# Patient Record
Sex: FEMALE | Race: BLACK OR AFRICAN AMERICAN | NOT HISPANIC OR LATINO | Employment: OTHER | URBAN - METROPOLITAN AREA
[De-identification: names, ages, dates, MRNs, and addresses within clinical notes are randomized per-mention and may not be internally consistent; named-entity substitution may affect disease eponyms.]

---

## 2017-01-13 ENCOUNTER — APPOINTMENT (OUTPATIENT)
Dept: ONCOLOGY | Facility: HOSPITAL | Age: 82
End: 2017-01-13

## 2017-01-13 ENCOUNTER — APPOINTMENT (OUTPATIENT)
Dept: LAB | Facility: HOSPITAL | Age: 82
End: 2017-01-13

## 2017-01-17 ENCOUNTER — LAB (OUTPATIENT)
Dept: LAB | Facility: HOSPITAL | Age: 82
End: 2017-01-17

## 2017-01-17 ENCOUNTER — INFUSION (OUTPATIENT)
Dept: ONCOLOGY | Facility: HOSPITAL | Age: 82
End: 2017-01-17

## 2017-01-17 VITALS — BODY MASS INDEX: 38.57 KG/M2 | WEIGHT: 199 LBS

## 2017-01-17 DIAGNOSIS — D50.9 IRON DEFICIENCY ANEMIA, UNSPECIFIED IRON DEFICIENCY ANEMIA TYPE: ICD-10-CM

## 2017-01-17 DIAGNOSIS — C50.211 MALIGNANT NEOPLASM OF UPPER-INNER QUADRANT OF RIGHT FEMALE BREAST (HCC): ICD-10-CM

## 2017-01-17 DIAGNOSIS — N18.30 ANEMIA OF CHRONIC RENAL FAILURE, STAGE 3 (MODERATE) (HCC): ICD-10-CM

## 2017-01-17 DIAGNOSIS — D63.1 ANEMIA OF CHRONIC RENAL FAILURE, STAGE 3 (MODERATE) (HCC): Primary | ICD-10-CM

## 2017-01-17 DIAGNOSIS — N18.30 ANEMIA OF CHRONIC RENAL FAILURE, STAGE 3 (MODERATE) (HCC): Primary | ICD-10-CM

## 2017-01-17 DIAGNOSIS — D63.1 ANEMIA OF CHRONIC RENAL FAILURE, STAGE 3 (MODERATE) (HCC): ICD-10-CM

## 2017-01-17 LAB
BASOPHILS # BLD AUTO: 0.01 10*3/MM3 (ref 0–0.1)
BASOPHILS NFR BLD AUTO: 0.2 % (ref 0–1.1)
DEPRECATED RDW RBC AUTO: 43.6 FL (ref 37–49)
EOSINOPHIL # BLD AUTO: 0 10*3/MM3 (ref 0–0.36)
EOSINOPHIL NFR BLD AUTO: 0 % (ref 1–5)
ERYTHROCYTE [DISTWIDTH] IN BLOOD BY AUTOMATED COUNT: 13.6 % (ref 11.7–14.5)
FERRITIN SERPL-MCNC: 592.1 NG/ML
HCT VFR BLD AUTO: 31.3 % (ref 34–45)
HGB BLD-MCNC: 9.6 G/DL (ref 11.5–14.9)
IMM GRANULOCYTES # BLD: 0.02 10*3/MM3 (ref 0–0.03)
IMM GRANULOCYTES NFR BLD: 0.3 % (ref 0–0.5)
IRON 24H UR-MRATE: 32 MCG/DL (ref 37–145)
IRON SATN MFR SERPL: 12 % (ref 14–48)
LYMPHOCYTES # BLD AUTO: 1.32 10*3/MM3 (ref 1–3.5)
LYMPHOCYTES NFR BLD AUTO: 20.8 % (ref 20–49)
MCH RBC QN AUTO: 26.9 PG (ref 27–33)
MCHC RBC AUTO-ENTMCNC: 30.7 G/DL (ref 32–35)
MCV RBC AUTO: 87.7 FL (ref 83–97)
MONOCYTES # BLD AUTO: 0.54 10*3/MM3 (ref 0.25–0.8)
MONOCYTES NFR BLD AUTO: 8.5 % (ref 4–12)
NEUTROPHILS # BLD AUTO: 4.45 10*3/MM3 (ref 1.5–7)
NEUTROPHILS NFR BLD AUTO: 70.2 % (ref 39–75)
NRBC BLD MANUAL-RTO: 0 /100 WBC (ref 0–0)
PLATELET # BLD AUTO: 215 10*3/MM3 (ref 150–375)
PMV BLD AUTO: 9.2 FL (ref 8.9–12.1)
RBC # BLD AUTO: 3.57 10*6/MM3 (ref 3.9–5)
TIBC SERPL-MCNC: 258 MCG/DL (ref 249–505)
TRANSFERRIN SERPL-MCNC: 184 MG/DL (ref 200–360)
WBC NRBC COR # BLD: 6.34 10*3/MM3 (ref 4–10)

## 2017-01-17 PROCEDURE — 82728 ASSAY OF FERRITIN: CPT | Performed by: INTERNAL MEDICINE

## 2017-01-17 PROCEDURE — 36415 COLL VENOUS BLD VENIPUNCTURE: CPT | Performed by: INTERNAL MEDICINE

## 2017-01-17 PROCEDURE — 83540 ASSAY OF IRON: CPT | Performed by: INTERNAL MEDICINE

## 2017-01-17 PROCEDURE — 84466 ASSAY OF TRANSFERRIN: CPT | Performed by: INTERNAL MEDICINE

## 2017-01-17 PROCEDURE — 85025 COMPLETE CBC W/AUTO DIFF WBC: CPT | Performed by: INTERNAL MEDICINE

## 2017-01-17 PROCEDURE — 25010000002 EPOETIN ALFA PER 1000 UNITS: Performed by: INTERNAL MEDICINE

## 2017-01-17 PROCEDURE — 96372 THER/PROPH/DIAG INJ SC/IM: CPT

## 2017-01-17 RX ADMIN — ERYTHROPOIETIN 14000 UNITS: 20000 INJECTION, SOLUTION INTRAVENOUS; SUBCUTANEOUS at 16:48

## 2017-02-03 ENCOUNTER — LAB (OUTPATIENT)
Dept: LAB | Facility: HOSPITAL | Age: 82
End: 2017-02-03

## 2017-02-03 ENCOUNTER — INFUSION (OUTPATIENT)
Dept: ONCOLOGY | Facility: HOSPITAL | Age: 82
End: 2017-02-03

## 2017-02-03 DIAGNOSIS — D63.1 ANEMIA OF CHRONIC RENAL FAILURE, STAGE 3 (MODERATE) (HCC): Primary | ICD-10-CM

## 2017-02-03 DIAGNOSIS — C50.211 MALIGNANT NEOPLASM OF UPPER-INNER QUADRANT OF RIGHT FEMALE BREAST (HCC): ICD-10-CM

## 2017-02-03 DIAGNOSIS — D50.9 IRON DEFICIENCY ANEMIA, UNSPECIFIED IRON DEFICIENCY ANEMIA TYPE: ICD-10-CM

## 2017-02-03 DIAGNOSIS — N18.30 ANEMIA OF CHRONIC RENAL FAILURE, STAGE 3 (MODERATE) (HCC): Primary | ICD-10-CM

## 2017-02-03 DIAGNOSIS — N18.30 ANEMIA OF CHRONIC RENAL FAILURE, STAGE 3 (MODERATE) (HCC): ICD-10-CM

## 2017-02-03 DIAGNOSIS — D63.1 ANEMIA OF CHRONIC RENAL FAILURE, STAGE 3 (MODERATE) (HCC): ICD-10-CM

## 2017-02-03 LAB
BASOPHILS # BLD AUTO: 0.01 10*3/MM3 (ref 0–0.1)
BASOPHILS NFR BLD AUTO: 0.2 % (ref 0–1.1)
DEPRECATED RDW RBC AUTO: 43.8 FL (ref 37–49)
EOSINOPHIL # BLD AUTO: 0 10*3/MM3 (ref 0–0.36)
EOSINOPHIL NFR BLD AUTO: 0 % (ref 1–5)
ERYTHROCYTE [DISTWIDTH] IN BLOOD BY AUTOMATED COUNT: 14.1 % (ref 11.7–14.5)
HCT VFR BLD AUTO: 30.7 % (ref 34–45)
HGB BLD-MCNC: 9.8 G/DL (ref 11.5–14.9)
IMM GRANULOCYTES # BLD: 0.04 10*3/MM3 (ref 0–0.03)
IMM GRANULOCYTES NFR BLD: 0.6 % (ref 0–0.5)
LYMPHOCYTES # BLD AUTO: 1.31 10*3/MM3 (ref 1–3.5)
LYMPHOCYTES NFR BLD AUTO: 21.1 % (ref 20–49)
MCH RBC QN AUTO: 27.1 PG (ref 27–33)
MCHC RBC AUTO-ENTMCNC: 31.9 G/DL (ref 32–35)
MCV RBC AUTO: 85 FL (ref 83–97)
MONOCYTES # BLD AUTO: 0.81 10*3/MM3 (ref 0.25–0.8)
MONOCYTES NFR BLD AUTO: 13 % (ref 4–12)
NEUTROPHILS # BLD AUTO: 4.04 10*3/MM3 (ref 1.5–7)
NEUTROPHILS NFR BLD AUTO: 65.1 % (ref 39–75)
NRBC BLD MANUAL-RTO: 0 /100 WBC (ref 0–0)
PLATELET # BLD AUTO: 201 10*3/MM3 (ref 150–375)
PMV BLD AUTO: 10.7 FL (ref 8.9–12.1)
RBC # BLD AUTO: 3.61 10*6/MM3 (ref 3.9–5)
WBC NRBC COR # BLD: 6.21 10*3/MM3 (ref 4–10)

## 2017-02-03 PROCEDURE — 25010000002 EPOETIN ALFA PER 1000 UNITS: Performed by: INTERNAL MEDICINE

## 2017-02-03 PROCEDURE — 96372 THER/PROPH/DIAG INJ SC/IM: CPT

## 2017-02-03 PROCEDURE — 85025 COMPLETE CBC W/AUTO DIFF WBC: CPT | Performed by: INTERNAL MEDICINE

## 2017-02-03 PROCEDURE — 36416 COLLJ CAPILLARY BLOOD SPEC: CPT | Performed by: INTERNAL MEDICINE

## 2017-02-03 RX ADMIN — ERYTHROPOIETIN 14000 UNITS: 20000 INJECTION, SOLUTION INTRAVENOUS; SUBCUTANEOUS at 16:12

## 2017-02-24 ENCOUNTER — LAB (OUTPATIENT)
Dept: LAB | Facility: HOSPITAL | Age: 82
End: 2017-02-24

## 2017-02-24 ENCOUNTER — INFUSION (OUTPATIENT)
Dept: ONCOLOGY | Facility: HOSPITAL | Age: 82
End: 2017-02-24

## 2017-02-24 VITALS — BODY MASS INDEX: 37.79 KG/M2 | WEIGHT: 195 LBS

## 2017-02-24 DIAGNOSIS — D50.9 IRON DEFICIENCY ANEMIA, UNSPECIFIED IRON DEFICIENCY ANEMIA TYPE: ICD-10-CM

## 2017-02-24 DIAGNOSIS — D63.1 ANEMIA ASSOCIATED WITH CHRONIC RENAL FAILURE, STAGE 3 (MODERATE): ICD-10-CM

## 2017-02-24 DIAGNOSIS — N18.3 ANEMIA ASSOCIATED WITH CHRONIC RENAL FAILURE, STAGE 3 (MODERATE): ICD-10-CM

## 2017-02-24 DIAGNOSIS — N18.3 ANEMIA ASSOCIATED WITH CHRONIC RENAL FAILURE, STAGE 3 (MODERATE): Primary | ICD-10-CM

## 2017-02-24 DIAGNOSIS — D63.1 ANEMIA OF CHRONIC RENAL FAILURE, STAGE 3 (MODERATE) (HCC): ICD-10-CM

## 2017-02-24 DIAGNOSIS — N18.30 ANEMIA OF CHRONIC RENAL FAILURE, STAGE 3 (MODERATE) (HCC): ICD-10-CM

## 2017-02-24 DIAGNOSIS — C50.211 MALIGNANT NEOPLASM OF UPPER-INNER QUADRANT OF RIGHT FEMALE BREAST (HCC): ICD-10-CM

## 2017-02-24 DIAGNOSIS — D63.1 ANEMIA ASSOCIATED WITH CHRONIC RENAL FAILURE, STAGE 3 (MODERATE): Primary | ICD-10-CM

## 2017-02-24 LAB
ALBUMIN SERPL-MCNC: 4 G/DL (ref 3.5–5.2)
ALBUMIN/GLOB SERPL: 1.2 G/DL (ref 1.1–2.4)
ALP SERPL-CCNC: 92 U/L (ref 38–116)
ALT SERPL W P-5'-P-CCNC: 17 U/L (ref 0–33)
ANION GAP SERPL CALCULATED.3IONS-SCNC: 10.9 MMOL/L
AST SERPL-CCNC: 20 U/L (ref 0–32)
BASOPHILS # BLD AUTO: 0.01 10*3/MM3 (ref 0–0.1)
BASOPHILS NFR BLD AUTO: 0.2 % (ref 0–1.1)
BILIRUB SERPL-MCNC: 0.2 MG/DL (ref 0.1–1.2)
BUN BLD-MCNC: 27 MG/DL (ref 6–20)
BUN/CREAT SERPL: 19.1 (ref 7.3–30)
CALCIUM SPEC-SCNC: 10.1 MG/DL (ref 8.5–10.2)
CHLORIDE SERPL-SCNC: 100 MMOL/L (ref 98–107)
CO2 SERPL-SCNC: 33.1 MMOL/L (ref 22–29)
CREAT BLD-MCNC: 1.41 MG/DL (ref 0.6–1.1)
DEPRECATED RDW RBC AUTO: 43.8 FL (ref 37–49)
EOSINOPHIL # BLD AUTO: 0 10*3/MM3 (ref 0–0.36)
EOSINOPHIL NFR BLD AUTO: 0 % (ref 1–5)
ERYTHROCYTE [DISTWIDTH] IN BLOOD BY AUTOMATED COUNT: 13.6 % (ref 11.7–14.5)
FERRITIN SERPL-MCNC: 599.5 NG/ML
GFR SERPL CREATININE-BSD FRML MDRD: 43 ML/MIN/1.73
GLOBULIN UR ELPH-MCNC: 3.3 GM/DL (ref 1.8–3.5)
GLUCOSE BLD-MCNC: 231 MG/DL (ref 74–124)
HCT VFR BLD AUTO: 30.3 % (ref 34–45)
HGB BLD-MCNC: 9.3 G/DL (ref 11.5–14.9)
HGB RETIC QN: 31 PG (ref 29.8–36.1)
IMM GRANULOCYTES # BLD: 0.02 10*3/MM3 (ref 0–0.03)
IMM GRANULOCYTES NFR BLD: 0.3 % (ref 0–0.5)
IMM RETICS NFR: 10.8 % (ref 3–15.8)
IRON 24H UR-MRATE: 48 MCG/DL (ref 37–145)
IRON SATN MFR SERPL: 20 % (ref 14–48)
LYMPHOCYTES # BLD AUTO: 1.39 10*3/MM3 (ref 1–3.5)
LYMPHOCYTES NFR BLD AUTO: 21.3 % (ref 20–49)
MCH RBC QN AUTO: 27.2 PG (ref 27–33)
MCHC RBC AUTO-ENTMCNC: 30.7 G/DL (ref 32–35)
MCV RBC AUTO: 88.6 FL (ref 83–97)
MONOCYTES # BLD AUTO: 0.54 10*3/MM3 (ref 0.25–0.8)
MONOCYTES NFR BLD AUTO: 8.3 % (ref 4–12)
NEUTROPHILS # BLD AUTO: 4.58 10*3/MM3 (ref 1.5–7)
NEUTROPHILS NFR BLD AUTO: 69.9 % (ref 39–75)
NRBC BLD MANUAL-RTO: 0 /100 WBC (ref 0–0)
PLATELET # BLD AUTO: 186 10*3/MM3 (ref 150–375)
PMV BLD AUTO: 9.1 FL (ref 8.9–12.1)
POTASSIUM BLD-SCNC: 4.4 MMOL/L (ref 3.5–4.7)
PROT SERPL-MCNC: 7.3 G/DL (ref 6.3–8)
RBC # BLD AUTO: 3.42 10*6/MM3 (ref 3.9–5)
RETICS/RBC NFR AUTO: 1.78 % (ref 0.6–2)
SODIUM BLD-SCNC: 144 MMOL/L (ref 134–145)
TIBC SERPL-MCNC: 245 MCG/DL (ref 249–505)
TRANSFERRIN SERPL-MCNC: 175 MG/DL (ref 200–360)
WBC NRBC COR # BLD: 6.54 10*3/MM3 (ref 4–10)

## 2017-02-24 PROCEDURE — 85025 COMPLETE CBC W/AUTO DIFF WBC: CPT | Performed by: INTERNAL MEDICINE

## 2017-02-24 PROCEDURE — 36415 COLL VENOUS BLD VENIPUNCTURE: CPT | Performed by: INTERNAL MEDICINE

## 2017-02-24 PROCEDURE — 82728 ASSAY OF FERRITIN: CPT | Performed by: INTERNAL MEDICINE

## 2017-02-24 PROCEDURE — 85046 RETICYTE/HGB CONCENTRATE: CPT | Performed by: INTERNAL MEDICINE

## 2017-02-24 PROCEDURE — 63510000001 EPOETIN ALFA PER 1000 UNITS: Performed by: INTERNAL MEDICINE

## 2017-02-24 PROCEDURE — 84466 ASSAY OF TRANSFERRIN: CPT | Performed by: INTERNAL MEDICINE

## 2017-02-24 PROCEDURE — 80053 COMPREHEN METABOLIC PANEL: CPT | Performed by: INTERNAL MEDICINE

## 2017-02-24 PROCEDURE — 96372 THER/PROPH/DIAG INJ SC/IM: CPT

## 2017-02-24 PROCEDURE — 83540 ASSAY OF IRON: CPT | Performed by: INTERNAL MEDICINE

## 2017-02-24 RX ADMIN — ERYTHROPOIETIN 14000 UNITS: 20000 INJECTION, SOLUTION INTRAVENOUS; SUBCUTANEOUS at 16:17

## 2017-03-17 ENCOUNTER — LAB (OUTPATIENT)
Dept: LAB | Facility: HOSPITAL | Age: 82
End: 2017-03-17

## 2017-03-17 ENCOUNTER — INFUSION (OUTPATIENT)
Dept: ONCOLOGY | Facility: HOSPITAL | Age: 82
End: 2017-03-17

## 2017-03-17 DIAGNOSIS — N18.30 ANEMIA OF CHRONIC RENAL FAILURE, STAGE 3 (MODERATE) (HCC): Primary | ICD-10-CM

## 2017-03-17 DIAGNOSIS — D63.1 ANEMIA OF CHRONIC RENAL FAILURE, STAGE 3 (MODERATE) (HCC): Primary | ICD-10-CM

## 2017-03-17 DIAGNOSIS — C50.211 MALIGNANT NEOPLASM OF UPPER-INNER QUADRANT OF RIGHT FEMALE BREAST (HCC): ICD-10-CM

## 2017-03-17 DIAGNOSIS — D50.9 IRON DEFICIENCY ANEMIA, UNSPECIFIED IRON DEFICIENCY ANEMIA TYPE: ICD-10-CM

## 2017-03-17 LAB
BASOPHILS # BLD AUTO: 0.02 10*3/MM3 (ref 0–0.1)
BASOPHILS NFR BLD AUTO: 0.3 % (ref 0–1.1)
DEPRECATED RDW RBC AUTO: 43.8 FL (ref 37–49)
EOSINOPHIL # BLD AUTO: 0 10*3/MM3 (ref 0–0.36)
EOSINOPHIL NFR BLD AUTO: 0 % (ref 1–5)
ERYTHROCYTE [DISTWIDTH] IN BLOOD BY AUTOMATED COUNT: 13.3 % (ref 11.7–14.5)
FERRITIN SERPL-MCNC: 545.9 NG/ML
HCT VFR BLD AUTO: 30.8 % (ref 34–45)
HGB BLD-MCNC: 8.9 G/DL (ref 11.5–14.9)
HGB RETIC QN: 31.2 PG (ref 29.8–36.1)
IMM GRANULOCYTES # BLD: 0.01 10*3/MM3 (ref 0–0.03)
IMM GRANULOCYTES NFR BLD: 0.2 % (ref 0–0.5)
IMM RETICS NFR: 10.8 % (ref 3–15.8)
IRON 24H UR-MRATE: 40 MCG/DL (ref 37–145)
IRON SATN MFR SERPL: 17 % (ref 14–48)
LYMPHOCYTES # BLD AUTO: 1.45 10*3/MM3 (ref 1–3.5)
LYMPHOCYTES NFR BLD AUTO: 23.5 % (ref 20–49)
MCH RBC QN AUTO: 26.3 PG (ref 27–33)
MCHC RBC AUTO-ENTMCNC: 28.9 G/DL (ref 32–35)
MCV RBC AUTO: 91.1 FL (ref 83–97)
MONOCYTES # BLD AUTO: 0.61 10*3/MM3 (ref 0.25–0.8)
MONOCYTES NFR BLD AUTO: 9.9 % (ref 4–12)
NEUTROPHILS # BLD AUTO: 4.08 10*3/MM3 (ref 1.5–7)
NEUTROPHILS NFR BLD AUTO: 66.1 % (ref 39–75)
NRBC BLD MANUAL-RTO: 0 /100 WBC (ref 0–0)
PLATELET # BLD AUTO: 239 10*3/MM3 (ref 150–375)
PMV BLD AUTO: 9.7 FL (ref 8.9–12.1)
RBC # BLD AUTO: 3.38 10*6/MM3 (ref 3.9–5)
RETICS/RBC NFR AUTO: 2.17 % (ref 0.6–2)
TIBC SERPL-MCNC: 238 MCG/DL (ref 249–505)
TRANSFERRIN SERPL-MCNC: 170 MG/DL (ref 200–360)
WBC NRBC COR # BLD: 6.17 10*3/MM3 (ref 4–10)

## 2017-03-17 PROCEDURE — 63510000001 EPOETIN ALFA PER 1000 UNITS: Performed by: INTERNAL MEDICINE

## 2017-03-17 PROCEDURE — 85025 COMPLETE CBC W/AUTO DIFF WBC: CPT | Performed by: INTERNAL MEDICINE

## 2017-03-17 PROCEDURE — 83540 ASSAY OF IRON: CPT | Performed by: INTERNAL MEDICINE

## 2017-03-17 PROCEDURE — 84466 ASSAY OF TRANSFERRIN: CPT | Performed by: INTERNAL MEDICINE

## 2017-03-17 PROCEDURE — 85046 RETICYTE/HGB CONCENTRATE: CPT | Performed by: INTERNAL MEDICINE

## 2017-03-17 PROCEDURE — 96372 THER/PROPH/DIAG INJ SC/IM: CPT

## 2017-03-17 PROCEDURE — 36415 COLL VENOUS BLD VENIPUNCTURE: CPT | Performed by: INTERNAL MEDICINE

## 2017-03-17 PROCEDURE — 82728 ASSAY OF FERRITIN: CPT | Performed by: INTERNAL MEDICINE

## 2017-03-17 RX ADMIN — ERYTHROPOIETIN 14000 UNITS: 20000 INJECTION, SOLUTION INTRAVENOUS; SUBCUTANEOUS at 16:17

## 2017-03-31 RX ORDER — ANASTROZOLE 1 MG/1
TABLET ORAL
Qty: 30 TABLET | Refills: 5 | Status: SHIPPED | OUTPATIENT
Start: 2017-03-31 | End: 2017-09-28 | Stop reason: SDUPTHER

## 2017-04-10 ENCOUNTER — OFFICE VISIT (OUTPATIENT)
Dept: ONCOLOGY | Facility: CLINIC | Age: 82
End: 2017-04-10

## 2017-04-10 ENCOUNTER — INFUSION (OUTPATIENT)
Dept: ONCOLOGY | Facility: HOSPITAL | Age: 82
End: 2017-04-10

## 2017-04-10 ENCOUNTER — LAB (OUTPATIENT)
Dept: LAB | Facility: HOSPITAL | Age: 82
End: 2017-04-10

## 2017-04-10 VITALS
DIASTOLIC BLOOD PRESSURE: 80 MMHG | SYSTOLIC BLOOD PRESSURE: 120 MMHG | HEIGHT: 60 IN | WEIGHT: 192.2 LBS | RESPIRATION RATE: 16 BRPM | HEART RATE: 82 BPM | BODY MASS INDEX: 37.73 KG/M2 | TEMPERATURE: 98.6 F

## 2017-04-10 DIAGNOSIS — N18.30 ANEMIA OF CHRONIC RENAL FAILURE, STAGE 3 (MODERATE) (HCC): ICD-10-CM

## 2017-04-10 DIAGNOSIS — D63.1 ANEMIA OF CHRONIC RENAL FAILURE, STAGE 3 (MODERATE) (HCC): ICD-10-CM

## 2017-04-10 DIAGNOSIS — C50.211 MALIGNANT NEOPLASM OF UPPER-INNER QUADRANT OF RIGHT FEMALE BREAST (HCC): Primary | ICD-10-CM

## 2017-04-10 DIAGNOSIS — D50.9 IRON DEFICIENCY ANEMIA, UNSPECIFIED IRON DEFICIENCY ANEMIA TYPE: ICD-10-CM

## 2017-04-10 DIAGNOSIS — N18.30 ANEMIA OF CHRONIC RENAL FAILURE, STAGE 3 (MODERATE) (HCC): Primary | ICD-10-CM

## 2017-04-10 DIAGNOSIS — D63.1 ANEMIA OF CHRONIC RENAL FAILURE, STAGE 3 (MODERATE) (HCC): Primary | ICD-10-CM

## 2017-04-10 LAB
BASOPHILS # BLD AUTO: 0.03 10*3/MM3 (ref 0–0.1)
BASOPHILS NFR BLD AUTO: 0.5 % (ref 0–1.1)
DEPRECATED RDW RBC AUTO: 41.2 FL (ref 37–49)
EOSINOPHIL # BLD AUTO: 0 10*3/MM3 (ref 0–0.36)
EOSINOPHIL NFR BLD AUTO: 0 % (ref 1–5)
ERYTHROCYTE [DISTWIDTH] IN BLOOD BY AUTOMATED COUNT: 13 % (ref 11.7–14.5)
HCT VFR BLD AUTO: 31.3 % (ref 34–45)
HGB BLD-MCNC: 9.8 G/DL (ref 11.5–14.9)
IMM GRANULOCYTES # BLD: 0.04 10*3/MM3 (ref 0–0.03)
IMM GRANULOCYTES NFR BLD: 0.7 % (ref 0–0.5)
LYMPHOCYTES # BLD AUTO: 1.37 10*3/MM3 (ref 1–3.5)
LYMPHOCYTES NFR BLD AUTO: 24.3 % (ref 20–49)
MCH RBC QN AUTO: 27.4 PG (ref 27–33)
MCHC RBC AUTO-ENTMCNC: 31.3 G/DL (ref 32–35)
MCV RBC AUTO: 87.4 FL (ref 83–97)
MONOCYTES # BLD AUTO: 0.53 10*3/MM3 (ref 0.25–0.8)
MONOCYTES NFR BLD AUTO: 9.4 % (ref 4–12)
NEUTROPHILS # BLD AUTO: 3.66 10*3/MM3 (ref 1.5–7)
NEUTROPHILS NFR BLD AUTO: 65.1 % (ref 39–75)
NRBC BLD MANUAL-RTO: 0 /100 WBC (ref 0–0)
PLATELET # BLD AUTO: 242 10*3/MM3 (ref 150–375)
PMV BLD AUTO: 9.7 FL (ref 8.9–12.1)
RBC # BLD AUTO: 3.58 10*6/MM3 (ref 3.9–5)
WBC NRBC COR # BLD: 5.63 10*3/MM3 (ref 4–10)

## 2017-04-10 PROCEDURE — 36415 COLL VENOUS BLD VENIPUNCTURE: CPT

## 2017-04-10 PROCEDURE — 85025 COMPLETE CBC W/AUTO DIFF WBC: CPT

## 2017-04-10 PROCEDURE — 99214 OFFICE O/P EST MOD 30 MIN: CPT | Performed by: INTERNAL MEDICINE

## 2017-04-10 PROCEDURE — 63510000001 EPOETIN ALFA PER 1000 UNITS: Performed by: INTERNAL MEDICINE

## 2017-04-10 PROCEDURE — 96372 THER/PROPH/DIAG INJ SC/IM: CPT

## 2017-04-10 RX ADMIN — ERYTHROPOIETIN 14000 UNITS: 20000 INJECTION, SOLUTION INTRAVENOUS; SUBCUTANEOUS at 16:12

## 2017-04-10 NOTE — PROGRESS NOTES
Subjective .     REASONS FOR FOLLOWUP:  Breast cancer, anemia    HISTORY OF PRESENT ILLNESS:  The patient is a 81 y.o. year old female  who is here for follow-up with the above-mentioned history.    Denies chest pain.  Denies shortness of air.  Denies dizziness.  Denies weakness.  Denies bleeding from any location.  Denies hot flashes.    Past Medical History:   Diagnosis Date   • Anemia     Iron deficiency anemia    • Anxiety    • Arthritis    • Breast cancer    • Chronic kidney disease     Anemia of chronic renal insufficency, stage 3   • Depression    • Diabetes mellitus    • Hypertension    • YARY (obstructive sleep apnea)    • Poor circulation    • Stroke        HEMATOLOGIC/ONCOLOGIC HISTORY:  (History from previous dates can be found in the separate document.)    MEDICATIONS    Current Outpatient Prescriptions:   •  amLODIPine (NORVASC) 10 MG tablet, Take 1 tablet by mouth daily., Disp: , Rfl:   •  anastrozole (ARIMIDEX) 1 MG tablet, take 1 tablet by mouth once daily, Disp: 30 tablet, Rfl: 5  •  aspirin 81 MG tablet, Take 81 mg by mouth., Disp: , Rfl:   •  busPIRone (BUSPAR) 15 MG tablet, Take 1 tablet by mouth every morning. And 2 tablets in the evening, Disp: , Rfl:   •  calcium carbonate-vitamin d (CALCIUM 600+D) 600-400 MG-UNIT per tablet, Take 1 tablet by mouth., Disp: , Rfl:   •  clopidogrel (PLAVIX) 75 MG tablet, take 1 tablet by mouth once daily, Disp: , Rfl:   •  cyanocobalamin (VITAMIN B-12) 1000 MCG tablet, Take 100 mcg by mouth., Disp: , Rfl:   •  doxepin (SINEquan) 50 MG capsule, take 1 tablet by mouth three times a day at bedtime, Disp: , Rfl: 0  •  epoetin joe (EPOGEN,PROCRIT) 45672 UNIT/ML injection, Epogen SOLN; Patient Sig: Epogen SOLN 5,000u if hgb is below 12   subq injectable; 0; 22-Jul-2015; Active, Disp: , Rfl:   •  FERROUS SULFATE PO, Take  by mouth., Disp: , Rfl:   •  furosemide (LASIX) 40 MG tablet, Take 1 tablet by mouth daily., Disp: , Rfl:   •  glucose blood test strip, TRUEtest  Test In Vitro Strip; Patient Sig: TRUEtest Test In Vitro Strip ; 200; 0; 27-May-2014; Active, Disp: , Rfl:   •  HYDROcodone-acetaminophen (NORCO) 5-325 MG per tablet, Take 1 tablet by mouth every 6 (six) hours as needed. For pain, Disp: , Rfl:   •  insulin aspart (NovoLOG) 100 UNIT/ML injection, Inject 8 Units under the skin Medrol Dose Pack scheduling ONLY., Disp: , Rfl:   •  Insulin Glargine 100 UNIT/ML solution pen-injector, Inject 20 Units under the skin daily., Disp: , Rfl:   •  ipratropium-albuterol (DUONEB) 0.5-2.5 mg/mL nebulizer, Inhale 3 mL 3 (Three) Times a Day., Disp: , Rfl:   •  lansoprazole (PREVACID) 30 MG capsule, Take 1 capsule by mouth daily., Disp: , Rfl:   •  LANTUS 100 UNIT/ML injection, , Disp: , Rfl: 0  •  levETIRAcetam (KEPPRA) 500 MG tablet, Take 1 tablet by mouth 2 (two) times a day., Disp: , Rfl:   •  losartan (COZAAR) 100 MG tablet, Take 1 tablet by mouth daily., Disp: , Rfl:   •  meclizine (ANTIVERT) 25 MG tablet, take 1 tablet by mouth three times a day if needed for dizziness, Disp: , Rfl: 0  •  metoclopramide (REGLAN) 10 MG tablet, Take 1 tablet by mouth daily. Before a meal, Disp: , Rfl:   •  Multiple Vitamins-Minerals (MULTIVITAL PO), Take 1 tablet by mouth daily., Disp: , Rfl:   •  pravastatin (PRAVACHOL) 40 MG tablet, Take 1 tablet by mouth nightly., Disp: , Rfl:   •  temazepam (RESTORIL) 30 MG capsule, Take 1 capsule by mouth nightly as needed. At bedtime, Disp: , Rfl:     ALLERGIES:     Allergies   Allergen Reactions   • Sulfa Antibiotics Other (See Comments)     GI upset   • Penicillins Itching and Rash       SOCIAL HISTORY:       Social History     Social History   • Marital status:      Spouse name: N/A   • Number of children: N/A   • Years of education: High School     Occupational History   • Nurse aid Retired     Social History Main Topics   • Smoking status: Never Smoker   • Smokeless tobacco: Never Used   • Alcohol use No   • Drug use: No   • Sexual activity: Not  "on file     Other Topics Concern   • Not on file     Social History Narrative         FAMILY HISTORY:  Family History   Problem Relation Age of Onset   • Cancer Father    • Cancer Brother    • Diabetes Brother    • Heart disease Brother    • Cancer Other        REVIEW OF SYSTEMS:  GENERAL: No change in appetite or weight;   No fevers, chills, sweats.    SKIN: No nonhealing lesions.   No rashes.  HEME/LYMPH: No easy bruising, bleeding.   No swollen nodes.   EYES: No vision changes or diplopia.   ENT: No tinnitus, hearing loss, gum bleeding, epistaxis, hoarseness or dysphagia.   RESPIRATORY: No cough, shortness of breath, hemoptysis or wheezing.   CVS: No chest pain, palpitations, orthopnea, dyspnea on exertion or PND.   GI: No melena or hematochezia.   No abdominal pain.  No nausea, vomiting, constipation, diarrhea  : No lower tract obstructive symptoms, dysuria or hematuria.   MUSCULOSKELETAL: No bone pain.  No joint stiffness.   NEUROLOGICAL: No global weakness, loss of consciousness or seizures.   PSYCHIATRIC: No increased nervousness, mood changes or depression.          Objective    Vitals:    04/10/17 1544   BP: 120/80   Pulse: 82   Resp: 16   Temp: 98.6 °F (37 °C)   Weight: 192 lb 3.2 oz (87.2 kg)   Height: 60.23\" (153 cm)   PainSc: 0-No pain     Current Status 4/10/2017   ECOG score 0      PHYSICAL EXAM:    GENERAL:  Well-developed, well-nourished in no acute distress.   SKIN:  Warm, dry without rashes, purpura or petechiae.  HEAD:  Normocephalic.  EYES:  Pupils equal, round and reactive to light.  EOMs intact.  Conjunctivae normal.  EARS:  Hearing intact.  NOSE:  Septum midline.  No excoriations or nasal discharge.  MOUTH:  Tongue is well-papillated; no stomatitis or ulcers.  Lips normal.  THROAT:  Oropharynx without lesions or exudates.  NECK:  Supple with good range of motion; no thyromegaly or masses, no JVD.  LYMPHATICS:  No cervical, supraclavicular, axillary or inguinal adenopathy.  CHEST:  Lungs " clear to percussion and auscultation. Good airflow.  CARDIAC:  Regular rate and rhythm without murmurs, rubs or gallops. Normal S1,S2.  ABDOMEN:  Soft, nontender with no organomegaly or masses.  EXTREMITIES:  No clubbing, cyanosis or edema.  NEUROLOGICAL:  Cranial Nerves II-XII grossly intact.  No focal neurological deficits.  PSYCHIATRIC:  Normal affect and mood.      RECENT LABS:        WBC   Date/Time Value Ref Range Status   04/10/2017 03:34 PM 5.63 4.00 - 10.00 10*3/mm3 Final     Hemoglobin   Date/Time Value Ref Range Status   04/10/2017 03:34 PM 9.8 (L) 11.5 - 14.9 g/dL Final     Platelets   Date/Time Value Ref Range Status   04/10/2017 03:34  150 - 375 10*3/mm3 Final       Assessment/Plan   ASSESSMENT:  Malignant neoplasm of upper-inner quadrant of right female breast  - CBC & Differential  - CBC & Differential  - CBC & Differential  - CBC & Differential  - Ferritin  - Iron Profile    Anemia of chronic renal failure, stage 3 (moderate)  - CBC & Differential  - CBC & Differential  - CBC & Differential  - CBC & Differential  - Ferritin  - Iron Profile    Iron deficiency anemia, unspecified iron deficiency anemia type  - CBC & Differential  - CBC & Differential  - CBC & Differential  - CBC & Differential  - Ferritin  - Iron Profile    1.  Breast cancer.  Right-sided invasive ductal carcinoma, papillary type, removed with lumpectomy on 08/26/2014 by Dr. Sung Griggs. Only 1.7 mm of remaining invasive carcinoma. Node negative. Completed radiation.   Arimidex 10/17/2014 until planned 10/17/2019. Tolerating Arimidex well.     2.  Bone health. Normal bone density on DEXA scan 9/23/16. Calcium carbonate 600 mg with vitamin D once daily, changed to calcium citrate 1000 mg with vitamin D when Arimidex was started (she is also on lansoprazole).     3.  Anemia of chronic renal insufficiency, stage 3.   Procrit p.r.n. for hemoglobin less than 10.   On the late March 2016 visit, changed from every 5 week CBC to every  2 week CBC due to a fall in hemoglobin.  She has not needed much Procrit on the every 3 week interval she is currently on.  However, family prefers to maintain 3 week interval checks.    4.  Iron deficiency anemia. History of Venofer and Feraheme use. Does not respond well to p.o. iron but is taking ferrous sulfate 1 tablet 3 times per day without any problems (she will continue this).   In May 2015, she received one dose of Feraheme due to an iron percent saturation of around 10% and MCV of 82.6 despite an elevated ferritin. I think her ferritin is chronically high because of inflammation).   Recent iron labs: Unremarkable     PLAN:  · CBC every 3 week.    · Procrit if hemoglobin less than 10.    · M.D. visit in 12 weeks with iron studies 3 weeks prior  · Next DEXA scan due sometime after 9/23/18  · She states Dr. Plascencia does her breast exams and orders her mammograms and she is up-to-date.    · Continue Arimidex calcium and vitamin D.

## 2017-05-01 ENCOUNTER — INFUSION (OUTPATIENT)
Dept: ONCOLOGY | Facility: HOSPITAL | Age: 82
End: 2017-05-01

## 2017-05-01 ENCOUNTER — LAB (OUTPATIENT)
Dept: LAB | Facility: HOSPITAL | Age: 82
End: 2017-05-01

## 2017-05-01 VITALS — WEIGHT: 192 LBS | BODY MASS INDEX: 37.21 KG/M2

## 2017-05-01 DIAGNOSIS — D63.1 ANEMIA OF CHRONIC RENAL FAILURE, STAGE 3 (MODERATE) (HCC): Primary | ICD-10-CM

## 2017-05-01 DIAGNOSIS — N18.30 ANEMIA OF CHRONIC RENAL FAILURE, STAGE 3 (MODERATE) (HCC): Primary | ICD-10-CM

## 2017-05-01 DIAGNOSIS — D63.1 ANEMIA OF CHRONIC RENAL FAILURE, STAGE 3 (MODERATE) (HCC): ICD-10-CM

## 2017-05-01 DIAGNOSIS — C50.211 MALIGNANT NEOPLASM OF UPPER-INNER QUADRANT OF RIGHT FEMALE BREAST (HCC): ICD-10-CM

## 2017-05-01 DIAGNOSIS — N18.30 ANEMIA OF CHRONIC RENAL FAILURE, STAGE 3 (MODERATE) (HCC): ICD-10-CM

## 2017-05-01 DIAGNOSIS — D50.9 IRON DEFICIENCY ANEMIA, UNSPECIFIED IRON DEFICIENCY ANEMIA TYPE: ICD-10-CM

## 2017-05-01 LAB
BASOPHILS # BLD AUTO: 0.02 10*3/MM3 (ref 0–0.1)
BASOPHILS NFR BLD AUTO: 0.3 % (ref 0–1.1)
DEPRECATED RDW RBC AUTO: 41.5 FL (ref 37–49)
EOSINOPHIL # BLD AUTO: 0 10*3/MM3 (ref 0–0.36)
EOSINOPHIL NFR BLD AUTO: 0 % (ref 1–5)
ERYTHROCYTE [DISTWIDTH] IN BLOOD BY AUTOMATED COUNT: 12.9 % (ref 11.7–14.5)
HCT VFR BLD AUTO: 30.7 % (ref 34–45)
HGB BLD-MCNC: 9.7 G/DL (ref 11.5–14.9)
IMM GRANULOCYTES # BLD: 0.03 10*3/MM3 (ref 0–0.03)
IMM GRANULOCYTES NFR BLD: 0.4 % (ref 0–0.5)
LYMPHOCYTES # BLD AUTO: 1.17 10*3/MM3 (ref 1–3.5)
LYMPHOCYTES NFR BLD AUTO: 14.9 % (ref 20–49)
MCH RBC QN AUTO: 28 PG (ref 27–33)
MCHC RBC AUTO-ENTMCNC: 31.6 G/DL (ref 32–35)
MCV RBC AUTO: 88.5 FL (ref 83–97)
MONOCYTES # BLD AUTO: 0.58 10*3/MM3 (ref 0.25–0.8)
MONOCYTES NFR BLD AUTO: 7.4 % (ref 4–12)
NEUTROPHILS # BLD AUTO: 6.04 10*3/MM3 (ref 1.5–7)
NEUTROPHILS NFR BLD AUTO: 77 % (ref 39–75)
NRBC BLD MANUAL-RTO: 0 /100 WBC (ref 0–0)
PLATELET # BLD AUTO: 215 10*3/MM3 (ref 150–375)
PMV BLD AUTO: 10.5 FL (ref 8.9–12.1)
RBC # BLD AUTO: 3.47 10*6/MM3 (ref 3.9–5)
WBC NRBC COR # BLD: 7.84 10*3/MM3 (ref 4–10)

## 2017-05-01 PROCEDURE — 63510000001 EPOETIN ALFA PER 1000 UNITS: Performed by: INTERNAL MEDICINE

## 2017-05-01 PROCEDURE — 85025 COMPLETE CBC W/AUTO DIFF WBC: CPT | Performed by: INTERNAL MEDICINE

## 2017-05-01 PROCEDURE — 96372 THER/PROPH/DIAG INJ SC/IM: CPT

## 2017-05-01 PROCEDURE — 36416 COLLJ CAPILLARY BLOOD SPEC: CPT | Performed by: INTERNAL MEDICINE

## 2017-05-01 RX ADMIN — ERYTHROPOIETIN 14000 UNITS: 20000 INJECTION, SOLUTION INTRAVENOUS; SUBCUTANEOUS at 15:45

## 2017-05-22 ENCOUNTER — APPOINTMENT (OUTPATIENT)
Dept: ONCOLOGY | Facility: HOSPITAL | Age: 82
End: 2017-05-22

## 2017-05-22 ENCOUNTER — APPOINTMENT (OUTPATIENT)
Dept: LAB | Facility: HOSPITAL | Age: 82
End: 2017-05-22

## 2017-05-30 ENCOUNTER — LAB (OUTPATIENT)
Dept: LAB | Facility: HOSPITAL | Age: 82
End: 2017-05-30

## 2017-05-30 ENCOUNTER — INFUSION (OUTPATIENT)
Dept: ONCOLOGY | Facility: HOSPITAL | Age: 82
End: 2017-05-30

## 2017-05-30 DIAGNOSIS — D63.1 ANEMIA OF CHRONIC RENAL FAILURE, STAGE 3 (MODERATE) (HCC): ICD-10-CM

## 2017-05-30 DIAGNOSIS — N18.30 ANEMIA OF CHRONIC RENAL FAILURE, STAGE 3 (MODERATE) (HCC): ICD-10-CM

## 2017-05-30 LAB
BASOPHILS # BLD AUTO: 0.03 10*3/MM3 (ref 0–0.1)
BASOPHILS NFR BLD AUTO: 0.3 % (ref 0–1.1)
DEPRECATED RDW RBC AUTO: 41.6 FL (ref 37–49)
EOSINOPHIL # BLD AUTO: 0 10*3/MM3 (ref 0–0.36)
EOSINOPHIL NFR BLD AUTO: 0 % (ref 1–5)
ERYTHROCYTE [DISTWIDTH] IN BLOOD BY AUTOMATED COUNT: 12.8 % (ref 11.7–14.5)
HCT VFR BLD AUTO: 32.1 % (ref 34–45)
HGB BLD-MCNC: 10 G/DL (ref 11.5–14.9)
IMM GRANULOCYTES # BLD: 0.06 10*3/MM3 (ref 0–0.03)
IMM GRANULOCYTES NFR BLD: 0.7 % (ref 0–0.5)
LYMPHOCYTES # BLD AUTO: 2.34 10*3/MM3 (ref 1–3.5)
LYMPHOCYTES NFR BLD AUTO: 26.7 % (ref 20–49)
MCH RBC QN AUTO: 27.6 PG (ref 27–33)
MCHC RBC AUTO-ENTMCNC: 31.2 G/DL (ref 32–35)
MCV RBC AUTO: 88.7 FL (ref 83–97)
MONOCYTES # BLD AUTO: 0.81 10*3/MM3 (ref 0.25–0.8)
MONOCYTES NFR BLD AUTO: 9.2 % (ref 4–12)
NEUTROPHILS # BLD AUTO: 5.53 10*3/MM3 (ref 1.5–7)
NEUTROPHILS NFR BLD AUTO: 63.1 % (ref 39–75)
NRBC BLD MANUAL-RTO: 0 /100 WBC (ref 0–0)
PLATELET # BLD AUTO: 250 10*3/MM3 (ref 150–375)
PMV BLD AUTO: 10.1 FL (ref 8.9–12.1)
RBC # BLD AUTO: 3.62 10*6/MM3 (ref 3.9–5)
WBC NRBC COR # BLD: 8.77 10*3/MM3 (ref 4–10)

## 2017-05-30 PROCEDURE — 36416 COLLJ CAPILLARY BLOOD SPEC: CPT

## 2017-05-30 PROCEDURE — 85025 COMPLETE CBC W/AUTO DIFF WBC: CPT

## 2017-06-23 ENCOUNTER — INFUSION (OUTPATIENT)
Dept: ONCOLOGY | Facility: HOSPITAL | Age: 82
End: 2017-06-23

## 2017-06-23 ENCOUNTER — LAB (OUTPATIENT)
Dept: LAB | Facility: HOSPITAL | Age: 82
End: 2017-06-23

## 2017-06-23 VITALS — WEIGHT: 196 LBS | BODY MASS INDEX: 37.99 KG/M2

## 2017-06-23 DIAGNOSIS — C50.211 MALIGNANT NEOPLASM OF UPPER-INNER QUADRANT OF RIGHT FEMALE BREAST (HCC): ICD-10-CM

## 2017-06-23 DIAGNOSIS — D50.9 IRON DEFICIENCY ANEMIA, UNSPECIFIED IRON DEFICIENCY ANEMIA TYPE: ICD-10-CM

## 2017-06-23 DIAGNOSIS — D63.1 ANEMIA OF CHRONIC RENAL FAILURE, STAGE 3 (MODERATE) (HCC): Primary | ICD-10-CM

## 2017-06-23 DIAGNOSIS — D63.1 ANEMIA OF CHRONIC RENAL FAILURE, STAGE 3 (MODERATE) (HCC): ICD-10-CM

## 2017-06-23 DIAGNOSIS — N18.30 ANEMIA OF CHRONIC RENAL FAILURE, STAGE 3 (MODERATE) (HCC): Primary | ICD-10-CM

## 2017-06-23 DIAGNOSIS — N18.30 ANEMIA OF CHRONIC RENAL FAILURE, STAGE 3 (MODERATE) (HCC): ICD-10-CM

## 2017-06-23 LAB
BASOPHILS # BLD AUTO: 0.02 10*3/MM3 (ref 0–0.1)
BASOPHILS NFR BLD AUTO: 0.3 % (ref 0–1.1)
DEPRECATED RDW RBC AUTO: 42.4 FL (ref 37–49)
EOSINOPHIL # BLD AUTO: 0 10*3/MM3 (ref 0–0.36)
EOSINOPHIL NFR BLD AUTO: 0 % (ref 1–5)
ERYTHROCYTE [DISTWIDTH] IN BLOOD BY AUTOMATED COUNT: 12.5 % (ref 11.7–14.5)
FERRITIN SERPL-MCNC: 507.4 NG/ML
HCT VFR BLD AUTO: 30.9 % (ref 34–45)
HGB BLD-MCNC: 9.4 G/DL (ref 11.5–14.9)
IMM GRANULOCYTES # BLD: 0.05 10*3/MM3 (ref 0–0.03)
IMM GRANULOCYTES NFR BLD: 0.7 % (ref 0–0.5)
IRON 24H UR-MRATE: 51 MCG/DL (ref 37–145)
IRON SATN MFR SERPL: 19 % (ref 14–48)
LYMPHOCYTES # BLD AUTO: 1.54 10*3/MM3 (ref 1–3.5)
LYMPHOCYTES NFR BLD AUTO: 22.1 % (ref 20–49)
MCH RBC QN AUTO: 28.1 PG (ref 27–33)
MCHC RBC AUTO-ENTMCNC: 30.4 G/DL (ref 32–35)
MCV RBC AUTO: 92.2 FL (ref 83–97)
MONOCYTES # BLD AUTO: 0.58 10*3/MM3 (ref 0.25–0.8)
MONOCYTES NFR BLD AUTO: 8.3 % (ref 4–12)
NEUTROPHILS # BLD AUTO: 4.79 10*3/MM3 (ref 1.5–7)
NEUTROPHILS NFR BLD AUTO: 68.6 % (ref 39–75)
NRBC BLD MANUAL-RTO: 0 /100 WBC (ref 0–0)
PLATELET # BLD AUTO: 219 10*3/MM3 (ref 150–375)
PMV BLD AUTO: 9.3 FL (ref 8.9–12.1)
RBC # BLD AUTO: 3.35 10*6/MM3 (ref 3.9–5)
TIBC SERPL-MCNC: 265 MCG/DL (ref 249–505)
TRANSFERRIN SERPL-MCNC: 189 MG/DL (ref 200–360)
WBC NRBC COR # BLD: 6.98 10*3/MM3 (ref 4–10)

## 2017-06-23 PROCEDURE — 96372 THER/PROPH/DIAG INJ SC/IM: CPT

## 2017-06-23 PROCEDURE — 36415 COLL VENOUS BLD VENIPUNCTURE: CPT | Performed by: INTERNAL MEDICINE

## 2017-06-23 PROCEDURE — 63510000001 EPOETIN ALFA PER 1000 UNITS: Performed by: INTERNAL MEDICINE

## 2017-06-23 PROCEDURE — 83540 ASSAY OF IRON: CPT | Performed by: INTERNAL MEDICINE

## 2017-06-23 PROCEDURE — 85025 COMPLETE CBC W/AUTO DIFF WBC: CPT | Performed by: INTERNAL MEDICINE

## 2017-06-23 PROCEDURE — 84466 ASSAY OF TRANSFERRIN: CPT | Performed by: INTERNAL MEDICINE

## 2017-06-23 PROCEDURE — 82728 ASSAY OF FERRITIN: CPT | Performed by: INTERNAL MEDICINE

## 2017-06-23 RX ADMIN — ERYTHROPOIETIN 14000 UNITS: 20000 INJECTION, SOLUTION INTRAVENOUS; SUBCUTANEOUS at 16:34

## 2017-07-06 ENCOUNTER — OFFICE VISIT (OUTPATIENT)
Dept: ONCOLOGY | Facility: CLINIC | Age: 82
End: 2017-07-06

## 2017-07-06 ENCOUNTER — LAB (OUTPATIENT)
Dept: LAB | Facility: HOSPITAL | Age: 82
End: 2017-07-06

## 2017-07-06 ENCOUNTER — INFUSION (OUTPATIENT)
Dept: ONCOLOGY | Facility: HOSPITAL | Age: 82
End: 2017-07-06

## 2017-07-06 VITALS
TEMPERATURE: 97.9 F | SYSTOLIC BLOOD PRESSURE: 136 MMHG | OXYGEN SATURATION: 94 % | BODY MASS INDEX: 38.13 KG/M2 | DIASTOLIC BLOOD PRESSURE: 64 MMHG | WEIGHT: 194.2 LBS | HEART RATE: 86 BPM | HEIGHT: 60 IN | RESPIRATION RATE: 14 BRPM

## 2017-07-06 DIAGNOSIS — C50.211 MALIGNANT NEOPLASM OF UPPER-INNER QUADRANT OF RIGHT FEMALE BREAST (HCC): Primary | ICD-10-CM

## 2017-07-06 DIAGNOSIS — D63.1 ANEMIA OF CHRONIC RENAL FAILURE, STAGE 3 (MODERATE) (HCC): ICD-10-CM

## 2017-07-06 DIAGNOSIS — D50.9 IRON DEFICIENCY ANEMIA, UNSPECIFIED IRON DEFICIENCY ANEMIA TYPE: ICD-10-CM

## 2017-07-06 DIAGNOSIS — D63.1 ANEMIA OF CHRONIC RENAL FAILURE, STAGE 3 (MODERATE) (HCC): Primary | ICD-10-CM

## 2017-07-06 DIAGNOSIS — N18.30 ANEMIA OF CHRONIC RENAL FAILURE, STAGE 3 (MODERATE) (HCC): Primary | ICD-10-CM

## 2017-07-06 DIAGNOSIS — N18.30 ANEMIA OF CHRONIC RENAL FAILURE, STAGE 3 (MODERATE) (HCC): ICD-10-CM

## 2017-07-06 LAB
BASOPHILS # BLD AUTO: 0.03 10*3/MM3 (ref 0–0.1)
BASOPHILS NFR BLD AUTO: 0.4 % (ref 0–1.1)
DEPRECATED RDW RBC AUTO: 41.3 FL (ref 37–49)
EOSINOPHIL # BLD AUTO: 0 10*3/MM3 (ref 0–0.36)
EOSINOPHIL NFR BLD AUTO: 0 % (ref 1–5)
ERYTHROCYTE [DISTWIDTH] IN BLOOD BY AUTOMATED COUNT: 12.9 % (ref 11.7–14.5)
HCT VFR BLD AUTO: 30.8 % (ref 34–45)
HGB BLD-MCNC: 9.9 G/DL (ref 11.5–14.9)
IMM GRANULOCYTES # BLD: 0.03 10*3/MM3 (ref 0–0.03)
IMM GRANULOCYTES NFR BLD: 0.4 % (ref 0–0.5)
LYMPHOCYTES # BLD AUTO: 1.31 10*3/MM3 (ref 1–3.5)
LYMPHOCYTES NFR BLD AUTO: 19.2 % (ref 20–49)
MCH RBC QN AUTO: 28.6 PG (ref 27–33)
MCHC RBC AUTO-ENTMCNC: 32.1 G/DL (ref 32–35)
MCV RBC AUTO: 89 FL (ref 83–97)
MONOCYTES # BLD AUTO: 0.62 10*3/MM3 (ref 0.25–0.8)
MONOCYTES NFR BLD AUTO: 9.1 % (ref 4–12)
NEUTROPHILS # BLD AUTO: 4.84 10*3/MM3 (ref 1.5–7)
NEUTROPHILS NFR BLD AUTO: 70.9 % (ref 39–75)
NRBC BLD MANUAL-RTO: 0 /100 WBC (ref 0–0)
PLATELET # BLD AUTO: 220 10*3/MM3 (ref 150–375)
PMV BLD AUTO: 9.8 FL (ref 8.9–12.1)
RBC # BLD AUTO: 3.46 10*6/MM3 (ref 3.9–5)
WBC NRBC COR # BLD: 6.83 10*3/MM3 (ref 4–10)

## 2017-07-06 PROCEDURE — 36416 COLLJ CAPILLARY BLOOD SPEC: CPT

## 2017-07-06 PROCEDURE — 63510000001 EPOETIN ALFA PER 1000 UNITS: Performed by: INTERNAL MEDICINE

## 2017-07-06 PROCEDURE — 99214 OFFICE O/P EST MOD 30 MIN: CPT | Performed by: INTERNAL MEDICINE

## 2017-07-06 PROCEDURE — 96372 THER/PROPH/DIAG INJ SC/IM: CPT

## 2017-07-06 PROCEDURE — 85025 COMPLETE CBC W/AUTO DIFF WBC: CPT

## 2017-07-06 RX ADMIN — ERYTHROPOIETIN 14000 UNITS: 20000 INJECTION, SOLUTION INTRAVENOUS; SUBCUTANEOUS at 16:16

## 2017-07-25 ENCOUNTER — TELEPHONE (OUTPATIENT)
Dept: NEUROSURGERY | Facility: CLINIC | Age: 82
End: 2017-07-25

## 2017-07-25 DIAGNOSIS — D32.0 BENIGN MENINGIOMA OF BRAIN (HCC): Primary | ICD-10-CM

## 2017-07-25 NOTE — TELEPHONE ENCOUNTER
----- Message from Martha Woodward sent at 7/25/2017  2:02 PM EDT -----  Regarding: Recall  This patient called and scheduled her two year recall for meningioma. She needs to have a repeat MRI Brain with 3D SPGR prior (she wants this done here at [facility]).    Her last appointment is converted so she will need new forms to be completed. Also, the MRI order needs to be entered.      She is scheduled to come in and see Patricia on 8/28/17 at 1:15.

## 2017-07-27 ENCOUNTER — APPOINTMENT (OUTPATIENT)
Dept: ONCOLOGY | Facility: HOSPITAL | Age: 82
End: 2017-07-27

## 2017-07-27 ENCOUNTER — APPOINTMENT (OUTPATIENT)
Dept: LAB | Facility: HOSPITAL | Age: 82
End: 2017-07-27

## 2017-08-16 ENCOUNTER — HOSPITAL ENCOUNTER (OUTPATIENT)
Dept: MRI IMAGING | Facility: HOSPITAL | Age: 82
Discharge: HOME | End: 2017-08-16
Admitting: NURSE PRACTITIONER

## 2017-08-16 DIAGNOSIS — D32.0 BENIGN MENINGIOMA OF BRAIN (HCC): ICD-10-CM

## 2017-08-16 PROCEDURE — 70553 MRI BRAIN STEM W/O & W/DYE: CPT

## 2017-08-16 PROCEDURE — 0 GADOBENATE DIMEGLUMINE 529 MG/ML SOLUTION: Performed by: NURSE PRACTITIONER

## 2017-08-16 PROCEDURE — A9577 INJ MULTIHANCE: HCPCS | Performed by: NURSE PRACTITIONER

## 2017-08-16 PROCEDURE — 82565 ASSAY OF CREATININE: CPT

## 2017-08-16 RX ADMIN — GADOBENATE DIMEGLUMINE 16 ML: 529 INJECTION, SOLUTION INTRAVENOUS at 18:22

## 2017-08-17 ENCOUNTER — LAB (OUTPATIENT)
Dept: LAB | Facility: HOSPITAL | Age: 82
End: 2017-08-17

## 2017-08-17 ENCOUNTER — INFUSION (OUTPATIENT)
Dept: ONCOLOGY | Facility: HOSPITAL | Age: 82
End: 2017-08-17

## 2017-08-17 VITALS — BODY MASS INDEX: 33.61 KG/M2 | WEIGHT: 195.8 LBS

## 2017-08-17 DIAGNOSIS — C50.211 MALIGNANT NEOPLASM OF UPPER-INNER QUADRANT OF RIGHT FEMALE BREAST (HCC): ICD-10-CM

## 2017-08-17 DIAGNOSIS — D63.1 ANEMIA OF CHRONIC RENAL FAILURE, STAGE 3 (MODERATE) (HCC): Primary | ICD-10-CM

## 2017-08-17 DIAGNOSIS — D63.1 ANEMIA OF CHRONIC RENAL FAILURE, STAGE 3 (MODERATE) (HCC): ICD-10-CM

## 2017-08-17 DIAGNOSIS — N18.30 ANEMIA OF CHRONIC RENAL FAILURE, STAGE 3 (MODERATE) (HCC): Primary | ICD-10-CM

## 2017-08-17 DIAGNOSIS — D50.9 IRON DEFICIENCY ANEMIA, UNSPECIFIED IRON DEFICIENCY ANEMIA TYPE: ICD-10-CM

## 2017-08-17 DIAGNOSIS — N18.30 ANEMIA OF CHRONIC RENAL FAILURE, STAGE 3 (MODERATE) (HCC): ICD-10-CM

## 2017-08-17 LAB
BASOPHILS # BLD AUTO: 0.02 10*3/MM3 (ref 0–0.1)
BASOPHILS NFR BLD AUTO: 0.2 % (ref 0–1.1)
CREAT BLDA-MCNC: 1.3 MG/DL (ref 0.6–1.3)
DEPRECATED RDW RBC AUTO: 40.2 FL (ref 37–49)
EOSINOPHIL # BLD AUTO: 0 10*3/MM3 (ref 0–0.36)
EOSINOPHIL NFR BLD AUTO: 0 % (ref 1–5)
ERYTHROCYTE [DISTWIDTH] IN BLOOD BY AUTOMATED COUNT: 12.7 % (ref 11.7–14.5)
HCT VFR BLD AUTO: 30.6 % (ref 34–45)
HGB BLD-MCNC: 9.7 G/DL (ref 11.5–14.9)
IMM GRANULOCYTES # BLD: 0.06 10*3/MM3 (ref 0–0.03)
IMM GRANULOCYTES NFR BLD: 0.7 % (ref 0–0.5)
LYMPHOCYTES # BLD AUTO: 1.32 10*3/MM3 (ref 1–3.5)
LYMPHOCYTES NFR BLD AUTO: 14.8 % (ref 20–49)
MCH RBC QN AUTO: 27.8 PG (ref 27–33)
MCHC RBC AUTO-ENTMCNC: 31.7 G/DL (ref 32–35)
MCV RBC AUTO: 87.7 FL (ref 83–97)
MONOCYTES # BLD AUTO: 0.72 10*3/MM3 (ref 0.25–0.8)
MONOCYTES NFR BLD AUTO: 8.1 % (ref 4–12)
NEUTROPHILS # BLD AUTO: 6.77 10*3/MM3 (ref 1.5–7)
NEUTROPHILS NFR BLD AUTO: 76.2 % (ref 39–75)
NRBC BLD MANUAL-RTO: 0 /100 WBC (ref 0–0)
PLATELET # BLD AUTO: 238 10*3/MM3 (ref 150–375)
PMV BLD AUTO: 9.6 FL (ref 8.9–12.1)
RBC # BLD AUTO: 3.49 10*6/MM3 (ref 3.9–5)
WBC NRBC COR # BLD: 8.89 10*3/MM3 (ref 4–10)

## 2017-08-17 PROCEDURE — 96372 THER/PROPH/DIAG INJ SC/IM: CPT

## 2017-08-17 PROCEDURE — 36416 COLLJ CAPILLARY BLOOD SPEC: CPT | Performed by: INTERNAL MEDICINE

## 2017-08-17 PROCEDURE — 85025 COMPLETE CBC W/AUTO DIFF WBC: CPT | Performed by: INTERNAL MEDICINE

## 2017-08-17 PROCEDURE — 63510000001 EPOETIN ALFA PER 1000 UNITS: Performed by: INTERNAL MEDICINE

## 2017-08-17 RX ADMIN — ERYTHROPOIETIN 14000 UNITS: 20000 INJECTION, SOLUTION INTRAVENOUS; SUBCUTANEOUS at 16:28

## 2017-08-17 NOTE — PROGRESS NOTES
Pt presents for procrit today. HGB 9.7. Barbie states pt is approved for procrit 15k units q3 weeks. Procrit given today.   Pt states she fell out of a chair at home today and hit her head on carpeted floor. Denies any dizziness, blurred vision, headaches, or other changes. Reviewed with Ashtyn BRIDGES. Per Ashtyn, instructed pt to go to ER or call 911 if she becomes dizzy, light headed, has headaches, blurred vision, nausea, vomiting, or other complaints. Pt and daughter v/u.

## 2017-08-28 ENCOUNTER — OFFICE VISIT (OUTPATIENT)
Dept: NEUROSURGERY | Facility: CLINIC | Age: 82
End: 2017-08-28

## 2017-08-28 VITALS
DIASTOLIC BLOOD PRESSURE: 70 MMHG | RESPIRATION RATE: 20 BRPM | WEIGHT: 198 LBS | HEART RATE: 86 BPM | HEIGHT: 64 IN | BODY MASS INDEX: 33.8 KG/M2 | SYSTOLIC BLOOD PRESSURE: 146 MMHG

## 2017-08-28 DIAGNOSIS — D32.0 CEREBRAL MENINGIOMA (HCC): Primary | ICD-10-CM

## 2017-08-28 DIAGNOSIS — C50.211 MALIGNANT NEOPLASM OF UPPER-INNER QUADRANT OF RIGHT FEMALE BREAST (HCC): ICD-10-CM

## 2017-08-28 PROCEDURE — 99214 OFFICE O/P EST MOD 30 MIN: CPT | Performed by: NURSE PRACTITIONER

## 2017-08-28 NOTE — PROGRESS NOTES
Subjective   Patient ID: Erika Irvin is a 81 y.o. female is here today for a two year follow-up of meningioma. Patient presents accompanied by her daughter.    History of Present Illness   Patient presents for follow-up of meningioma.  She is status post craniotomy and resection of large frontal meningioma in August 2013.  Pathology revealed WHO grade 1.  Since her last office visit she has had additional MRI.  She reports a new diagnosis of breast cancer.  This was treated with surgery, radiation.  She did not have to do chemotherapy.  Her daughter states was caught in the early stages.  She now continues on a Arimidex    She denies any headaches or vision changes.  She has had one fall that was out of a chair when she fell asleep.    The following portions of the patient's history were reviewed and updated as appropriate: allergies, current medications and problem list.    Review of Systems   Constitutional: Negative for chills and fever.   HENT: Negative for hearing loss, tinnitus and trouble swallowing.    Eyes: Negative for visual disturbance.   Respiratory: Positive for cough (managed with cough medication). Negative for shortness of breath and wheezing.    Cardiovascular: Negative for chest pain and palpitations.   Gastrointestinal: Negative for abdominal pain, nausea and vomiting.   Genitourinary: Negative for difficulty urinating and dysuria.   Musculoskeletal: Negative for gait problem.   Skin: Negative for rash.   Neurological: Negative for dizziness, facial asymmetry, weakness, light-headedness, numbness and headaches.   Psychiatric/Behavioral: Negative for confusion and decreased concentration.       Objective   Physical Exam   Constitutional: She is oriented to person, place, and time. She appears well-developed and well-nourished.   obese   HENT:   Head: Atraumatic.   Well healed left frontal incision   Eyes: EOM are normal. Pupils are equal, round, and reactive to light.   Pulmonary/Chest:  Effort normal.   Neurological: She is alert and oriented to person, place, and time. She has normal strength. She has a normal Finger-Nose-Finger Test.   Skin: Skin is warm and dry.   Psychiatric: She has a normal mood and affect. Her behavior is normal. Thought content normal.   Vitals reviewed.    Neurologic Exam     Mental Status   Oriented to person, place, and time.   Level of consciousness: alert  Normal comprehension.     Cranial Nerves     CN II   Visual fields full to confrontation.     CN III, IV, VI   Pupils are equal, round, and reactive to light.  Extraocular motions are normal.   CN III: no CN III palsy  CN VI: no CN VI palsy  Nystagmus: none   Diplopia: none    CN V   Facial sensation intact.     CN VII   Left facial weakness: central (mild)    CN VIII   CN VIII normal.     CN IX, X   CN IX normal.   CN X normal.     CN XI   CN XI normal.     CN XII   Tongue: not atrophic  Tongue deviation: left    Motor Exam   Right arm pronator drift: absent  Left arm pronator drift: absent    Strength   Strength 5/5 throughout.     Gait, Coordination, and Reflexes     Gait  Gait: wide-based (stable with cane)    Coordination   Finger to nose coordination: normal    Reflexes   Right Knowles: absent  Left Knowles: absent      Assessment/Plan   Independent Review of Radiographic Studies:    MRI of the brain with and without contrast [facility] dated August 16, 2017 was reviewed with Dr. William.  This reveals scoliosis in the bed of the previous tumor but no residual or recurrent tumor is seen.    Medical Decision Making:    Patient now 4 years status post craniotomy with resection of large WHO grade 1 meningioma.  Since her last visit she has been diagnosed with breast cancer.  He is currently in remission and she is on a Mitra attacks.  She is overall doing well.  She denies any headache or vision changes; however, her daughter states that she does need an eye exam.    Exam as noted above with no  neurologic changes.  Her MRI reveals no residual or recurrent tumor there is no de fahad masses.  We will plan to see her back in 3 years with repeat MRI.  I told her and her daughter to contact us if they have any concerns especially any changes in headaches, vision, speech, strength.  Any sudden changes should be evaluated in the emergency room.    Plan: Return to office 3 years with MRI brain.    Erika was seen today for meningioma.    Diagnoses and all orders for this visit:    Cerebral meningioma  Comments:  WHO grade I; crani 8/2013  Orders:  -     MRI Brain With & Without Contrast; Future    Malignant neoplasm of upper-inner quadrant of right female breast      Return in about 3 years (around 8/28/2020) for with imaging, Follow-up with NP.

## 2017-09-07 ENCOUNTER — LAB (OUTPATIENT)
Dept: LAB | Facility: HOSPITAL | Age: 82
End: 2017-09-07

## 2017-09-07 ENCOUNTER — INFUSION (OUTPATIENT)
Dept: ONCOLOGY | Facility: HOSPITAL | Age: 82
End: 2017-09-07

## 2017-09-07 DIAGNOSIS — D63.1 ANEMIA OF CHRONIC RENAL FAILURE, STAGE 3 (MODERATE) (HCC): Primary | ICD-10-CM

## 2017-09-07 DIAGNOSIS — D50.9 IRON DEFICIENCY ANEMIA, UNSPECIFIED IRON DEFICIENCY ANEMIA TYPE: ICD-10-CM

## 2017-09-07 DIAGNOSIS — N18.30 ANEMIA OF CHRONIC RENAL FAILURE, STAGE 3 (MODERATE) (HCC): Primary | ICD-10-CM

## 2017-09-07 DIAGNOSIS — N18.30 ANEMIA OF CHRONIC RENAL FAILURE, STAGE 3 (MODERATE) (HCC): ICD-10-CM

## 2017-09-07 DIAGNOSIS — C50.211 MALIGNANT NEOPLASM OF UPPER-INNER QUADRANT OF RIGHT FEMALE BREAST (HCC): ICD-10-CM

## 2017-09-07 DIAGNOSIS — D63.1 ANEMIA OF CHRONIC RENAL FAILURE, STAGE 3 (MODERATE) (HCC): ICD-10-CM

## 2017-09-07 LAB
BASOPHILS # BLD AUTO: 0.02 10*3/MM3 (ref 0–0.1)
BASOPHILS NFR BLD AUTO: 0.3 % (ref 0–1.1)
DEPRECATED RDW RBC AUTO: 42.2 FL (ref 37–49)
EOSINOPHIL # BLD AUTO: 0 10*3/MM3 (ref 0–0.36)
EOSINOPHIL NFR BLD AUTO: 0 % (ref 1–5)
ERYTHROCYTE [DISTWIDTH] IN BLOOD BY AUTOMATED COUNT: 12.8 % (ref 11.7–14.5)
FERRITIN SERPL-MCNC: 633.3 NG/ML
HCT VFR BLD AUTO: 31.5 % (ref 34–45)
HGB BLD-MCNC: 9.5 G/DL (ref 11.5–14.9)
IMM GRANULOCYTES # BLD: 0.03 10*3/MM3 (ref 0–0.03)
IMM GRANULOCYTES NFR BLD: 0.5 % (ref 0–0.5)
IRON 24H UR-MRATE: 51 MCG/DL (ref 37–145)
IRON SATN MFR SERPL: 19 % (ref 14–48)
LYMPHOCYTES # BLD AUTO: 1.6 10*3/MM3 (ref 1–3.5)
LYMPHOCYTES NFR BLD AUTO: 25 % (ref 20–49)
MCH RBC QN AUTO: 27.2 PG (ref 27–33)
MCHC RBC AUTO-ENTMCNC: 30.2 G/DL (ref 32–35)
MCV RBC AUTO: 90.3 FL (ref 83–97)
MONOCYTES # BLD AUTO: 0.5 10*3/MM3 (ref 0.25–0.8)
MONOCYTES NFR BLD AUTO: 7.8 % (ref 4–12)
NEUTROPHILS # BLD AUTO: 4.25 10*3/MM3 (ref 1.5–7)
NEUTROPHILS NFR BLD AUTO: 66.4 % (ref 39–75)
NRBC BLD MANUAL-RTO: 0 /100 WBC (ref 0–0)
PLATELET # BLD AUTO: 266 10*3/MM3 (ref 150–375)
PMV BLD AUTO: 9.2 FL (ref 8.9–12.1)
RBC # BLD AUTO: 3.49 10*6/MM3 (ref 3.9–5)
TIBC SERPL-MCNC: 266 MCG/DL (ref 249–505)
TRANSFERRIN SERPL-MCNC: 190 MG/DL (ref 200–360)
WBC NRBC COR # BLD: 6.4 10*3/MM3 (ref 4–10)

## 2017-09-07 PROCEDURE — 63510000001 EPOETIN ALFA PER 1000 UNITS: Performed by: INTERNAL MEDICINE

## 2017-09-07 PROCEDURE — 36415 COLL VENOUS BLD VENIPUNCTURE: CPT | Performed by: INTERNAL MEDICINE

## 2017-09-07 PROCEDURE — 96372 THER/PROPH/DIAG INJ SC/IM: CPT

## 2017-09-07 PROCEDURE — 83540 ASSAY OF IRON: CPT | Performed by: INTERNAL MEDICINE

## 2017-09-07 PROCEDURE — 85025 COMPLETE CBC W/AUTO DIFF WBC: CPT | Performed by: INTERNAL MEDICINE

## 2017-09-07 PROCEDURE — 82728 ASSAY OF FERRITIN: CPT | Performed by: INTERNAL MEDICINE

## 2017-09-07 PROCEDURE — 84466 ASSAY OF TRANSFERRIN: CPT | Performed by: INTERNAL MEDICINE

## 2017-09-07 RX ADMIN — ERYTHROPOIETIN 14000 UNITS: 20000 INJECTION, SOLUTION INTRAVENOUS; SUBCUTANEOUS at 16:35

## 2017-09-07 NOTE — PROGRESS NOTES
CBC reviewed with Cordelia ELDER.  Hgb 9.5 today and orders received to keep pt at 14,000 units because this dosing keeps pt in the 9.0 range for Hgb.    CBC reviewed with pt and family member and copy given to pt.

## 2017-09-28 ENCOUNTER — APPOINTMENT (OUTPATIENT)
Dept: ONCOLOGY | Facility: HOSPITAL | Age: 82
End: 2017-09-28

## 2017-09-28 ENCOUNTER — APPOINTMENT (OUTPATIENT)
Dept: LAB | Facility: HOSPITAL | Age: 82
End: 2017-09-28

## 2017-09-28 RX ORDER — ANASTROZOLE 1 MG/1
TABLET ORAL
Qty: 30 TABLET | Refills: 5 | Status: SHIPPED | OUTPATIENT
Start: 2017-09-28 | End: 2018-03-28 | Stop reason: SDUPTHER

## 2017-10-05 ENCOUNTER — INFUSION (OUTPATIENT)
Dept: ONCOLOGY | Facility: HOSPITAL | Age: 82
End: 2017-10-05

## 2017-10-05 ENCOUNTER — LAB (OUTPATIENT)
Dept: LAB | Facility: HOSPITAL | Age: 82
End: 2017-10-05

## 2017-10-05 DIAGNOSIS — D50.9 IRON DEFICIENCY ANEMIA, UNSPECIFIED IRON DEFICIENCY ANEMIA TYPE: ICD-10-CM

## 2017-10-05 DIAGNOSIS — C50.211 MALIGNANT NEOPLASM OF UPPER-INNER QUADRANT OF RIGHT FEMALE BREAST (HCC): ICD-10-CM

## 2017-10-05 DIAGNOSIS — D63.1 ANEMIA OF CHRONIC RENAL FAILURE, STAGE 3 (MODERATE) (HCC): Primary | ICD-10-CM

## 2017-10-05 DIAGNOSIS — N18.30 ANEMIA OF CHRONIC RENAL FAILURE, STAGE 3 (MODERATE) (HCC): Primary | ICD-10-CM

## 2017-10-05 DIAGNOSIS — N18.30 ANEMIA OF CHRONIC RENAL FAILURE, STAGE 3 (MODERATE) (HCC): ICD-10-CM

## 2017-10-05 DIAGNOSIS — D63.1 ANEMIA OF CHRONIC RENAL FAILURE, STAGE 3 (MODERATE) (HCC): ICD-10-CM

## 2017-10-05 LAB
BASOPHILS # BLD AUTO: 0.05 10*3/MM3 (ref 0–0.1)
BASOPHILS NFR BLD AUTO: 0.6 % (ref 0–1.1)
DEPRECATED RDW RBC AUTO: 40.8 FL (ref 37–49)
EOSINOPHIL # BLD AUTO: 0 10*3/MM3 (ref 0–0.36)
EOSINOPHIL NFR BLD AUTO: 0 % (ref 1–5)
ERYTHROCYTE [DISTWIDTH] IN BLOOD BY AUTOMATED COUNT: 12.9 % (ref 11.7–14.5)
HCT VFR BLD AUTO: 30.4 % (ref 34–45)
HGB BLD-MCNC: 9.6 G/DL (ref 11.5–14.9)
IMM GRANULOCYTES # BLD: 0.04 10*3/MM3 (ref 0–0.03)
IMM GRANULOCYTES NFR BLD: 0.5 % (ref 0–0.5)
LYMPHOCYTES # BLD AUTO: 2.23 10*3/MM3 (ref 1–3.5)
LYMPHOCYTES NFR BLD AUTO: 26.4 % (ref 20–49)
MCH RBC QN AUTO: 27.6 PG (ref 27–33)
MCHC RBC AUTO-ENTMCNC: 31.6 G/DL (ref 32–35)
MCV RBC AUTO: 87.4 FL (ref 83–97)
MONOCYTES # BLD AUTO: 0.73 10*3/MM3 (ref 0.25–0.8)
MONOCYTES NFR BLD AUTO: 8.6 % (ref 4–12)
NEUTROPHILS # BLD AUTO: 5.39 10*3/MM3 (ref 1.5–7)
NEUTROPHILS NFR BLD AUTO: 63.9 % (ref 39–75)
NRBC BLD MANUAL-RTO: 0 /100 WBC (ref 0–0)
PLATELET # BLD AUTO: 257 10*3/MM3 (ref 150–375)
PMV BLD AUTO: 10.5 FL (ref 8.9–12.1)
RBC # BLD AUTO: 3.48 10*6/MM3 (ref 3.9–5)
WBC NRBC COR # BLD: 8.44 10*3/MM3 (ref 4–10)

## 2017-10-05 PROCEDURE — 63510000001 EPOETIN ALFA PER 1000 UNITS: Performed by: NURSE PRACTITIONER

## 2017-10-05 PROCEDURE — 96372 THER/PROPH/DIAG INJ SC/IM: CPT | Performed by: NURSE PRACTITIONER

## 2017-10-05 PROCEDURE — 36416 COLLJ CAPILLARY BLOOD SPEC: CPT | Performed by: INTERNAL MEDICINE

## 2017-10-05 PROCEDURE — 85025 COMPLETE CBC W/AUTO DIFF WBC: CPT | Performed by: INTERNAL MEDICINE

## 2017-10-05 RX ADMIN — ERYTHROPOIETIN 14000 UNITS: 20000 INJECTION, SOLUTION INTRAVENOUS; SUBCUTANEOUS at 16:29

## 2017-10-20 ENCOUNTER — APPOINTMENT (OUTPATIENT)
Dept: ONCOLOGY | Facility: HOSPITAL | Age: 82
End: 2017-10-20

## 2017-10-20 ENCOUNTER — APPOINTMENT (OUTPATIENT)
Dept: ONCOLOGY | Facility: CLINIC | Age: 82
End: 2017-10-20

## 2017-10-20 ENCOUNTER — APPOINTMENT (OUTPATIENT)
Dept: LAB | Facility: HOSPITAL | Age: 82
End: 2017-10-20

## 2017-10-24 ENCOUNTER — LAB (OUTPATIENT)
Dept: LAB | Facility: HOSPITAL | Age: 82
End: 2017-10-24

## 2017-10-24 ENCOUNTER — APPOINTMENT (OUTPATIENT)
Dept: ONCOLOGY | Facility: HOSPITAL | Age: 82
End: 2017-10-24

## 2017-10-24 ENCOUNTER — OFFICE VISIT (OUTPATIENT)
Dept: ONCOLOGY | Facility: CLINIC | Age: 82
End: 2017-10-24

## 2017-10-24 VITALS
SYSTOLIC BLOOD PRESSURE: 138 MMHG | RESPIRATION RATE: 16 BRPM | TEMPERATURE: 98.6 F | OXYGEN SATURATION: 94 % | HEART RATE: 84 BPM | WEIGHT: 193.4 LBS | HEIGHT: 60 IN | DIASTOLIC BLOOD PRESSURE: 70 MMHG | BODY MASS INDEX: 37.97 KG/M2

## 2017-10-24 DIAGNOSIS — D50.9 IRON DEFICIENCY ANEMIA, UNSPECIFIED IRON DEFICIENCY ANEMIA TYPE: ICD-10-CM

## 2017-10-24 DIAGNOSIS — C50.211 MALIGNANT NEOPLASM OF UPPER-INNER QUADRANT OF RIGHT FEMALE BREAST (HCC): ICD-10-CM

## 2017-10-24 DIAGNOSIS — D63.1 ANEMIA OF CHRONIC RENAL FAILURE, STAGE 3 (MODERATE) (HCC): ICD-10-CM

## 2017-10-24 DIAGNOSIS — Z17.0 MALIGNANT NEOPLASM OF UPPER-INNER QUADRANT OF RIGHT BREAST IN FEMALE, ESTROGEN RECEPTOR POSITIVE (HCC): Primary | ICD-10-CM

## 2017-10-24 DIAGNOSIS — N18.30 ANEMIA OF CHRONIC RENAL FAILURE, STAGE 3 (MODERATE) (HCC): ICD-10-CM

## 2017-10-24 DIAGNOSIS — C50.211 MALIGNANT NEOPLASM OF UPPER-INNER QUADRANT OF RIGHT BREAST IN FEMALE, ESTROGEN RECEPTOR POSITIVE (HCC): Primary | ICD-10-CM

## 2017-10-24 LAB
BASOPHILS # BLD AUTO: 0.03 10*3/MM3 (ref 0–0.1)
BASOPHILS NFR BLD AUTO: 0.4 % (ref 0–1.1)
DEPRECATED RDW RBC AUTO: 41.2 FL (ref 37–49)
EOSINOPHIL # BLD AUTO: 0 10*3/MM3 (ref 0–0.36)
EOSINOPHIL NFR BLD AUTO: 0 % (ref 1–5)
ERYTHROCYTE [DISTWIDTH] IN BLOOD BY AUTOMATED COUNT: 13.1 % (ref 11.7–14.5)
HCT VFR BLD AUTO: 34.5 % (ref 34–45)
HGB BLD-MCNC: 10.8 G/DL (ref 11.5–14.9)
IMM GRANULOCYTES # BLD: 0.03 10*3/MM3 (ref 0–0.03)
IMM GRANULOCYTES NFR BLD: 0.4 % (ref 0–0.5)
LYMPHOCYTES # BLD AUTO: 1.79 10*3/MM3 (ref 1–3.5)
LYMPHOCYTES NFR BLD AUTO: 25.8 % (ref 20–49)
MCH RBC QN AUTO: 27.1 PG (ref 27–33)
MCHC RBC AUTO-ENTMCNC: 31.3 G/DL (ref 32–35)
MCV RBC AUTO: 86.7 FL (ref 83–97)
MONOCYTES # BLD AUTO: 0.66 10*3/MM3 (ref 0.25–0.8)
MONOCYTES NFR BLD AUTO: 9.5 % (ref 4–12)
NEUTROPHILS # BLD AUTO: 4.44 10*3/MM3 (ref 1.5–7)
NEUTROPHILS NFR BLD AUTO: 63.9 % (ref 39–75)
NRBC BLD MANUAL-RTO: 0 /100 WBC (ref 0–0)
PLATELET # BLD AUTO: 200 10*3/MM3 (ref 150–375)
PMV BLD AUTO: 10.1 FL (ref 8.9–12.1)
RBC # BLD AUTO: 3.98 10*6/MM3 (ref 3.9–5)
WBC NRBC COR # BLD: 6.95 10*3/MM3 (ref 4–10)

## 2017-10-24 PROCEDURE — 85025 COMPLETE CBC W/AUTO DIFF WBC: CPT | Performed by: INTERNAL MEDICINE

## 2017-10-24 PROCEDURE — 36416 COLLJ CAPILLARY BLOOD SPEC: CPT | Performed by: INTERNAL MEDICINE

## 2017-10-24 PROCEDURE — 99214 OFFICE O/P EST MOD 30 MIN: CPT | Performed by: INTERNAL MEDICINE

## 2017-10-24 NOTE — PROGRESS NOTES
Subjective .     REASONS FOR FOLLOWUP:  Breast cancer, anemia    HISTORY OF PRESENT ILLNESS:  The patient is a 82 y.o. year old female  who is here for follow-up with the above-mentioned history.      Denies neurological symptoms.  Denies nausea   Denies weight loss.  Denies pain.  No significant problems with hot flashes.    Denies chest pain.  Denies any change in baseline shortness of air.  Denies dizziness.  Denies weakness.  Denies bleeding from any location.      Past Medical History:   Diagnosis Date   • Anemia     Iron deficiency anemia    • Anxiety    • Arthritis    • Breast cancer 08/2014    right    • Breast cancer 12/2006    Left   • Chronic kidney disease     Anemia of chronic renal insufficency, stage 3   • COPD (chronic obstructive pulmonary disease)    • Depression    • Diabetes mellitus    • Hyperlipidemia    • Hypertension    • YARY (obstructive sleep apnea)    • Poor circulation    • Stroke     CVA in 1990.       HEMATOLOGIC/ONCOLOGIC HISTORY:  (History from previous dates can be found in the separate document.)    MEDICATIONS    Current Outpatient Prescriptions:   •  amLODIPine (NORVASC) 10 MG tablet, Take 1 tablet by mouth daily., Disp: , Rfl:   •  anastrozole (ARIMIDEX) 1 MG tablet, take 1 tablet by mouth once daily, Disp: 30 tablet, Rfl: 5  •  aspirin 81 MG tablet, Take 81 mg by mouth., Disp: , Rfl:   •  busPIRone (BUSPAR) 15 MG tablet, Take 1 tablet by mouth every morning. And 2 tablets in the evening, Disp: , Rfl:   •  calcium carbonate-vitamin d (CALCIUM 600+D) 600-400 MG-UNIT per tablet, Take 1 tablet by mouth., Disp: , Rfl:   •  clopidogrel (PLAVIX) 75 MG tablet, take 1 tablet by mouth once daily, Disp: , Rfl:   •  cyanocobalamin (VITAMIN B-12) 1000 MCG tablet, Take 100 mcg by mouth., Disp: , Rfl:   •  doxepin (SINEquan) 50 MG capsule, take 1 tablet by mouth three times a day at bedtime, Disp: , Rfl: 0  •  epoetin joe (EPOGEN,PROCRIT) 50505 UNIT/ML injection, Epogen SOLN; Patient Sig:  Epogen SOLN 5,000u if hgb is below 12   subq injectable; 0; 22-Jul-2015; Active, Disp: , Rfl:   •  FERROUS SULFATE PO, Take  by mouth., Disp: , Rfl:   •  furosemide (LASIX) 40 MG tablet, Take 1 tablet by mouth daily., Disp: , Rfl:   •  glucose blood test strip, TRUEtest Test In Vitro Strip; Patient Sig: TRUEtest Test In Vitro Strip ; 200; 0; 27-May-2014; Active, Disp: , Rfl:   •  HYDROcodone-acetaminophen (NORCO) 5-325 MG per tablet, Take 1 tablet by mouth every 6 (six) hours as needed. For pain, Disp: , Rfl:   •  insulin aspart (NovoLOG) 100 UNIT/ML injection, Inject 8 Units under the skin Medrol Dose Pack scheduling ONLY., Disp: , Rfl:   •  Insulin Glargine 100 UNIT/ML solution pen-injector, Inject 20 Units under the skin daily., Disp: , Rfl:   •  ipratropium-albuterol (DUONEB) 0.5-2.5 mg/mL nebulizer, Inhale 3 mL 3 (Three) Times a Day., Disp: , Rfl:   •  lansoprazole (PREVACID) 30 MG capsule, Take 1 capsule by mouth daily., Disp: , Rfl:   •  LANTUS 100 UNIT/ML injection, , Disp: , Rfl: 0  •  levETIRAcetam (KEPPRA) 500 MG tablet, Take 1 tablet by mouth 2 (two) times a day., Disp: , Rfl:   •  losartan (COZAAR) 100 MG tablet, Take 1 tablet by mouth daily., Disp: , Rfl:   •  meclizine (ANTIVERT) 25 MG tablet, take 1 tablet by mouth three times a day if needed for dizziness, Disp: , Rfl: 0  •  metoclopramide (REGLAN) 10 MG tablet, Take 1 tablet by mouth daily. Before a meal, Disp: , Rfl:   •  Multiple Vitamins-Minerals (MULTIVITAL PO), Take 1 tablet by mouth daily., Disp: , Rfl:   •  pravastatin (PRAVACHOL) 40 MG tablet, Take 1 tablet by mouth nightly., Disp: , Rfl:   •  temazepam (RESTORIL) 30 MG capsule, Take 1 capsule by mouth nightly as needed. At bedtime, Disp: , Rfl:     ALLERGIES:     Allergies   Allergen Reactions   • Sulfa Antibiotics Other (See Comments)     GI upset   • Penicillins Itching and Rash       SOCIAL HISTORY:       Social History     Social History   • Marital status:      Spouse name: N/A  "  • Number of children: N/A   • Years of education: High School     Occupational History   • Nurse aid Retired     Social History Main Topics   • Smoking status: Never Smoker   • Smokeless tobacco: Never Used   • Alcohol use No   • Drug use: No   • Sexual activity: Not on file     Other Topics Concern   • Not on file     Social History Narrative         FAMILY HISTORY:  Family History   Problem Relation Age of Onset   • Cancer Father    • Cancer Brother    • Diabetes Brother    • Heart disease Brother    • Cancer Other        REVIEW OF SYSTEMS:  GENERAL: No change in appetite or weight;   No fevers, chills, sweats.    SKIN: No nonhealing lesions.   No rashes.  HEME/LYMPH: No easy bruising, bleeding.   No swollen nodes.   EYES: No vision changes or diplopia.   ENT: No tinnitus, hearing loss, gum bleeding, epistaxis, hoarseness or dysphagia.   RESPIRATORY: No change in baseline shortness of air.  She is on oxygen at home.  CVS: No chest pain, palpitations, orthopnea, dyspnea on exertion or PND.   GI: No melena or hematochezia.   No abdominal pain.  No nausea, vomiting, constipation, diarrhea  : No lower tract obstructive symptoms, dysuria or hematuria.   MUSCULOSKELETAL: No bone pain.  No joint stiffness.   NEUROLOGICAL: No global weakness, loss of consciousness or seizures.   PSYCHIATRIC: No increased nervousness, mood changes or depression.          Objective    Vitals:    10/24/17 1603   BP: 138/70   Pulse: 84   Resp: 16   Temp: 98.6 °F (37 °C)   TempSrc: Oral   SpO2: 94%   Weight: 193 lb 6.4 oz (87.7 kg)   Height: 60.23\" (153 cm)   PainSc: 0-No pain     Current Status 10/24/2017   ECOG score 1      PHYSICAL EXAM:    GENERAL:  Well-developed, well-nourished in no acute distress.   SKIN:  Warm, dry without rashes, purpura or petechiae.  HEAD:  Normocephalic.  EYES:  Pupils equal, round and reactive to light.  EOMs intact.  Conjunctivae normal.  EARS:  Hearing intact.  NOSE:  Septum midline.  No excoriations or " nasal discharge.  MOUTH:  Tongue is well-papillated; no stomatitis or ulcers.  Lips normal.  THROAT:  Oropharynx without lesions or exudates.  NECK:  Supple with good range of motion; no thyromegaly or masses, no JVD.  LYMPHATICS:  No cervical, supraclavicular, axillary or inguinal adenopathy.  CHEST:  Lungs clear to percussion and auscultation. Good airflow.  CARDIAC:  Regular rate and rhythm without murmurs, rubs or gallops. Normal S1,S2.  ABDOMEN:  Soft, nontender with no organomegaly or masses.  EXTREMITIES:  No clubbing, cyanosis or edema.  NEUROLOGICAL:  Cranial Nerves II-XII grossly intact.  No focal neurological deficits.  PSYCHIATRIC:  Normal affect and mood.      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 female, estrogen receptor positive  - CBC & Differential  - CBC & Differential  - CBC & Differential  - CBC & Differential  - Comprehensive Metabolic Panel  - Ferritin  - Iron Profile    Anemia of chronic renal failure, stage 3 (moderate)  - CBC & Differential  - CBC & Differential  - CBC & Differential  - CBC & Differential  - Comprehensive Metabolic Panel  - Ferritin  - Iron Profile    Iron deficiency anemia, unspecified iron deficiency anemia type  - CBC & Differential  - CBC & Differential  - CBC & Differential  - CBC & Differential  - Comprehensive Metabolic Panel  - Ferritin  - Iron Profile    1.  Breast cancer.  Right-sided invasive ductal carcinoma, papillary type, removed with lumpectomy on 08/26/2014 by Dr. Sung Griggs. Only 1.7 mm of remaining invasive carcinoma. Node negative. Completed radiation.   Arimidex 10/17/2014 until  planned 10/17/2019. Tolerating Arimidex well.   Appears to remain in remission.    2.  Bone health. Normal bone density on DEXA scan 9/23/16. Calcium carbonate 600 mg with vitamin D once daily, changed to calcium citrate 1000 mg with vitamin D when Arimidex was started (she is also on lansoprazole).     3.  Anemia of chronic renal insufficiency, stage 3.   Procrit p.r.n. for hemoglobin less than 10.   On the late March 2016 visit, changed from every 5 week CBC to every 2 week CBC due to a fall in hemoglobin.  She has not needed much Procrit on the every 3 week interval she is currently on.  However, family prefers to maintain 3 week interval checks.    4.  Iron deficiency anemia. History of Venofer and Feraheme use. Does not respond well to p.o. iron but is taking ferrous sulfate 1 tablet 3 times per day without any problems (she will continue this).   In May 2015, she received one dose of Feraheme due to an iron percent saturation of around 10% and MCV of 82.6 despite an elevated ferritin. I think her ferritin is chronically high because of inflammation).   Recent iron labs: Unremarkable     PLAN:  · CBC every 3 week.    · Procrit if hemoglobin less than 10.    · M.D. visit in 12 weeks with iron studies 3 weeks prior  · Next DEXA scan due sometime after 9/23/18  · She states Dr. Plascencia does her breast exams and orders her mammograms and she is up-to-date.    · Continue Arimidex calcium and vitamin D.

## 2017-11-14 ENCOUNTER — INFUSION (OUTPATIENT)
Dept: ONCOLOGY | Facility: HOSPITAL | Age: 82
End: 2017-11-14

## 2017-11-14 ENCOUNTER — LAB (OUTPATIENT)
Dept: LAB | Facility: HOSPITAL | Age: 82
End: 2017-11-14

## 2017-11-14 DIAGNOSIS — D63.1 ANEMIA OF CHRONIC RENAL FAILURE, STAGE 3 (MODERATE) (HCC): ICD-10-CM

## 2017-11-14 DIAGNOSIS — N18.30 ANEMIA OF CHRONIC RENAL FAILURE, STAGE 3 (MODERATE) (HCC): ICD-10-CM

## 2017-11-14 DIAGNOSIS — Z17.0 MALIGNANT NEOPLASM OF UPPER-INNER QUADRANT OF RIGHT BREAST IN FEMALE, ESTROGEN RECEPTOR POSITIVE (HCC): ICD-10-CM

## 2017-11-14 DIAGNOSIS — D63.1 ANEMIA OF CHRONIC RENAL FAILURE, STAGE 3 (MODERATE) (HCC): Primary | ICD-10-CM

## 2017-11-14 DIAGNOSIS — D50.9 IRON DEFICIENCY ANEMIA, UNSPECIFIED IRON DEFICIENCY ANEMIA TYPE: ICD-10-CM

## 2017-11-14 DIAGNOSIS — N18.30 ANEMIA OF CHRONIC RENAL FAILURE, STAGE 3 (MODERATE) (HCC): Primary | ICD-10-CM

## 2017-11-14 DIAGNOSIS — C50.211 MALIGNANT NEOPLASM OF UPPER-INNER QUADRANT OF RIGHT BREAST IN FEMALE, ESTROGEN RECEPTOR POSITIVE (HCC): ICD-10-CM

## 2017-11-14 LAB
BASOPHILS # BLD AUTO: 0.02 10*3/MM3 (ref 0–0.1)
BASOPHILS NFR BLD AUTO: 0.2 % (ref 0–1.1)
DEPRECATED RDW RBC AUTO: 39.8 FL (ref 37–49)
EOSINOPHIL # BLD AUTO: 0 10*3/MM3 (ref 0–0.36)
EOSINOPHIL NFR BLD AUTO: 0 % (ref 1–5)
ERYTHROCYTE [DISTWIDTH] IN BLOOD BY AUTOMATED COUNT: 12.9 % (ref 11.7–14.5)
HCT VFR BLD AUTO: 31.9 % (ref 34–45)
HGB BLD-MCNC: 10.2 G/DL (ref 11.5–14.9)
IMM GRANULOCYTES # BLD: 0.04 10*3/MM3 (ref 0–0.03)
IMM GRANULOCYTES NFR BLD: 0.5 % (ref 0–0.5)
LYMPHOCYTES # BLD AUTO: 2.05 10*3/MM3 (ref 1–3.5)
LYMPHOCYTES NFR BLD AUTO: 24.1 % (ref 20–49)
MCH RBC QN AUTO: 27.4 PG (ref 27–33)
MCHC RBC AUTO-ENTMCNC: 32 G/DL (ref 32–35)
MCV RBC AUTO: 85.8 FL (ref 83–97)
MONOCYTES # BLD AUTO: 0.74 10*3/MM3 (ref 0.25–0.8)
MONOCYTES NFR BLD AUTO: 8.7 % (ref 4–12)
NEUTROPHILS # BLD AUTO: 5.67 10*3/MM3 (ref 1.5–7)
NEUTROPHILS NFR BLD AUTO: 66.5 % (ref 39–75)
NRBC BLD MANUAL-RTO: 0 /100 WBC (ref 0–0)
PLATELET # BLD AUTO: 245 10*3/MM3 (ref 150–375)
PMV BLD AUTO: 9.8 FL (ref 8.9–12.1)
RBC # BLD AUTO: 3.72 10*6/MM3 (ref 3.9–5)
WBC NRBC COR # BLD: 8.52 10*3/MM3 (ref 4–10)

## 2017-11-14 PROCEDURE — 85025 COMPLETE CBC W/AUTO DIFF WBC: CPT | Performed by: INTERNAL MEDICINE

## 2017-11-14 PROCEDURE — 36416 COLLJ CAPILLARY BLOOD SPEC: CPT | Performed by: INTERNAL MEDICINE

## 2017-12-05 ENCOUNTER — INFUSION (OUTPATIENT)
Dept: ONCOLOGY | Facility: HOSPITAL | Age: 82
End: 2017-12-05

## 2017-12-05 ENCOUNTER — LAB (OUTPATIENT)
Dept: LAB | Facility: HOSPITAL | Age: 82
End: 2017-12-05

## 2017-12-05 DIAGNOSIS — D63.1 ANEMIA OF CHRONIC RENAL FAILURE, STAGE 3 (MODERATE) (HCC): ICD-10-CM

## 2017-12-05 DIAGNOSIS — Z17.0 MALIGNANT NEOPLASM OF UPPER-INNER QUADRANT OF RIGHT BREAST IN FEMALE, ESTROGEN RECEPTOR POSITIVE (HCC): ICD-10-CM

## 2017-12-05 DIAGNOSIS — D63.1 ANEMIA OF CHRONIC RENAL FAILURE, STAGE 3 (MODERATE) (HCC): Primary | ICD-10-CM

## 2017-12-05 DIAGNOSIS — N18.30 ANEMIA OF CHRONIC RENAL FAILURE, STAGE 3 (MODERATE) (HCC): Primary | ICD-10-CM

## 2017-12-05 DIAGNOSIS — D50.9 IRON DEFICIENCY ANEMIA, UNSPECIFIED IRON DEFICIENCY ANEMIA TYPE: ICD-10-CM

## 2017-12-05 DIAGNOSIS — N18.30 ANEMIA OF CHRONIC RENAL FAILURE, STAGE 3 (MODERATE) (HCC): ICD-10-CM

## 2017-12-05 DIAGNOSIS — C50.211 MALIGNANT NEOPLASM OF UPPER-INNER QUADRANT OF RIGHT BREAST IN FEMALE, ESTROGEN RECEPTOR POSITIVE (HCC): ICD-10-CM

## 2017-12-05 LAB
BASOPHILS # BLD AUTO: 0.02 10*3/MM3 (ref 0–0.1)
BASOPHILS NFR BLD AUTO: 0.3 % (ref 0–1.1)
DEPRECATED RDW RBC AUTO: 42.4 FL (ref 37–49)
EOSINOPHIL # BLD AUTO: 0 10*3/MM3 (ref 0–0.36)
EOSINOPHIL NFR BLD AUTO: 0 % (ref 1–5)
ERYTHROCYTE [DISTWIDTH] IN BLOOD BY AUTOMATED COUNT: 13 % (ref 11.7–14.5)
FERRITIN SERPL-MCNC: 451.8 NG/ML
HCT VFR BLD AUTO: 30.6 % (ref 34–45)
HGB BLD-MCNC: 9.4 G/DL (ref 11.5–14.9)
IMM GRANULOCYTES # BLD: 0.02 10*3/MM3 (ref 0–0.03)
IMM GRANULOCYTES NFR BLD: 0.3 % (ref 0–0.5)
IRON 24H UR-MRATE: 35 MCG/DL (ref 37–145)
IRON SATN MFR SERPL: 14 % (ref 14–48)
LYMPHOCYTES # BLD AUTO: 1.48 10*3/MM3 (ref 1–3.5)
LYMPHOCYTES NFR BLD AUTO: 22.9 % (ref 20–49)
MCH RBC QN AUTO: 27.5 PG (ref 27–33)
MCHC RBC AUTO-ENTMCNC: 30.7 G/DL (ref 32–35)
MCV RBC AUTO: 89.5 FL (ref 83–97)
MONOCYTES # BLD AUTO: 0.6 10*3/MM3 (ref 0.25–0.8)
MONOCYTES NFR BLD AUTO: 9.3 % (ref 4–12)
NEUTROPHILS # BLD AUTO: 4.34 10*3/MM3 (ref 1.5–7)
NEUTROPHILS NFR BLD AUTO: 67.2 % (ref 39–75)
NRBC BLD MANUAL-RTO: 0 /100 WBC (ref 0–0)
PLATELET # BLD AUTO: 245 10*3/MM3 (ref 150–375)
PMV BLD AUTO: 9.9 FL (ref 8.9–12.1)
RBC # BLD AUTO: 3.42 10*6/MM3 (ref 3.9–5)
TIBC SERPL-MCNC: 248 MCG/DL (ref 249–505)
TRANSFERRIN SERPL-MCNC: 177 MG/DL (ref 200–360)
WBC NRBC COR # BLD: 6.46 10*3/MM3 (ref 4–10)

## 2017-12-05 PROCEDURE — 63510000001 EPOETIN ALFA PER 1000 UNITS: Performed by: INTERNAL MEDICINE

## 2017-12-05 PROCEDURE — 83540 ASSAY OF IRON: CPT | Performed by: INTERNAL MEDICINE

## 2017-12-05 PROCEDURE — 96372 THER/PROPH/DIAG INJ SC/IM: CPT

## 2017-12-05 PROCEDURE — 36415 COLL VENOUS BLD VENIPUNCTURE: CPT | Performed by: INTERNAL MEDICINE

## 2017-12-05 PROCEDURE — 82728 ASSAY OF FERRITIN: CPT | Performed by: INTERNAL MEDICINE

## 2017-12-05 PROCEDURE — 84466 ASSAY OF TRANSFERRIN: CPT | Performed by: INTERNAL MEDICINE

## 2017-12-05 PROCEDURE — 85025 COMPLETE CBC W/AUTO DIFF WBC: CPT | Performed by: INTERNAL MEDICINE

## 2017-12-05 RX ADMIN — ERYTHROPOIETIN 11000 UNITS: 20000 INJECTION, SOLUTION INTRAVENOUS; SUBCUTANEOUS at 15:51

## 2017-12-26 ENCOUNTER — APPOINTMENT (OUTPATIENT)
Dept: ONCOLOGY | Facility: HOSPITAL | Age: 82
End: 2017-12-26

## 2017-12-26 ENCOUNTER — APPOINTMENT (OUTPATIENT)
Dept: LAB | Facility: HOSPITAL | Age: 82
End: 2017-12-26

## 2017-12-27 ENCOUNTER — APPOINTMENT (OUTPATIENT)
Dept: ONCOLOGY | Facility: HOSPITAL | Age: 82
End: 2017-12-27

## 2017-12-27 ENCOUNTER — APPOINTMENT (OUTPATIENT)
Dept: LAB | Facility: HOSPITAL | Age: 82
End: 2017-12-27

## 2018-01-04 ENCOUNTER — INFUSION (OUTPATIENT)
Dept: ONCOLOGY | Facility: HOSPITAL | Age: 83
End: 2018-01-04

## 2018-01-04 ENCOUNTER — DOCUMENTATION (OUTPATIENT)
Dept: ONCOLOGY | Facility: CLINIC | Age: 83
End: 2018-01-04

## 2018-01-04 ENCOUNTER — LAB (OUTPATIENT)
Dept: LAB | Facility: HOSPITAL | Age: 83
End: 2018-01-04

## 2018-01-04 ENCOUNTER — APPOINTMENT (OUTPATIENT)
Dept: LAB | Facility: HOSPITAL | Age: 83
End: 2018-01-04

## 2018-01-04 ENCOUNTER — APPOINTMENT (OUTPATIENT)
Dept: ONCOLOGY | Facility: HOSPITAL | Age: 83
End: 2018-01-04

## 2018-01-04 DIAGNOSIS — D50.9 IRON DEFICIENCY ANEMIA, UNSPECIFIED IRON DEFICIENCY ANEMIA TYPE: ICD-10-CM

## 2018-01-04 DIAGNOSIS — N18.30 ANEMIA OF CHRONIC RENAL FAILURE, STAGE 3 (MODERATE) (HCC): ICD-10-CM

## 2018-01-04 DIAGNOSIS — C50.211 MALIGNANT NEOPLASM OF UPPER-INNER QUADRANT OF RIGHT BREAST IN FEMALE, ESTROGEN RECEPTOR POSITIVE (HCC): Primary | ICD-10-CM

## 2018-01-04 DIAGNOSIS — Z17.0 MALIGNANT NEOPLASM OF UPPER-INNER QUADRANT OF RIGHT BREAST IN FEMALE, ESTROGEN RECEPTOR POSITIVE (HCC): ICD-10-CM

## 2018-01-04 DIAGNOSIS — D63.1 ANEMIA OF CHRONIC RENAL FAILURE, STAGE 3 (MODERATE) (HCC): ICD-10-CM

## 2018-01-04 DIAGNOSIS — D63.1 ANEMIA OF CHRONIC RENAL FAILURE, STAGE 3 (MODERATE) (HCC): Primary | ICD-10-CM

## 2018-01-04 DIAGNOSIS — N18.30 ANEMIA OF CHRONIC RENAL FAILURE, STAGE 3 (MODERATE) (HCC): Primary | ICD-10-CM

## 2018-01-04 DIAGNOSIS — C50.211 MALIGNANT NEOPLASM OF UPPER-INNER QUADRANT OF RIGHT BREAST IN FEMALE, ESTROGEN RECEPTOR POSITIVE (HCC): ICD-10-CM

## 2018-01-04 DIAGNOSIS — Z17.0 MALIGNANT NEOPLASM OF UPPER-INNER QUADRANT OF RIGHT BREAST IN FEMALE, ESTROGEN RECEPTOR POSITIVE (HCC): Primary | ICD-10-CM

## 2018-01-04 LAB
ALBUMIN SERPL-MCNC: 4.2 G/DL (ref 3.5–5.2)
ALBUMIN/GLOB SERPL: 1.2 G/DL (ref 1.1–2.4)
ALP SERPL-CCNC: 140 U/L (ref 38–116)
ALT SERPL W P-5'-P-CCNC: 164 U/L (ref 0–33)
ANION GAP SERPL CALCULATED.3IONS-SCNC: 8.3 MMOL/L
AST SERPL-CCNC: 252 U/L (ref 0–32)
BASOPHILS # BLD AUTO: 0.02 10*3/MM3 (ref 0–0.1)
BASOPHILS NFR BLD AUTO: 0.3 % (ref 0–1.1)
BILIRUB SERPL-MCNC: 0.3 MG/DL (ref 0.1–1.2)
BUN BLD-MCNC: 24 MG/DL (ref 6–20)
BUN/CREAT SERPL: 18.5 (ref 7.3–30)
CALCIUM SPEC-SCNC: 10.3 MG/DL (ref 8.5–10.2)
CHLORIDE SERPL-SCNC: 97 MMOL/L (ref 98–107)
CO2 SERPL-SCNC: 35.7 MMOL/L (ref 22–29)
CREAT BLD-MCNC: 1.3 MG/DL (ref 0.6–1.1)
DEPRECATED RDW RBC AUTO: 42.4 FL (ref 37–49)
EOSINOPHIL # BLD AUTO: 0 10*3/MM3 (ref 0–0.36)
EOSINOPHIL NFR BLD AUTO: 0 % (ref 1–5)
ERYTHROCYTE [DISTWIDTH] IN BLOOD BY AUTOMATED COUNT: 12.9 % (ref 11.7–14.5)
GFR SERPL CREATININE-BSD FRML MDRD: 48 ML/MIN/1.73
GLOBULIN UR ELPH-MCNC: 3.5 GM/DL (ref 1.8–3.5)
GLUCOSE BLD-MCNC: 191 MG/DL (ref 74–124)
HCT VFR BLD AUTO: 30.5 % (ref 34–45)
HGB BLD-MCNC: 9.2 G/DL (ref 11.5–14.9)
IMM GRANULOCYTES # BLD: 0.01 10*3/MM3 (ref 0–0.03)
IMM GRANULOCYTES NFR BLD: 0.2 % (ref 0–0.5)
LYMPHOCYTES # BLD AUTO: 0.87 10*3/MM3 (ref 1–3.5)
LYMPHOCYTES NFR BLD AUTO: 14.4 % (ref 20–49)
MCH RBC QN AUTO: 27.4 PG (ref 27–33)
MCHC RBC AUTO-ENTMCNC: 30.2 G/DL (ref 32–35)
MCV RBC AUTO: 90.8 FL (ref 83–97)
MONOCYTES # BLD AUTO: 0.55 10*3/MM3 (ref 0.25–0.8)
MONOCYTES NFR BLD AUTO: 9.1 % (ref 4–12)
NEUTROPHILS # BLD AUTO: 4.61 10*3/MM3 (ref 1.5–7)
NEUTROPHILS NFR BLD AUTO: 76 % (ref 39–75)
NRBC BLD MANUAL-RTO: 0 /100 WBC (ref 0–0)
PLATELET # BLD AUTO: 238 10*3/MM3 (ref 150–375)
PMV BLD AUTO: 9.2 FL (ref 8.9–12.1)
POTASSIUM BLD-SCNC: 4.1 MMOL/L (ref 3.5–4.7)
PROT SERPL-MCNC: 7.7 G/DL (ref 6.3–8)
RBC # BLD AUTO: 3.36 10*6/MM3 (ref 3.9–5)
SODIUM BLD-SCNC: 141 MMOL/L (ref 134–145)
WBC NRBC COR # BLD: 6.06 10*3/MM3 (ref 4–10)

## 2018-01-04 PROCEDURE — 63510000001 EPOETIN ALFA PER 1000 UNITS: Performed by: INTERNAL MEDICINE

## 2018-01-04 PROCEDURE — 96372 THER/PROPH/DIAG INJ SC/IM: CPT

## 2018-01-04 PROCEDURE — 36415 COLL VENOUS BLD VENIPUNCTURE: CPT | Performed by: INTERNAL MEDICINE

## 2018-01-04 PROCEDURE — 80053 COMPREHEN METABOLIC PANEL: CPT | Performed by: INTERNAL MEDICINE

## 2018-01-04 PROCEDURE — 85025 COMPLETE CBC W/AUTO DIFF WBC: CPT | Performed by: INTERNAL MEDICINE

## 2018-01-04 RX ADMIN — ERYTHROPOIETIN 11000 UNITS: 20000 INJECTION, SOLUTION INTRAVENOUS; SUBCUTANEOUS at 14:52

## 2018-01-04 NOTE — PROGRESS NOTES
Noted increase in AST and ALT on lab work from today.  With a history of breast cancer, I called and left a voicemail telling her I think she should have a CT of the abdomen to week or so before her upcoming appointment with me which is in about 2 weeks.    I told her I doubt this is from breast cancer since she had such a low risk breast cancer.  However, I think we should look for cancer in the liver.  If none is found, would defer to her PCP evaluation for liver dysfunction.    I last the triage nurses to set up a CT scan.  (I have ordered this without IV contrast due to her creatinine of 1.3 at the age of 82 years old)

## 2018-01-05 ENCOUNTER — TELEPHONE (OUTPATIENT)
Dept: ONCOLOGY | Facility: HOSPITAL | Age: 83
End: 2018-01-05

## 2018-01-05 ENCOUNTER — APPOINTMENT (OUTPATIENT)
Dept: ONCOLOGY | Facility: HOSPITAL | Age: 83
End: 2018-01-05

## 2018-01-05 ENCOUNTER — APPOINTMENT (OUTPATIENT)
Dept: LAB | Facility: HOSPITAL | Age: 83
End: 2018-01-05

## 2018-01-05 NOTE — TELEPHONE ENCOUNTER
----- Message from Hosea Trinh II, MD sent at 1/4/2018  5:33 PM EST -----   Please arrange for a CT abdomen without contrast at least a few days before her upcoming office visit with me.    (See my note from today for additional details)      Attempted to call pt. No answer, left VM on home number with details explaining why pt needs CT scan. Message sent to scheduling to call pt and schedule appt

## 2018-01-11 ENCOUNTER — HOSPITAL ENCOUNTER (OUTPATIENT)
Dept: CT IMAGING | Facility: HOSPITAL | Age: 83
Discharge: HOME | End: 2018-01-11
Attending: INTERNAL MEDICINE | Admitting: INTERNAL MEDICINE

## 2018-01-11 DIAGNOSIS — Z17.0 MALIGNANT NEOPLASM OF UPPER-INNER QUADRANT OF RIGHT BREAST IN FEMALE, ESTROGEN RECEPTOR POSITIVE (HCC): ICD-10-CM

## 2018-01-11 DIAGNOSIS — C50.211 MALIGNANT NEOPLASM OF UPPER-INNER QUADRANT OF RIGHT BREAST IN FEMALE, ESTROGEN RECEPTOR POSITIVE (HCC): ICD-10-CM

## 2018-01-11 PROCEDURE — 74150 CT ABDOMEN W/O CONTRAST: CPT

## 2018-01-16 ENCOUNTER — APPOINTMENT (OUTPATIENT)
Dept: LAB | Facility: HOSPITAL | Age: 83
End: 2018-01-16

## 2018-01-16 ENCOUNTER — APPOINTMENT (OUTPATIENT)
Dept: ONCOLOGY | Facility: CLINIC | Age: 83
End: 2018-01-16

## 2018-01-22 ENCOUNTER — OFFICE VISIT (OUTPATIENT)
Dept: ONCOLOGY | Facility: CLINIC | Age: 83
End: 2018-01-22

## 2018-01-22 ENCOUNTER — LAB (OUTPATIENT)
Dept: LAB | Facility: HOSPITAL | Age: 83
End: 2018-01-22

## 2018-01-22 ENCOUNTER — INFUSION (OUTPATIENT)
Dept: ONCOLOGY | Facility: HOSPITAL | Age: 83
End: 2018-01-22

## 2018-01-22 ENCOUNTER — TELEPHONE (OUTPATIENT)
Dept: ONCOLOGY | Facility: HOSPITAL | Age: 83
End: 2018-01-22

## 2018-01-22 VITALS
WEIGHT: 192.4 LBS | BODY MASS INDEX: 37.77 KG/M2 | RESPIRATION RATE: 16 BRPM | SYSTOLIC BLOOD PRESSURE: 122 MMHG | DIASTOLIC BLOOD PRESSURE: 60 MMHG | TEMPERATURE: 98.3 F | HEART RATE: 96 BPM | HEIGHT: 60 IN

## 2018-01-22 DIAGNOSIS — D63.1 ANEMIA OF CHRONIC RENAL FAILURE, STAGE 3 (MODERATE) (HCC): ICD-10-CM

## 2018-01-22 DIAGNOSIS — C50.211 MALIGNANT NEOPLASM OF UPPER-INNER QUADRANT OF RIGHT BREAST IN FEMALE, ESTROGEN RECEPTOR POSITIVE (HCC): ICD-10-CM

## 2018-01-22 DIAGNOSIS — D50.9 IRON DEFICIENCY ANEMIA, UNSPECIFIED IRON DEFICIENCY ANEMIA TYPE: ICD-10-CM

## 2018-01-22 DIAGNOSIS — N18.30 ANEMIA OF CHRONIC RENAL FAILURE, STAGE 3 (MODERATE) (HCC): ICD-10-CM

## 2018-01-22 DIAGNOSIS — C50.211 MALIGNANT NEOPLASM OF UPPER-INNER QUADRANT OF RIGHT BREAST IN FEMALE, ESTROGEN RECEPTOR POSITIVE (HCC): Primary | ICD-10-CM

## 2018-01-22 DIAGNOSIS — Z17.0 MALIGNANT NEOPLASM OF UPPER-INNER QUADRANT OF RIGHT BREAST IN FEMALE, ESTROGEN RECEPTOR POSITIVE (HCC): Primary | ICD-10-CM

## 2018-01-22 DIAGNOSIS — D63.1 ANEMIA OF CHRONIC RENAL FAILURE, STAGE 3 (MODERATE) (HCC): Primary | ICD-10-CM

## 2018-01-22 DIAGNOSIS — Z17.0 MALIGNANT NEOPLASM OF UPPER-INNER QUADRANT OF RIGHT BREAST IN FEMALE, ESTROGEN RECEPTOR POSITIVE (HCC): ICD-10-CM

## 2018-01-22 DIAGNOSIS — N18.30 ANEMIA OF CHRONIC RENAL FAILURE, STAGE 3 (MODERATE) (HCC): Primary | ICD-10-CM

## 2018-01-22 LAB
ALBUMIN SERPL-MCNC: 4.2 G/DL (ref 3.5–5.2)
ALBUMIN/GLOB SERPL: 1.1 G/DL (ref 1.1–2.4)
ALP SERPL-CCNC: 152 U/L (ref 38–116)
ALT SERPL W P-5'-P-CCNC: 81 U/L (ref 0–33)
ANION GAP SERPL CALCULATED.3IONS-SCNC: 9.7 MMOL/L
AST SERPL-CCNC: 131 U/L (ref 0–32)
BASOPHILS # BLD AUTO: 0.02 10*3/MM3 (ref 0–0.1)
BASOPHILS NFR BLD AUTO: 0.3 % (ref 0–1.1)
BILIRUB SERPL-MCNC: 0.4 MG/DL (ref 0.1–1.2)
BUN BLD-MCNC: 22 MG/DL (ref 6–20)
BUN/CREAT SERPL: 15.2 (ref 7.3–30)
CALCIUM SPEC-SCNC: 10.5 MG/DL (ref 8.5–10.2)
CHLORIDE SERPL-SCNC: 100 MMOL/L (ref 98–107)
CO2 SERPL-SCNC: 35.3 MMOL/L (ref 22–29)
CREAT BLD-MCNC: 1.45 MG/DL (ref 0.6–1.1)
DEPRECATED RDW RBC AUTO: 41 FL (ref 37–49)
EOSINOPHIL # BLD AUTO: 0 10*3/MM3 (ref 0–0.36)
EOSINOPHIL NFR BLD AUTO: 0 % (ref 1–5)
ERYTHROCYTE [DISTWIDTH] IN BLOOD BY AUTOMATED COUNT: 12.8 % (ref 11.7–14.5)
GFR SERPL CREATININE-BSD FRML MDRD: 42 ML/MIN/1.73
GLOBULIN UR ELPH-MCNC: 3.7 GM/DL (ref 1.8–3.5)
GLUCOSE BLD-MCNC: 96 MG/DL (ref 74–124)
HCT VFR BLD AUTO: 31.4 % (ref 34–45)
HGB BLD-MCNC: 9.7 G/DL (ref 11.5–14.9)
IMM GRANULOCYTES # BLD: 0.05 10*3/MM3 (ref 0–0.03)
IMM GRANULOCYTES NFR BLD: 0.7 % (ref 0–0.5)
LYMPHOCYTES # BLD AUTO: 1.91 10*3/MM3 (ref 1–3.5)
LYMPHOCYTES NFR BLD AUTO: 26.6 % (ref 20–49)
MCH RBC QN AUTO: 27.4 PG (ref 27–33)
MCHC RBC AUTO-ENTMCNC: 30.9 G/DL (ref 32–35)
MCV RBC AUTO: 88.7 FL (ref 83–97)
MONOCYTES # BLD AUTO: 0.9 10*3/MM3 (ref 0.25–0.8)
MONOCYTES NFR BLD AUTO: 12.5 % (ref 4–12)
NEUTROPHILS # BLD AUTO: 4.31 10*3/MM3 (ref 1.5–7)
NEUTROPHILS NFR BLD AUTO: 59.9 % (ref 39–75)
NRBC BLD MANUAL-RTO: 0 /100 WBC (ref 0–0)
PLATELET # BLD AUTO: 226 10*3/MM3 (ref 150–375)
PMV BLD AUTO: 10.1 FL (ref 8.9–12.1)
POTASSIUM BLD-SCNC: 3.9 MMOL/L (ref 3.5–4.7)
PROT SERPL-MCNC: 7.9 G/DL (ref 6.3–8)
RBC # BLD AUTO: 3.54 10*6/MM3 (ref 3.9–5)
SODIUM BLD-SCNC: 145 MMOL/L (ref 134–145)
WBC NRBC COR # BLD: 7.19 10*3/MM3 (ref 4–10)

## 2018-01-22 PROCEDURE — 36416 COLLJ CAPILLARY BLOOD SPEC: CPT | Performed by: INTERNAL MEDICINE

## 2018-01-22 PROCEDURE — 85025 COMPLETE CBC W/AUTO DIFF WBC: CPT | Performed by: INTERNAL MEDICINE

## 2018-01-22 PROCEDURE — 96372 THER/PROPH/DIAG INJ SC/IM: CPT

## 2018-01-22 PROCEDURE — 99215 OFFICE O/P EST HI 40 MIN: CPT | Performed by: INTERNAL MEDICINE

## 2018-01-22 PROCEDURE — 63510000001 EPOETIN ALFA PER 1000 UNITS: Performed by: INTERNAL MEDICINE

## 2018-01-22 PROCEDURE — 36415 COLL VENOUS BLD VENIPUNCTURE: CPT | Performed by: INTERNAL MEDICINE

## 2018-01-22 PROCEDURE — 80053 COMPREHEN METABOLIC PANEL: CPT | Performed by: INTERNAL MEDICINE

## 2018-01-22 RX ADMIN — ERYTHROPOIETIN 11000 UNITS: 20000 INJECTION, SOLUTION INTRAVENOUS; SUBCUTANEOUS at 09:54

## 2018-01-22 NOTE — TELEPHONE ENCOUNTER
----- Message from Hosea Trinh II, MD sent at 1/22/2018 10:35 AM EST -----   Please call patient's daughter and tell her liver labs are much better, but are still significantly elevated.  Please make sure both sets of CMP labs are sent to Dr. Plascencia and that the patient follows up with Dr. Plascencia.        Attempted to call pt's daughter, no answer. Unable to leave message. Called pt and informed her. She v/u. Routed CMP to Dr. Lopez's office.

## 2018-01-22 NOTE — PROGRESS NOTES
Subjective .     REASONS FOR FOLLOWUP:  Breast cancer, anemia    HISTORY OF PRESENT ILLNESS:  The patient is a 82 y.o. year old female  who is here for follow-up with the above-mentioned history.    Denies hot flashes.  Denies bleeding, chest pain, dizziness.  No change in baseline shortness of air.    Past Medical History:   Diagnosis Date   • Anemia     Iron deficiency anemia    • Anxiety    • Arthritis    • Breast cancer 08/2014    right    • Breast cancer 12/2006    Left   • Chronic kidney disease     Anemia of chronic renal insufficency, stage 3   • COPD (chronic obstructive pulmonary disease)    • Depression    • Diabetes mellitus    • Hyperlipidemia    • Hypertension    • YARY (obstructive sleep apnea)    • Poor circulation    • Stroke     CVA in 1990.       HEMATOLOGIC/ONCOLOGIC HISTORY:  (History from previous dates can be found in the separate document.)    MEDICATIONS    Current Outpatient Prescriptions:   •  amLODIPine (NORVASC) 10 MG tablet, Take 1 tablet by mouth daily., Disp: , Rfl:   •  anastrozole (ARIMIDEX) 1 MG tablet, take 1 tablet by mouth once daily, Disp: 30 tablet, Rfl: 5  •  aspirin 81 MG tablet, Take 81 mg by mouth., Disp: , Rfl:   •  busPIRone (BUSPAR) 15 MG tablet, Take 1 tablet by mouth every morning. And 2 tablets in the evening, Disp: , Rfl:   •  calcium carbonate-vitamin d (CALCIUM 600+D) 600-400 MG-UNIT per tablet, Take 1 tablet by mouth., Disp: , Rfl:   •  clopidogrel (PLAVIX) 75 MG tablet, take 1 tablet by mouth once daily, Disp: , Rfl:   •  cyanocobalamin (VITAMIN B-12) 1000 MCG tablet, Take 100 mcg by mouth., Disp: , Rfl:   •  doxepin (SINEquan) 50 MG capsule, take 1 tablet by mouth three times a day at bedtime, Disp: , Rfl: 0  •  epoetin joe (EPOGEN,PROCRIT) 74401 UNIT/ML injection, Epogen SOLN; Patient Sig: Epogen SOLN 5,000u if hgb is below 12   subq injectable; 0; 22-Jul-2015; Active, Disp: , Rfl:   •  FERROUS SULFATE PO, Take  by mouth., Disp: , Rfl:   •  furosemide  (LASIX) 40 MG tablet, Take 1 tablet by mouth daily., Disp: , Rfl:   •  glucose blood test strip, TRUEtest Test In Vitro Strip; Patient Sig: TRUEtest Test In Vitro Strip ; 200; 0; 27-May-2014; Active, Disp: , Rfl:   •  HYDROcodone-acetaminophen (NORCO) 5-325 MG per tablet, Take 1 tablet by mouth every 6 (six) hours as needed. For pain, Disp: , Rfl:   •  insulin aspart (NovoLOG) 100 UNIT/ML injection, Inject 8 Units under the skin Medrol Dose Pack scheduling ONLY., Disp: , Rfl:   •  Insulin Glargine 100 UNIT/ML solution pen-injector, Inject 20 Units under the skin daily., Disp: , Rfl:   •  ipratropium-albuterol (DUONEB) 0.5-2.5 mg/mL nebulizer, Inhale 3 mL 3 (Three) Times a Day., Disp: , Rfl:   •  lansoprazole (PREVACID) 30 MG capsule, Take 1 capsule by mouth daily., Disp: , Rfl:   •  LANTUS 100 UNIT/ML injection, , Disp: , Rfl: 0  •  levETIRAcetam (KEPPRA) 500 MG tablet, Take 1 tablet by mouth 2 (two) times a day., Disp: , Rfl:   •  losartan (COZAAR) 100 MG tablet, Take 1 tablet by mouth daily., Disp: , Rfl:   •  meclizine (ANTIVERT) 25 MG tablet, take 1 tablet by mouth three times a day if needed for dizziness, Disp: , Rfl: 0  •  metoclopramide (REGLAN) 10 MG tablet, Take 1 tablet by mouth daily. Before a meal, Disp: , Rfl:   •  Multiple Vitamins-Minerals (MULTIVITAL PO), Take 1 tablet by mouth daily., Disp: , Rfl:   •  pravastatin (PRAVACHOL) 40 MG tablet, Take 1 tablet by mouth nightly., Disp: , Rfl:   •  temazepam (RESTORIL) 30 MG capsule, Take 1 capsule by mouth nightly as needed. At bedtime, Disp: , Rfl:   No current facility-administered medications for this visit.     Facility-Administered Medications Ordered in Other Visits:   •  epoetin joe (EPOGEN,PROCRIT) injection 11,000 Units, 11,000 Units, Subcutaneous, Once, Hosea Trinh II, MD    ALLERGIES:     Allergies   Allergen Reactions   • Sulfa Antibiotics Other (See Comments)     GI upset   • Penicillins Itching and Rash       SOCIAL HISTORY:       Social  "History     Social History   • Marital status:      Spouse name: N/A   • Number of children: N/A   • Years of education: High School     Occupational History   • Nurse aid Retired     Social History Main Topics   • Smoking status: Never Smoker   • Smokeless tobacco: Never Used   • Alcohol use No   • Drug use: No   • Sexual activity: Not on file     Other Topics Concern   • Not on file     Social History Narrative         FAMILY HISTORY:  Family History   Problem Relation Age of Onset   • Cancer Father    • Cancer Brother    • Diabetes Brother    • Heart disease Brother    • Cancer Other        REVIEW OF SYSTEMS:  GENERAL: No change in appetite or weight;   No fevers, chills, sweats.    SKIN: No nonhealing lesions.   No rashes.  HEME/LYMPH: No easy bruising, bleeding.   No swollen nodes.   EYES: No vision changes or diplopia.   ENT: No tinnitus, hearing loss, gum bleeding, epistaxis, hoarseness or dysphagia.   RESPIRATORY: No change in baseline shortness of air.  She is on oxygen at home.  CVS: No chest pain, palpitations, orthopnea, dyspnea on exertion or PND.   GI: No melena or hematochezia.   No abdominal pain.  No nausea, vomiting, constipation, diarrhea  : No lower tract obstructive symptoms, dysuria or hematuria.   MUSCULOSKELETAL: No bone pain.  No joint stiffness.   NEUROLOGICAL: No global weakness, loss of consciousness or seizures.   PSYCHIATRIC: No increased nervousness, mood changes or depression.          Objective    Vitals:    01/22/18 0912   BP: 122/60   Pulse: 96   Resp: 16   Temp: 98.3 °F (36.8 °C)   Weight: 87.3 kg (192 lb 6.4 oz)   Height: 153 cm (60.24\")   PainSc: 0-No pain     Current Status 1/22/2018   ECOG score 0      PHYSICAL EXAM:    GENERAL:  Well-developed, well-nourished in no acute distress.   SKIN:  Warm, dry without rashes, purpura or petechiae.  HEAD:  Normocephalic.  EYES:  Pupils equal, round and reactive to light.  EOMs intact.  Conjunctivae normal.  EARS:  Hearing " intact.  NOSE:  Septum midline.  No excoriations or nasal discharge.  MOUTH:  Tongue is well-papillated; no stomatitis or ulcers.  Lips normal.  THROAT:  Oropharynx without lesions or exudates.  NECK:  Supple with good range of motion; no thyromegaly or masses, no JVD.  LYMPHATICS:  No cervical, supraclavicular, axillary or inguinal adenopathy.  CHEST:  Lungs clear to percussion and auscultation. Good airflow.  CARDIAC:  Regular rate and rhythm without murmurs, rubs or gallops. Normal S1,S2.  ABDOMEN:  Soft, nontender with no organomegaly or masses.  EXTREMITIES:  No clubbing, cyanosis or edema.  NEUROLOGICAL:  Cranial Nerves II-XII grossly intact.  No focal neurological deficits.  PSYCHIATRIC:  Normal affect and mood.      RECENT LABS:        WBC   Date/Time Value Ref Range Status   01/22/2018 09:04 AM 7.19 4.00 - 10.00 10*3/mm3 Final     Hemoglobin   Date/Time Value Ref Range Status   01/22/2018 09:04 AM 9.7 (L) 11.5 - 14.9 g/dL Final     Platelets   Date/Time Value Ref Range Status   01/22/2018 09:04  150 - 375 10*3/mm3 Final       Assessment/Plan   ASSESSMENT:  Malignant neoplasm of upper-inner quadrant of right breast in female, estrogen receptor positive  - CBC & Differential  - CBC & Differential  - CBC & Differential  - CBC & Differential  - Comprehensive Metabolic Panel  - Ferritin  - Iron Profile  - Retic With IRF & RET-He    Anemia of chronic renal failure, stage 3 (moderate)  - CBC & Differential  - CBC & Differential  - CBC & Differential  - CBC & Differential  - Comprehensive Metabolic Panel  - Ferritin  - Iron Profile  - Retic With IRF & RET-He    Iron deficiency anemia, unspecified iron deficiency anemia type  - CBC & Differential  - CBC & Differential  - CBC & Differential  - CBC & Differential  - Comprehensive Metabolic Panel  - Ferritin  - Iron Profile  - Retic With IRF & RET-He    *Breast cancer.  Right-sided invasive ductal carcinoma, papillary type, removed with lumpectomy on 08/26/2014  by Dr. Sung Griggs. Only 1.7 mm of remaining invasive carcinoma. Node negative. Completed radiation.   Arimidex 10/17/2014 until planned 10/17/2019. Tolerating Arimidex well.   Appears to remain in remission.    *Bone health. Normal bone density on DEXA scan 9/23/16. Calcium carbonate 600 mg with vitamin D once daily, changed to calcium citrate 1000 mg with vitamin D when Arimidex was started (she is also on lansoprazole).     *Anemia of chronic renal insufficiency, stage 3.   Procrit p.r.n. for hemoglobin less than 10.   On the late March 2016 visit, changed from every 5 week CBC to every 2 week CBC due to a fall in hemoglobin.  She has not needed much Procrit on the every 3 week interval she is currently on.  However, family prefers to maintain 3 week interval checks.    *Iron deficiency anemia. History of Venofer and Feraheme use. Does not respond well to p.o. iron but is taking ferrous sulfate 1 tablet 3 times per day without any problems (she will continue this).   In May 2015, she received one dose of Feraheme due to an iron percent saturation of around 10% and MCV of 82.6 despite an elevated ferritin. I think her ferritin is chronically high because of inflammation).   Recent iron labs: Unremarkable     *Elevated transaminases 1/4/18.  This prompted CT abdomen 1/11/18 (without contrast due to chronic renal insufficiency):  No evidence of malignancy.  Transaminases are much better today, but still significantly elevated.  She was advised to follow up with her PCP.    PLAN:  · CBC every 3 week.    · Procrit if hemoglobin less than 10.    · M.D. visit in 12 weeks with iron studies 3 weeks prior  · Next DEXA scan due sometime after 9/23/18  · She states Dr. Plascencia does her breast exams and orders her mammograms and she is up-to-date.    · Continue Arimidex calcium and vitamin D.  · Defer further management of elevated transaminases to PCP, Dr. Plascencia.    Daughter assisted with history.  CT images  personally reviewed by me.

## 2018-02-12 ENCOUNTER — INFUSION (OUTPATIENT)
Dept: ONCOLOGY | Facility: HOSPITAL | Age: 83
End: 2018-02-12

## 2018-02-12 ENCOUNTER — LAB (OUTPATIENT)
Dept: LAB | Facility: HOSPITAL | Age: 83
End: 2018-02-12

## 2018-02-12 DIAGNOSIS — D50.9 IRON DEFICIENCY ANEMIA, UNSPECIFIED IRON DEFICIENCY ANEMIA TYPE: ICD-10-CM

## 2018-02-12 DIAGNOSIS — D63.1 ANEMIA OF CHRONIC RENAL FAILURE, STAGE 3 (MODERATE) (HCC): ICD-10-CM

## 2018-02-12 DIAGNOSIS — C50.211 MALIGNANT NEOPLASM OF UPPER-INNER QUADRANT OF RIGHT BREAST IN FEMALE, ESTROGEN RECEPTOR POSITIVE (HCC): ICD-10-CM

## 2018-02-12 DIAGNOSIS — N18.30 ANEMIA OF CHRONIC RENAL FAILURE, STAGE 3 (MODERATE) (HCC): ICD-10-CM

## 2018-02-12 DIAGNOSIS — Z17.0 MALIGNANT NEOPLASM OF UPPER-INNER QUADRANT OF RIGHT BREAST IN FEMALE, ESTROGEN RECEPTOR POSITIVE (HCC): ICD-10-CM

## 2018-02-12 LAB
BASOPHILS # BLD AUTO: 0.04 10*3/MM3 (ref 0–0.1)
BASOPHILS NFR BLD AUTO: 0.5 % (ref 0–1.1)
DEPRECATED RDW RBC AUTO: 39.9 FL (ref 37–49)
EOSINOPHIL # BLD AUTO: 0 10*3/MM3 (ref 0–0.36)
EOSINOPHIL NFR BLD AUTO: 0 % (ref 1–5)
ERYTHROCYTE [DISTWIDTH] IN BLOOD BY AUTOMATED COUNT: 12.6 % (ref 11.7–14.5)
HCT VFR BLD AUTO: 31.8 % (ref 34–45)
HGB BLD-MCNC: 10.2 G/DL (ref 11.5–14.9)
IMM GRANULOCYTES # BLD: 0.08 10*3/MM3 (ref 0–0.03)
IMM GRANULOCYTES NFR BLD: 1.1 % (ref 0–0.5)
LYMPHOCYTES # BLD AUTO: 1.65 10*3/MM3 (ref 1–3.5)
LYMPHOCYTES NFR BLD AUTO: 22.4 % (ref 20–49)
MCH RBC QN AUTO: 27.6 PG (ref 27–33)
MCHC RBC AUTO-ENTMCNC: 32.1 G/DL (ref 32–35)
MCV RBC AUTO: 85.9 FL (ref 83–97)
MONOCYTES # BLD AUTO: 0.59 10*3/MM3 (ref 0.25–0.8)
MONOCYTES NFR BLD AUTO: 8 % (ref 4–12)
NEUTROPHILS # BLD AUTO: 5.02 10*3/MM3 (ref 1.5–7)
NEUTROPHILS NFR BLD AUTO: 68 % (ref 39–75)
NRBC BLD MANUAL-RTO: 0 /100 WBC (ref 0–0)
PLATELET # BLD AUTO: 189 10*3/MM3 (ref 150–375)
PMV BLD AUTO: 10.6 FL (ref 8.9–12.1)
RBC # BLD AUTO: 3.7 10*6/MM3 (ref 3.9–5)
WBC NRBC COR # BLD: 7.38 10*3/MM3 (ref 4–10)

## 2018-02-12 PROCEDURE — 85025 COMPLETE CBC W/AUTO DIFF WBC: CPT | Performed by: INTERNAL MEDICINE

## 2018-02-12 PROCEDURE — 36415 COLL VENOUS BLD VENIPUNCTURE: CPT | Performed by: INTERNAL MEDICINE

## 2018-03-05 ENCOUNTER — APPOINTMENT (OUTPATIENT)
Dept: ONCOLOGY | Facility: HOSPITAL | Age: 83
End: 2018-03-05

## 2018-03-05 ENCOUNTER — APPOINTMENT (OUTPATIENT)
Dept: LAB | Facility: HOSPITAL | Age: 83
End: 2018-03-05

## 2018-03-05 ENCOUNTER — INFUSION (OUTPATIENT)
Dept: ONCOLOGY | Facility: HOSPITAL | Age: 83
End: 2018-03-05

## 2018-03-05 ENCOUNTER — LAB (OUTPATIENT)
Dept: LAB | Facility: HOSPITAL | Age: 83
End: 2018-03-05

## 2018-03-05 DIAGNOSIS — D63.1 ANEMIA OF CHRONIC RENAL FAILURE, STAGE 3 (MODERATE) (HCC): ICD-10-CM

## 2018-03-05 DIAGNOSIS — N18.30 ANEMIA OF CHRONIC RENAL FAILURE, STAGE 3 (MODERATE) (HCC): Primary | ICD-10-CM

## 2018-03-05 DIAGNOSIS — D50.9 IRON DEFICIENCY ANEMIA, UNSPECIFIED IRON DEFICIENCY ANEMIA TYPE: ICD-10-CM

## 2018-03-05 DIAGNOSIS — N18.30 ANEMIA OF CHRONIC RENAL FAILURE, STAGE 3 (MODERATE) (HCC): ICD-10-CM

## 2018-03-05 DIAGNOSIS — Z17.0 MALIGNANT NEOPLASM OF UPPER-INNER QUADRANT OF RIGHT BREAST IN FEMALE, ESTROGEN RECEPTOR POSITIVE (HCC): ICD-10-CM

## 2018-03-05 DIAGNOSIS — C50.211 MALIGNANT NEOPLASM OF UPPER-INNER QUADRANT OF RIGHT BREAST IN FEMALE, ESTROGEN RECEPTOR POSITIVE (HCC): ICD-10-CM

## 2018-03-05 DIAGNOSIS — D63.1 ANEMIA OF CHRONIC RENAL FAILURE, STAGE 3 (MODERATE) (HCC): Primary | ICD-10-CM

## 2018-03-05 LAB
BASOPHILS # BLD AUTO: 0.03 10*3/MM3 (ref 0–0.1)
BASOPHILS NFR BLD AUTO: 0.5 % (ref 0–1.1)
DEPRECATED RDW RBC AUTO: 42.3 FL (ref 37–49)
EOSINOPHIL # BLD AUTO: 0 10*3/MM3 (ref 0–0.36)
EOSINOPHIL NFR BLD AUTO: 0 % (ref 1–5)
ERYTHROCYTE [DISTWIDTH] IN BLOOD BY AUTOMATED COUNT: 13.1 % (ref 11.7–14.5)
FERRITIN SERPL-MCNC: 487.8 NG/ML
HCT VFR BLD AUTO: 31.2 % (ref 34–45)
HGB BLD-MCNC: 9.6 G/DL (ref 11.5–14.9)
HGB RETIC QN: 31.1 PG (ref 29.8–36.1)
IMM GRANULOCYTES # BLD: 0.03 10*3/MM3 (ref 0–0.03)
IMM GRANULOCYTES NFR BLD: 0.5 % (ref 0–0.5)
IMM RETICS NFR: 18.5 % (ref 3–15.8)
IRON 24H UR-MRATE: 40 MCG/DL (ref 37–145)
IRON SATN MFR SERPL: 15 % (ref 14–48)
LYMPHOCYTES # BLD AUTO: 1.5 10*3/MM3 (ref 1–3.5)
LYMPHOCYTES NFR BLD AUTO: 23.7 % (ref 20–49)
MCH RBC QN AUTO: 27.7 PG (ref 27–33)
MCHC RBC AUTO-ENTMCNC: 30.8 G/DL (ref 32–35)
MCV RBC AUTO: 89.9 FL (ref 83–97)
MONOCYTES # BLD AUTO: 0.67 10*3/MM3 (ref 0.25–0.8)
MONOCYTES NFR BLD AUTO: 10.6 % (ref 4–12)
NEUTROPHILS # BLD AUTO: 4.1 10*3/MM3 (ref 1.5–7)
NEUTROPHILS NFR BLD AUTO: 64.7 % (ref 39–75)
NRBC BLD MANUAL-RTO: 0 /100 WBC (ref 0–0)
PLATELET # BLD AUTO: 243 10*3/MM3 (ref 150–375)
PMV BLD AUTO: 9.3 FL (ref 8.9–12.1)
RBC # BLD AUTO: 3.47 10*6/MM3 (ref 3.9–5)
RETICS/RBC NFR AUTO: 3.18 % (ref 0.6–2)
TIBC SERPL-MCNC: 265 MCG/DL (ref 249–505)
TRANSFERRIN SERPL-MCNC: 189 MG/DL (ref 200–360)
WBC NRBC COR # BLD: 6.33 10*3/MM3 (ref 4–10)

## 2018-03-05 PROCEDURE — 85025 COMPLETE CBC W/AUTO DIFF WBC: CPT | Performed by: INTERNAL MEDICINE

## 2018-03-05 PROCEDURE — 83540 ASSAY OF IRON: CPT | Performed by: INTERNAL MEDICINE

## 2018-03-05 PROCEDURE — 84466 ASSAY OF TRANSFERRIN: CPT | Performed by: INTERNAL MEDICINE

## 2018-03-05 PROCEDURE — 36415 COLL VENOUS BLD VENIPUNCTURE: CPT | Performed by: INTERNAL MEDICINE

## 2018-03-05 PROCEDURE — 96372 THER/PROPH/DIAG INJ SC/IM: CPT

## 2018-03-05 PROCEDURE — 82728 ASSAY OF FERRITIN: CPT | Performed by: INTERNAL MEDICINE

## 2018-03-05 PROCEDURE — 63510000001 EPOETIN ALFA PER 1000 UNITS: Performed by: INTERNAL MEDICINE

## 2018-03-05 PROCEDURE — 85046 RETICYTE/HGB CONCENTRATE: CPT | Performed by: INTERNAL MEDICINE

## 2018-03-05 RX ADMIN — ERYTHROPOIETIN 8000 UNITS: 20000 INJECTION, SOLUTION INTRAVENOUS; SUBCUTANEOUS at 15:55

## 2018-03-05 NOTE — PROGRESS NOTES
Pt here for CBC and Procrit. Hgb 9.6 today. Dose scheduled for 1/22 was 11,000 units and dose for 2/12 was held per protocol. Pt restarted today at 25% reduced dose which is rounded to 8,000 of Procrit. Copy of labs given to pt and pt v/u.

## 2018-03-06 ENCOUNTER — APPOINTMENT (OUTPATIENT)
Dept: LAB | Facility: HOSPITAL | Age: 83
End: 2018-03-06

## 2018-03-06 ENCOUNTER — APPOINTMENT (OUTPATIENT)
Dept: ONCOLOGY | Facility: HOSPITAL | Age: 83
End: 2018-03-06

## 2018-03-24 ENCOUNTER — APPOINTMENT (OUTPATIENT)
Dept: GENERAL RADIOLOGY | Facility: HOSPITAL | Age: 83
End: 2018-03-24

## 2018-03-24 ENCOUNTER — HOSPITAL ENCOUNTER (EMERGENCY)
Facility: HOSPITAL | Age: 83
Discharge: HOME | End: 2018-03-24
Attending: EMERGENCY MEDICINE | Admitting: NURSE PRACTITIONER

## 2018-03-24 ENCOUNTER — APPOINTMENT (OUTPATIENT)
Dept: CT IMAGING | Facility: HOSPITAL | Age: 83
End: 2018-03-24

## 2018-03-24 VITALS
RESPIRATION RATE: 16 BRPM | HEART RATE: 74 BPM | DIASTOLIC BLOOD PRESSURE: 51 MMHG | BODY MASS INDEX: 29.88 KG/M2 | HEIGHT: 64 IN | SYSTOLIC BLOOD PRESSURE: 115 MMHG | TEMPERATURE: 97.3 F | WEIGHT: 175 LBS | OXYGEN SATURATION: 100 %

## 2018-03-24 DIAGNOSIS — S00.93XA CONTUSION OF HEAD, UNSPECIFIED PART OF HEAD, INITIAL ENCOUNTER: Primary | ICD-10-CM

## 2018-03-24 DIAGNOSIS — S16.1XXA STRAIN OF NECK MUSCLE, INITIAL ENCOUNTER: ICD-10-CM
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
## 2018-03-24 DIAGNOSIS — D64.9 CHRONIC ANEMIA: ICD-10-CM

## 2018-03-24 DIAGNOSIS — S70.02XA CONTUSION OF LEFT HIP, INITIAL ENCOUNTER: ICD-10-CM

## 2018-03-24 DIAGNOSIS — R42 DIZZINESS: ICD-10-CM

## 2018-03-24 LAB
ALBUMIN SERPL-MCNC: 3.8 G/DL (ref 3.5–5.2)
ALBUMIN/GLOB SERPL: 1.2 G/DL
ALP SERPL-CCNC: 150 U/L (ref 39–117)
ALT SERPL W P-5'-P-CCNC: 67 U/L (ref 1–33)
ANION GAP SERPL CALCULATED.3IONS-SCNC: 9 MMOL/L
AST SERPL-CCNC: 48 U/L (ref 1–32)
BASOPHILS # BLD AUTO: 0.01 10*3/MM3 (ref 0–0.2)
BASOPHILS NFR BLD AUTO: 0.2 % (ref 0–1.5)
BILIRUB SERPL-MCNC: 0.3 MG/DL (ref 0.1–1.2)
BILIRUB UR QL STRIP: NEGATIVE
BUN BLD-MCNC: 22 MG/DL (ref 8–23)
BUN/CREAT SERPL: 15.4 (ref 7–25)
CALCIUM SPEC-SCNC: 9.5 MG/DL (ref 8.6–10.5)
CHLORIDE SERPL-SCNC: 102 MMOL/L (ref 98–107)
CLARITY UR: CLEAR
CO2 SERPL-SCNC: 32 MMOL/L (ref 22–29)
COLOR UR: YELLOW
CREAT BLD-MCNC: 1.43 MG/DL (ref 0.57–1)
DEPRECATED RDW RBC AUTO: 45.3 FL (ref 37–54)
EOSINOPHIL # BLD AUTO: 0 10*3/MM3 (ref 0–0.7)
EOSINOPHIL NFR BLD AUTO: 0 % (ref 0.3–6.2)
ERYTHROCYTE [DISTWIDTH] IN BLOOD BY AUTOMATED COUNT: 13.7 % (ref 11.7–13)
GFR SERPL CREATININE-BSD FRML MDRD: 43 ML/MIN/1.73
GLOBULIN UR ELPH-MCNC: 3.3 GM/DL
GLUCOSE BLD-MCNC: 137 MG/DL (ref 65–99)
GLUCOSE UR STRIP-MCNC: NEGATIVE MG/DL
HCT VFR BLD AUTO: 28.1 % (ref 35.6–45.5)
HGB BLD-MCNC: 8.6 G/DL (ref 11.9–15.5)
HGB UR QL STRIP.AUTO: NEGATIVE
HOLD SPECIMEN: NORMAL
IMM GRANULOCYTES # BLD: 0.03 10*3/MM3 (ref 0–0.03)
IMM GRANULOCYTES NFR BLD: 0.5 % (ref 0–0.5)
KETONES UR QL STRIP: NEGATIVE
LEUKOCYTE ESTERASE UR QL STRIP.AUTO: NEGATIVE
LYMPHOCYTES # BLD AUTO: 0.68 10*3/MM3 (ref 0.9–4.8)
LYMPHOCYTES NFR BLD AUTO: 10.8 % (ref 19.6–45.3)
MAGNESIUM SERPL-MCNC: 2.3 MG/DL (ref 1.6–2.4)
MCH RBC QN AUTO: 27.8 PG (ref 26.9–32)
MCHC RBC AUTO-ENTMCNC: 30.6 G/DL (ref 32.4–36.3)
MCV RBC AUTO: 90.9 FL (ref 80.5–98.2)
MONOCYTES # BLD AUTO: 0.6 10*3/MM3 (ref 0.2–1.2)
MONOCYTES NFR BLD AUTO: 9.5 % (ref 5–12)
NEUTROPHILS # BLD AUTO: 4.98 10*3/MM3 (ref 1.9–8.1)
NEUTROPHILS NFR BLD AUTO: 79 % (ref 42.7–76)
NITRITE UR QL STRIP: NEGATIVE
PH UR STRIP.AUTO: 5.5 [PH] (ref 5–8)
PLATELET # BLD AUTO: 243 10*3/MM3 (ref 140–500)
PMV BLD AUTO: 9.8 FL (ref 6–12)
POTASSIUM BLD-SCNC: 4.1 MMOL/L (ref 3.5–5.2)
PROT SERPL-MCNC: 7.1 G/DL (ref 6–8.5)
PROT UR QL STRIP: NEGATIVE
RBC # BLD AUTO: 3.09 10*6/MM3 (ref 3.9–5.2)
SODIUM BLD-SCNC: 143 MMOL/L (ref 136–145)
SP GR UR STRIP: 1.01 (ref 1–1.03)
TROPONIN T SERPL-MCNC: <0.01 NG/ML (ref 0–0.03)
UROBILINOGEN UR QL STRIP: NORMAL
WBC NRBC COR # BLD: 6.3 10*3/MM3 (ref 4.5–10.7)
WHOLE BLOOD HOLD SPECIMEN: NORMAL

## 2018-03-24 PROCEDURE — 83735 ASSAY OF MAGNESIUM: CPT | Performed by: NURSE PRACTITIONER

## 2018-03-24 PROCEDURE — 85025 COMPLETE CBC W/AUTO DIFF WBC: CPT | Performed by: NURSE PRACTITIONER

## 2018-03-24 PROCEDURE — 84484 ASSAY OF TROPONIN QUANT: CPT | Performed by: NURSE PRACTITIONER

## 2018-03-24 PROCEDURE — 81003 URINALYSIS AUTO W/O SCOPE: CPT | Performed by: NURSE PRACTITIONER

## 2018-03-24 PROCEDURE — 80053 COMPREHEN METABOLIC PANEL: CPT | Performed by: NURSE PRACTITIONER

## 2018-03-24 PROCEDURE — 96360 HYDRATION IV INFUSION INIT: CPT

## 2018-03-24 PROCEDURE — 99285 EMERGENCY DEPT VISIT HI MDM: CPT

## 2018-03-24 PROCEDURE — 70450 CT HEAD/BRAIN W/O DYE: CPT

## 2018-03-24 PROCEDURE — 72125 CT NECK SPINE W/O DYE: CPT

## 2018-03-24 PROCEDURE — 71046 X-RAY EXAM CHEST 2 VIEWS: CPT

## 2018-03-24 PROCEDURE — 93010 ELECTROCARDIOGRAM REPORT: CPT | Performed by: INTERNAL MEDICINE

## 2018-03-24 PROCEDURE — 93005 ELECTROCARDIOGRAM TRACING: CPT | Performed by: NURSE PRACTITIONER

## 2018-03-24 PROCEDURE — 73502 X-RAY EXAM HIP UNI 2-3 VIEWS: CPT

## 2018-03-24 RX ORDER — SODIUM CHLORIDE 0.9 % (FLUSH) 0.9 %
10 SYRINGE (ML) INJECTION AS NEEDED
Status: DISCONTINUED | OUTPATIENT
Start: 2018-03-24 | End: 2018-03-24 | Stop reason: HOSPADM

## 2018-03-24 RX ADMIN — SODIUM CHLORIDE 500 ML: 9 INJECTION, SOLUTION INTRAVENOUS at 10:47

## 2018-03-24 NOTE — ED PROVIDER NOTES
MD ATTESTATION NOTE    The OBIE and I have discussed this patient's history, physical exam, and treatment plan. I have reviewed the documentation and personally had a face to face interaction with the patient. I affirm the documentation and agree with the treatment and plan.  The attached note describes my personal findings.    Pt presents to the ED with daughter in law bedside c/o neck and left hip pain s/p fall from standing. She advised she was sitting at the table, stood up, got dizzy/weak, and then fell. She reports striking her head on the floor but denies LOC. Pt typically uses a walker and cane at home to ambulate. She denies dizziness now, CP, or SOA. On exam, pt is awake and alert in NAD, RRR, CTAB 98% on 2 L via NC, abdomen soft nontender, tenderness w/ raising of LLE in the left hip to the buttock/lateral aspect. No gross deformity noted      EKG           EKG time: 1035  Rhythm/Rate: NSR 79  P waves and CO: nml  QRS, axis: LAD   ST and T waves: non specific changes, nml QT     Interpreted Contemporaneously by me, independently viewed  unchanged compared to prior 8/2014  I reviewed lab work and CT reports.  I also reviewed x-rays which were unremarkable.  Her clinical exam is not suggestive of a fracture as well.  The patient was able to ambulate well in emergency department at baseline with walker.  I believe she very likely had some orthostasis as the cause of the lightheadedness.  She did not have any symptoms suggestive of posterior circulation issue and she had no dizziness, poor control or focal weakness.  She had no change in her speech or visual changes well.  Did not have any chest pain or shortness of breath.  She also did not have any syncope  Attestation:  I supervised care provided by the midlevel provider.    We have discussed this patient's history, physical exam, and treatment plan.   I have reviewed the note and personally saw and examined the patient and agree with the plan of  care.    Documentation assistance provided by kandace Sheppard for Dr. Michael. Information recorded by the scribe was done at my direction and has been verified and validated by me.       Ivet Sheppard  03/24/18 1147       Mendez Michael MD  03/24/18 [id number]

## 2018-03-24 NOTE — ED NOTES
Pt ambulated in [place] with steady gait using ER walker. Pt independent after helped to sit up in bed. Denies pain with ambulation. Lauren ELDER aware.      Adrienne Wellington RN  03/24/18 2506

## 2018-03-24 NOTE — ED NOTES
Pt to ED via EMS s/ fall at home. Pt is a/o x 3 here, states she was sitting at table, got up and got dizzy, fell to ground hit back of head, neck pain, L hip pain. Pt denies LOC, no visual external rotation of LLE, can lift leg off bed, painful with flexion at hip joint. Was not able to stand after fall. Edema present to L ankle but daughter reports normal for her. Pt has + distal pedal pulses. Wears 2L NC AAT at home. Pt not on blood thinners, hx of stroke, trouble with swallowing normally. Pt uses walker at home. Pt arrives in c-collar     Adrienne Wellington RN  03/24/18 1031       Adrienne Wellington RN  03/24/18 1220

## 2018-03-24 NOTE — ED PROVIDER NOTES
EMERGENCY DEPARTMENT ENCOUNTER    CHIEF COMPLAINT  Chief Complaint: pain post fall  History given by: patient, family  History limited by: N/A  Room Number: 44/44  PMD: Kate Lopez MD      HPI:  Pt is a 82 y.o. female who reports that she lives alone and presents s/p fall that occurred this morning. She states that when she stood up quickly from her chair, she became lightheaded and then fell. She notes that she did not have any other prodrome before the fall (denies syncope, chest pain, trouble breathing, abd pain, or any other sx). She reports that she sustained blow to head but did not lose consciousness. Currently, she has neck pain and left hip pain, both of which are exacerbated by movement. She denies headache, back pain, abd pain, chest pain, acute change in chronic dyspnea, nausea, vomiting, vision changes, fevers, chills, and sustaining any other injury during fall. Per family, pt has been at her baseline mental status since the fall. Pt notes that she is on 81mg ASA and no other blood thinner. EMS placed pt's neck in c-collar. Past Medical History of HTN, COPD, arthritis, CKD, diabetes, and hyperlipidemia.     Duration: fall occurred this morning  Timing: constant  Location: neck, left hip  Radiation: none  Quality: pain  Intensity/Severity: moderate  Progression: unchanged  Associated Symptoms: lightheadedness (before fall), neck pain, left hip pain  Aggravating Factors: movement  Alleviating Factors: remaining still, rest  Previous Episodes: none mentioned  Treatment before arrival: EMS placed pt's neck in c-collar.     PAST MEDICAL HISTORY  Active Ambulatory Problems     Diagnosis Date Noted   • Anemia of chronic renal failure, stage 3 (moderate) 02/05/2016   • Malignant neoplasm of upper-inner quadrant of right female breast 02/09/2016   • Ductal carcinoma in situ (DCIS) of left breast 02/09/2016   • Iron deficiency anemia 02/09/2016   • At risk for bone density loss 02/09/2016   •  Cerebral meningioma 08/28/2017     Resolved Ambulatory Problems     Diagnosis Date Noted   • No Resolved Ambulatory Problems     Past Medical History:   Diagnosis Date   • Anemia    • Anxiety    • Arthritis    • Breast cancer 08/2014   • Breast cancer 12/2006   • Chronic kidney disease    • COPD (chronic obstructive pulmonary disease)    • Depression    • Diabetes mellitus    • Hyperlipidemia    • Hypertension    • YARY (obstructive sleep apnea)    • Poor circulation    • Stroke        PAST SURGICAL HISTORY  Past Surgical History:   Procedure Laterality Date   • BREAST LUMPECTOMY  08/2014    Right-sided invasive ductal carcinoma,papillary type   • BREAST LUMPECTOMY Left 2006   • BREAST SURGERY Right 09/2014    enlargement of lumpectomy site   • CHOLECYSTECTOMY     • CRANIOTOMY Left 08/2013    with removal meningioma   • HYSTERECTOMY     • TUBAL ABDOMINAL LIGATION         FAMILY HISTORY  Family History   Problem Relation Age of Onset   • Cancer Father    • Cancer Brother    • Diabetes Brother    • Heart disease Brother    • Cancer Other        SOCIAL HISTORY  Social History     Social History   • Marital status:      Spouse name: N/A   • Number of children: N/A   • Years of education: High School     Occupational History   • Nurse aid Retired     Social History Main Topics   • Smoking status: Never Smoker   • Smokeless tobacco: Never Used   • Alcohol use No   • Drug use: No   • Sexual activity: Not on file     Other Topics Concern   • Not on file     Social History Narrative   • No narrative on file         ALLERGIES  Sulfa antibiotics and Penicillins    REVIEW OF SYSTEMS  Review of Systems   Constitutional: Negative for chills and fever.   HENT: Negative for sore throat.    Eyes: Negative for visual disturbance.   Respiratory: Negative for cough. Shortness of breath: chronic, no acute change.    Cardiovascular: Negative for chest pain.   Gastrointestinal: Negative for abdominal pain, diarrhea, nausea and  vomiting.   Genitourinary: Negative for flank pain.   Musculoskeletal: Positive for neck pain. Negative for back pain.        Left hip pain   Skin: Negative for rash.   Neurological: Positive for dizziness (lightheadedness (before fall)) and light-headedness (before fall). Negative for numbness and headaches.   Psychiatric/Behavioral: The patient is not nervous/anxious.        PHYSICAL EXAM  ED Triage Vitals   Temp Heart Rate Resp BP SpO2   03/24/18 0943 03/24/18 0938 03/24/18 0938 03/24/18 0938 03/24/18 0938   98.5 °F (36.9 °C) 84 16 122/56 100 % WNL       Physical Exam   Constitutional: She is oriented to person, place, and time and well-developed, well-nourished, and in no distress.   HENT:   Head: Normocephalic and atraumatic.   Mouth/Throat: Mucous membranes are normal.   No palpable scalp hematoma   Eyes: EOM are normal. Pupils are equal, round, and reactive to light. No scleral icterus.   Neck:   c-spine tenderness, c-collar in place   Cardiovascular: Normal rate, regular rhythm and normal heart sounds.    Pulses:       Dorsalis pedis pulses are 2+ on the right side, and 2+ on the left side.        Posterior tibial pulses are 2+ on the right side, and 2+ on the left side.   Pulmonary/Chest: Effort normal and breath sounds normal. No respiratory distress. She exhibits no tenderness.   Abdominal: Soft. There is no tenderness. There is no rebound and no guarding.   Genitourinary: Rectal exam shows guaiac negative stool (heme negative brown stool in rectal vault).   Genitourinary Comments: RN at pt's bedside during rectal exam   Musculoskeletal:   Left hip tenderness, NV intact distally to BLE, FROM to BUE   Neurological: She is alert and oriented to person, place, and time. She has normal sensation.   Skin: Skin is warm and dry.   Psychiatric: Mood and affect normal.   Nursing note and vitals reviewed.      LAB RESULTS  Recent Results (from the past 24 hour(s))   Urinalysis With / Microscopic If Indicated -  Urine, Catheter    Collection Time: 03/24/18 10:34 AM   Result Value Ref Range    Color, UA Yellow Yellow, Straw    Appearance, UA Clear Clear    pH, UA 5.5 5.0 - 8.0    Specific Gravity, UA 1.012 1.005 - 1.030    Glucose, UA Negative Negative    Ketones, UA Negative Negative    Bilirubin, UA Negative Negative    Blood, UA Negative Negative    Protein, UA Negative Negative    Leuk Esterase, UA Negative Negative    Nitrite, UA Negative Negative    Urobilinogen, UA 0.2 E.U./dL 0.2 - 1.0 E.U./dL   Comprehensive Metabolic Panel    Collection Time: 03/24/18 10:45 AM   Result Value Ref Range    Glucose 137 (H) 65 - 99 mg/dL    BUN 22 8 - 23 mg/dL    Creatinine 1.43 (H) 0.57 - 1.00 mg/dL    Sodium 143 136 - 145 mmol/L    Potassium 4.1 3.5 - 5.2 mmol/L    Chloride 102 98 - 107 mmol/L    CO2 32.0 (H) 22.0 - 29.0 mmol/L    Calcium 9.5 8.6 - 10.5 mg/dL    Total Protein 7.1 6.0 - 8.5 g/dL    Albumin 3.80 3.50 - 5.20 g/dL    ALT (SGPT) 67 (H) 1 - 33 U/L    AST (SGOT) 48 (H) 1 - 32 U/L    Alkaline Phosphatase 150 (H) 39 - 117 U/L    Total Bilirubin 0.3 0.1 - 1.2 mg/dL    eGFR  African Amer 43 (L) >60 mL/min/1.73    Globulin 3.3 gm/dL    A/G Ratio 1.2 g/dL    BUN/Creatinine Ratio 15.4 7.0 - 25.0    Anion Gap 9.0 mmol/L   Troponin    Collection Time: 03/24/18 10:45 AM   Result Value Ref Range    Troponin T <0.010 0.000 - 0.030 ng/mL   Magnesium    Collection Time: 03/24/18 10:45 AM   Result Value Ref Range    Magnesium 2.3 1.6 - 2.4 mg/dL   CBC Auto Differential    Collection Time: 03/24/18 10:45 AM   Result Value Ref Range    WBC 6.30 4.50 - 10.70 10*3/mm3    RBC 3.09 (L) 3.90 - 5.20 10*6/mm3    Hemoglobin 8.6 (L) 11.9 - 15.5 g/dL    Hematocrit 28.1 (L) 35.6 - 45.5 %    MCV 90.9 80.5 - 98.2 fL    MCH 27.8 26.9 - 32.0 pg    MCHC 30.6 (L) 32.4 - 36.3 g/dL    RDW 13.7 (H) 11.7 - 13.0 %    RDW-SD 45.3 37.0 - 54.0 fl    MPV 9.8 6.0 - 12.0 fL    Platelets 243 140 - 500 10*3/mm3    Neutrophil % 79.0 (H) 42.7 - 76.0 %    Lymphocyte % 10.8  (L) 19.6 - 45.3 %    Monocyte % 9.5 5.0 - 12.0 %    Eosinophil % 0.0 (L) 0.3 - 6.2 %    Basophil % 0.2 0.0 - 1.5 %    Immature Grans % 0.5 0.0 - 0.5 %    Neutrophils, Absolute 4.98 1.90 - 8.10 10*3/mm3    Lymphocytes, Absolute 0.68 (L) 0.90 - 4.80 10*3/mm3    Monocytes, Absolute 0.60 0.20 - 1.20 10*3/mm3    Eosinophils, Absolute 0.00 0.00 - 0.70 10*3/mm3    Basophils, Absolute 0.01 0.00 - 0.20 10*3/mm3    Immature Grans, Absolute 0.03 0.00 - 0.03 10*3/mm3   Light Blue Top    Collection Time: 03/24/18 10:46 AM   Result Value Ref Range    Extra Tube hold for add-on    Gold Top - SST    Collection Time: 03/24/18 10:46 AM   Result Value Ref Range    Extra Tube Hold for add-ons.        I ordered the above labs and reviewed the results      RADIOLOGY    CT Head Without Contrast (Final result) Result time: 03/24/18 11:22:24   Final result by Dangelo Mckeon MD (03/24/18 11:22:24)   Impression:   1. No evidence of acute intracranial process.           NONCONTRAST CT CERVICAL SPINE     HISTORY: See above.     PROTOCOL: Imaging was performed with standard technique.     Radiation dose reduction techniques were utilized including automated  exposure control and exposure modulation based on body size.     COMPARISON: None available     FINDINGS:  1. There is no vertebral body compression deformity, traumatic  malalignment nor other evidence of acute process.  2. Chronic compression deformities of C3-C4 C5-C6.  3. Large anterior osteophyte formation most pronounced at C5-C6 also  C3-4-5.  4. No acute traumatic abnormality, no soft tissue swelling of the neck.  5. Incidental note is made of large volume of cerumen within the ear  canal bilaterally     This report was finalized on 3/24/2018 11:22 AM by Dr. Dangelo Mckeon MD.      Narrative:   EMERGENCY NONCONTRAST HEAD CT SCAN.     HISTORY: Female who is 82 years-old, with a history of prior resection  for left frontal meningioma, craniotomy with hyperostosis frontalis  interna  presenting now for evaluation of dizziness, neck pain and  headache following trauma.     COMPARISON: MRI of 08/07/2013 and postsurgical head CT scan following  removal of left frontal meningioma.     Radiation dose reduction techniques were utilized, including automated  exposure control and exposure modulation based on body size.     FINDINGS:   1. The basal cisterns and sulci over the convexities are normal for age,  mildly prominent but stable.   2. The ventricles are prominent, stable without displacement.   3. There is periventricular lucency consistent with chronic ischemic  change.    4. Encephalomalacia left frontal region consistent with postop surgical  bed following removal of meningioma.  5. There is no significant interval change nor other evidence of an  acute intracranial process.     XR Hip With or Without Pelvis 2 - 3 View Left (Final result) Result time: 03/24/18 14:04:52   Final result by Dangelo Mckeon MD (03/24/18 14:04:52)   Narrative:   EMERGENCY LEFT HIP AND PELVIS 3 VIEWS     HISTORY: Patient fell and complains of left leg pain     COMPARISON: None available     FINDINGS:  1. Large-volume of thick material without obstruction free air nor  dilatation of loops question constipation.  2. The osseous structures the pelvis and left hip are normal.           TWO-VIEW CHEST     HISTORY: Morbidly obese 82-year-old female with dizziness fell today  complains of left hip and leg pain.     COMPARISON: 8/20/2014     FINDINGS:  1. Limited imaging due to body habitus, portable technique and low lung  volumes.  2. Cardiac enlargement.  3. Tracheal deviation to the right question left neck mass.  4. The lungs are grossly clear of acute process.     This report was finalized on 3/24/2018 2:04 PM by Dr. Dangelo Mckeon MD.         I ordered the above noted radiological studies and reviewed the images on the PACS system.        EKG    EKG was interpreted by Dr. Michael. See Dr Michael's note for EKG  interpretation.       MEDICAL RECORD REVIEW    2 months ago, BUN was 22, creatinine was 1.45, and hgb was 9.6.         PROGRESS AND CONSULTS  1030- Ordered CT c-spine, CT head, left hip and pelvis xray, CXR, UA, troponin, magnesium level, blood work, and EKG for further evaluation. Ordered IV fluids for hydration.   1036- Reviewed pt's history and workup with Dr. Michael.  At bedside evaluation, they agree with the plan of care.  1200- CT c-spine shows no acute fracture. C-collar has been removed.   1400- Rechecked pt. She is resting comfortably and is in no acute distress. Discussed with pt and family about all pertinent results including hemoglobin of 8.6 that has decreased from 2 weeks ago, elevated creatinine that is unchanged compared to previous, negative troponin, stable EKG findings, CT head findings (no acute process), CT c-spine findings (no acute fracture), left hip and pelvis xray findings (no acute fracture), and CXR findings (no obvious acute process). Informed pt and family of plan to perform rectal exam to evaluate for any rectal bleeding that could potentially be contributing to pt's decreased hemoglobin. Also informed them that we will ambulate pt in ED. They verbalize understanding and agreement with plan.   1403- Performed rectal exam with RN at pt's bedside-> heme negative brown stool in rectal vault. Informed pt and family about heme negative stool on rectal exam.  1428- Pt ambulated using walker without difficulty in ED. Pt now states that she has no dizziness, no lightheadedness, and no pain when ambulating.   1432- Reviewed implications of results, diagnosis, meds, responsibility to follow up, warning signs and symptoms of possible worsening, potential complications and reasons to return to ER with patient and family.  Discussed all results and noted any abnormalities with patient and family.  Discussed with pt and family about the importance of and the absolute need to have pt recheck  "abnormalities (including decreased hemoglobin of 8.6) and condition closely with PMD in 3 to 5 days. Advised pt to rest, to well hydrate, to change positions slowly, to use walker when ambulating, and to have family or friend stay with her today. Provided head injury precautions. Pt states that she has walker at home and has available family member who can stay with her today.   Discussed plan for discharge, as there is no emergent indication for admission.  Pt and family are agreeable and understands need for follow up and repeat testing.  Pt and family are aware that discharge does not mean that nothing is wrong but it indicates no emergency is present.  Pt is discharged with instructions to follow up with primary care doctor to have their blood pressure rechecked.       DIAGNOSIS  Final diagnoses:   Contusion of head, unspecified part of head, initial encounter   Strain of neck muscle, initial encounter   Contusion of left hip, initial encounter   Dizziness (resolved)   Acute on chronic anemia   Fall, initial encounter       FOLLOW UP   Kate Lopez MD  [address]  [place] [address]  [phone]    Call in 2 days          COURSE & MEDICAL DECISION MAKING  Pertinent Labs and Imaging studies that were ordered and reviewed are noted above.  Results were reviewed/discussed with the patient and family and they were also made aware of online assess.   Pt and family also made aware that some labs, such as cultures, will not be resulted during ER visit and follow up with PMD is necessary.     MEDICATIONS GIVEN IN ER  Medications   sodium chloride 0.9 % flush 10 mL (not administered)   sodium chloride 0.9 % bolus 500 mL (0 mL Intravenous Stopped 3/24/18 1141)       /51 (BP Location: Right arm, Patient Position: Lying)   Pulse 74   Temp 97.3 °F (36.3 °C) (Temporal Artery )   Resp 16   Ht 162.6 cm (64\")   Wt 79.4 kg (175 lb)   SpO2 100%   BMI 30.04 kg/m²       I personally " reviewed the past medical history, past surgical history, social history, family history, current medications and allergies as they appear in this chart.  The scribe's note accurately reflects the work and decisions made by me.     Documentation assistance provided by kandace Chapman for YULIANA Cee on 3/24/2018 at 2:35 PM. Information recorded by the scribe was done at my direction and has been verified and validated by me.       Dean Chapman  03/24/18 1511       JERROD Jerez  03/24/18 1824

## 2018-03-24 NOTE — DISCHARGE INSTRUCTIONS
Continue current home medications  Rest, increase fluids  Change positions slowly  Use walker when ambulating  Follow up with pmd in 3-5 days for recheck and to have your hemoglobin rechecked  Have someone stay with you tonight  Return to er for headache, vomiting, chest pain, shortness of air, dizziness, weakness, increased pain or any new or worsening symptoms

## 2018-03-24 NOTE — ED TRIAGE NOTES
Stood up became dizzy, fell, found by EMS on left side, c/o head, neck and generalized back pain, denies LOC

## 2018-03-26 ENCOUNTER — INFUSION (OUTPATIENT)
Dept: ONCOLOGY | Facility: HOSPITAL | Age: 83
End: 2018-03-26

## 2018-03-26 ENCOUNTER — LAB (OUTPATIENT)
Dept: LAB | Facility: HOSPITAL | Age: 83
End: 2018-03-26

## 2018-03-26 DIAGNOSIS — N18.30 ANEMIA OF CHRONIC RENAL FAILURE, STAGE 3 (MODERATE) (HCC): ICD-10-CM

## 2018-03-26 DIAGNOSIS — N18.30 ANEMIA OF CHRONIC RENAL FAILURE, STAGE 3 (MODERATE) (HCC): Primary | ICD-10-CM

## 2018-03-26 DIAGNOSIS — C50.211 MALIGNANT NEOPLASM OF UPPER-INNER QUADRANT OF RIGHT BREAST IN FEMALE, ESTROGEN RECEPTOR POSITIVE (HCC): ICD-10-CM

## 2018-03-26 DIAGNOSIS — D63.1 ANEMIA OF CHRONIC RENAL FAILURE, STAGE 3 (MODERATE) (HCC): ICD-10-CM

## 2018-03-26 DIAGNOSIS — D50.9 IRON DEFICIENCY ANEMIA, UNSPECIFIED IRON DEFICIENCY ANEMIA TYPE: ICD-10-CM

## 2018-03-26 DIAGNOSIS — Z17.0 MALIGNANT NEOPLASM OF UPPER-INNER QUADRANT OF RIGHT BREAST IN FEMALE, ESTROGEN RECEPTOR POSITIVE (HCC): ICD-10-CM

## 2018-03-26 DIAGNOSIS — D63.1 ANEMIA OF CHRONIC RENAL FAILURE, STAGE 3 (MODERATE) (HCC): Primary | ICD-10-CM

## 2018-03-26 LAB
BASOPHILS # BLD AUTO: 0.02 10*3/MM3 (ref 0–0.1)
BASOPHILS NFR BLD AUTO: 0.3 % (ref 0–1.1)
DEPRECATED RDW RBC AUTO: 41.6 FL (ref 37–49)
EOSINOPHIL # BLD AUTO: 0 10*3/MM3 (ref 0–0.36)
EOSINOPHIL NFR BLD AUTO: 0 % (ref 1–5)
ERYTHROCYTE [DISTWIDTH] IN BLOOD BY AUTOMATED COUNT: 13.2 % (ref 11.7–14.5)
HCT VFR BLD AUTO: 28.4 % (ref 34–45)
HGB BLD-MCNC: 9 G/DL (ref 11.5–14.9)
IMM GRANULOCYTES # BLD: 0.02 10*3/MM3 (ref 0–0.03)
IMM GRANULOCYTES NFR BLD: 0.3 % (ref 0–0.5)
LYMPHOCYTES # BLD AUTO: 1.17 10*3/MM3 (ref 1–3.5)
LYMPHOCYTES NFR BLD AUTO: 19.3 % (ref 20–49)
MCH RBC QN AUTO: 27.6 PG (ref 27–33)
MCHC RBC AUTO-ENTMCNC: 31.7 G/DL (ref 32–35)
MCV RBC AUTO: 87.1 FL (ref 83–97)
MONOCYTES # BLD AUTO: 0.58 10*3/MM3 (ref 0.25–0.8)
MONOCYTES NFR BLD AUTO: 9.6 % (ref 4–12)
NEUTROPHILS # BLD AUTO: 4.26 10*3/MM3 (ref 1.5–7)
NEUTROPHILS NFR BLD AUTO: 70.5 % (ref 39–75)
NRBC BLD MANUAL-RTO: 0 /100 WBC (ref 0–0)
PLATELET # BLD AUTO: 242 10*3/MM3 (ref 150–375)
PMV BLD AUTO: 10.1 FL (ref 8.9–12.1)
RBC # BLD AUTO: 3.26 10*6/MM3 (ref 3.9–5)
WBC NRBC COR # BLD: 6.05 10*3/MM3 (ref 4–10)

## 2018-03-26 PROCEDURE — 36415 COLL VENOUS BLD VENIPUNCTURE: CPT | Performed by: INTERNAL MEDICINE

## 2018-03-26 PROCEDURE — 96372 THER/PROPH/DIAG INJ SC/IM: CPT | Performed by: INTERNAL MEDICINE

## 2018-03-26 PROCEDURE — 63510000001 EPOETIN ALFA PER 1000 UNITS: Performed by: INTERNAL MEDICINE

## 2018-03-26 PROCEDURE — 85025 COMPLETE CBC W/AUTO DIFF WBC: CPT | Performed by: INTERNAL MEDICINE

## 2018-03-26 RX ADMIN — ERYTHROPOIETIN 8000 UNITS: 20000 INJECTION, SOLUTION INTRAVENOUS; SUBCUTANEOUS at 15:38

## 2018-03-28 RX ORDER — ANASTROZOLE 1 MG/1
TABLET ORAL
Qty: 30 TABLET | Refills: 5 | Status: SHIPPED | OUTPATIENT
Start: 2018-03-28 | End: 2018-09-04 | Stop reason: SDUPTHER

## 2018-05-03 ENCOUNTER — APPOINTMENT (OUTPATIENT)
Dept: LAB | Facility: HOSPITAL | Age: 83
End: 2018-05-03

## 2018-05-03 ENCOUNTER — APPOINTMENT (OUTPATIENT)
Dept: ONCOLOGY | Facility: HOSPITAL | Age: 83
End: 2018-05-03

## 2018-05-03 ENCOUNTER — APPOINTMENT (OUTPATIENT)
Dept: ONCOLOGY | Facility: CLINIC | Age: 83
End: 2018-05-03

## 2018-05-04 ENCOUNTER — LAB (OUTPATIENT)
Dept: LAB | Facility: HOSPITAL | Age: 83
End: 2018-05-04

## 2018-05-04 ENCOUNTER — INFUSION (OUTPATIENT)
Dept: ONCOLOGY | Facility: HOSPITAL | Age: 83
End: 2018-05-04

## 2018-05-04 ENCOUNTER — DOCUMENTATION (OUTPATIENT)
Dept: ONCOLOGY | Facility: CLINIC | Age: 83
End: 2018-05-04

## 2018-05-04 DIAGNOSIS — D50.9 IRON DEFICIENCY ANEMIA, UNSPECIFIED IRON DEFICIENCY ANEMIA TYPE: ICD-10-CM

## 2018-05-04 DIAGNOSIS — C50.211 MALIGNANT NEOPLASM OF UPPER-INNER QUADRANT OF RIGHT BREAST IN FEMALE, ESTROGEN RECEPTOR POSITIVE (HCC): ICD-10-CM

## 2018-05-04 DIAGNOSIS — Z17.0 MALIGNANT NEOPLASM OF UPPER-INNER QUADRANT OF RIGHT BREAST IN FEMALE, ESTROGEN RECEPTOR POSITIVE (HCC): ICD-10-CM

## 2018-05-04 DIAGNOSIS — D63.1 ANEMIA OF CHRONIC RENAL FAILURE, STAGE 3 (MODERATE) (HCC): Primary | ICD-10-CM

## 2018-05-04 DIAGNOSIS — N18.30 ANEMIA OF CHRONIC RENAL FAILURE, STAGE 3 (MODERATE) (HCC): Primary | ICD-10-CM

## 2018-05-04 DIAGNOSIS — D63.1 ANEMIA OF CHRONIC RENAL FAILURE, STAGE 3 (MODERATE) (HCC): ICD-10-CM

## 2018-05-04 DIAGNOSIS — N18.30 ANEMIA OF CHRONIC RENAL FAILURE, STAGE 3 (MODERATE) (HCC): ICD-10-CM

## 2018-05-04 LAB
BASOPHILS # BLD AUTO: 0.03 10*3/MM3 (ref 0–0.1)
BASOPHILS NFR BLD AUTO: 0.4 % (ref 0–1.1)
DEPRECATED RDW RBC AUTO: 40 FL (ref 37–49)
EOSINOPHIL # BLD AUTO: 0 10*3/MM3 (ref 0–0.36)
EOSINOPHIL NFR BLD AUTO: 0 % (ref 1–5)
ERYTHROCYTE [DISTWIDTH] IN BLOOD BY AUTOMATED COUNT: 12.6 % (ref 11.7–14.5)
HCT VFR BLD AUTO: 29.9 % (ref 34–45)
HGB BLD-MCNC: 9.3 G/DL (ref 11.5–14.9)
IMM GRANULOCYTES # BLD: 0.03 10*3/MM3 (ref 0–0.03)
IMM GRANULOCYTES NFR BLD: 0.4 % (ref 0–0.5)
LYMPHOCYTES # BLD AUTO: 1.52 10*3/MM3 (ref 1–3.5)
LYMPHOCYTES NFR BLD AUTO: 19.6 % (ref 20–49)
MCH RBC QN AUTO: 27 PG (ref 27–33)
MCHC RBC AUTO-ENTMCNC: 31.1 G/DL (ref 32–35)
MCV RBC AUTO: 86.7 FL (ref 83–97)
MONOCYTES # BLD AUTO: 0.73 10*3/MM3 (ref 0.25–0.8)
MONOCYTES NFR BLD AUTO: 9.4 % (ref 4–12)
NEUTROPHILS # BLD AUTO: 5.46 10*3/MM3 (ref 1.5–7)
NEUTROPHILS NFR BLD AUTO: 70.2 % (ref 39–75)
NRBC BLD MANUAL-RTO: 0 /100 WBC (ref 0–0)
PLATELET # BLD AUTO: 212 10*3/MM3 (ref 150–375)
PMV BLD AUTO: 10.9 FL (ref 8.9–12.1)
RBC # BLD AUTO: 3.45 10*6/MM3 (ref 3.9–5)
WBC NRBC COR # BLD: 7.77 10*3/MM3 (ref 4–10)

## 2018-05-04 PROCEDURE — 96372 THER/PROPH/DIAG INJ SC/IM: CPT | Performed by: INTERNAL MEDICINE

## 2018-05-04 PROCEDURE — 36415 COLL VENOUS BLD VENIPUNCTURE: CPT | Performed by: INTERNAL MEDICINE

## 2018-05-04 PROCEDURE — 85025 COMPLETE CBC W/AUTO DIFF WBC: CPT | Performed by: INTERNAL MEDICINE

## 2018-05-04 PROCEDURE — 63510000001 EPOETIN ALFA PER 1000 UNITS: Performed by: INTERNAL MEDICINE

## 2018-05-04 RX ADMIN — ERYTHROPOIETIN 8000 UNITS: 20000 INJECTION, SOLUTION INTRAVENOUS; SUBCUTANEOUS at 13:01

## 2018-05-04 NOTE — PROGRESS NOTES
[employer] financial paperwork given to Ms. Irvin and her daughter.  I explained documentation needed. They should bring completed application to us, call with questions.  They have contact information.

## 2018-05-22 DIAGNOSIS — D63.1 ANEMIA OF CHRONIC RENAL FAILURE, STAGE 3 (MODERATE) (HCC): Primary | ICD-10-CM

## 2018-05-22 DIAGNOSIS — N18.30 ANEMIA OF CHRONIC RENAL FAILURE, STAGE 3 (MODERATE) (HCC): Primary | ICD-10-CM

## 2018-05-24 ENCOUNTER — LAB (OUTPATIENT)
Dept: LAB | Facility: HOSPITAL | Age: 83
End: 2018-05-24

## 2018-05-24 ENCOUNTER — OFFICE VISIT (OUTPATIENT)
Dept: ONCOLOGY | Facility: CLINIC | Age: 83
End: 2018-05-24

## 2018-05-24 ENCOUNTER — INFUSION (OUTPATIENT)
Dept: ONCOLOGY | Facility: HOSPITAL | Age: 83
End: 2018-05-24

## 2018-05-24 VITALS
RESPIRATION RATE: 16 BRPM | TEMPERATURE: 97.6 F | HEIGHT: 64 IN | DIASTOLIC BLOOD PRESSURE: 56 MMHG | OXYGEN SATURATION: 93 % | WEIGHT: 187 LBS | HEART RATE: 81 BPM | SYSTOLIC BLOOD PRESSURE: 114 MMHG | BODY MASS INDEX: 31.92 KG/M2

## 2018-05-24 DIAGNOSIS — C50.211 MALIGNANT NEOPLASM OF UPPER-INNER QUADRANT OF RIGHT BREAST IN FEMALE, ESTROGEN RECEPTOR POSITIVE (HCC): Primary | ICD-10-CM

## 2018-05-24 DIAGNOSIS — N18.30 ANEMIA OF CHRONIC RENAL FAILURE, STAGE 3 (MODERATE) (HCC): ICD-10-CM

## 2018-05-24 DIAGNOSIS — Z17.0 MALIGNANT NEOPLASM OF UPPER-INNER QUADRANT OF RIGHT BREAST IN FEMALE, ESTROGEN RECEPTOR POSITIVE (HCC): Primary | ICD-10-CM

## 2018-05-24 DIAGNOSIS — D63.1 ANEMIA OF CHRONIC RENAL FAILURE, STAGE 3 (MODERATE) (HCC): ICD-10-CM

## 2018-05-24 DIAGNOSIS — D63.1 ANEMIA OF CHRONIC RENAL FAILURE, STAGE 3 (MODERATE) (HCC): Primary | ICD-10-CM

## 2018-05-24 DIAGNOSIS — D50.9 IRON DEFICIENCY ANEMIA, UNSPECIFIED IRON DEFICIENCY ANEMIA TYPE: ICD-10-CM

## 2018-05-24 DIAGNOSIS — N18.30 ANEMIA OF CHRONIC RENAL FAILURE, STAGE 3 (MODERATE) (HCC): Primary | ICD-10-CM

## 2018-05-24 LAB
BASOPHILS # BLD AUTO: 0.02 10*3/MM3 (ref 0–0.1)
BASOPHILS NFR BLD AUTO: 0.3 % (ref 0–1.1)
DEPRECATED RDW RBC AUTO: 41.9 FL (ref 37–49)
EOSINOPHIL # BLD AUTO: 0 10*3/MM3 (ref 0–0.36)
EOSINOPHIL NFR BLD AUTO: 0 % (ref 1–5)
ERYTHROCYTE [DISTWIDTH] IN BLOOD BY AUTOMATED COUNT: 12.6 % (ref 11.7–14.5)
FERRITIN SERPL-MCNC: 448.6 NG/ML
HCT VFR BLD AUTO: 30.1 % (ref 34–45)
HGB BLD-MCNC: 9 G/DL (ref 11.5–14.9)
HGB RETIC QN: 30.1 PG (ref 29.8–36.1)
IMM GRANULOCYTES # BLD: 0.02 10*3/MM3 (ref 0–0.03)
IMM GRANULOCYTES NFR BLD: 0.3 % (ref 0–0.5)
IMM RETICS NFR: 20.7 % (ref 3–15.8)
IRON 24H UR-MRATE: 49 MCG/DL (ref 37–145)
IRON SATN MFR SERPL: 19 % (ref 14–48)
LYMPHOCYTES # BLD AUTO: 1.33 10*3/MM3 (ref 1–3.5)
LYMPHOCYTES NFR BLD AUTO: 19.7 % (ref 20–49)
MCH RBC QN AUTO: 27.3 PG (ref 27–33)
MCHC RBC AUTO-ENTMCNC: 29.9 G/DL (ref 32–35)
MCV RBC AUTO: 91.2 FL (ref 83–97)
MONOCYTES # BLD AUTO: 0.69 10*3/MM3 (ref 0.25–0.8)
MONOCYTES NFR BLD AUTO: 10.2 % (ref 4–12)
NEUTROPHILS # BLD AUTO: 4.7 10*3/MM3 (ref 1.5–7)
NEUTROPHILS NFR BLD AUTO: 69.5 % (ref 39–75)
NRBC BLD MANUAL-RTO: 0 /100 WBC (ref 0–0)
PLATELET # BLD AUTO: 270 10*3/MM3 (ref 150–375)
PMV BLD AUTO: 9.7 FL (ref 8.9–12.1)
RBC # BLD AUTO: 3.3 10*6/MM3 (ref 3.9–5)
RETICS/RBC NFR AUTO: 2.12 % (ref 0.6–2)
TIBC SERPL-MCNC: 258 MCG/DL (ref 249–505)
TRANSFERRIN SERPL-MCNC: 184 MG/DL (ref 200–360)
WBC NRBC COR # BLD: 6.76 10*3/MM3 (ref 4–10)

## 2018-05-24 PROCEDURE — 83540 ASSAY OF IRON: CPT | Performed by: INTERNAL MEDICINE

## 2018-05-24 PROCEDURE — 84466 ASSAY OF TRANSFERRIN: CPT | Performed by: INTERNAL MEDICINE

## 2018-05-24 PROCEDURE — 96372 THER/PROPH/DIAG INJ SC/IM: CPT

## 2018-05-24 PROCEDURE — 85046 RETICYTE/HGB CONCENTRATE: CPT | Performed by: INTERNAL MEDICINE

## 2018-05-24 PROCEDURE — 36415 COLL VENOUS BLD VENIPUNCTURE: CPT | Performed by: INTERNAL MEDICINE

## 2018-05-24 PROCEDURE — 85025 COMPLETE CBC W/AUTO DIFF WBC: CPT | Performed by: INTERNAL MEDICINE

## 2018-05-24 PROCEDURE — 63510000001 EPOETIN ALFA PER 1000 UNITS: Performed by: INTERNAL MEDICINE

## 2018-05-24 PROCEDURE — 99214 OFFICE O/P EST MOD 30 MIN: CPT | Performed by: INTERNAL MEDICINE

## 2018-05-24 PROCEDURE — 82728 ASSAY OF FERRITIN: CPT | Performed by: INTERNAL MEDICINE

## 2018-05-24 RX ORDER — FUROSEMIDE 40 MG/1
TABLET ORAL
COMMUNITY
Start: 2018-01-28 | End: 2018-05-24 | Stop reason: SDUPTHER

## 2018-05-24 RX ADMIN — ERYTHROPOIETIN 8000 UNITS: 20000 INJECTION, SOLUTION INTRAVENOUS; SUBCUTANEOUS at 12:15

## 2018-05-24 NOTE — PROGRESS NOTES
Subjective .     REASONS FOR FOLLOWUP:  Breast cancer, anemia    HISTORY OF PRESENT ILLNESS:  The patient is a 82 y.o. year old female  who is here for follow-up with the above-mentioned history.    Denies nausea, neurological symptoms, pain, hot flashes.    Denies chest pain, dizziness, bleeding.    No change in baseline shortness of air, she is on home oxygen.    Past Medical History:   Diagnosis Date   • Anemia     Iron deficiency anemia    • Anxiety    • Arthritis    • Breast cancer 08/2014    right    • Breast cancer 12/2006    Left   • Chronic kidney disease     Anemia of chronic renal insufficency, stage 3   • COPD (chronic obstructive pulmonary disease)    • Depression    • Diabetes mellitus    • Hyperlipidemia    • Hypertension    • YARY (obstructive sleep apnea)    • Poor circulation    • Stroke     CVA in 1990.       HEMATOLOGIC/ONCOLOGIC HISTORY:  (History from previous dates can be found in the separate document.)    MEDICATIONS    Current Outpatient Prescriptions:   •  amLODIPine (NORVASC) 10 MG tablet, Take 1 tablet by mouth daily., Disp: , Rfl:   •  anastrozole (ARIMIDEX) 1 MG tablet, take 1 tablet by mouth once daily, Disp: 30 tablet, Rfl: 5  •  aspirin 81 MG tablet, Take 81 mg by mouth., Disp: , Rfl:   •  busPIRone (BUSPAR) 15 MG tablet, Take 1 tablet by mouth every morning. And 2 tablets in the evening, Disp: , Rfl:   •  calcium carbonate-vitamin d (CALCIUM 600+D) 600-400 MG-UNIT per tablet, Take 1 tablet by mouth., Disp: , Rfl:   •  cyanocobalamin (VITAMIN B-12) 1000 MCG tablet, Take 100 mcg by mouth., Disp: , Rfl:   •  doxepin (SINEquan) 50 MG capsule, 3 tablets at bedtime, Disp: , Rfl: 0  •  epoetin joe (EPOGEN,PROCRIT) 46589 UNIT/ML injection, Epogen SOLN; Patient Sig: Epogen SOLN 5,000u if hgb is below 12   subq injectable; 0; 22-Jul-2015; Active, Disp: , Rfl:   •  FERROUS SULFATE PO, Take  by mouth., Disp: , Rfl:   •  furosemide (LASIX) 40 MG tablet, Take 1 tablet by mouth daily., Disp: ,  Rfl:   •  glucose blood test strip, TRUEtest Test In Vitro Strip; Patient Sig: TRUEtest Test In Vitro Strip ; 200; 0; 27-May-2014; Active, Disp: , Rfl:   •  HYDROcodone-acetaminophen (NORCO) 5-325 MG per tablet, Take 1 tablet by mouth every 6 (six) hours as needed. For pain, Disp: , Rfl:   •  insulin aspart (NovoLOG) 100 UNIT/ML injection, Inject 8 Units under the skin Medrol Dose Pack scheduling ONLY., Disp: , Rfl:   •  Insulin Glargine 100 UNIT/ML solution pen-injector, Inject 20 Units under the skin daily., Disp: , Rfl:   •  ipratropium-albuterol (DUONEB) 0.5-2.5 mg/mL nebulizer, Inhale 3 mL 3 (Three) Times a Day., Disp: , Rfl:   •  lansoprazole (PREVACID) 30 MG capsule, Take 1 capsule by mouth daily., Disp: , Rfl:   •  LANTUS 100 UNIT/ML injection, , Disp: , Rfl: 0  •  levETIRAcetam (KEPPRA) 500 MG tablet, Take 1 tablet by mouth 2 (two) times a day., Disp: , Rfl:   •  losartan (COZAAR) 100 MG tablet, Take 1 tablet by mouth daily., Disp: , Rfl:   •  metoclopramide (REGLAN) 10 MG tablet, Take 1 tablet by mouth daily. Before a meal, Disp: , Rfl:   •  Multiple Vitamins-Minerals (MULTIVITAL PO), Take 1 tablet by mouth daily., Disp: , Rfl:   •  pravastatin (PRAVACHOL) 40 MG tablet, Take 1 tablet by mouth nightly., Disp: , Rfl:   •  temazepam (RESTORIL) 30 MG capsule, Take 1 capsule by mouth nightly as needed. At bedtime, Disp: , Rfl:     ALLERGIES:     Allergies   Allergen Reactions   • Sulfa Antibiotics Other (See Comments)     GI upset   • Penicillins Itching and Rash       SOCIAL HISTORY:       Social History     Social History   • Marital status:      Spouse name: N/A   • Number of children: N/A   • Years of education: High School     Occupational History   • Nurse aid Retired     Social History Main Topics   • Smoking status: Never Smoker   • Smokeless tobacco: Never Used   • Alcohol use No   • Drug use: No   • Sexual activity: Not on file     Other Topics Concern   • Not on file     Social History  "Narrative   • No narrative on file         FAMILY HISTORY:  Family History   Problem Relation Age of Onset   • Cancer Father    • Cancer Brother    • Diabetes Brother    • Heart disease Brother    • Cancer Other        REVIEW OF SYSTEMS:  Review of Systems   Constitutional: Negative for activity change.   HENT: Negative for nosebleeds and trouble swallowing.    Respiratory: Positive for shortness of breath. Negative for wheezing.    Cardiovascular: Negative for chest pain and palpitations.   Gastrointestinal: Negative for constipation, diarrhea and nausea.   Genitourinary: Negative for dysuria and hematuria.   Musculoskeletal: Negative for arthralgias and myalgias.   Skin: Negative for rash and wound.   Neurological: Negative for seizures and syncope.   Hematological: Negative for adenopathy. Does not bruise/bleed easily.   Psychiatric/Behavioral: Negative for confusion.             Objective    Vitals:    05/24/18 1144   BP: 114/56   Pulse: 81   Resp: 16   Temp: 97.6 °F (36.4 °C)   TempSrc: Oral   SpO2: 93%   Weight: 84.8 kg (187 lb)   Height: 162.6 cm (64.02\")   PainSc: 6  Comment: Headaches x2 days   PainLoc: Head     Current Status 5/24/2018   ECOG score 1      PHYSICAL EXAM:    CONSTITUTIONAL:  Vital signs reviewed.  No distress, looks comfortable.  EYES:  Conjunctiva and lids unremarkable.  PERRLA  EARS,NOSE,MOUTH,THROAT:  Ears and nose appear unremarkable.  Lips, teeth, gums appear unremarkable.  RESPIRATORY:  Normal respiratory effort.  Lungs clear to auscultation bilaterally.  CARDIOVASCULAR:  Normal S1, S2.  No murmurs rubs or gallops.  No significant lower extremity edema.  GASTROINTESTINAL: Abdomen appears unremarkable.  Nontender.  No hepatomegaly.  No splenomegaly.  LYMPHATIC:  No cervical, supraclavicular, axillary lymphadenopathy.  SKIN:  Warm.  No rashes.  PSYCHIATRIC:  Normal judgment and insight.  Normal mood and affect.     RECENT LABS:        WBC   Date/Time Value Ref Range Status   05/24/2018 " 11:36 AM 6.76 4.00 - 10.00 10*3/mm3 Final     Hemoglobin   Date/Time Value Ref Range Status   05/24/2018 11:36 AM 9.0 (L) 11.5 - 14.9 g/dL Final     Platelets   Date/Time Value Ref Range Status   05/24/2018 11:36  150 - 375 10*3/mm3 Final       Assessment/Plan   ASSESSMENT:  There are no diagnoses linked to this encounter.    *Breast cancer.  Right-sided invasive ductal carcinoma, papillary type, removed with lumpectomy on 08/26/2014 by Dr. Sung Griggs. Only 1.7 mm of remaining invasive carcinoma. Node negative. Completed radiation.   Arimidex 10/17/2014 until planned 10/17/2019. Tolerating Arimidex well.   Remains in remission.    *Bone health. Normal bone density on DEXA scan 9/23/16. Calcium carbonate 600 mg with vitamin D once daily, changed to calcium citrate 1000 mg with vitamin D when Arimidex was started (she is also on lansoprazole).     *Anemia of chronic renal insufficiency, stage 3.   Procrit p.r.n. for hemoglobin less than 10.   On the late March 2016 visit, changed from every 5 week CBC to every 2 week CBC due to a fall in hemoglobin.  She has not needed much Procrit on the every 3 week interval she is currently on.  However, family prefers to maintain 3 week interval checks.    *Iron deficiency anemia. History of Venofer and Feraheme use. Does not respond well to p.o. iron but is taking ferrous sulfate 1 tablet 3 times per day without any problems (she will continue this).   In May 2015, she received one dose of Feraheme due to an iron percent saturation of around 10% and MCV of 82.6 despite an elevated ferritin. I think her ferritin is chronically high because of inflammation).   She has not had recent iron labs.  Therefore, I have ordered some today.  I've also ordered some for the next M.D. visit in 3 months.    *Elevated transaminases 1/4/18.  This prompted CT abdomen 1/11/18 (without contrast due to chronic renal insufficiency):  No evidence of malignancy.  Transaminases are much better  today, but still significantly elevated.  She was advised to follow up with her PCP.    PLAN:  · CBC every 3 week.    · Procrit if hemoglobin less than 10.    · M.D. visit in 12 weeks with iron studies 3 weeks prior  · Next DEXA scan due sometime after 9/23/18 (M.D. visit after next)  · She states Dr. Plascencia does her breast exams and orders her mammograms and she is up-to-date.  ·   last mammogram BI-RADS 2 (3/5/18, Mike)  · Continue Arimidex calcium and vitamin D.  · Defer further management of elevated transaminases to PCP, Dr. Plascencia.    Daughter assisted with history.

## 2018-06-18 ENCOUNTER — APPOINTMENT (OUTPATIENT)
Dept: LAB | Facility: HOSPITAL | Age: 83
End: 2018-06-18

## 2018-06-18 ENCOUNTER — APPOINTMENT (OUTPATIENT)
Dept: ONCOLOGY | Facility: HOSPITAL | Age: 83
End: 2018-06-18

## 2018-06-21 ENCOUNTER — INFUSION (OUTPATIENT)
Dept: ONCOLOGY | Facility: HOSPITAL | Age: 83
End: 2018-06-21

## 2018-06-21 ENCOUNTER — LAB (OUTPATIENT)
Dept: LAB | Facility: HOSPITAL | Age: 83
End: 2018-06-21

## 2018-06-21 DIAGNOSIS — D63.1 ANEMIA OF CHRONIC RENAL FAILURE, STAGE 3 (MODERATE) (HCC): ICD-10-CM

## 2018-06-21 DIAGNOSIS — D63.1 ANEMIA OF CHRONIC RENAL FAILURE, STAGE 3 (MODERATE) (HCC): Primary | ICD-10-CM

## 2018-06-21 DIAGNOSIS — Z17.0 MALIGNANT NEOPLASM OF UPPER-INNER QUADRANT OF RIGHT BREAST IN FEMALE, ESTROGEN RECEPTOR POSITIVE (HCC): ICD-10-CM

## 2018-06-21 DIAGNOSIS — N18.30 ANEMIA OF CHRONIC RENAL FAILURE, STAGE 3 (MODERATE) (HCC): ICD-10-CM

## 2018-06-21 DIAGNOSIS — D50.9 IRON DEFICIENCY ANEMIA, UNSPECIFIED IRON DEFICIENCY ANEMIA TYPE: ICD-10-CM

## 2018-06-21 DIAGNOSIS — N18.30 ANEMIA OF CHRONIC RENAL FAILURE, STAGE 3 (MODERATE) (HCC): Primary | ICD-10-CM

## 2018-06-21 DIAGNOSIS — C50.211 MALIGNANT NEOPLASM OF UPPER-INNER QUADRANT OF RIGHT BREAST IN FEMALE, ESTROGEN RECEPTOR POSITIVE (HCC): ICD-10-CM

## 2018-06-21 LAB
BASOPHILS # BLD AUTO: 0.02 10*3/MM3 (ref 0–0.1)
BASOPHILS NFR BLD AUTO: 0.3 % (ref 0–1.1)
DEPRECATED RDW RBC AUTO: 41.7 FL (ref 37–49)
EOSINOPHIL # BLD AUTO: 0 10*3/MM3 (ref 0–0.36)
EOSINOPHIL NFR BLD AUTO: 0 % (ref 1–5)
ERYTHROCYTE [DISTWIDTH] IN BLOOD BY AUTOMATED COUNT: 12.7 % (ref 11.7–14.5)
FERRITIN SERPL-MCNC: 429.8 NG/ML
HCT VFR BLD AUTO: 30.3 % (ref 34–45)
HGB BLD-MCNC: 9.3 G/DL (ref 11.5–14.9)
IMM GRANULOCYTES # BLD: 0.04 10*3/MM3 (ref 0–0.03)
IMM GRANULOCYTES NFR BLD: 0.7 % (ref 0–0.5)
IRON 24H UR-MRATE: 35 MCG/DL (ref 37–145)
IRON SATN MFR SERPL: 14 % (ref 14–48)
LYMPHOCYTES # BLD AUTO: 1.82 10*3/MM3 (ref 1–3.5)
LYMPHOCYTES NFR BLD AUTO: 30.6 % (ref 20–49)
MCH RBC QN AUTO: 27.5 PG (ref 27–33)
MCHC RBC AUTO-ENTMCNC: 30.7 G/DL (ref 32–35)
MCV RBC AUTO: 89.6 FL (ref 83–97)
MONOCYTES # BLD AUTO: 0.6 10*3/MM3 (ref 0.25–0.8)
MONOCYTES NFR BLD AUTO: 10.1 % (ref 4–12)
NEUTROPHILS # BLD AUTO: 3.47 10*3/MM3 (ref 1.5–7)
NEUTROPHILS NFR BLD AUTO: 58.3 % (ref 39–75)
NRBC BLD MANUAL-RTO: 0 /100 WBC (ref 0–0)
PLATELET # BLD AUTO: 265 10*3/MM3 (ref 150–375)
PMV BLD AUTO: 8.5 FL (ref 8.9–12.1)
RBC # BLD AUTO: 3.38 10*6/MM3 (ref 3.9–5)
TIBC SERPL-MCNC: 251 MCG/DL (ref 249–505)
TRANSFERRIN SERPL-MCNC: 179 MG/DL (ref 200–360)
WBC NRBC COR # BLD: 5.95 10*3/MM3 (ref 4–10)

## 2018-06-21 PROCEDURE — 63510000001 EPOETIN ALFA PER 1000 UNITS: Performed by: INTERNAL MEDICINE

## 2018-06-21 PROCEDURE — 36415 COLL VENOUS BLD VENIPUNCTURE: CPT | Performed by: INTERNAL MEDICINE

## 2018-06-21 PROCEDURE — 85025 COMPLETE CBC W/AUTO DIFF WBC: CPT

## 2018-06-21 PROCEDURE — 96372 THER/PROPH/DIAG INJ SC/IM: CPT

## 2018-06-21 PROCEDURE — 83540 ASSAY OF IRON: CPT | Performed by: INTERNAL MEDICINE

## 2018-06-21 PROCEDURE — 84466 ASSAY OF TRANSFERRIN: CPT | Performed by: INTERNAL MEDICINE

## 2018-06-21 PROCEDURE — 82728 ASSAY OF FERRITIN: CPT | Performed by: INTERNAL MEDICINE

## 2018-06-21 RX ADMIN — ERYTHROPOIETIN 10000 UNITS: 10000 INJECTION, SOLUTION INTRAVENOUS; SUBCUTANEOUS at 15:26

## 2018-07-09 ENCOUNTER — APPOINTMENT (OUTPATIENT)
Dept: LAB | Facility: HOSPITAL | Age: 83
End: 2018-07-09

## 2018-07-09 ENCOUNTER — APPOINTMENT (OUTPATIENT)
Dept: ONCOLOGY | Facility: HOSPITAL | Age: 83
End: 2018-07-09

## 2018-07-16 ENCOUNTER — INFUSION (OUTPATIENT)
Dept: ONCOLOGY | Facility: HOSPITAL | Age: 83
End: 2018-07-16

## 2018-07-16 ENCOUNTER — LAB (OUTPATIENT)
Dept: LAB | Facility: HOSPITAL | Age: 83
End: 2018-07-16

## 2018-07-16 DIAGNOSIS — D63.1 ANEMIA OF CHRONIC RENAL FAILURE, STAGE 3 (MODERATE) (HCC): ICD-10-CM

## 2018-07-16 DIAGNOSIS — Z17.0 MALIGNANT NEOPLASM OF UPPER-INNER QUADRANT OF RIGHT BREAST IN FEMALE, ESTROGEN RECEPTOR POSITIVE (HCC): ICD-10-CM

## 2018-07-16 DIAGNOSIS — N18.30 ANEMIA OF CHRONIC RENAL FAILURE, STAGE 3 (MODERATE) (HCC): ICD-10-CM

## 2018-07-16 DIAGNOSIS — D50.9 IRON DEFICIENCY ANEMIA, UNSPECIFIED IRON DEFICIENCY ANEMIA TYPE: ICD-10-CM

## 2018-07-16 DIAGNOSIS — C50.211 MALIGNANT NEOPLASM OF UPPER-INNER QUADRANT OF RIGHT BREAST IN FEMALE, ESTROGEN RECEPTOR POSITIVE (HCC): ICD-10-CM

## 2018-07-16 LAB
BASOPHILS # BLD AUTO: 0.02 10*3/MM3 (ref 0–0.1)
BASOPHILS NFR BLD AUTO: 0.3 % (ref 0–1.1)
DEPRECATED RDW RBC AUTO: 39.8 FL (ref 37–49)
EOSINOPHIL # BLD AUTO: 0 10*3/MM3 (ref 0–0.36)
EOSINOPHIL NFR BLD AUTO: 0 % (ref 1–5)
ERYTHROCYTE [DISTWIDTH] IN BLOOD BY AUTOMATED COUNT: 12.9 % (ref 11.7–14.5)
HCT VFR BLD AUTO: 32 % (ref 34–45)
HGB BLD-MCNC: 10.2 G/DL (ref 11.5–14.9)
IMM GRANULOCYTES # BLD: 0.04 10*3/MM3 (ref 0–0.03)
IMM GRANULOCYTES NFR BLD: 0.6 % (ref 0–0.5)
LYMPHOCYTES # BLD AUTO: 1.93 10*3/MM3 (ref 1–3.5)
LYMPHOCYTES NFR BLD AUTO: 29 % (ref 20–49)
MCH RBC QN AUTO: 27.3 PG (ref 27–33)
MCHC RBC AUTO-ENTMCNC: 31.9 G/DL (ref 32–35)
MCV RBC AUTO: 85.8 FL (ref 83–97)
MONOCYTES # BLD AUTO: 0.62 10*3/MM3 (ref 0.25–0.8)
MONOCYTES NFR BLD AUTO: 9.3 % (ref 4–12)
NEUTROPHILS # BLD AUTO: 4.05 10*3/MM3 (ref 1.5–7)
NEUTROPHILS NFR BLD AUTO: 60.8 % (ref 39–75)
NRBC BLD MANUAL-RTO: 0 /100 WBC (ref 0–0)
PLATELET # BLD AUTO: 266 10*3/MM3 (ref 150–375)
PMV BLD AUTO: 10.4 FL (ref 8.9–12.1)
RBC # BLD AUTO: 3.73 10*6/MM3 (ref 3.9–5)
WBC NRBC COR # BLD: 6.66 10*3/MM3 (ref 4–10)

## 2018-07-16 PROCEDURE — 36415 COLL VENOUS BLD VENIPUNCTURE: CPT | Performed by: INTERNAL MEDICINE

## 2018-07-16 PROCEDURE — 85025 COMPLETE CBC W/AUTO DIFF WBC: CPT | Performed by: INTERNAL MEDICINE

## 2018-07-16 NOTE — PROGRESS NOTES
Hgb today is 10.2.  Per Dr Trinh's note, procrit for hgb less than 10.0.  Patient without questions and concerns at this time.   Patient to appt desk to make new appts.   Given copy of labs.

## 2018-07-30 ENCOUNTER — APPOINTMENT (OUTPATIENT)
Dept: ONCOLOGY | Facility: HOSPITAL | Age: 83
End: 2018-07-30

## 2018-07-30 ENCOUNTER — APPOINTMENT (OUTPATIENT)
Dept: LAB | Facility: HOSPITAL | Age: 83
End: 2018-07-30

## 2018-08-01 ENCOUNTER — LAB (OUTPATIENT)
Dept: LAB | Facility: HOSPITAL | Age: 83
End: 2018-08-01

## 2018-08-01 ENCOUNTER — INFUSION (OUTPATIENT)
Dept: ONCOLOGY | Facility: HOSPITAL | Age: 83
End: 2018-08-01

## 2018-08-01 DIAGNOSIS — D63.1 ANEMIA OF CHRONIC RENAL FAILURE, STAGE 3 (MODERATE) (HCC): ICD-10-CM

## 2018-08-01 DIAGNOSIS — N18.30 ANEMIA OF CHRONIC RENAL FAILURE, STAGE 3 (MODERATE) (HCC): Primary | ICD-10-CM

## 2018-08-01 DIAGNOSIS — D63.1 ANEMIA OF CHRONIC RENAL FAILURE, STAGE 3 (MODERATE) (HCC): Primary | ICD-10-CM

## 2018-08-01 DIAGNOSIS — N18.30 ANEMIA OF CHRONIC RENAL FAILURE, STAGE 3 (MODERATE) (HCC): ICD-10-CM

## 2018-08-01 DIAGNOSIS — D50.9 IRON DEFICIENCY ANEMIA, UNSPECIFIED IRON DEFICIENCY ANEMIA TYPE: ICD-10-CM

## 2018-08-01 DIAGNOSIS — C50.211 MALIGNANT NEOPLASM OF UPPER-INNER QUADRANT OF RIGHT BREAST IN FEMALE, ESTROGEN RECEPTOR POSITIVE (HCC): ICD-10-CM

## 2018-08-01 DIAGNOSIS — Z17.0 MALIGNANT NEOPLASM OF UPPER-INNER QUADRANT OF RIGHT BREAST IN FEMALE, ESTROGEN RECEPTOR POSITIVE (HCC): ICD-10-CM

## 2018-08-01 LAB
BASOPHILS # BLD AUTO: 0.02 10*3/MM3 (ref 0–0.1)
BASOPHILS NFR BLD AUTO: 0.4 % (ref 0–1.1)
DEPRECATED RDW RBC AUTO: 42.3 FL (ref 37–49)
EOSINOPHIL # BLD AUTO: 0 10*3/MM3 (ref 0–0.36)
EOSINOPHIL NFR BLD AUTO: 0 % (ref 1–5)
ERYTHROCYTE [DISTWIDTH] IN BLOOD BY AUTOMATED COUNT: 13 % (ref 11.7–14.5)
FERRITIN SERPL-MCNC: 690.6 NG/ML
HCT VFR BLD AUTO: 31.3 % (ref 34–45)
HGB BLD-MCNC: 9.8 G/DL (ref 11.5–14.9)
HGB RETIC QN: 30.6 PG (ref 29.8–36.1)
IMM GRANULOCYTES # BLD: 0.01 10*3/MM3 (ref 0–0.03)
IMM GRANULOCYTES NFR BLD: 0.2 % (ref 0–0.5)
IMM RETICS NFR: 19.3 % (ref 3–15.8)
IRON 24H UR-MRATE: 55 MCG/DL (ref 37–145)
IRON SATN MFR SERPL: 21 % (ref 14–48)
LYMPHOCYTES # BLD AUTO: 1.48 10*3/MM3 (ref 1–3.5)
LYMPHOCYTES NFR BLD AUTO: 26.8 % (ref 20–49)
MCH RBC QN AUTO: 27.6 PG (ref 27–33)
MCHC RBC AUTO-ENTMCNC: 31.3 G/DL (ref 32–35)
MCV RBC AUTO: 88.2 FL (ref 83–97)
MONOCYTES # BLD AUTO: 0.63 10*3/MM3 (ref 0.25–0.8)
MONOCYTES NFR BLD AUTO: 11.4 % (ref 4–12)
NEUTROPHILS # BLD AUTO: 3.38 10*3/MM3 (ref 1.5–7)
NEUTROPHILS NFR BLD AUTO: 61.2 % (ref 39–75)
NRBC BLD MANUAL-RTO: 0 /100 WBC (ref 0–0)
PLATELET # BLD AUTO: 262 10*3/MM3 (ref 150–375)
PMV BLD AUTO: 10.2 FL (ref 8.9–12.1)
RBC # BLD AUTO: 3.55 10*6/MM3 (ref 3.9–5)
RETICS/RBC NFR AUTO: 2.44 % (ref 0.6–2)
TIBC SERPL-MCNC: 259 MCG/DL (ref 249–505)
TRANSFERRIN SERPL-MCNC: 185 MG/DL (ref 200–360)
WBC NRBC COR # BLD: 5.52 10*3/MM3 (ref 4–10)

## 2018-08-01 PROCEDURE — 84466 ASSAY OF TRANSFERRIN: CPT

## 2018-08-01 PROCEDURE — 83540 ASSAY OF IRON: CPT

## 2018-08-01 PROCEDURE — 85046 RETICYTE/HGB CONCENTRATE: CPT | Performed by: INTERNAL MEDICINE

## 2018-08-01 PROCEDURE — 63510000001 EPOETIN ALFA PER 1000 UNITS: Performed by: INTERNAL MEDICINE

## 2018-08-01 PROCEDURE — 82728 ASSAY OF FERRITIN: CPT

## 2018-08-01 PROCEDURE — 36415 COLL VENOUS BLD VENIPUNCTURE: CPT | Performed by: INTERNAL MEDICINE

## 2018-08-01 PROCEDURE — 85025 COMPLETE CBC W/AUTO DIFF WBC: CPT | Performed by: INTERNAL MEDICINE

## 2018-08-01 PROCEDURE — 96372 THER/PROPH/DIAG INJ SC/IM: CPT | Performed by: INTERNAL MEDICINE

## 2018-08-01 RX ADMIN — ERYTHROPOIETIN 10000 UNITS: 10000 INJECTION, SOLUTION INTRAVENOUS; SUBCUTANEOUS at 12:48

## 2018-08-01 NOTE — PROGRESS NOTES
Reviewed CBC results from today and iron studies from June, with Bia Patel and Barbie Mendes,  And decision was made to keep procrit at 79587 unit dose.   Iron studies pending today.

## 2018-08-20 ENCOUNTER — LAB (OUTPATIENT)
Dept: LAB | Facility: HOSPITAL | Age: 83
End: 2018-08-20

## 2018-08-20 ENCOUNTER — APPOINTMENT (OUTPATIENT)
Dept: ONCOLOGY | Facility: HOSPITAL | Age: 83
End: 2018-08-20

## 2018-08-20 ENCOUNTER — OFFICE VISIT (OUTPATIENT)
Dept: ONCOLOGY | Facility: CLINIC | Age: 83
End: 2018-08-20

## 2018-08-20 VITALS
HEIGHT: 64 IN | SYSTOLIC BLOOD PRESSURE: 116 MMHG | OXYGEN SATURATION: 93 % | HEART RATE: 84 BPM | DIASTOLIC BLOOD PRESSURE: 64 MMHG | TEMPERATURE: 98.5 F | WEIGHT: 185.2 LBS | RESPIRATION RATE: 16 BRPM | BODY MASS INDEX: 31.62 KG/M2

## 2018-08-20 DIAGNOSIS — Z17.0 MALIGNANT NEOPLASM OF UPPER-INNER QUADRANT OF RIGHT BREAST IN FEMALE, ESTROGEN RECEPTOR POSITIVE (HCC): Primary | ICD-10-CM

## 2018-08-20 DIAGNOSIS — C50.211 MALIGNANT NEOPLASM OF UPPER-INNER QUADRANT OF RIGHT BREAST IN FEMALE, ESTROGEN RECEPTOR POSITIVE (HCC): ICD-10-CM

## 2018-08-20 DIAGNOSIS — D63.1 ANEMIA OF CHRONIC RENAL FAILURE, STAGE 3 (MODERATE) (HCC): ICD-10-CM

## 2018-08-20 DIAGNOSIS — Z17.0 MALIGNANT NEOPLASM OF UPPER-INNER QUADRANT OF RIGHT BREAST IN FEMALE, ESTROGEN RECEPTOR POSITIVE (HCC): ICD-10-CM

## 2018-08-20 DIAGNOSIS — Z91.89 AT RISK FOR BONE DENSITY LOSS: ICD-10-CM

## 2018-08-20 DIAGNOSIS — N18.30 ANEMIA OF CHRONIC RENAL FAILURE, STAGE 3 (MODERATE) (HCC): ICD-10-CM

## 2018-08-20 DIAGNOSIS — D50.9 IRON DEFICIENCY ANEMIA, UNSPECIFIED IRON DEFICIENCY ANEMIA TYPE: ICD-10-CM

## 2018-08-20 DIAGNOSIS — C50.211 MALIGNANT NEOPLASM OF UPPER-INNER QUADRANT OF RIGHT BREAST IN FEMALE, ESTROGEN RECEPTOR POSITIVE (HCC): Primary | ICD-10-CM

## 2018-08-20 DIAGNOSIS — Z79.811 AROMATASE INHIBITOR USE: ICD-10-CM

## 2018-08-20 LAB
BASOPHILS # BLD AUTO: 0.01 10*3/MM3 (ref 0–0.1)
BASOPHILS NFR BLD AUTO: 0.2 % (ref 0–1.1)
DEPRECATED RDW RBC AUTO: 40.9 FL (ref 37–49)
EOSINOPHIL # BLD AUTO: 0 10*3/MM3 (ref 0–0.36)
EOSINOPHIL NFR BLD AUTO: 0 % (ref 1–5)
ERYTHROCYTE [DISTWIDTH] IN BLOOD BY AUTOMATED COUNT: 13 % (ref 11.7–14.5)
HCT VFR BLD AUTO: 32.9 % (ref 34–45)
HGB BLD-MCNC: 10.5 G/DL (ref 11.5–14.9)
IMM GRANULOCYTES # BLD: 0.03 10*3/MM3 (ref 0–0.03)
IMM GRANULOCYTES NFR BLD: 0.5 % (ref 0–0.5)
LYMPHOCYTES # BLD AUTO: 1.48 10*3/MM3 (ref 1–3.5)
LYMPHOCYTES NFR BLD AUTO: 26.6 % (ref 20–49)
MCH RBC QN AUTO: 27.6 PG (ref 27–33)
MCHC RBC AUTO-ENTMCNC: 31.9 G/DL (ref 32–35)
MCV RBC AUTO: 86.4 FL (ref 83–97)
MONOCYTES # BLD AUTO: 0.62 10*3/MM3 (ref 0.25–0.8)
MONOCYTES NFR BLD AUTO: 11.2 % (ref 4–12)
NEUTROPHILS # BLD AUTO: 3.42 10*3/MM3 (ref 1.5–7)
NEUTROPHILS NFR BLD AUTO: 61.5 % (ref 39–75)
NRBC BLD MANUAL-RTO: 0 /100 WBC (ref 0–0)
PLATELET # BLD AUTO: 241 10*3/MM3 (ref 150–375)
PMV BLD AUTO: 9.8 FL (ref 8.9–12.1)
RBC # BLD AUTO: 3.81 10*6/MM3 (ref 3.9–5)
WBC NRBC COR # BLD: 5.56 10*3/MM3 (ref 4–10)

## 2018-08-20 PROCEDURE — 85025 COMPLETE CBC W/AUTO DIFF WBC: CPT | Performed by: INTERNAL MEDICINE

## 2018-08-20 PROCEDURE — 99214 OFFICE O/P EST MOD 30 MIN: CPT | Performed by: INTERNAL MEDICINE

## 2018-08-20 PROCEDURE — 36415 COLL VENOUS BLD VENIPUNCTURE: CPT | Performed by: INTERNAL MEDICINE

## 2018-08-20 NOTE — PROGRESS NOTES
Subjective .     REASONS FOR FOLLOWUP:  Breast cancer, anemia    HISTORY OF PRESENT ILLNESS:  The patient is a 82 y.o. year old female  who is here for follow-up with the above-mentioned history.    Denies bleeding from any location, chest pain.    No change in baseline shortness of air.    Denies hot flashes, nausea.    Right shoulder pain with lifting the arm ×1 week.    Past Medical History:   Diagnosis Date   • Anemia     Iron deficiency anemia    • Anxiety    • Arthritis    • Breast cancer (CMS/HCC) 08/2014    right    • Breast cancer (CMS/HCC) 12/2006    Left   • Chronic kidney disease     Anemia of chronic renal insufficency, stage 3   • COPD (chronic obstructive pulmonary disease) (CMS/HCC)    • Depression    • Diabetes mellitus (CMS/HCC)    • Hyperlipidemia    • Hypertension    • YARY (obstructive sleep apnea)    • Poor circulation    • Stroke (CMS/[facility])     CVA in 1990.       HEMATOLOGIC/ONCOLOGIC HISTORY:  (History from previous dates can be found in the separate document.)    MEDICATIONS    Current Outpatient Prescriptions:   •  amLODIPine (NORVASC) 10 MG tablet, Take 1 tablet by mouth daily., Disp: , Rfl:   •  anastrozole (ARIMIDEX) 1 MG tablet, take 1 tablet by mouth once daily, Disp: 30 tablet, Rfl: 5  •  aspirin 81 MG tablet, Take 81 mg by mouth., Disp: , Rfl:   •  busPIRone (BUSPAR) 15 MG tablet, Take 1 tablet by mouth every morning. And 2 tablets in the evening, Disp: , Rfl:   •  calcium carbonate-vitamin d (CALCIUM 600+D) 600-400 MG-UNIT per tablet, Take 1 tablet by mouth., Disp: , Rfl:   •  cyanocobalamin (VITAMIN B-12) 1000 MCG tablet, Take 100 mcg by mouth., Disp: , Rfl:   •  doxepin (SINEquan) 50 MG capsule, 3 tablets at bedtime, Disp: , Rfl: 0  •  epoetin joe (EPOGEN,PROCRIT) 25452 UNIT/ML injection, Epogen SOLN; Patient Sig: Epogen SOLN 5,000u if hgb is below 12   subq injectable; 0; 22-Jul-2015; Active, Disp: , Rfl:   •  FERROUS SULFATE PO, Take  by mouth., Disp: , Rfl:   •  furosemide  (LASIX) 40 MG tablet, Take 1 tablet by mouth daily., Disp: , Rfl:   •  glucose blood test strip, TRUEtest Test In Vitro Strip; Patient Sig: TRUEtest Test In Vitro Strip ; 200; 0; 27-May-2014; Active, Disp: , Rfl:   •  HYDROcodone-acetaminophen (NORCO) 5-325 MG per tablet, Take 1 tablet by mouth every 6 (six) hours as needed. For pain, Disp: , Rfl:   •  insulin aspart (NovoLOG) 100 UNIT/ML injection, Inject 8 Units under the skin Medrol Dose Pack scheduling ONLY., Disp: , Rfl:   •  Insulin Glargine 100 UNIT/ML solution pen-injector, Inject 20 Units under the skin daily., Disp: , Rfl:   •  ipratropium-albuterol (DUONEB) 0.5-2.5 mg/mL nebulizer, Inhale 3 mL 3 (Three) Times a Day., Disp: , Rfl:   •  LANTUS 100 UNIT/ML injection, , Disp: , Rfl: 0  •  levETIRAcetam (KEPPRA) 500 MG tablet, Take 1 tablet by mouth 2 (two) times a day., Disp: , Rfl:   •  losartan (COZAAR) 100 MG tablet, Take 1 tablet by mouth daily., Disp: , Rfl:   •  metoclopramide (REGLAN) 10 MG tablet, Take 1 tablet by mouth daily. Before a meal, Disp: , Rfl:   •  Multiple Vitamins-Minerals (MULTIVITAL PO), Take 1 tablet by mouth daily., Disp: , Rfl:   •  pravastatin (PRAVACHOL) 40 MG tablet, Take 1 tablet by mouth nightly., Disp: , Rfl:   •  temazepam (RESTORIL) 30 MG capsule, Take 1 capsule by mouth nightly as needed. At bedtime, Disp: , Rfl:   •  lansoprazole (PREVACID) 30 MG capsule, Take 1 capsule by mouth daily., Disp: , Rfl:     ALLERGIES:     Allergies   Allergen Reactions   • Sulfa Antibiotics Other (See Comments)     GI upset   • Penicillins Itching and Rash       SOCIAL HISTORY:       Social History     Social History   • Marital status:      Spouse name: N/A   • Number of children: N/A   • Years of education: High School     Occupational History   • Nurse aid Retired     Social History Main Topics   • Smoking status: Never Smoker   • Smokeless tobacco: Never Used   • Alcohol use No   • Drug use: No   • Sexual activity: Not on file  "    Other Topics Concern   • Not on file     Social History Narrative   • No narrative on file         FAMILY HISTORY:  Family History   Problem Relation Age of Onset   • Cancer Father    • Cancer Brother    • Diabetes Brother    • Heart disease Brother    • Cancer Other        REVIEW OF SYSTEMS:  Review of Systems   Constitutional: Negative for activity change.   HENT: Negative for nosebleeds and trouble swallowing.    Respiratory: Positive for shortness of breath. Negative for wheezing.    Cardiovascular: Negative for chest pain and palpitations.   Gastrointestinal: Negative for constipation, diarrhea and nausea.   Genitourinary: Negative for dysuria and hematuria.   Musculoskeletal: Negative for arthralgias and myalgias.   Skin: Negative for rash and wound.   Neurological: Negative for seizures and syncope.   Hematological: Negative for adenopathy. Does not bruise/bleed easily.   Psychiatric/Behavioral: Negative for confusion.             Objective    Vitals:    08/20/18 1523   BP: 116/64   Pulse: 84   Resp: 16   Temp: 98.5 °F (36.9 °C)   TempSrc: Oral   SpO2: 93%   Weight: 84 kg (185 lb 3.2 oz)   Height: 162.6 cm (64.02\")   PainSc: 5  Comment: RT hand pain     Current Status 5/24/2018   ECOG score 1      PHYSICAL EXAM:    CONSTITUTIONAL:  Vital signs reviewed.  No distress, looks comfortable.  EYES:  Conjunctiva and lids unremarkable.  PERRLA  EARS,NOSE,MOUTH,THROAT:  Ears and nose appear unremarkable.  Lips, teeth, gums appear unremarkable.  RESPIRATORY:  Normal respiratory effort.  Lungs clear to auscultation bilaterally.  CARDIOVASCULAR:  Normal S1, S2.  No murmurs rubs or gallops.  No significant lower extremity edema.  GASTROINTESTINAL: Abdomen appears unremarkable.  Nontender.  No hepatomegaly.  No splenomegaly.  LYMPHATIC:  No cervical, supraclavicular, axillary lymphadenopathy.  SKIN:  Warm.  No rashes.  PSYCHIATRIC:  Normal judgment and insight.  Normal mood and affect.      RECENT LABS:        WBC "   Date/Time Value Ref Range Status   08/20/2018 03:02 PM 5.56 4.00 - 10.00 10*3/mm3 Final     Hemoglobin   Date/Time Value Ref Range Status   08/20/2018 03:02 PM 10.5 (L) 11.5 - 14.9 g/dL Final     Platelets   Date/Time Value Ref Range Status   08/20/2018 03:02  150 - 375 10*3/mm3 Final       Assessment/Plan   ASSESSMENT:  Malignant neoplasm of upper-inner quadrant of right breast in female, estrogen receptor positive (CMS/HCC)  - CBC & Differential  - Comprehensive Metabolic Panel  - Ferritin  - Iron Profile  - CBC & Differential  - CBC & Differential    Iron deficiency anemia, unspecified iron deficiency anemia type  - CBC & Differential  - Comprehensive Metabolic Panel  - Ferritin  - Iron Profile  - CBC & Differential  - CBC & Differential      *Breast cancer.  Right-sided invasive ductal carcinoma, papillary type, removed with lumpectomy on 08/26/2014 by Dr. Sung Griggs. Only 1.7 mm of remaining invasive carcinoma. Node negative. Completed radiation.   Arimidex 10/17/2014 until planned 10/17/2019. Tolerating Arimidex well.   Appears to remain in remission.    *Bone health. Normal bone density on DEXA scan 9/23/16. Calcium carbonate 600 mg with vitamin D once daily, changed to calcium citrate 1000 mg with vitamin D when Arimidex was started (she is also on lansoprazole).     *Anemia of chronic renal insufficiency, stage 3.   Procrit p.r.n. for hemoglobin less than 10.   On the late March 2016 visit, changed from every 5 week CBC to every 2 week CBC due to a fall in hemoglobin.  She has not needed much Procrit on the every 3 week interval she is currently on.    She would like to decrease from every 3 week checks monthly checks.  I think that is reasonable.    *Iron deficiency anemia. History of Venofer and Feraheme use. Does not respond well to p.o. iron but is taking ferrous sulfate 1 tablet 3 times per day without any problems (she will continue this).   In May 2015, she received one dose of Feraheme  due to an iron percent saturation of around 10% and MCV of 82.6 despite an elevated ferritin. I think her ferritin is chronically high because of inflammation).     *Elevated transaminases 1/4/18.  This prompted CT abdomen 1/11/18 (without contrast due to chronic renal insufficiency):  No evidence of malignancy.  Transaminases are much better today, but still significantly elevated.  She was advised to follow up with her PCP.    PLAN:  · CBC every month  · Procrit if hemoglobin less than 10.    · Iron labs and CMP 2 months  · M.D. 3 months.  One week prior: DEXA scan  · She states Dr. Plascencia does her breast exams and orders her mammograms and she is up-to-date.  ·   last mammogram BI-RADS 2 (3/5/18, Mike)  · Continue Arimidex calcium and vitamin D.  · Defer further management of right shoulder pain with lifting arm to PCP, Dr. Plascencia.  This has been present for 1 week.    Daughter assisted with history.

## 2018-09-05 RX ORDER — ANASTROZOLE 1 MG/1
TABLET ORAL
Qty: 90 TABLET | Refills: 1 | Status: SHIPPED | OUTPATIENT
Start: 2018-09-05 | End: 2019-02-14 | Stop reason: SDUPTHER

## 2018-09-17 ENCOUNTER — INFUSION (OUTPATIENT)
Dept: ONCOLOGY | Facility: HOSPITAL | Age: 83
End: 2018-09-17

## 2018-09-17 ENCOUNTER — LAB (OUTPATIENT)
Dept: LAB | Facility: HOSPITAL | Age: 83
End: 2018-09-17

## 2018-09-17 DIAGNOSIS — D63.1 ANEMIA OF CHRONIC RENAL FAILURE, STAGE 3 (MODERATE) (HCC): ICD-10-CM

## 2018-09-17 DIAGNOSIS — Z17.0 MALIGNANT NEOPLASM OF UPPER-INNER QUADRANT OF RIGHT BREAST IN FEMALE, ESTROGEN RECEPTOR POSITIVE (HCC): ICD-10-CM

## 2018-09-17 DIAGNOSIS — Z79.811 AROMATASE INHIBITOR USE: ICD-10-CM

## 2018-09-17 DIAGNOSIS — D50.9 IRON DEFICIENCY ANEMIA, UNSPECIFIED IRON DEFICIENCY ANEMIA TYPE: ICD-10-CM

## 2018-09-17 DIAGNOSIS — N18.30 ANEMIA OF CHRONIC RENAL FAILURE, STAGE 3 (MODERATE) (HCC): ICD-10-CM

## 2018-09-17 DIAGNOSIS — Z91.89 AT RISK FOR BONE DENSITY LOSS: ICD-10-CM

## 2018-09-17 DIAGNOSIS — D63.1 ANEMIA OF CHRONIC RENAL FAILURE, STAGE 3 (MODERATE) (HCC): Primary | ICD-10-CM

## 2018-09-17 DIAGNOSIS — C50.211 MALIGNANT NEOPLASM OF UPPER-INNER QUADRANT OF RIGHT BREAST IN FEMALE, ESTROGEN RECEPTOR POSITIVE (HCC): ICD-10-CM

## 2018-09-17 DIAGNOSIS — N18.30 ANEMIA OF CHRONIC RENAL FAILURE, STAGE 3 (MODERATE) (HCC): Primary | ICD-10-CM

## 2018-09-17 LAB
BASOPHILS # BLD AUTO: 0.02 10*3/MM3 (ref 0–0.1)
BASOPHILS NFR BLD AUTO: 0.3 % (ref 0–1.1)
DEPRECATED RDW RBC AUTO: 43.8 FL (ref 37–49)
EOSINOPHIL # BLD AUTO: 0 10*3/MM3 (ref 0–0.36)
EOSINOPHIL NFR BLD AUTO: 0 % (ref 1–5)
ERYTHROCYTE [DISTWIDTH] IN BLOOD BY AUTOMATED COUNT: 13 % (ref 11.7–14.5)
HCT VFR BLD AUTO: 31.2 % (ref 34–45)
HGB BLD-MCNC: 9.2 G/DL (ref 11.5–14.9)
IMM GRANULOCYTES # BLD: 0.03 10*3/MM3 (ref 0–0.03)
IMM GRANULOCYTES NFR BLD: 0.4 % (ref 0–0.5)
LYMPHOCYTES # BLD AUTO: 1.37 10*3/MM3 (ref 1–3.5)
LYMPHOCYTES NFR BLD AUTO: 19.5 % (ref 20–49)
MCH RBC QN AUTO: 27.3 PG (ref 27–33)
MCHC RBC AUTO-ENTMCNC: 29.5 G/DL (ref 32–35)
MCV RBC AUTO: 92.6 FL (ref 83–97)
MONOCYTES # BLD AUTO: 0.73 10*3/MM3 (ref 0.25–0.8)
MONOCYTES NFR BLD AUTO: 10.4 % (ref 4–12)
NEUTROPHILS # BLD AUTO: 4.87 10*3/MM3 (ref 1.5–7)
NEUTROPHILS NFR BLD AUTO: 69.4 % (ref 39–75)
NRBC BLD MANUAL-RTO: 0 /100 WBC (ref 0–0)
PLATELET # BLD AUTO: 228 10*3/MM3 (ref 150–375)
PMV BLD AUTO: 9.4 FL (ref 8.9–12.1)
RBC # BLD AUTO: 3.37 10*6/MM3 (ref 3.9–5)
WBC NRBC COR # BLD: 7.02 10*3/MM3 (ref 4–10)

## 2018-09-17 PROCEDURE — 63510000001 EPOETIN ALFA PER 1000 UNITS: Performed by: INTERNAL MEDICINE

## 2018-09-17 PROCEDURE — 96372 THER/PROPH/DIAG INJ SC/IM: CPT | Performed by: INTERNAL MEDICINE

## 2018-09-17 PROCEDURE — 36415 COLL VENOUS BLD VENIPUNCTURE: CPT | Performed by: INTERNAL MEDICINE

## 2018-09-17 PROCEDURE — 85025 COMPLETE CBC W/AUTO DIFF WBC: CPT | Performed by: INTERNAL MEDICINE

## 2018-09-17 RX ADMIN — ERYTHROPOIETIN 7000 UNITS: 20000 INJECTION, SOLUTION INTRAVENOUS; SUBCUTANEOUS at 14:26

## 2018-10-09 ENCOUNTER — TELEPHONE (OUTPATIENT)
Dept: ONCOLOGY | Facility: HOSPITAL | Age: 83
End: 2018-10-09

## 2018-10-09 NOTE — TELEPHONE ENCOUNTER
----- Message from Ashtyn Linder sent at 10/9/2018  4:31 PM EDT -----  Contact: [phone]  Dorcas daughter   Calling to see if mother should continue calcium she has been taking?

## 2018-10-09 NOTE — TELEPHONE ENCOUNTER
Pt's daughter thought that pt was supposed to stop taking Calcium. Per Dr Trinh's last visit note, pt is still supposed to be on this.

## 2018-10-15 ENCOUNTER — APPOINTMENT (OUTPATIENT)
Dept: ONCOLOGY | Facility: HOSPITAL | Age: 83
End: 2018-10-15

## 2018-10-15 ENCOUNTER — APPOINTMENT (OUTPATIENT)
Dept: LAB | Facility: HOSPITAL | Age: 83
End: 2018-10-15

## 2018-10-19 ENCOUNTER — INFUSION (OUTPATIENT)
Dept: ONCOLOGY | Facility: HOSPITAL | Age: 83
End: 2018-10-19

## 2018-10-19 ENCOUNTER — LAB (OUTPATIENT)
Dept: LAB | Facility: HOSPITAL | Age: 83
End: 2018-10-19

## 2018-10-19 DIAGNOSIS — D63.1 ANEMIA OF CHRONIC RENAL FAILURE, STAGE 3 (MODERATE) (HCC): ICD-10-CM

## 2018-10-19 DIAGNOSIS — D63.1 ANEMIA OF CHRONIC RENAL FAILURE, STAGE 3 (MODERATE) (HCC): Primary | ICD-10-CM

## 2018-10-19 DIAGNOSIS — Z17.0 MALIGNANT NEOPLASM OF UPPER-INNER QUADRANT OF RIGHT BREAST IN FEMALE, ESTROGEN RECEPTOR POSITIVE (HCC): ICD-10-CM

## 2018-10-19 DIAGNOSIS — Z91.89 AT RISK FOR BONE DENSITY LOSS: ICD-10-CM

## 2018-10-19 DIAGNOSIS — D50.9 IRON DEFICIENCY ANEMIA, UNSPECIFIED IRON DEFICIENCY ANEMIA TYPE: ICD-10-CM

## 2018-10-19 DIAGNOSIS — N18.30 ANEMIA OF CHRONIC RENAL FAILURE, STAGE 3 (MODERATE) (HCC): ICD-10-CM

## 2018-10-19 DIAGNOSIS — N18.30 ANEMIA OF CHRONIC RENAL FAILURE, STAGE 3 (MODERATE) (HCC): Primary | ICD-10-CM

## 2018-10-19 DIAGNOSIS — C50.211 MALIGNANT NEOPLASM OF UPPER-INNER QUADRANT OF RIGHT BREAST IN FEMALE, ESTROGEN RECEPTOR POSITIVE (HCC): ICD-10-CM

## 2018-10-19 DIAGNOSIS — Z79.811 AROMATASE INHIBITOR USE: ICD-10-CM

## 2018-10-19 LAB
ABO GROUP BLD: NORMAL
ALBUMIN SERPL-MCNC: 3.6 G/DL (ref 3.5–5.2)
ALBUMIN/GLOB SERPL: 1.1 G/DL (ref 1.1–2.4)
ALP SERPL-CCNC: 165 U/L (ref 38–116)
ALT SERPL W P-5'-P-CCNC: 40 U/L (ref 0–33)
ANION GAP SERPL CALCULATED.3IONS-SCNC: 7.8 MMOL/L
AST SERPL-CCNC: 33 U/L (ref 0–32)
BASOPHILS # BLD AUTO: 0 10*3/MM3 (ref 0–0.1)
BASOPHILS NFR BLD AUTO: 0 % (ref 0–1.1)
BILIRUB SERPL-MCNC: 0.5 MG/DL (ref 0.1–1.2)
BLD GP AB SCN SERPL QL: NEGATIVE
BUN BLD-MCNC: 29 MG/DL (ref 6–20)
BUN/CREAT SERPL: 21.6 (ref 7.3–30)
CALCIUM SPEC-SCNC: 9.7 MG/DL (ref 8.5–10.2)
CHLORIDE SERPL-SCNC: 95 MMOL/L (ref 98–107)
CO2 SERPL-SCNC: 39.2 MMOL/L (ref 22–29)
CREAT BLD-MCNC: 1.34 MG/DL (ref 0.6–1.1)
DEPRECATED RDW RBC AUTO: 43.1 FL (ref 37–49)
EOSINOPHIL # BLD AUTO: 0 10*3/MM3 (ref 0–0.36)
EOSINOPHIL NFR BLD AUTO: 0 % (ref 1–5)
ERYTHROCYTE [DISTWIDTH] IN BLOOD BY AUTOMATED COUNT: 12.9 % (ref 11.7–14.5)
FERRITIN SERPL-MCNC: 654.7 NG/ML
GFR SERPL CREATININE-BSD FRML MDRD: 46 ML/MIN/1.73
GLOBULIN UR ELPH-MCNC: 3.4 GM/DL (ref 1.8–3.5)
GLUCOSE BLD-MCNC: 189 MG/DL (ref 74–124)
HCT VFR BLD AUTO: 25.5 % (ref 34–45)
HGB BLD-MCNC: 7.5 G/DL (ref 11.5–14.9)
IMM GRANULOCYTES # BLD: 0.05 10*3/MM3 (ref 0–0.03)
IMM GRANULOCYTES NFR BLD: 0.6 % (ref 0–0.5)
IRON 24H UR-MRATE: 64 MCG/DL (ref 37–145)
IRON SATN MFR SERPL: 26 % (ref 14–48)
LYMPHOCYTES # BLD AUTO: 1.43 10*3/MM3 (ref 1–3.5)
LYMPHOCYTES NFR BLD AUTO: 17.8 % (ref 20–49)
MCH RBC QN AUTO: 27 PG (ref 27–33)
MCHC RBC AUTO-ENTMCNC: 29.4 G/DL (ref 32–35)
MCV RBC AUTO: 91.7 FL (ref 83–97)
MONOCYTES # BLD AUTO: 0.75 10*3/MM3 (ref 0.25–0.8)
MONOCYTES NFR BLD AUTO: 9.3 % (ref 4–12)
NEUTROPHILS # BLD AUTO: 5.81 10*3/MM3 (ref 1.5–7)
NEUTROPHILS NFR BLD AUTO: 72.3 % (ref 39–75)
NRBC BLD MANUAL-RTO: 0 /100 WBC (ref 0–0)
PLATELET # BLD AUTO: 250 10*3/MM3 (ref 150–375)
PMV BLD AUTO: 9.6 FL (ref 8.9–12.1)
POTASSIUM BLD-SCNC: 4.3 MMOL/L (ref 3.5–4.7)
PROT SERPL-MCNC: 7 G/DL (ref 6.3–8)
RBC # BLD AUTO: 2.78 10*6/MM3 (ref 3.9–5)
RH BLD: POSITIVE
SODIUM BLD-SCNC: 142 MMOL/L (ref 134–145)
T&S EXPIRATION DATE: NORMAL
TIBC SERPL-MCNC: 246 MCG/DL (ref 249–505)
TRANSFERRIN SERPL-MCNC: 176 MG/DL (ref 200–360)
WBC NRBC COR # BLD: 8.04 10*3/MM3 (ref 4–10)

## 2018-10-19 PROCEDURE — 86900 BLOOD TYPING SEROLOGIC ABO: CPT

## 2018-10-19 PROCEDURE — 36415 COLL VENOUS BLD VENIPUNCTURE: CPT | Performed by: INTERNAL MEDICINE

## 2018-10-19 PROCEDURE — 80053 COMPREHEN METABOLIC PANEL: CPT | Performed by: INTERNAL MEDICINE

## 2018-10-19 PROCEDURE — 83540 ASSAY OF IRON: CPT | Performed by: INTERNAL MEDICINE

## 2018-10-19 PROCEDURE — 86850 RBC ANTIBODY SCREEN: CPT

## 2018-10-19 PROCEDURE — 96372 THER/PROPH/DIAG INJ SC/IM: CPT | Performed by: NURSE PRACTITIONER

## 2018-10-19 PROCEDURE — 82728 ASSAY OF FERRITIN: CPT | Performed by: INTERNAL MEDICINE

## 2018-10-19 PROCEDURE — 84466 ASSAY OF TRANSFERRIN: CPT | Performed by: INTERNAL MEDICINE

## 2018-10-19 PROCEDURE — 85025 COMPLETE CBC W/AUTO DIFF WBC: CPT | Performed by: INTERNAL MEDICINE

## 2018-10-19 PROCEDURE — 86920 COMPATIBILITY TEST SPIN: CPT

## 2018-10-19 PROCEDURE — 86901 BLOOD TYPING SEROLOGIC RH(D): CPT

## 2018-10-19 PROCEDURE — 63510000001 EPOETIN ALFA PER 1000 UNITS: Performed by: NURSE PRACTITIONER

## 2018-10-19 RX ORDER — DIPHENHYDRAMINE HCL 25 MG
25 CAPSULE ORAL ONCE
Status: CANCELLED | OUTPATIENT
Start: 2018-10-19 | End: 2018-10-19

## 2018-10-19 RX ORDER — SODIUM CHLORIDE 9 MG/ML
250 INJECTION, SOLUTION INTRAVENOUS AS NEEDED
Status: CANCELLED | OUTPATIENT
Start: 2018-10-19

## 2018-10-19 RX ORDER — ACETAMINOPHEN 325 MG/1
650 TABLET ORAL ONCE
Status: CANCELLED | OUTPATIENT
Start: 2018-10-19 | End: 2018-10-19

## 2018-10-19 RX ADMIN — ERYTHROPOIETIN 10000 UNITS: 10000 INJECTION, SOLUTION INTRAVENOUS; SUBCUTANEOUS at 12:34

## 2018-10-19 NOTE — PROGRESS NOTES
Hgb today is 7.5.  Patient denies any bleeding, blood in stools.  States that she is more tired.  Reviewed with Adrienne colunga NP and jay to order transfusion and increase procrit dose to 63140 units today.   Orders placed in epic for transfusion.  Message sent to scheduling desk for appt to transfuse [id number].  Patient sent to scheduling desk for appt and to lab for type and cross.

## 2018-10-20 ENCOUNTER — INFUSION (OUTPATIENT)
Dept: ONCOLOGY | Facility: HOSPITAL | Age: 83
End: 2018-10-20

## 2018-10-20 VITALS
RESPIRATION RATE: 18 BRPM | HEART RATE: 80 BPM | TEMPERATURE: 97.6 F | DIASTOLIC BLOOD PRESSURE: 58 MMHG | SYSTOLIC BLOOD PRESSURE: 134 MMHG | OXYGEN SATURATION: 99 %

## 2018-10-20 DIAGNOSIS — N18.30 ANEMIA OF CHRONIC RENAL FAILURE, STAGE 3 (MODERATE) (HCC): ICD-10-CM

## 2018-10-20 DIAGNOSIS — D63.1 ANEMIA OF CHRONIC RENAL FAILURE, STAGE 3 (MODERATE) (HCC): ICD-10-CM

## 2018-10-20 PROCEDURE — 36430 TRANSFUSION BLD/BLD COMPNT: CPT

## 2018-10-20 PROCEDURE — 63710000001 DIPHENHYDRAMINE PER 50 MG: Performed by: NURSE PRACTITIONER

## 2018-10-20 PROCEDURE — 86900 BLOOD TYPING SEROLOGIC ABO: CPT

## 2018-10-20 PROCEDURE — P9016 RBC LEUKOCYTES REDUCED: HCPCS

## 2018-10-20 RX ORDER — ACETAMINOPHEN 325 MG/1
650 TABLET ORAL ONCE
Status: COMPLETED | OUTPATIENT
Start: 2018-10-20 | End: 2018-10-20

## 2018-10-20 RX ORDER — SODIUM CHLORIDE 9 MG/ML
250 INJECTION, SOLUTION INTRAVENOUS AS NEEDED
Status: DISCONTINUED | OUTPATIENT
Start: 2018-10-20 | End: 2018-10-20 | Stop reason: HOSPADM

## 2018-10-20 RX ORDER — DIPHENHYDRAMINE HCL 25 MG
25 CAPSULE ORAL ONCE
Status: COMPLETED | OUTPATIENT
Start: 2018-10-20 | End: 2018-10-20

## 2018-10-20 RX ADMIN — ACETAMINOPHEN 650 MG: 325 TABLET, FILM COATED ORAL at 10:44

## 2018-10-20 RX ADMIN — DIPHENHYDRAMINE HYDROCHLORIDE 25 MG: 25 CAPSULE ORAL at 10:44

## 2018-10-21 LAB
ABO + RH BLD: NORMAL
ABO + RH BLD: NORMAL
BH BB BLOOD EXPIRATION DATE: NORMAL
BH BB BLOOD EXPIRATION DATE: NORMAL
BH BB BLOOD TYPE BARCODE: 5100
BH BB BLOOD TYPE BARCODE: 5100
BH BB DISPENSE STATUS: NORMAL
BH BB DISPENSE STATUS: NORMAL
BH BB PRODUCT CODE: NORMAL
BH BB PRODUCT CODE: NORMAL
BH BB UNIT NUMBER: NORMAL
BH BB UNIT NUMBER: NORMAL
CROSSMATCH INTERPRETATION: NORMAL
CROSSMATCH INTERPRETATION: NORMAL
UNIT  ABO: NORMAL
UNIT  ABO: NORMAL
UNIT  RH: NORMAL
UNIT  RH: NORMAL

## 2018-11-05 ENCOUNTER — HOSPITAL ENCOUNTER (OUTPATIENT)
Dept: BONE DENSITY | Facility: HOSPITAL | Age: 83
Discharge: HOME | End: 2018-11-05
Attending: INTERNAL MEDICINE | Admitting: INTERNAL MEDICINE

## 2018-11-05 DIAGNOSIS — D50.9 IRON DEFICIENCY ANEMIA, UNSPECIFIED IRON DEFICIENCY ANEMIA TYPE: ICD-10-CM

## 2018-11-05 DIAGNOSIS — D63.1 ANEMIA OF CHRONIC RENAL FAILURE, STAGE 3 (MODERATE) (HCC): ICD-10-CM

## 2018-11-05 DIAGNOSIS — Z17.0 MALIGNANT NEOPLASM OF UPPER-INNER QUADRANT OF RIGHT BREAST IN FEMALE, ESTROGEN RECEPTOR POSITIVE (HCC): ICD-10-CM

## 2018-11-05 DIAGNOSIS — C50.211 MALIGNANT NEOPLASM OF UPPER-INNER QUADRANT OF RIGHT BREAST IN FEMALE, ESTROGEN RECEPTOR POSITIVE (HCC): ICD-10-CM

## 2018-11-05 DIAGNOSIS — N18.30 ANEMIA OF CHRONIC RENAL FAILURE, STAGE 3 (MODERATE) (HCC): ICD-10-CM

## 2018-11-05 DIAGNOSIS — Z79.811 AROMATASE INHIBITOR USE: ICD-10-CM

## 2018-11-05 DIAGNOSIS — Z91.89 AT RISK FOR BONE DENSITY LOSS: ICD-10-CM

## 2018-11-05 PROCEDURE — 77080 DXA BONE DENSITY AXIAL: CPT

## 2018-11-12 ENCOUNTER — OFFICE VISIT (OUTPATIENT)
Dept: ONCOLOGY | Facility: CLINIC | Age: 83
End: 2018-11-12

## 2018-11-12 ENCOUNTER — LAB (OUTPATIENT)
Dept: LAB | Facility: HOSPITAL | Age: 83
End: 2018-11-12

## 2018-11-12 ENCOUNTER — APPOINTMENT (OUTPATIENT)
Dept: ONCOLOGY | Facility: HOSPITAL | Age: 83
End: 2018-11-12

## 2018-11-12 VITALS
DIASTOLIC BLOOD PRESSURE: 62 MMHG | HEIGHT: 64 IN | WEIGHT: 183 LBS | RESPIRATION RATE: 16 BRPM | SYSTOLIC BLOOD PRESSURE: 126 MMHG | OXYGEN SATURATION: 90 % | HEART RATE: 93 BPM | TEMPERATURE: 99.5 F | BODY MASS INDEX: 31.24 KG/M2

## 2018-11-12 DIAGNOSIS — C50.211 MALIGNANT NEOPLASM OF UPPER-INNER QUADRANT OF RIGHT BREAST IN FEMALE, ESTROGEN RECEPTOR POSITIVE (HCC): Primary | ICD-10-CM

## 2018-11-12 DIAGNOSIS — C50.211 MALIGNANT NEOPLASM OF UPPER-INNER QUADRANT OF RIGHT BREAST IN FEMALE, ESTROGEN RECEPTOR POSITIVE (HCC): ICD-10-CM

## 2018-11-12 DIAGNOSIS — Z79.811 AROMATASE INHIBITOR USE: ICD-10-CM

## 2018-11-12 DIAGNOSIS — D50.9 IRON DEFICIENCY ANEMIA, UNSPECIFIED IRON DEFICIENCY ANEMIA TYPE: ICD-10-CM

## 2018-11-12 DIAGNOSIS — Z17.0 MALIGNANT NEOPLASM OF UPPER-INNER QUADRANT OF RIGHT BREAST IN FEMALE, ESTROGEN RECEPTOR POSITIVE (HCC): Primary | ICD-10-CM

## 2018-11-12 DIAGNOSIS — Z17.0 MALIGNANT NEOPLASM OF UPPER-INNER QUADRANT OF RIGHT BREAST IN FEMALE, ESTROGEN RECEPTOR POSITIVE (HCC): ICD-10-CM

## 2018-11-12 DIAGNOSIS — Z91.89 AT RISK FOR BONE DENSITY LOSS: ICD-10-CM

## 2018-11-12 DIAGNOSIS — N18.30 ANEMIA OF CHRONIC RENAL FAILURE, STAGE 3 (MODERATE) (HCC): ICD-10-CM

## 2018-11-12 DIAGNOSIS — D63.1 ANEMIA OF CHRONIC RENAL FAILURE, STAGE 3 (MODERATE) (HCC): ICD-10-CM

## 2018-11-12 LAB
BASOPHILS # BLD AUTO: 0.02 10*3/MM3 (ref 0–0.1)
BASOPHILS NFR BLD AUTO: 0.2 % (ref 0–1.1)
DEPRECATED RDW RBC AUTO: 40.6 FL (ref 37–49)
EOSINOPHIL # BLD AUTO: 0 10*3/MM3 (ref 0–0.36)
EOSINOPHIL NFR BLD AUTO: 0 % (ref 1–5)
ERYTHROCYTE [DISTWIDTH] IN BLOOD BY AUTOMATED COUNT: 12.7 % (ref 11.7–14.5)
HCT VFR BLD AUTO: 32.7 % (ref 34–45)
HGB BLD-MCNC: 10.4 G/DL (ref 11.5–14.9)
IMM GRANULOCYTES # BLD: 0.05 10*3/MM3 (ref 0–0.03)
IMM GRANULOCYTES NFR BLD: 0.6 % (ref 0–0.5)
LYMPHOCYTES # BLD AUTO: 2.28 10*3/MM3 (ref 1–3.5)
LYMPHOCYTES NFR BLD AUTO: 25.7 % (ref 20–49)
MCH RBC QN AUTO: 28 PG (ref 27–33)
MCHC RBC AUTO-ENTMCNC: 31.8 G/DL (ref 32–35)
MCV RBC AUTO: 88.1 FL (ref 83–97)
MONOCYTES # BLD AUTO: 0.77 10*3/MM3 (ref 0.25–0.8)
MONOCYTES NFR BLD AUTO: 8.7 % (ref 4–12)
NEUTROPHILS # BLD AUTO: 5.76 10*3/MM3 (ref 1.5–7)
NEUTROPHILS NFR BLD AUTO: 64.8 % (ref 39–75)
NRBC BLD MANUAL-RTO: 0 /100 WBC (ref 0–0)
PLATELET # BLD AUTO: 228 10*3/MM3 (ref 150–375)
PMV BLD AUTO: 10.1 FL (ref 8.9–12.1)
RBC # BLD AUTO: 3.71 10*6/MM3 (ref 3.9–5)
WBC NRBC COR # BLD: 8.88 10*3/MM3 (ref 4–10)

## 2018-11-12 PROCEDURE — 85025 COMPLETE CBC W/AUTO DIFF WBC: CPT | Performed by: INTERNAL MEDICINE

## 2018-11-12 PROCEDURE — 99214 OFFICE O/P EST MOD 30 MIN: CPT | Performed by: INTERNAL MEDICINE

## 2018-11-12 PROCEDURE — 36416 COLLJ CAPILLARY BLOOD SPEC: CPT | Performed by: INTERNAL MEDICINE

## 2018-11-12 NOTE — PROGRESS NOTES
Subjective .     REASONS FOR FOLLOWUP:  Breast cancer, anemia    HISTORY OF PRESENT ILLNESS:  The patient is a 83 y.o. year old female  who is here for follow-up with the above-mentioned history.    Right shoulder pain cleared up spontaneously.  Currently denies pain.  No change in baseline shortness of breath.  Has oxygen at home.  Denies bleeding.  Denies chest pain.  Denies dizziness.  Denies nausea.    Taking Arimidex without problems.    Past Medical History:   Diagnosis Date   • Anemia     Iron deficiency anemia    • Anxiety    • Arthritis    • Breast cancer (CMS/HCC) 08/2014    right    • Breast cancer (CMS/HCC) 12/2006    Left   • Chronic kidney disease     Anemia of chronic renal insufficency, stage 3   • COPD (chronic obstructive pulmonary disease) (CMS/HCC)    • Depression    • Diabetes mellitus (CMS/HCC)    • Hyperlipidemia    • Hypertension    • YARY (obstructive sleep apnea)    • Poor circulation    • Stroke (CMS/HCC)     CVA in 1990.       HEMATOLOGIC/ONCOLOGIC HISTORY:  (History from previous dates can be found in the separate document.)    MEDICATIONS    Current Outpatient Medications:   •  amLODIPine (NORVASC) 10 MG tablet, Take 1 tablet by mouth daily., Disp: , Rfl:   •  anastrozole (ARIMIDEX) 1 MG tablet, TAKE 1 TABLET BY MOUTH ONCE DAILY, Disp: 90 tablet, Rfl: 1  •  aspirin 81 MG tablet, Take 81 mg by mouth., Disp: , Rfl:   •  busPIRone (BUSPAR) 15 MG tablet, Take 1 tablet by mouth every morning. And 2 tablets in the evening, Disp: , Rfl:   •  calcium carbonate-vitamin d (CALCIUM 600+D) 600-400 MG-UNIT per tablet, Take 1 tablet by mouth., Disp: , Rfl:   •  cyanocobalamin (VITAMIN B-12) 1000 MCG tablet, Take 100 mcg by mouth., Disp: , Rfl:   •  doxepin (SINEquan) 50 MG capsule, 3 tablets at bedtime, Disp: , Rfl: 0  •  epoetin joe (EPOGEN,PROCRIT) 18121 UNIT/ML injection, Epogen SOLN; Patient Sig: Epogen SOLN 5,000u if hgb is below 12   subq injectable; 0; 22-Jul-2015; Active, Disp: , Rfl:   •   FERROUS SULFATE PO, Take  by mouth., Disp: , Rfl:   •  furosemide (LASIX) 40 MG tablet, Take 1 tablet by mouth daily., Disp: , Rfl:   •  glucose blood test strip, TRUEtest Test In Vitro Strip; Patient Sig: TRUEtest Test In Vitro Strip ; 200; 0; 27-May-2014; Active, Disp: , Rfl:   •  HYDROcodone-acetaminophen (NORCO) 5-325 MG per tablet, Take 1 tablet by mouth every 6 (six) hours as needed. For pain, Disp: , Rfl:   •  insulin aspart (NovoLOG) 100 UNIT/ML injection, Inject 8 Units under the skin Medrol Dose Pack scheduling ONLY., Disp: , Rfl:   •  Insulin Glargine 100 UNIT/ML solution pen-injector, Inject 20 Units under the skin daily., Disp: , Rfl:   •  ipratropium-albuterol (DUONEB) 0.5-2.5 mg/mL nebulizer, Inhale 3 mL 3 (Three) Times a Day., Disp: , Rfl:   •  lansoprazole (PREVACID) 30 MG capsule, Take 1 capsule by mouth daily., Disp: , Rfl:   •  LANTUS 100 UNIT/ML injection, , Disp: , Rfl: 0  •  levETIRAcetam (KEPPRA) 500 MG tablet, Take 1 tablet by mouth 2 (two) times a day., Disp: , Rfl:   •  losartan (COZAAR) 100 MG tablet, Take 1 tablet by mouth daily., Disp: , Rfl:   •  metoclopramide (REGLAN) 10 MG tablet, Take 1 tablet by mouth daily. Before a meal, Disp: , Rfl:   •  Multiple Vitamins-Minerals (MULTIVITAL PO), Take 1 tablet by mouth daily., Disp: , Rfl:   •  pravastatin (PRAVACHOL) 40 MG tablet, Take 1 tablet by mouth nightly., Disp: , Rfl:   •  temazepam (RESTORIL) 30 MG capsule, Take 1 capsule by mouth nightly as needed. At bedtime, Disp: , Rfl:     ALLERGIES:     Allergies   Allergen Reactions   • Sulfa Antibiotics Other (See Comments)     GI upset   • Penicillins Itching and Rash       SOCIAL HISTORY:       Social History     Socioeconomic History   • Marital status:      Spouse name: Not on file   • Number of children: Not on file   • Years of education: High School   • Highest education level: Not on file   Social Needs   • Financial resource strain: Not on file   • Food insecurity - worry: Not  "on file   • Food insecurity - inability: Not on file   • Transportation needs - medical: Not on file   • Transportation needs - non-medical: Not on file   Occupational History   • Occupation: Nurse aid     Employer: RETIRED   Tobacco Use   • Smoking status: Never Smoker   • Smokeless tobacco: Never Used   Substance and Sexual Activity   • Alcohol use: No   • Drug use: No   • Sexual activity: Not on file   Other Topics Concern   • Not on file   Social History Narrative   • Not on file         FAMILY HISTORY:  Family History   Problem Relation Age of Onset   • Cancer Father    • Cancer Brother    • Diabetes Brother    • Heart disease Brother    • Cancer Other        REVIEW OF SYSTEMS:  Review of Systems   Constitutional: Negative for activity change.   HENT: Negative for nosebleeds and trouble swallowing.    Respiratory: Positive for shortness of breath. Negative for wheezing.    Cardiovascular: Negative for chest pain and palpitations.   Gastrointestinal: Negative for constipation, diarrhea and nausea.   Genitourinary: Negative for dysuria and hematuria.   Musculoskeletal: Negative for arthralgias and myalgias.   Skin: Negative for rash and wound.   Neurological: Negative for seizures and syncope.   Hematological: Negative for adenopathy. Does not bruise/bleed easily.   Psychiatric/Behavioral: Negative for confusion.             Objective    Vitals:    11/12/18 1519   BP: 126/62   Pulse: 93   Resp: 16   Temp: 99.5 °F (37.5 °C)   TempSrc: Oral   SpO2: 90%   Weight: 83 kg (183 lb)   Height: 162.6 cm (64.02\")   PainSc: 0-No pain     Current Status 11/12/2018   ECOG score 1      PHYSICAL EXAM:    CONSTITUTIONAL:  Vital signs reviewed.  No distress, looks comfortable.  EYES:  Conjunctiva and lids unremarkable.  PERRLA  EARS,NOSE,MOUTH,THROAT:  Ears and nose appear unremarkable.  Lips, teeth, gums appear unremarkable.  RESPIRATORY:  Normal respiratory effort.  Lungs clear to auscultation bilaterally.  CARDIOVASCULAR:  " Normal S1, S2.  No murmurs rubs or gallops.  No significant lower extremity edema.  GASTROINTESTINAL: Abdomen appears unremarkable.  Nontender.  No hepatomegaly.  No splenomegaly.  LYMPHATIC:  No cervical, supraclavicular, axillary lymphadenopathy.  SKIN:  Warm.  No rashes.  PSYCHIATRIC:  Normal judgment and insight.  Normal mood and affect.    RECENT LABS:        WBC   Date/Time Value Ref Range Status   11/12/2018 02:58 PM 8.88 4.00 - 10.00 10*3/mm3 Final     Hemoglobin   Date/Time Value Ref Range Status   11/12/2018 02:58 PM 10.4 (L) 11.5 - 14.9 g/dL Final     Platelets   Date/Time Value Ref Range Status   11/12/2018 02:58  150 - 375 10*3/mm3 Final       Assessment/Plan   ASSESSMENT:  Malignant neoplasm of upper-inner quadrant of right breast in female, estrogen receptor positive (CMS/HCC)  - CBC & Differential  - CBC & Differential  - CBC & Differential  - Ferritin  - Iron Profile  - Retic With IRF & RET-He    Iron deficiency anemia, unspecified iron deficiency anemia type  - CBC & Differential  - CBC & Differential  - CBC & Differential  - Ferritin  - Iron Profile  - Retic With IRF & RET-He      *Breast cancer.  Right-sided invasive ductal carcinoma, papillary type, removed with lumpectomy on 08/26/2014 by Dr. Sung Griggs. Only 1.7 mm of remaining invasive carcinoma. Node negative. Completed radiation.   Arimidex 10/17/2014 until planned 10/17/2019. Tolerating Arimidex well.   Remission continues.    *Bone health. Normal bone density on DEXA scan 11/5/18. Calcium carbonate 600 mg with vitamin D once daily, changed to calcium citrate 1000 mg with vitamin D when Arimidex was started (she is also on lansoprazole).     *Anemia of chronic renal insufficiency, stage 3.   Procrit p.r.n. for hemoglobin less than 10.   On the late March 2016 visit, changed from every 5 week CBC to every 2 week CBC due to a fall in hemoglobin.  She has not needed much Procrit on the every 3 week interval she is currently on.     Required a transfusion last month and Hb dropped to 7.5.  However, since Hb is >10 today, no Procrit today and I think it is reasonable to maintain the monthly CBC check.  Continue monthly CBC.    *Iron deficiency anemia. History of Venofer and Feraheme use. Does not respond well to p.o. iron but is taking ferrous sulfate 1 tablet 3 times per day without any problems (she will continue this).   In May 2015, she received one dose of Feraheme due to an iron percent saturation of around 10% and MCV of 82.6 despite an elevated ferritin. I think her ferritin is chronically high because of inflammation).     *Elevated transaminases 1/4/18.  This prompted CT abdomen 1/11/18 (without contrast due to chronic renal insufficiency):  No evidence of malignancy.  Transaminases are much better today, but still significantly elevated.  She was advised to follow up with her PCP.    PLAN:  · CBC every month  · Procrit if hemoglobin less than 10.    · Iron labs and CMP 2 months  · M.D. 3 months.   · She states Dr. Plascencia does her breast exams and orders her mammograms and she is up-to-date.  ·   last mammogram BI-RADS 2 (3/5/18, [facility])  · Continue Arimidex calcium and vitamin D.      Daughter assisted with history.

## 2018-12-19 ENCOUNTER — INFUSION (OUTPATIENT)
Dept: ONCOLOGY | Facility: HOSPITAL | Age: 83
End: 2018-12-19

## 2018-12-19 ENCOUNTER — LAB (OUTPATIENT)
Dept: LAB | Facility: HOSPITAL | Age: 83
End: 2018-12-19

## 2018-12-19 ENCOUNTER — APPOINTMENT (OUTPATIENT)
Dept: ONCOLOGY | Facility: HOSPITAL | Age: 83
End: 2018-12-19

## 2018-12-19 DIAGNOSIS — N18.30 ANEMIA OF CHRONIC RENAL FAILURE, STAGE 3 (MODERATE) (HCC): Primary | ICD-10-CM

## 2018-12-19 DIAGNOSIS — C50.211 MALIGNANT NEOPLASM OF UPPER-INNER QUADRANT OF RIGHT BREAST IN FEMALE, ESTROGEN RECEPTOR POSITIVE (HCC): ICD-10-CM

## 2018-12-19 DIAGNOSIS — Z17.0 MALIGNANT NEOPLASM OF UPPER-INNER QUADRANT OF RIGHT BREAST IN FEMALE, ESTROGEN RECEPTOR POSITIVE (HCC): ICD-10-CM

## 2018-12-19 DIAGNOSIS — D63.1 ANEMIA OF CHRONIC RENAL FAILURE, STAGE 3 (MODERATE) (HCC): Primary | ICD-10-CM

## 2018-12-19 DIAGNOSIS — D50.9 IRON DEFICIENCY ANEMIA, UNSPECIFIED IRON DEFICIENCY ANEMIA TYPE: ICD-10-CM

## 2018-12-19 LAB
BASOPHILS # BLD AUTO: 0.01 10*3/MM3 (ref 0–0.1)
BASOPHILS NFR BLD AUTO: 0.2 % (ref 0–1.1)
DEPRECATED RDW RBC AUTO: 39.1 FL (ref 37–49)
EOSINOPHIL # BLD AUTO: 0 10*3/MM3 (ref 0–0.36)
EOSINOPHIL NFR BLD AUTO: 0 % (ref 1–5)
ERYTHROCYTE [DISTWIDTH] IN BLOOD BY AUTOMATED COUNT: 12.4 % (ref 11.7–14.5)
HCT VFR BLD AUTO: 30 % (ref 34–45)
HGB BLD-MCNC: 9.5 G/DL (ref 11.5–14.9)
IMM GRANULOCYTES # BLD: 0.04 10*3/MM3 (ref 0–0.03)
IMM GRANULOCYTES NFR BLD: 0.7 % (ref 0–0.5)
LYMPHOCYTES # BLD AUTO: 1.13 10*3/MM3 (ref 1–3.5)
LYMPHOCYTES NFR BLD AUTO: 18.6 % (ref 20–49)
MCH RBC QN AUTO: 27.4 PG (ref 27–33)
MCHC RBC AUTO-ENTMCNC: 31.7 G/DL (ref 32–35)
MCV RBC AUTO: 86.5 FL (ref 83–97)
MONOCYTES # BLD AUTO: 0.44 10*3/MM3 (ref 0.25–0.8)
MONOCYTES NFR BLD AUTO: 7.2 % (ref 4–12)
NEUTROPHILS # BLD AUTO: 4.46 10*3/MM3 (ref 1.5–7)
NEUTROPHILS NFR BLD AUTO: 73.3 % (ref 39–75)
NRBC BLD MANUAL-RTO: 0 /100 WBC (ref 0–0)
PLATELET # BLD AUTO: 199 10*3/MM3 (ref 150–375)
PMV BLD AUTO: 9.9 FL (ref 8.9–12.1)
RBC # BLD AUTO: 3.47 10*6/MM3 (ref 3.9–5)
WBC NRBC COR # BLD: 6.08 10*3/MM3 (ref 4–10)

## 2018-12-19 PROCEDURE — 85025 COMPLETE CBC W/AUTO DIFF WBC: CPT | Performed by: INTERNAL MEDICINE

## 2018-12-19 PROCEDURE — 36415 COLL VENOUS BLD VENIPUNCTURE: CPT | Performed by: INTERNAL MEDICINE

## 2018-12-19 PROCEDURE — 96372 THER/PROPH/DIAG INJ SC/IM: CPT | Performed by: INTERNAL MEDICINE

## 2018-12-19 PROCEDURE — 63510000001 EPOETIN ALFA PER 1000 UNITS: Performed by: INTERNAL MEDICINE

## 2018-12-19 RX ADMIN — ERYTHROPOIETIN 7000 UNITS: 20000 INJECTION, SOLUTION INTRAVENOUS; SUBCUTANEOUS at 15:01

## 2019-01-07 ENCOUNTER — APPOINTMENT (OUTPATIENT)
Dept: ONCOLOGY | Facility: HOSPITAL | Age: 84
End: 2019-01-07

## 2019-01-07 ENCOUNTER — APPOINTMENT (OUTPATIENT)
Dept: LAB | Facility: HOSPITAL | Age: 84
End: 2019-01-07

## 2019-01-09 ENCOUNTER — APPOINTMENT (OUTPATIENT)
Dept: ONCOLOGY | Facility: HOSPITAL | Age: 84
End: 2019-01-09

## 2019-01-09 ENCOUNTER — APPOINTMENT (OUTPATIENT)
Dept: LAB | Facility: HOSPITAL | Age: 84
End: 2019-01-09

## 2019-01-16 ENCOUNTER — LAB (OUTPATIENT)
Dept: LAB | Facility: HOSPITAL | Age: 84
End: 2019-01-16

## 2019-01-16 ENCOUNTER — INFUSION (OUTPATIENT)
Dept: ONCOLOGY | Facility: HOSPITAL | Age: 84
End: 2019-01-16

## 2019-01-16 DIAGNOSIS — D50.9 IRON DEFICIENCY ANEMIA, UNSPECIFIED IRON DEFICIENCY ANEMIA TYPE: ICD-10-CM

## 2019-01-16 DIAGNOSIS — C50.211 MALIGNANT NEOPLASM OF UPPER-INNER QUADRANT OF RIGHT BREAST IN FEMALE, ESTROGEN RECEPTOR POSITIVE (HCC): ICD-10-CM

## 2019-01-16 DIAGNOSIS — Z17.0 MALIGNANT NEOPLASM OF UPPER-INNER QUADRANT OF RIGHT BREAST IN FEMALE, ESTROGEN RECEPTOR POSITIVE (HCC): ICD-10-CM

## 2019-01-16 DIAGNOSIS — N18.30 ANEMIA OF CHRONIC RENAL FAILURE, STAGE 3 (MODERATE) (HCC): Primary | ICD-10-CM

## 2019-01-16 DIAGNOSIS — D63.1 ANEMIA OF CHRONIC RENAL FAILURE, STAGE 3 (MODERATE) (HCC): Primary | ICD-10-CM

## 2019-01-16 LAB
BASOPHILS # BLD AUTO: 0.01 10*3/MM3 (ref 0–0.1)
BASOPHILS NFR BLD AUTO: 0.2 % (ref 0–1.1)
DEPRECATED RDW RBC AUTO: 39.9 FL (ref 37–49)
EOSINOPHIL # BLD AUTO: 0 10*3/MM3 (ref 0–0.36)
EOSINOPHIL NFR BLD AUTO: 0 % (ref 1–5)
ERYTHROCYTE [DISTWIDTH] IN BLOOD BY AUTOMATED COUNT: 12.2 % (ref 11.7–14.5)
FERRITIN SERPL-MCNC: 633.7 NG/ML
HCT VFR BLD AUTO: 29.5 % (ref 34–45)
HGB BLD-MCNC: 9 G/DL (ref 11.5–14.9)
HGB RETIC QN AUTO: 31 PG (ref 29.8–36.1)
IMM GRANULOCYTES # BLD AUTO: 0.04 10*3/MM3 (ref 0–0.03)
IMM GRANULOCYTES NFR BLD AUTO: 0.7 % (ref 0–0.5)
IMM RETICS NFR: 15 % (ref 3–15.8)
IRON 24H UR-MRATE: 67 MCG/DL (ref 37–145)
IRON SATN MFR SERPL: 29 % (ref 14–48)
LYMPHOCYTES # BLD AUTO: 1.29 10*3/MM3 (ref 1–3.5)
LYMPHOCYTES NFR BLD AUTO: 22.6 % (ref 20–49)
MCH RBC QN AUTO: 27.4 PG (ref 27–33)
MCHC RBC AUTO-ENTMCNC: 30.5 G/DL (ref 32–35)
MCV RBC AUTO: 89.9 FL (ref 83–97)
MONOCYTES # BLD AUTO: 0.54 10*3/MM3 (ref 0.25–0.8)
MONOCYTES NFR BLD AUTO: 9.5 % (ref 4–12)
NEUTROPHILS # BLD AUTO: 3.83 10*3/MM3 (ref 1.5–7)
NEUTROPHILS NFR BLD AUTO: 67 % (ref 39–75)
NRBC BLD AUTO-RTO: 0 /100 WBC (ref 0–0)
PLATELET # BLD AUTO: 205 10*3/MM3 (ref 150–375)
PMV BLD AUTO: 9.6 FL (ref 8.9–12.1)
RBC # BLD AUTO: 3.28 10*6/MM3 (ref 3.9–5)
RETICS/RBC NFR AUTO: 1.88 % (ref 0.6–2)
TIBC SERPL-MCNC: 232 MCG/DL (ref 249–505)
TRANSFERRIN SERPL-MCNC: 166 MG/DL (ref 200–360)
WBC NRBC COR # BLD: 5.71 10*3/MM3 (ref 4–10)

## 2019-01-16 PROCEDURE — 83540 ASSAY OF IRON: CPT | Performed by: INTERNAL MEDICINE

## 2019-01-16 PROCEDURE — 96372 THER/PROPH/DIAG INJ SC/IM: CPT | Performed by: INTERNAL MEDICINE

## 2019-01-16 PROCEDURE — 63510000001 EPOETIN ALFA PER 1000 UNITS: Performed by: INTERNAL MEDICINE

## 2019-01-16 PROCEDURE — 84466 ASSAY OF TRANSFERRIN: CPT | Performed by: INTERNAL MEDICINE

## 2019-01-16 PROCEDURE — 36415 COLL VENOUS BLD VENIPUNCTURE: CPT | Performed by: INTERNAL MEDICINE

## 2019-01-16 PROCEDURE — 82728 ASSAY OF FERRITIN: CPT | Performed by: INTERNAL MEDICINE

## 2019-01-16 PROCEDURE — 85046 RETICYTE/HGB CONCENTRATE: CPT | Performed by: INTERNAL MEDICINE

## 2019-01-16 PROCEDURE — 85025 COMPLETE CBC W/AUTO DIFF WBC: CPT | Performed by: INTERNAL MEDICINE

## 2019-01-16 RX ADMIN — ERYTHROPOIETIN 7000 UNITS: 20000 INJECTION, SOLUTION INTRAVENOUS; SUBCUTANEOUS at 15:11

## 2019-02-04 ENCOUNTER — INFUSION (OUTPATIENT)
Dept: ONCOLOGY | Facility: HOSPITAL | Age: 84
End: 2019-02-04

## 2019-02-04 ENCOUNTER — OFFICE VISIT (OUTPATIENT)
Dept: ONCOLOGY | Facility: CLINIC | Age: 84
End: 2019-02-04

## 2019-02-04 ENCOUNTER — LAB (OUTPATIENT)
Dept: LAB | Facility: HOSPITAL | Age: 84
End: 2019-02-04

## 2019-02-04 VITALS
DIASTOLIC BLOOD PRESSURE: 62 MMHG | HEIGHT: 64 IN | WEIGHT: 183 LBS | RESPIRATION RATE: 20 BRPM | SYSTOLIC BLOOD PRESSURE: 136 MMHG | BODY MASS INDEX: 31.24 KG/M2 | HEART RATE: 105 BPM | OXYGEN SATURATION: 93 % | TEMPERATURE: 98.8 F

## 2019-02-04 DIAGNOSIS — Z17.0 MALIGNANT NEOPLASM OF UPPER-INNER QUADRANT OF RIGHT BREAST IN FEMALE, ESTROGEN RECEPTOR POSITIVE (HCC): Primary | ICD-10-CM

## 2019-02-04 DIAGNOSIS — C50.211 MALIGNANT NEOPLASM OF UPPER-INNER QUADRANT OF RIGHT BREAST IN FEMALE, ESTROGEN RECEPTOR POSITIVE (HCC): Primary | ICD-10-CM

## 2019-02-04 DIAGNOSIS — D50.9 IRON DEFICIENCY ANEMIA, UNSPECIFIED IRON DEFICIENCY ANEMIA TYPE: ICD-10-CM

## 2019-02-04 DIAGNOSIS — D63.1 ANEMIA OF CHRONIC RENAL FAILURE, STAGE 3 (MODERATE) (HCC): Primary | ICD-10-CM

## 2019-02-04 DIAGNOSIS — Z17.0 MALIGNANT NEOPLASM OF UPPER-INNER QUADRANT OF RIGHT BREAST IN FEMALE, ESTROGEN RECEPTOR POSITIVE (HCC): ICD-10-CM

## 2019-02-04 DIAGNOSIS — N18.30 ANEMIA OF CHRONIC RENAL FAILURE, STAGE 3 (MODERATE) (HCC): ICD-10-CM

## 2019-02-04 DIAGNOSIS — C50.211 MALIGNANT NEOPLASM OF UPPER-INNER QUADRANT OF RIGHT BREAST IN FEMALE, ESTROGEN RECEPTOR POSITIVE (HCC): ICD-10-CM

## 2019-02-04 DIAGNOSIS — D63.1 ANEMIA OF CHRONIC RENAL FAILURE, STAGE 3 (MODERATE) (HCC): ICD-10-CM

## 2019-02-04 DIAGNOSIS — N18.30 ANEMIA OF CHRONIC RENAL FAILURE, STAGE 3 (MODERATE) (HCC): Primary | ICD-10-CM

## 2019-02-04 LAB
BASOPHILS # BLD AUTO: 0.01 10*3/MM3 (ref 0–0.1)
BASOPHILS NFR BLD AUTO: 0.2 % (ref 0–1.1)
DEPRECATED RDW RBC AUTO: 39.9 FL (ref 37–49)
EOSINOPHIL # BLD AUTO: 0 10*3/MM3 (ref 0–0.36)
EOSINOPHIL NFR BLD AUTO: 0 % (ref 1–5)
ERYTHROCYTE [DISTWIDTH] IN BLOOD BY AUTOMATED COUNT: 12.5 % (ref 11.7–14.5)
HCT VFR BLD AUTO: 30.4 % (ref 34–45)
HGB BLD-MCNC: 9.6 G/DL (ref 11.5–14.9)
IMM GRANULOCYTES # BLD AUTO: 0.06 10*3/MM3 (ref 0–0.03)
IMM GRANULOCYTES NFR BLD AUTO: 0.9 % (ref 0–0.5)
LYMPHOCYTES # BLD AUTO: 1.22 10*3/MM3 (ref 1–3.5)
LYMPHOCYTES NFR BLD AUTO: 18.9 % (ref 20–49)
MCH RBC QN AUTO: 27.8 PG (ref 27–33)
MCHC RBC AUTO-ENTMCNC: 31.6 G/DL (ref 32–35)
MCV RBC AUTO: 88.1 FL (ref 83–97)
MONOCYTES # BLD AUTO: 0.58 10*3/MM3 (ref 0.25–0.8)
MONOCYTES NFR BLD AUTO: 9 % (ref 4–12)
NEUTROPHILS # BLD AUTO: 4.58 10*3/MM3 (ref 1.5–7)
NEUTROPHILS NFR BLD AUTO: 71 % (ref 39–75)
NRBC BLD AUTO-RTO: 0 /100 WBC (ref 0–0)
PLATELET # BLD AUTO: 227 10*3/MM3 (ref 150–375)
PMV BLD AUTO: 10.2 FL (ref 8.9–12.1)
RBC # BLD AUTO: 3.45 10*6/MM3 (ref 3.9–5)
WBC NRBC COR # BLD: 6.45 10*3/MM3 (ref 4–10)

## 2019-02-04 PROCEDURE — 85025 COMPLETE CBC W/AUTO DIFF WBC: CPT | Performed by: INTERNAL MEDICINE

## 2019-02-04 PROCEDURE — 99214 OFFICE O/P EST MOD 30 MIN: CPT | Performed by: INTERNAL MEDICINE

## 2019-02-04 PROCEDURE — 36415 COLL VENOUS BLD VENIPUNCTURE: CPT | Performed by: INTERNAL MEDICINE

## 2019-02-04 PROCEDURE — 63510000001 EPOETIN ALFA PER 1000 UNITS: Performed by: INTERNAL MEDICINE

## 2019-02-04 PROCEDURE — 96372 THER/PROPH/DIAG INJ SC/IM: CPT

## 2019-02-04 RX ADMIN — ERYTHROPOIETIN 7000 UNITS: 20000 INJECTION, SOLUTION INTRAVENOUS; SUBCUTANEOUS at 16:11

## 2019-02-04 NOTE — PROGRESS NOTES
Subjective .     REASONS FOR FOLLOWUP:  Breast cancer, anemia    HISTORY OF PRESENT ILLNESS:  The patient is a 83 y.o. year old female  who is here for follow-up with the above-mentioned history.    Has some abdominal discomfort when she is constipated.  Having some constipation today with abdominal discomfort today.  This clears when she has a bowel movement.  Denies nausea.  Denies pain otherwise.  Denies shortness of breath or bleeding.    Past Medical History:   Diagnosis Date   • Anemia     Iron deficiency anemia    • Anxiety    • Arthritis    • Breast cancer (CMS/HCC) 08/2014    right    • Breast cancer (CMS/HCC) 12/2006    Left   • Chronic kidney disease     Anemia of chronic renal insufficency, stage 3   • COPD (chronic obstructive pulmonary disease) (CMS/HCC)    • Depression    • Diabetes mellitus (CMS/HCC)    • Hyperlipidemia    • Hypertension    • YARY (obstructive sleep apnea)    • Poor circulation    • Stroke (CMS/HCC)     CVA in 1990.       HEMATOLOGIC/ONCOLOGIC HISTORY:  (History from previous dates can be found in the separate document.)    MEDICATIONS    Current Outpatient Medications:   •  O2 (OXYGEN), Inhale 2 L/min Daily., Disp: , Rfl:   •  amLODIPine (NORVASC) 10 MG tablet, Take 1 tablet by mouth daily., Disp: , Rfl:   •  anastrozole (ARIMIDEX) 1 MG tablet, TAKE 1 TABLET BY MOUTH ONCE DAILY, Disp: 90 tablet, Rfl: 1  •  aspirin 81 MG tablet, Take 81 mg by mouth., Disp: , Rfl:   •  busPIRone (BUSPAR) 15 MG tablet, Take 1 tablet by mouth every morning. And 2 tablets in the evening, Disp: , Rfl:   •  calcium carbonate-vitamin d (CALCIUM 600+D) 600-400 MG-UNIT per tablet, Take 1 tablet by mouth., Disp: , Rfl:   •  cyanocobalamin (VITAMIN B-12) 1000 MCG tablet, Take 100 mcg by mouth., Disp: , Rfl:   •  doxepin (SINEquan) 50 MG capsule, 3 tablets at bedtime, Disp: , Rfl: 0  •  epoetin joe (EPOGEN,PROCRIT) 42205 UNIT/ML injection, Epogen SOLN; Patient Sig: Epogen SOLN 5,000u if hgb is below 12   subq  injectable; 0; 22-Jul-2015; Active, Disp: , Rfl:   •  FERROUS SULFATE PO, Take  by mouth., Disp: , Rfl:   •  furosemide (LASIX) 40 MG tablet, Take 1 tablet by mouth daily., Disp: , Rfl:   •  glucose blood test strip, TRUEtest Test In Vitro Strip; Patient Sig: TRUEtest Test In Vitro Strip ; 200; 0; 27-May-2014; Active, Disp: , Rfl:   •  HYDROcodone-acetaminophen (NORCO) 5-325 MG per tablet, Take 1 tablet by mouth every 6 (six) hours as needed. For pain, Disp: , Rfl:   •  insulin aspart (NovoLOG) 100 UNIT/ML injection, Inject 8 Units under the skin Medrol Dose Pack scheduling ONLY., Disp: , Rfl:   •  Insulin Glargine 100 UNIT/ML solution pen-injector, Inject 20 Units under the skin daily., Disp: , Rfl:   •  ipratropium-albuterol (DUONEB) 0.5-2.5 mg/mL nebulizer, Inhale 3 mL 3 (Three) Times a Day., Disp: , Rfl:   •  lansoprazole (PREVACID) 30 MG capsule, Take 1 capsule by mouth daily., Disp: , Rfl:   •  LANTUS 100 UNIT/ML injection, , Disp: , Rfl: 0  •  levETIRAcetam (KEPPRA) 500 MG tablet, Take 1 tablet by mouth 2 (two) times a day., Disp: , Rfl:   •  losartan (COZAAR) 100 MG tablet, Take 1 tablet by mouth daily., Disp: , Rfl:   •  metoclopramide (REGLAN) 10 MG tablet, Take 1 tablet by mouth daily. Before a meal, Disp: , Rfl:   •  Multiple Vitamins-Minerals (MULTIVITAL PO), Take 1 tablet by mouth daily., Disp: , Rfl:   •  pravastatin (PRAVACHOL) 40 MG tablet, Take 1 tablet by mouth nightly., Disp: , Rfl:   •  temazepam (RESTORIL) 30 MG capsule, Take 1 capsule by mouth nightly as needed. At bedtime, Disp: , Rfl:   No current facility-administered medications for this visit.     ALLERGIES:     Allergies   Allergen Reactions   • Sulfa Antibiotics Other (See Comments)     GI upset   • Penicillins Itching and Rash       SOCIAL HISTORY:       Social History     Socioeconomic History   • Marital status:      Spouse name: Not on file   • Number of children: Not on file   • Years of education: High School   • Highest  "education level: Not on file   Social Needs   • Financial resource strain: Not on file   • Food insecurity - worry: Not on file   • Food insecurity - inability: Not on file   • Transportation needs - medical: Not on file   • Transportation needs - non-medical: Not on file   Occupational History   • Occupation: Nurse aid     Employer: RETIRED   Tobacco Use   • Smoking status: Never Smoker   • Smokeless tobacco: Never Used   Substance and Sexual Activity   • Alcohol use: No   • Drug use: No   • Sexual activity: Not on file   Other Topics Concern   • Not on file   Social History Narrative   • Not on file         FAMILY HISTORY:  Family History   Problem Relation Age of Onset   • Cancer Father    • Cancer Brother    • Diabetes Brother    • Heart disease Brother    • Cancer Other        REVIEW OF SYSTEMS:  Review of Systems   Constitutional: Negative for activity change.   HENT: Negative for nosebleeds and trouble swallowing.    Respiratory: Negative for shortness of breath and wheezing.    Cardiovascular: Negative for chest pain and palpitations.   Gastrointestinal: Positive for abdominal pain. Negative for constipation, diarrhea and nausea.   Genitourinary: Negative for dysuria and hematuria.   Musculoskeletal: Negative for arthralgias and myalgias.   Skin: Negative for rash and wound.   Neurological: Negative for seizures and syncope.   Hematological: Negative for adenopathy. Does not bruise/bleed easily.   Psychiatric/Behavioral: Negative for confusion.             Objective    Vitals:    02/04/19 1611   BP: 136/62   Pulse: 105   Resp: 20   Temp: 98.8 °F (37.1 °C)   TempSrc: Oral   SpO2: 93%   Weight: 83 kg (183 lb)   Height: 162.6 cm (64.02\")   PainSc:   6   PainLoc: Abdomen  Comment: PAIN IN STOMACH FROM BREAKFAST     Current Status 2/4/2019   ECOG score 1      PHYSICAL EXAM:    CONSTITUTIONAL:  Vital signs reviewed.  No distress, looks comfortable.  EYES:  Conjunctiva and lids unremarkable.  " PERRLA  EARS,NOSE,MOUTH,THROAT:  Ears and nose appear unremarkable.  Lips, teeth, gums appear unremarkable.  RESPIRATORY:  Normal respiratory effort.  Lungs clear to auscultation bilaterally.  CARDIOVASCULAR:  Normal S1, S2.  No murmurs rubs or gallops.  No significant lower extremity edema.  GASTROINTESTINAL: Abdomen appears unremarkable.  Nontender.  No hepatomegaly.  No splenomegaly.  LYMPHATIC:  No cervical, supraclavicular, axillary lymphadenopathy.  SKIN:  Warm.  No rashes.  PSYCHIATRIC:  Normal judgment and insight.  Normal mood and affect.      RECENT LABS:        WBC   Date/Time Value Ref Range Status   02/04/2019 03:46 PM 6.45 4.00 - 10.00 10*3/mm3 Final     Hemoglobin   Date/Time Value Ref Range Status   02/04/2019 03:46 PM 9.6 (L) 11.5 - 14.9 g/dL Final     Platelets   Date/Time Value Ref Range Status   02/04/2019 03:46  150 - 375 10*3/mm3 Final       Assessment/Plan   ASSESSMENT:  There are no diagnoses linked to this encounter.    *Breast cancer.  Right-sided invasive ductal carcinoma, papillary type, removed with lumpectomy on 08/26/2014 by Dr. Sung Griggs. Only 1.7 mm of remaining invasive carcinoma. Node negative. Completed radiation.   Arimidex 10/17/2014 until planned 10/17/2019. Tolerating Arimidex well.   Remains in remission.    *Bone health. Normal bone density on DEXA scan 11/5/18. Calcium carbonate 600 mg with vitamin D once daily, changed to calcium citrate 1000 mg with vitamin D when Arimidex was started (she is also on lansoprazole).     *Anemia of chronic renal insufficiency, stage 3.   Procrit p.r.n. for hemoglobin less than 10.   On the late March 2016 visit, changed from every 5 week CBC to every 2 week CBC due to a fall in hemoglobin.  She has not needed much Procrit on the every 3 week interval she is currently on.    Hb 9.6 today.  Procrit today.    *Iron deficiency anemia. History of Venofer and Feraheme use. Does not respond well to p.o. iron but is taking ferrous sulfate  1 tablet 3 times per day without any problems (she will continue this).   In May 2015, she received one dose of Feraheme due to an iron percent saturation of around 10% and MCV of 82.6 despite an elevated ferritin. I think her ferritin is chronically high because of inflammation).   Recent iron labs normal.    *Elevated transaminases 1/4/18.  This prompted CT abdomen 1/11/18 (without contrast due to chronic renal insufficiency):  No evidence of malignancy.  Transaminases are much better today, but still significantly elevated.  She was advised to follow up with her PCP.    PLAN:  · CBC every month  · Procrit if hemoglobin less than 10.    · Iron labs and CMP 2 months  · M.D. 3 months.   · She states Dr. Plascencia does her breast exams and orders her mammograms and she is up-to-date.  ·   last mammogram BI-RADS 2 (3/5/18, [facility])  · Continue Arimidex calcium and vitamin D.      Daughter assisted with history.

## 2019-02-14 RX ORDER — ANASTROZOLE 1 MG/1
TABLET ORAL
Qty: 90 TABLET | Refills: 0 | Status: SHIPPED | OUTPATIENT
Start: 2019-02-14 | End: 2019-05-03 | Stop reason: SDUPTHER

## 2019-03-04 ENCOUNTER — APPOINTMENT (OUTPATIENT)
Dept: ONCOLOGY | Facility: HOSPITAL | Age: 84
End: 2019-03-04

## 2019-03-12 ENCOUNTER — TELEPHONE (OUTPATIENT)
Dept: ONCOLOGY | Facility: HOSPITAL | Age: 84
End: 2019-03-12

## 2019-03-12 ENCOUNTER — INFUSION (OUTPATIENT)
Dept: ONCOLOGY | Facility: HOSPITAL | Age: 84
End: 2019-03-12

## 2019-03-12 ENCOUNTER — LAB (OUTPATIENT)
Dept: LAB | Facility: HOSPITAL | Age: 84
End: 2019-03-12

## 2019-03-12 ENCOUNTER — DOCUMENTATION (OUTPATIENT)
Dept: ONCOLOGY | Facility: CLINIC | Age: 84
End: 2019-03-12

## 2019-03-12 DIAGNOSIS — D63.1 ANEMIA OF CHRONIC RENAL FAILURE, STAGE 3 (MODERATE) (HCC): Primary | ICD-10-CM

## 2019-03-12 DIAGNOSIS — N18.30 ANEMIA OF CHRONIC RENAL FAILURE, STAGE 3 (MODERATE) (HCC): Primary | ICD-10-CM

## 2019-03-12 DIAGNOSIS — D63.1 ANEMIA OF CHRONIC RENAL FAILURE, STAGE 3 (MODERATE) (HCC): ICD-10-CM

## 2019-03-12 DIAGNOSIS — N18.30 ANEMIA OF CHRONIC RENAL FAILURE, STAGE 3 (MODERATE) (HCC): ICD-10-CM

## 2019-03-12 DIAGNOSIS — Z17.0 MALIGNANT NEOPLASM OF UPPER-INNER QUADRANT OF RIGHT BREAST IN FEMALE, ESTROGEN RECEPTOR POSITIVE (HCC): ICD-10-CM

## 2019-03-12 DIAGNOSIS — C50.211 MALIGNANT NEOPLASM OF UPPER-INNER QUADRANT OF RIGHT BREAST IN FEMALE, ESTROGEN RECEPTOR POSITIVE (HCC): ICD-10-CM

## 2019-03-12 LAB
ALBUMIN SERPL-MCNC: 4.1 G/DL (ref 3.5–5.2)
ALBUMIN/GLOB SERPL: 1 G/DL (ref 1.1–2.4)
ALP SERPL-CCNC: 206 U/L (ref 38–116)
ALT SERPL W P-5'-P-CCNC: 304 U/L (ref 0–33)
ANION GAP SERPL CALCULATED.3IONS-SCNC: 10.5 MMOL/L
AST SERPL-CCNC: 82 U/L (ref 0–32)
BASOPHILS # BLD AUTO: 0.01 10*3/MM3 (ref 0–0.2)
BASOPHILS NFR BLD AUTO: 0.2 % (ref 0–1.5)
BILIRUB SERPL-MCNC: 0.4 MG/DL (ref 0.1–1.2)
BUN BLD-MCNC: 16 MG/DL (ref 6–20)
BUN/CREAT SERPL: 13.7 (ref 7.3–30)
CALCIUM SPEC-SCNC: 10.7 MG/DL (ref 8.5–10.2)
CHLORIDE SERPL-SCNC: 99 MMOL/L (ref 98–107)
CO2 SERPL-SCNC: 33.5 MMOL/L (ref 22–29)
CREAT BLD-MCNC: 1.17 MG/DL (ref 0.6–1.1)
DEPRECATED RDW RBC AUTO: 39.7 FL (ref 37–54)
EOSINOPHIL # BLD AUTO: 0 10*3/MM3 (ref 0–0.4)
EOSINOPHIL NFR BLD AUTO: 0 % (ref 0.3–6.2)
ERYTHROCYTE [DISTWIDTH] IN BLOOD BY AUTOMATED COUNT: 12.3 % (ref 12.3–15.4)
FERRITIN SERPL-MCNC: 997.8 NG/ML (ref 13–150)
GFR SERPL CREATININE-BSD FRML MDRD: 54 ML/MIN/1.73
GLOBULIN UR ELPH-MCNC: 4 GM/DL (ref 1.8–3.5)
GLUCOSE BLD-MCNC: 175 MG/DL (ref 74–124)
HCT VFR BLD AUTO: 31 % (ref 34–46.6)
HGB BLD-MCNC: 9.9 G/DL (ref 12–15.9)
HGB RETIC QN AUTO: 30.7 PG (ref 29.8–36.1)
IMM GRANULOCYTES # BLD AUTO: 0.03 10*3/MM3 (ref 0–0.05)
IMM GRANULOCYTES NFR BLD AUTO: 0.5 % (ref 0–0.5)
IMM RETICS NFR: 13.3 % (ref 3–15.8)
IRON 24H UR-MRATE: 57 MCG/DL (ref 37–145)
IRON SATN MFR SERPL: 23 % (ref 14–48)
LYMPHOCYTES # BLD AUTO: 0.97 10*3/MM3 (ref 0.7–3.1)
LYMPHOCYTES NFR BLD AUTO: 15 % (ref 19.6–45.3)
MCH RBC QN AUTO: 28.4 PG (ref 26.6–33)
MCHC RBC AUTO-ENTMCNC: 31.9 G/DL (ref 31.5–35.7)
MCV RBC AUTO: 89.1 FL (ref 79–97)
MONOCYTES # BLD AUTO: 0.5 10*3/MM3 (ref 0.1–0.9)
MONOCYTES NFR BLD AUTO: 7.7 % (ref 5–12)
NEUTROPHILS # BLD AUTO: 4.97 10*3/MM3 (ref 1.4–7)
NEUTROPHILS NFR BLD AUTO: 76.6 % (ref 42.7–76)
NRBC BLD AUTO-RTO: 0 /100 WBC (ref 0–0)
PLATELET # BLD AUTO: 206 10*3/MM3 (ref 140–450)
PMV BLD AUTO: 9.8 FL (ref 6–12)
POTASSIUM BLD-SCNC: 3.8 MMOL/L (ref 3.5–4.7)
PROT SERPL-MCNC: 8.1 G/DL (ref 6.3–8)
RBC # BLD AUTO: 3.48 10*6/MM3 (ref 3.77–5.28)
RETICS/RBC NFR AUTO: 2.45 % (ref 0.5–1.5)
SODIUM BLD-SCNC: 143 MMOL/L (ref 134–145)
TIBC SERPL-MCNC: 249 MCG/DL (ref 249–505)
TRANSFERRIN SERPL-MCNC: 178 MG/DL (ref 200–360)
WBC NRBC COR # BLD: 6.48 10*3/MM3 (ref 3.4–10.8)

## 2019-03-12 PROCEDURE — 85046 RETICYTE/HGB CONCENTRATE: CPT | Performed by: INTERNAL MEDICINE

## 2019-03-12 PROCEDURE — 84466 ASSAY OF TRANSFERRIN: CPT | Performed by: INTERNAL MEDICINE

## 2019-03-12 PROCEDURE — 63510000001 EPOETIN ALFA PER 1000 UNITS: Performed by: INTERNAL MEDICINE

## 2019-03-12 PROCEDURE — 83540 ASSAY OF IRON: CPT | Performed by: INTERNAL MEDICINE

## 2019-03-12 PROCEDURE — 96372 THER/PROPH/DIAG INJ SC/IM: CPT | Performed by: INTERNAL MEDICINE

## 2019-03-12 PROCEDURE — 36415 COLL VENOUS BLD VENIPUNCTURE: CPT | Performed by: INTERNAL MEDICINE

## 2019-03-12 PROCEDURE — 85025 COMPLETE CBC W/AUTO DIFF WBC: CPT | Performed by: INTERNAL MEDICINE

## 2019-03-12 PROCEDURE — 82728 ASSAY OF FERRITIN: CPT | Performed by: INTERNAL MEDICINE

## 2019-03-12 PROCEDURE — 80053 COMPREHEN METABOLIC PANEL: CPT | Performed by: INTERNAL MEDICINE

## 2019-03-12 RX ADMIN — ERYTHROPOIETIN 7000 UNITS: 20000 INJECTION, SOLUTION INTRAVENOUS; SUBCUTANEOUS at 11:27

## 2019-03-12 NOTE — TELEPHONE ENCOUNTER
Unable to reach patient.  Called daughter Ms. Mcknight and spoke to her.  Pt currently living with daughter.  Informed daughter that liver labs and calcium were a little elevated today and that we faxed the results to Dr. Lopez.  [place] in lab faxed today's lab results to Dr. Lopez.  Daughter instructed to have her mother stop any calcium supplements.  Daughter v/u.  Stat CMP order placed for next lab draw as ordered per Dr. Trinh.

## 2019-03-12 NOTE — TELEPHONE ENCOUNTER
----- Message from Hosea Trinh II, MD sent at 3/12/2019  4:16 PM EDT -----   Please call patient and let her know that her liver labs are little high again and her calcium is slightly elevated again.  This has happened in the past.  I doubt it is related to the breast cancer considering she has this many years out from her breast cancer diagnosis.    I asked the lab to fax these labs to her PCP.  Please confirm this was done.    Stop calcium    Repeat a stat CMP when she comes in for her next lab check in our office

## 2019-03-12 NOTE — PROGRESS NOTES
Copy of the note I sent to the triage nurses today:      Please call patient and let her know that her liver labs are little high again and her calcium is slightly elevated again.  This has happened in the past.  I doubt it is related to the breast cancer considering she has this many years out from her breast cancer diagnosis.    I asked the lab to fax these labs to her PCP.  Please confirm this was done.    Stop calcium    Repeat a stat CMP when she comes in for her next lab check in our office

## 2019-04-01 ENCOUNTER — INFUSION (OUTPATIENT)
Dept: ONCOLOGY | Facility: HOSPITAL | Age: 84
End: 2019-04-01

## 2019-04-01 ENCOUNTER — LAB (OUTPATIENT)
Dept: LAB | Facility: HOSPITAL | Age: 84
End: 2019-04-01

## 2019-04-01 DIAGNOSIS — D63.1 ANEMIA OF CHRONIC RENAL FAILURE, STAGE 3 (MODERATE) (HCC): Primary | ICD-10-CM

## 2019-04-01 DIAGNOSIS — C50.211 MALIGNANT NEOPLASM OF UPPER-INNER QUADRANT OF RIGHT BREAST IN FEMALE, ESTROGEN RECEPTOR POSITIVE (HCC): ICD-10-CM

## 2019-04-01 DIAGNOSIS — D63.1 ANEMIA OF CHRONIC RENAL FAILURE, STAGE 3 (MODERATE) (HCC): ICD-10-CM

## 2019-04-01 DIAGNOSIS — N18.30 ANEMIA OF CHRONIC RENAL FAILURE, STAGE 3 (MODERATE) (HCC): Primary | ICD-10-CM

## 2019-04-01 DIAGNOSIS — N18.30 ANEMIA OF CHRONIC RENAL FAILURE, STAGE 3 (MODERATE) (HCC): ICD-10-CM

## 2019-04-01 DIAGNOSIS — Z17.0 MALIGNANT NEOPLASM OF UPPER-INNER QUADRANT OF RIGHT BREAST IN FEMALE, ESTROGEN RECEPTOR POSITIVE (HCC): ICD-10-CM

## 2019-04-01 LAB
BASOPHILS # BLD AUTO: 0.01 10*3/MM3 (ref 0–0.2)
BASOPHILS NFR BLD AUTO: 0.2 % (ref 0–1.5)
DEPRECATED RDW RBC AUTO: 39.8 FL (ref 37–54)
EOSINOPHIL # BLD AUTO: 0 10*3/MM3 (ref 0–0.4)
EOSINOPHIL NFR BLD AUTO: 0 % (ref 0.3–6.2)
ERYTHROCYTE [DISTWIDTH] IN BLOOD BY AUTOMATED COUNT: 12.3 % (ref 12.3–15.4)
HCT VFR BLD AUTO: 30.9 % (ref 34–46.6)
HGB BLD-MCNC: 9.7 G/DL (ref 12–15.9)
IMM GRANULOCYTES # BLD AUTO: 0.07 10*3/MM3 (ref 0–0.05)
IMM GRANULOCYTES NFR BLD AUTO: 1.1 % (ref 0–0.5)
LYMPHOCYTES # BLD AUTO: 1.48 10*3/MM3 (ref 0.7–3.1)
LYMPHOCYTES NFR BLD AUTO: 22.7 % (ref 19.6–45.3)
MCH RBC QN AUTO: 28.1 PG (ref 26.6–33)
MCHC RBC AUTO-ENTMCNC: 31.4 G/DL (ref 31.5–35.7)
MCV RBC AUTO: 89.6 FL (ref 79–97)
MONOCYTES # BLD AUTO: 0.6 10*3/MM3 (ref 0.1–0.9)
MONOCYTES NFR BLD AUTO: 9.2 % (ref 5–12)
NEUTROPHILS # BLD AUTO: 4.35 10*3/MM3 (ref 1.4–7)
NEUTROPHILS NFR BLD AUTO: 66.8 % (ref 42.7–76)
NRBC BLD AUTO-RTO: 0 /100 WBC (ref 0–0)
PLATELET # BLD AUTO: 237 10*3/MM3 (ref 140–450)
PMV BLD AUTO: 9.9 FL (ref 6–12)
RBC # BLD AUTO: 3.45 10*6/MM3 (ref 3.77–5.28)
WBC NRBC COR # BLD: 6.51 10*3/MM3 (ref 3.4–10.8)

## 2019-04-01 PROCEDURE — 96372 THER/PROPH/DIAG INJ SC/IM: CPT | Performed by: INTERNAL MEDICINE

## 2019-04-01 PROCEDURE — 63510000001 EPOETIN ALFA PER 1000 UNITS: Performed by: INTERNAL MEDICINE

## 2019-04-01 PROCEDURE — 36415 COLL VENOUS BLD VENIPUNCTURE: CPT | Performed by: INTERNAL MEDICINE

## 2019-04-01 PROCEDURE — 85025 COMPLETE CBC W/AUTO DIFF WBC: CPT | Performed by: INTERNAL MEDICINE

## 2019-04-01 RX ADMIN — ERYTHROPOIETIN 10000 UNITS: 10000 INJECTION, SOLUTION INTRAVENOUS; SUBCUTANEOUS at 16:30

## 2019-04-26 NOTE — PROGRESS NOTES
Subjective .     REASONS FOR FOLLOWUP:  Breast cancer, anemia    HISTORY OF PRESENT ILLNESS:  The patient is a 83 y.o. year old female  who is here for follow-up with the above-mentioned history.    No new problems.  Denies pain, shortness of breath, chest pain, nausea, bleeding.    Past Medical History:   Diagnosis Date   • Anemia     Iron deficiency anemia    • Anxiety    • Arthritis    • Breast cancer (CMS/HCC) 08/2014    right    • Breast cancer (CMS/HCC) 12/2006    Left   • Chronic kidney disease     Anemia of chronic renal insufficency, stage 3   • COPD (chronic obstructive pulmonary disease) (CMS/HCC)    • Depression    • Diabetes mellitus (CMS/HCC)    • Hyperlipidemia    • Hypertension    • YARY (obstructive sleep apnea)    • Poor circulation    • Stroke (CMS/HCC)     CVA in 1990.       HEMATOLOGIC/ONCOLOGIC HISTORY:  (History from previous dates can be found in the separate document.)    MEDICATIONS    Current Outpatient Medications:   •  amLODIPine (NORVASC) 10 MG tablet, Take 1 tablet by mouth daily., Disp: , Rfl:   •  anastrozole (ARIMIDEX) 1 MG tablet, TAKE 1 TABLET BY MOUTH ONCE DAILY, Disp: 90 tablet, Rfl: 0  •  aspirin 81 MG tablet, Take 81 mg by mouth., Disp: , Rfl:   •  busPIRone (BUSPAR) 15 MG tablet, Take 1 tablet by mouth every morning. And 2 tablets in the evening, Disp: , Rfl:   •  calcium carbonate-vitamin d (CALCIUM 600+D) 600-400 MG-UNIT per tablet, Take 1 tablet by mouth., Disp: , Rfl:   •  cyanocobalamin (VITAMIN B-12) 1000 MCG tablet, Take 100 mcg by mouth., Disp: , Rfl:   •  doxepin (SINEquan) 50 MG capsule, 3 tablets at bedtime, Disp: , Rfl: 0  •  epoetin joe (EPOGEN,PROCRIT) 93984 UNIT/ML injection, Epogen SOLN; Patient Sig: Epogen SOLN 5,000u if hgb is below 12   subq injectable; 0; 22-Jul-2015; Active, Disp: , Rfl:   •  FERROUS SULFATE PO, Take  by mouth., Disp: , Rfl:   •  furosemide (LASIX) 40 MG tablet, Take 1 tablet by mouth daily., Disp: , Rfl:   •  glucose blood test strip,  TRUEtest Test In Vitro Strip; Patient Sig: TRUEtest Test In Vitro Strip ; 200; 0; 27-May-2014; Active, Disp: , Rfl:   •  HYDROcodone-acetaminophen (NORCO) 5-325 MG per tablet, Take 1 tablet by mouth every 6 (six) hours as needed. For pain, Disp: , Rfl:   •  insulin aspart (NovoLOG) 100 UNIT/ML injection, Inject 8 Units under the skin Medrol Dose Pack scheduling ONLY., Disp: , Rfl:   •  Insulin Glargine 100 UNIT/ML solution pen-injector, Inject 20 Units under the skin daily., Disp: , Rfl:   •  ipratropium-albuterol (DUONEB) 0.5-2.5 mg/mL nebulizer, Inhale 3 mL 3 (Three) Times a Day., Disp: , Rfl:   •  lansoprazole (PREVACID) 30 MG capsule, Take 1 capsule by mouth daily., Disp: , Rfl:   •  LANTUS 100 UNIT/ML injection, , Disp: , Rfl: 0  •  levETIRAcetam (KEPPRA) 500 MG tablet, Take 1 tablet by mouth 2 (two) times a day., Disp: , Rfl:   •  losartan (COZAAR) 100 MG tablet, Take 1 tablet by mouth daily., Disp: , Rfl:   •  metoclopramide (REGLAN) 10 MG tablet, Take 1 tablet by mouth daily. Before a meal, Disp: , Rfl:   •  Multiple Vitamins-Minerals (MULTIVITAL PO), Take 1 tablet by mouth daily., Disp: , Rfl:   •  O2 (OXYGEN), Inhale 2 L/min Daily., Disp: , Rfl:   •  pravastatin (PRAVACHOL) 40 MG tablet, Take 1 tablet by mouth nightly., Disp: , Rfl:   •  temazepam (RESTORIL) 30 MG capsule, Take 1 capsule by mouth nightly as needed. At bedtime, Disp: , Rfl:     ALLERGIES:     Allergies   Allergen Reactions   • Sulfa Antibiotics Other (See Comments)     GI upset   • Penicillins Itching and Rash       SOCIAL HISTORY:       Social History     Socioeconomic History   • Marital status:      Spouse name: Not on file   • Number of children: Not on file   • Years of education: High School   • Highest education level: Not on file   Occupational History   • Occupation: Nurse aid     Employer: RETIRED   Tobacco Use   • Smoking status: Never Smoker   • Smokeless tobacco: Never Used   Substance and Sexual Activity   • Alcohol  "use: No   • Drug use: No         FAMILY HISTORY:  Family History   Problem Relation Age of Onset   • Cancer Father    • Cancer Brother    • Diabetes Brother    • Heart disease Brother    • Cancer Other        REVIEW OF SYSTEMS:  Review of Systems   Constitutional: Negative for activity change.   HENT: Negative for nosebleeds and trouble swallowing.    Respiratory: Negative for shortness of breath and wheezing.    Cardiovascular: Negative for chest pain and palpitations.   Gastrointestinal: Negative for constipation, diarrhea and nausea.   Genitourinary: Negative for dysuria and hematuria.   Musculoskeletal: Negative for arthralgias and myalgias.   Skin: Negative for rash and wound.   Neurological: Negative for seizures and syncope.   Hematological: Negative for adenopathy. Does not bruise/bleed easily.   Psychiatric/Behavioral: Negative for confusion.             Objective    Vitals:    04/29/19 1322   BP: 148/70   Pulse: 92   Resp: 16   Temp: 98.6 °F (37 °C)   SpO2: (!) 88%   Weight: 84.1 kg (185 lb 6.4 oz)   Height: 162.6 cm (64.02\")   PainSc: 0-No pain     Current Status 4/29/2019   ECOG score 0      PHYSICAL EXAM:    CONSTITUTIONAL:  Vital signs reviewed.  No distress, looks comfortable.  Overweight  EYES:  Conjunctiva and lids unremarkable.  PERRLA  EARS,NOSE,MOUTH,THROAT:  Ears and nose appear unremarkable.  Lips, teeth, gums appear unremarkable.  RESPIRATORY:  Normal respiratory effort.  Lungs clear to auscultation bilaterally.  CARDIOVASCULAR:  Normal S1, S2.  No murmurs rubs or gallops.  No significant lower extremity edema.  GASTROINTESTINAL: Abdomen appears unremarkable.  Nontender.  No hepatomegaly.  No splenomegaly.  LYMPHATIC:  No cervical, supraclavicular, axillary lymphadenopathy.  SKIN:  Warm.  No rashes.  PSYCHIATRIC:  Normal judgment and insight.  Normal mood and affect.    RECENT LABS:        WBC   Date/Time Value Ref Range Status   04/29/2019 01:13 PM 7.78 3.40 - 10.80 10*3/mm3 Final "     Hemoglobin   Date/Time Value Ref Range Status   04/29/2019 01:13 PM 10.1 (L) 12.0 - 15.9 g/dL Final     Platelets   Date/Time Value Ref Range Status   04/29/2019 01:13  140 - 450 10*3/mm3 Final       Assessment/Plan   ASSESSMENT:  Anemia of chronic renal failure, stage 3 (moderate) (CMS/HCC)  - CBC & Differential  - CBC & Differential  - CBC & Differential  - Ferritin  - Iron Profile      *Breast cancer.  Right-sided invasive ductal carcinoma, papillary type, removed with lumpectomy on 08/26/2014 by Dr. Sung Griggs. Only 1.7 mm of remaining invasive carcinoma. Node negative. Completed radiation.   Arimidex 10/17/2014 until planned 10/17/2019. Tolerating Arimidex well.   No clinical signs of recurrence.    *Bone health. Normal bone density on DEXA scan 11/5/18. Calcium carbonate 600 mg with vitamin D once daily, changed to calcium citrate 1000 mg with vitamin D when Arimidex was started (she is also on lansoprazole).   · Calcium stopped 2/28/19 due to hypercalcemia    *Anemia of chronic renal insufficiency, stage 3.   Procrit p.r.n. for hemoglobin less than 10.   On the late March 2016 visit, changed from every 5 week CBC to every 2 week CBC due to a fall in hemoglobin.  She has not needed much Procrit on the every 3 week interval she is currently on.    Hb 10.1 today.  No Procrit needed today.    *Iron deficiency anemia. History of Venofer and Feraheme use. Does not respond well to p.o. iron but is taking ferrous sulfate 1 tablet 3 times per day without any problems (she will continue this).   In May 2015, she received one dose of Feraheme due to an iron percent saturation of around 10% and MCV of 82.6 despite an elevated ferritin. I think her ferritin is chronically high because of inflammation).   Recent iron labs not low.    *Elevated transaminases 1/4/18.  This prompted CT abdomen 1/11/18 (without contrast due to chronic renal insufficiency):  No evidence of malignancy.  Transaminases are much better  today, but still significantly elevated.  She was advised to follow up with her PCP.  ALT and AST elevated March 2019.  Patient's daughter reports her PCP repeated these and repeat labs were fine.    *Hypercalcemia.  Calcium was stopped 2/28/2019.    PLAN:  · CBC every month  · Procrit if hemoglobin less than 10.    · Iron labs and 2 months  · (Defer CMP lab monitoring to PCP)  · M.D. 3 months.   · She states Dr. Plascencia does her breast exams and orders her mammograms and she is up-to-date.  ·   last mammogram BI-RADS 2 (3/27/2019, [facility])  · Continue Arimidex (5 years completes 10/17/2019)    Daughter assisted with history.      Daughter assisted with history.

## 2019-04-29 ENCOUNTER — LAB (OUTPATIENT)
Dept: LAB | Facility: HOSPITAL | Age: 84
End: 2019-04-29

## 2019-04-29 ENCOUNTER — APPOINTMENT (OUTPATIENT)
Dept: ONCOLOGY | Facility: HOSPITAL | Age: 84
End: 2019-04-29

## 2019-04-29 ENCOUNTER — OFFICE VISIT (OUTPATIENT)
Dept: ONCOLOGY | Facility: CLINIC | Age: 84
End: 2019-04-29

## 2019-04-29 VITALS
SYSTOLIC BLOOD PRESSURE: 148 MMHG | TEMPERATURE: 98.6 F | WEIGHT: 185.4 LBS | RESPIRATION RATE: 16 BRPM | OXYGEN SATURATION: 88 % | HEIGHT: 64 IN | BODY MASS INDEX: 31.65 KG/M2 | DIASTOLIC BLOOD PRESSURE: 70 MMHG | HEART RATE: 92 BPM

## 2019-04-29 DIAGNOSIS — D63.1 ANEMIA OF CHRONIC RENAL FAILURE, STAGE 3 (MODERATE) (HCC): Primary | ICD-10-CM

## 2019-04-29 DIAGNOSIS — N18.30 ANEMIA OF CHRONIC RENAL FAILURE, STAGE 3 (MODERATE) (HCC): Primary | ICD-10-CM

## 2019-04-29 LAB
BASOPHILS # BLD AUTO: 0.01 10*3/MM3 (ref 0–0.2)
BASOPHILS NFR BLD AUTO: 0.1 % (ref 0–1.5)
DEPRECATED RDW RBC AUTO: 40.8 FL (ref 37–54)
EOSINOPHIL # BLD AUTO: 0 10*3/MM3 (ref 0–0.4)
EOSINOPHIL NFR BLD AUTO: 0 % (ref 0.3–6.2)
ERYTHROCYTE [DISTWIDTH] IN BLOOD BY AUTOMATED COUNT: 12.5 % (ref 12.3–15.4)
HCT VFR BLD AUTO: 31.4 % (ref 34–46.6)
HGB BLD-MCNC: 10.1 G/DL (ref 12–15.9)
IMM GRANULOCYTES # BLD AUTO: 0.07 10*3/MM3 (ref 0–0.05)
IMM GRANULOCYTES NFR BLD AUTO: 0.9 % (ref 0–0.5)
LYMPHOCYTES # BLD AUTO: 1.45 10*3/MM3 (ref 0.7–3.1)
LYMPHOCYTES NFR BLD AUTO: 18.6 % (ref 19.6–45.3)
MCH RBC QN AUTO: 28.6 PG (ref 26.6–33)
MCHC RBC AUTO-ENTMCNC: 32.2 G/DL (ref 31.5–35.7)
MCV RBC AUTO: 89 FL (ref 79–97)
MONOCYTES # BLD AUTO: 0.76 10*3/MM3 (ref 0.1–0.9)
MONOCYTES NFR BLD AUTO: 9.8 % (ref 5–12)
NEUTROPHILS # BLD AUTO: 5.49 10*3/MM3 (ref 1.7–7)
NEUTROPHILS NFR BLD AUTO: 70.6 % (ref 42.7–76)
NRBC BLD AUTO-RTO: 0 /100 WBC (ref 0–0.2)
PLATELET # BLD AUTO: 215 10*3/MM3 (ref 140–450)
PMV BLD AUTO: 10.5 FL (ref 6–12)
RBC # BLD AUTO: 3.53 10*6/MM3 (ref 3.77–5.28)
WBC NRBC COR # BLD: 7.78 10*3/MM3 (ref 3.4–10.8)

## 2019-04-29 PROCEDURE — 99215 OFFICE O/P EST HI 40 MIN: CPT | Performed by: INTERNAL MEDICINE

## 2019-04-29 PROCEDURE — 85025 COMPLETE CBC W/AUTO DIFF WBC: CPT | Performed by: INTERNAL MEDICINE

## 2019-04-29 PROCEDURE — 36415 COLL VENOUS BLD VENIPUNCTURE: CPT | Performed by: INTERNAL MEDICINE

## 2019-05-03 RX ORDER — ANASTROZOLE 1 MG/1
TABLET ORAL
Qty: 90 TABLET | Refills: 1 | Status: SHIPPED | OUTPATIENT
Start: 2019-05-03 | End: 2020-06-12

## 2019-05-24 RX ORDER — ANASTROZOLE 1 MG/1
TABLET ORAL
Qty: 90 TABLET | Refills: 0 | OUTPATIENT
Start: 2019-05-24

## 2019-05-28 ENCOUNTER — LAB (OUTPATIENT)
Dept: LAB | Facility: HOSPITAL | Age: 84
End: 2019-05-28

## 2019-05-28 ENCOUNTER — INFUSION (OUTPATIENT)
Dept: ONCOLOGY | Facility: HOSPITAL | Age: 84
End: 2019-05-28

## 2019-05-28 DIAGNOSIS — N18.30 ANEMIA OF CHRONIC RENAL FAILURE, STAGE 3 (MODERATE) (HCC): ICD-10-CM

## 2019-05-28 DIAGNOSIS — D63.1 ANEMIA OF CHRONIC RENAL FAILURE, STAGE 3 (MODERATE) (HCC): ICD-10-CM

## 2019-05-28 LAB
BASOPHILS # BLD AUTO: 0.02 10*3/MM3 (ref 0–0.2)
BASOPHILS NFR BLD AUTO: 0.3 % (ref 0–1.5)
DEPRECATED RDW RBC AUTO: 39.8 FL (ref 37–54)
EOSINOPHIL # BLD AUTO: 0.01 10*3/MM3 (ref 0–0.4)
EOSINOPHIL NFR BLD AUTO: 0.1 % (ref 0.3–6.2)
ERYTHROCYTE [DISTWIDTH] IN BLOOD BY AUTOMATED COUNT: 12.4 % (ref 12.3–15.4)
HCT VFR BLD AUTO: 31.5 % (ref 34–46.6)
HGB BLD-MCNC: 10 G/DL (ref 12–15.9)
IMM GRANULOCYTES # BLD AUTO: 0.05 10*3/MM3 (ref 0–0.05)
IMM GRANULOCYTES NFR BLD AUTO: 0.7 % (ref 0–0.5)
LYMPHOCYTES # BLD AUTO: 1.93 10*3/MM3 (ref 0.7–3.1)
LYMPHOCYTES NFR BLD AUTO: 25.3 % (ref 19.6–45.3)
MCH RBC QN AUTO: 28.2 PG (ref 26.6–33)
MCHC RBC AUTO-ENTMCNC: 31.7 G/DL (ref 31.5–35.7)
MCV RBC AUTO: 88.7 FL (ref 79–97)
MONOCYTES # BLD AUTO: 0.68 10*3/MM3 (ref 0.1–0.9)
MONOCYTES NFR BLD AUTO: 8.9 % (ref 5–12)
NEUTROPHILS # BLD AUTO: 4.95 10*3/MM3 (ref 1.7–7)
NEUTROPHILS NFR BLD AUTO: 64.7 % (ref 42.7–76)
NRBC BLD AUTO-RTO: 0.4 /100 WBC (ref 0–0.2)
PLATELET # BLD AUTO: 227 10*3/MM3 (ref 140–450)
PMV BLD AUTO: 10 FL (ref 6–12)
RBC # BLD AUTO: 3.55 10*6/MM3 (ref 3.77–5.28)
WBC NRBC COR # BLD: 7.64 10*3/MM3 (ref 3.4–10.8)

## 2019-05-28 PROCEDURE — 36415 COLL VENOUS BLD VENIPUNCTURE: CPT | Performed by: INTERNAL MEDICINE

## 2019-05-28 PROCEDURE — 85025 COMPLETE CBC W/AUTO DIFF WBC: CPT | Performed by: INTERNAL MEDICINE

## 2019-06-25 ENCOUNTER — INFUSION (OUTPATIENT)
Dept: ONCOLOGY | Facility: HOSPITAL | Age: 84
End: 2019-06-25

## 2019-06-25 ENCOUNTER — LAB (OUTPATIENT)
Dept: LAB | Facility: HOSPITAL | Age: 84
End: 2019-06-25

## 2019-06-25 DIAGNOSIS — N18.30 ANEMIA OF CHRONIC RENAL FAILURE, STAGE 3 (MODERATE) (HCC): ICD-10-CM

## 2019-06-25 DIAGNOSIS — D63.1 ANEMIA OF CHRONIC RENAL FAILURE, STAGE 3 (MODERATE) (HCC): ICD-10-CM

## 2019-06-25 DIAGNOSIS — D63.1 ANEMIA OF CHRONIC RENAL FAILURE, STAGE 3 (MODERATE) (HCC): Primary | ICD-10-CM

## 2019-06-25 DIAGNOSIS — N18.30 ANEMIA OF CHRONIC RENAL FAILURE, STAGE 3 (MODERATE) (HCC): Primary | ICD-10-CM

## 2019-06-25 LAB
BASOPHILS # BLD AUTO: 0 10*3/MM3 (ref 0–0.2)
BASOPHILS NFR BLD AUTO: 0 % (ref 0–1.5)
DEPRECATED RDW RBC AUTO: 38.6 FL (ref 37–54)
EOSINOPHIL # BLD AUTO: 0 10*3/MM3 (ref 0–0.4)
EOSINOPHIL NFR BLD AUTO: 0 % (ref 0.3–6.2)
ERYTHROCYTE [DISTWIDTH] IN BLOOD BY AUTOMATED COUNT: 12 % (ref 12.3–15.4)
FERRITIN SERPL-MCNC: 592.1 NG/ML (ref 13–150)
HCT VFR BLD AUTO: 30.9 % (ref 34–46.6)
HGB BLD-MCNC: 9.5 G/DL (ref 12–15.9)
IMM GRANULOCYTES # BLD AUTO: 0.01 10*3/MM3 (ref 0–0.05)
IMM GRANULOCYTES NFR BLD AUTO: 0.2 % (ref 0–0.5)
IRON 24H UR-MRATE: 43 MCG/DL (ref 37–145)
IRON SATN MFR SERPL: 19 % (ref 14–48)
LYMPHOCYTES # BLD AUTO: 0.8 10*3/MM3 (ref 0.7–3.1)
LYMPHOCYTES NFR BLD AUTO: 13.4 % (ref 19.6–45.3)
MCH RBC QN AUTO: 27.5 PG (ref 26.6–33)
MCHC RBC AUTO-ENTMCNC: 30.7 G/DL (ref 31.5–35.7)
MCV RBC AUTO: 89.3 FL (ref 79–97)
MONOCYTES # BLD AUTO: 0.64 10*3/MM3 (ref 0.1–0.9)
MONOCYTES NFR BLD AUTO: 10.7 % (ref 5–12)
NEUTROPHILS # BLD AUTO: 4.52 10*3/MM3 (ref 1.7–7)
NEUTROPHILS NFR BLD AUTO: 75.7 % (ref 42.7–76)
NRBC BLD AUTO-RTO: 0 /100 WBC (ref 0–0.2)
PLATELET # BLD AUTO: 209 10*3/MM3 (ref 140–450)
PMV BLD AUTO: 9.8 FL (ref 6–12)
RBC # BLD AUTO: 3.46 10*6/MM3 (ref 3.77–5.28)
TIBC SERPL-MCNC: 227 MCG/DL (ref 249–505)
TRANSFERRIN SERPL-MCNC: 162 MG/DL (ref 200–360)
WBC NRBC COR # BLD: 5.97 10*3/MM3 (ref 3.4–10.8)

## 2019-06-25 PROCEDURE — 82728 ASSAY OF FERRITIN: CPT | Performed by: INTERNAL MEDICINE

## 2019-06-25 PROCEDURE — 36415 COLL VENOUS BLD VENIPUNCTURE: CPT | Performed by: INTERNAL MEDICINE

## 2019-06-25 PROCEDURE — 85025 COMPLETE CBC W/AUTO DIFF WBC: CPT | Performed by: INTERNAL MEDICINE

## 2019-06-25 PROCEDURE — 83540 ASSAY OF IRON: CPT | Performed by: INTERNAL MEDICINE

## 2019-06-25 PROCEDURE — 84466 ASSAY OF TRANSFERRIN: CPT | Performed by: INTERNAL MEDICINE

## 2019-06-25 PROCEDURE — 25010000002 EPOETIN ALFA PER 1000 UNITS: Performed by: INTERNAL MEDICINE

## 2019-06-25 PROCEDURE — 96372 THER/PROPH/DIAG INJ SC/IM: CPT | Performed by: INTERNAL MEDICINE

## 2019-06-25 RX ADMIN — ERYTHROPOIETIN 7000 UNITS: 20000 INJECTION, SOLUTION INTRAVENOUS; SUBCUTANEOUS at 16:39

## 2019-07-03 ENCOUNTER — LAB (OUTPATIENT)
Dept: LAB | Facility: HOSPITAL | Age: 84
End: 2019-07-03

## 2019-07-03 ENCOUNTER — APPOINTMENT (OUTPATIENT)
Dept: ONCOLOGY | Facility: HOSPITAL | Age: 84
End: 2019-07-03

## 2019-07-03 ENCOUNTER — OFFICE VISIT (OUTPATIENT)
Dept: ONCOLOGY | Facility: CLINIC | Age: 84
End: 2019-07-03

## 2019-07-03 VITALS
SYSTOLIC BLOOD PRESSURE: 125 MMHG | RESPIRATION RATE: 18 BRPM | BODY MASS INDEX: 31.26 KG/M2 | DIASTOLIC BLOOD PRESSURE: 64 MMHG | TEMPERATURE: 98.5 F | HEIGHT: 64 IN | WEIGHT: 183.1 LBS | HEART RATE: 93 BPM | OXYGEN SATURATION: 93 %

## 2019-07-03 DIAGNOSIS — C50.211 MALIGNANT NEOPLASM OF UPPER-INNER QUADRANT OF RIGHT BREAST IN FEMALE, ESTROGEN RECEPTOR POSITIVE (HCC): Primary | ICD-10-CM

## 2019-07-03 DIAGNOSIS — N18.30 ANEMIA OF CHRONIC RENAL FAILURE, STAGE 3 (MODERATE) (HCC): Primary | ICD-10-CM

## 2019-07-03 DIAGNOSIS — Z17.0 MALIGNANT NEOPLASM OF UPPER-INNER QUADRANT OF RIGHT BREAST IN FEMALE, ESTROGEN RECEPTOR POSITIVE (HCC): Primary | ICD-10-CM

## 2019-07-03 DIAGNOSIS — D63.1 ANEMIA OF CHRONIC RENAL FAILURE, STAGE 3 (MODERATE) (HCC): Primary | ICD-10-CM

## 2019-07-03 LAB
BASOPHILS # BLD AUTO: 0.02 10*3/MM3 (ref 0–0.2)
BASOPHILS NFR BLD AUTO: 0.3 % (ref 0–1.5)
DEPRECATED RDW RBC AUTO: 40.1 FL (ref 37–54)
EOSINOPHIL # BLD AUTO: 0 10*3/MM3 (ref 0–0.4)
EOSINOPHIL NFR BLD AUTO: 0 % (ref 0.3–6.2)
ERYTHROCYTE [DISTWIDTH] IN BLOOD BY AUTOMATED COUNT: 12.5 % (ref 12.3–15.4)
HCT VFR BLD AUTO: 32.8 % (ref 34–46.6)
HGB BLD-MCNC: 10.3 G/DL (ref 12–15.9)
IMM GRANULOCYTES # BLD AUTO: 0.11 10*3/MM3 (ref 0–0.05)
IMM GRANULOCYTES NFR BLD AUTO: 1.6 % (ref 0–0.5)
LYMPHOCYTES # BLD AUTO: 1.86 10*3/MM3 (ref 0.7–3.1)
LYMPHOCYTES NFR BLD AUTO: 27.8 % (ref 19.6–45.3)
MCH RBC QN AUTO: 28 PG (ref 26.6–33)
MCHC RBC AUTO-ENTMCNC: 31.4 G/DL (ref 31.5–35.7)
MCV RBC AUTO: 89.1 FL (ref 79–97)
MONOCYTES # BLD AUTO: 0.7 10*3/MM3 (ref 0.1–0.9)
MONOCYTES NFR BLD AUTO: 10.5 % (ref 5–12)
NEUTROPHILS # BLD AUTO: 4 10*3/MM3 (ref 1.7–7)
NEUTROPHILS NFR BLD AUTO: 59.8 % (ref 42.7–76)
NRBC BLD AUTO-RTO: 0 /100 WBC (ref 0–0.2)
PLATELET # BLD AUTO: 269 10*3/MM3 (ref 140–450)
PMV BLD AUTO: 9.5 FL (ref 6–12)
RBC # BLD AUTO: 3.68 10*6/MM3 (ref 3.77–5.28)
WBC NRBC COR # BLD: 6.69 10*3/MM3 (ref 3.4–10.8)

## 2019-07-03 PROCEDURE — 36415 COLL VENOUS BLD VENIPUNCTURE: CPT | Performed by: INTERNAL MEDICINE

## 2019-07-03 PROCEDURE — 99214 OFFICE O/P EST MOD 30 MIN: CPT | Performed by: INTERNAL MEDICINE

## 2019-07-03 PROCEDURE — 85025 COMPLETE CBC W/AUTO DIFF WBC: CPT | Performed by: INTERNAL MEDICINE

## 2019-07-03 RX ORDER — HYDROCODONE BITARTRATE AND ACETAMINOPHEN 5; 325 MG/1; MG/1
1 TABLET ORAL
COMMUNITY
Start: 2019-06-08 | End: 2020-06-12

## 2019-07-03 NOTE — PROGRESS NOTES
Subjective .     REASONS FOR FOLLOWUP:  Breast cancer, anemia    HISTORY OF PRESENT ILLNESS:  The patient is a 83 y.o. year old female  who is here for follow-up with the above-mentioned history.    No new problems since last visit.  Denies bleeding, chest pain, shortness of breath.    Continues to take Arimidex.  No complaints of hot flashes today.  No complaints of nausea or neurological symptoms.    Past Medical History:   Diagnosis Date   • Anemia     Iron deficiency anemia    • Anxiety    • Arthritis    • Breast cancer (CMS/HCC) 08/2014    right    • Breast cancer (CMS/HCC) 12/2006    Left   • Chronic kidney disease     Anemia of chronic renal insufficency, stage 3   • COPD (chronic obstructive pulmonary disease) (CMS/HCC)    • Depression    • Diabetes mellitus (CMS/HCC)    • Hyperlipidemia    • Hypertension    • YARY (obstructive sleep apnea)    • Poor circulation    • Stroke (CMS/HCC)     CVA in 1990.       HEMATOLOGIC/ONCOLOGIC HISTORY:  (History from previous dates can be found in the separate document.)    MEDICATIONS    Current Outpatient Medications:   •  amLODIPine (NORVASC) 10 MG tablet, Take 1 tablet by mouth daily., Disp: , Rfl:   •  anastrozole (ARIMIDEX) 1 MG tablet, TAKE 1 TABLET BY MOUTH ONCE DAILY, Disp: 90 tablet, Rfl: 1  •  aspirin 81 MG tablet, Take 81 mg by mouth., Disp: , Rfl:   •  busPIRone (BUSPAR) 15 MG tablet, Take 1 tablet by mouth every morning. And 2 tablets in the evening, Disp: , Rfl:   •  cyanocobalamin (VITAMIN B-12) 1000 MCG tablet, Take 100 mcg by mouth., Disp: , Rfl:   •  diclofenac (VOLTAREN) 1 % gel gel, OBIE 2 GRAMS EXT AA TID, Disp: , Rfl: 3  •  doxepin (SINEquan) 50 MG capsule, 3 tablets at bedtime, Disp: , Rfl: 0  •  epoetin joe (EPOGEN,PROCRIT) 46100 UNIT/ML injection, Epogen SOLN; Patient Sig: Epogen SOLN 5,000u if hgb is below 12   subq injectable; 0; 22-Jul-2015; Active, Disp: , Rfl:   •  FERROUS SULFATE PO, Take  by mouth., Disp: , Rfl:   •  furosemide (LASIX) 40  MG tablet, Take 1 tablet by mouth daily., Disp: , Rfl:   •  glucose blood test strip, TRUEtest Test In Vitro Strip; Patient Sig: TRUEtest Test In Vitro Strip ; 200; 0; 27-May-2014; Active, Disp: , Rfl:   •  HYDROcodone-acetaminophen (NORCO) 5-325 MG per tablet, Take 1 tablet by mouth every 6 (six) hours as needed. For pain, Disp: , Rfl:   •  HYDROcodone-acetaminophen (NORCO) 5-325 MG per tablet, Take 1 tablet by mouth., Disp: , Rfl:   •  insulin aspart (NovoLOG) 100 UNIT/ML injection, Inject 8 Units under the skin Medrol Dose Pack scheduling ONLY., Disp: , Rfl:   •  ipratropium-albuterol (DUONEB) 0.5-2.5 mg/mL nebulizer, Inhale 3 mL As Needed., Disp: , Rfl:   •  lansoprazole (PREVACID) 30 MG capsule, Take 1 capsule by mouth daily., Disp: , Rfl:   •  LANTUS 100 UNIT/ML injection, 22 Units Daily., Disp: , Rfl: 0  •  levETIRAcetam (KEPPRA) 500 MG tablet, Take 1 tablet by mouth 2 (two) times a day., Disp: , Rfl:   •  losartan (COZAAR) 100 MG tablet, Take 1 tablet by mouth daily., Disp: , Rfl:   •  metoclopramide (REGLAN) 10 MG tablet, Take 1 tablet by mouth daily. Before a meal, Disp: , Rfl:   •  Multiple Vitamins-Minerals (MULTIVITAL PO), Take 1 tablet by mouth daily., Disp: , Rfl:   •  O2 (OXYGEN), Inhale 2 L/min Daily., Disp: , Rfl:   •  pravastatin (PRAVACHOL) 40 MG tablet, Take 1 tablet by mouth nightly., Disp: , Rfl:   •  temazepam (RESTORIL) 30 MG capsule, Take 1 capsule by mouth nightly as needed. At bedtime, Disp: , Rfl:   •  Insulin Glargine 100 UNIT/ML solution pen-injector, Inject 20 Units under the skin daily., Disp: , Rfl:     ALLERGIES:     Allergies   Allergen Reactions   • Sulfa Antibiotics Other (See Comments)     GI upset   • Penicillins Itching and Rash       SOCIAL HISTORY:       Social History     Socioeconomic History   • Marital status:      Spouse name: Not on file   • Number of children: Not on file   • Years of education: High School   • Highest education level: Not on file  "  Occupational History   • Occupation: Nurse aid     Employer: RETIRED   Tobacco Use   • Smoking status: Never Smoker   • Smokeless tobacco: Never Used   Substance and Sexual Activity   • Alcohol use: No   • Drug use: No         FAMILY HISTORY:  Family History   Problem Relation Age of Onset   • Cancer Father    • Cancer Brother    • Diabetes Brother    • Heart disease Brother    • Cancer Other        REVIEW OF SYSTEMS:  Review of Systems   Constitutional: Negative for activity change.   HENT: Negative for nosebleeds and trouble swallowing.    Respiratory: Negative for shortness of breath and wheezing.    Cardiovascular: Negative for chest pain and palpitations.   Gastrointestinal: Negative for constipation, diarrhea and nausea.   Genitourinary: Negative for dysuria and hematuria.   Musculoskeletal: Negative for arthralgias and myalgias.   Skin: Negative for rash and wound.   Neurological: Negative for seizures and syncope.   Hematological: Negative for adenopathy. Does not bruise/bleed easily.   Psychiatric/Behavioral: Negative for confusion.             Objective    Vitals:    07/03/19 1517   BP: 125/64   Pulse: 93   Resp: 18   Temp: 98.5 °F (36.9 °C)   SpO2: 93%  Comment: started at 87 after sitting 5 minutes went to 93   Weight: 83.1 kg (183 lb 1.6 oz)   Height: 162.6 cm (64.02\")   PainSc:   5   PainLoc: Abdomen  Comment: stomach hurts when eating     Current Status 7/3/2019   ECOG score 1      PHYSICAL EXAM:    CONSTITUTIONAL:  Vital signs reviewed.  No distress, looks comfortable.  EYES:  Conjunctiva and lids unremarkable.  PERRLA  EARS,NOSE,MOUTH,THROAT:  Ears and nose appear unremarkable.  Lips, teeth, gums appear unremarkable.  RESPIRATORY:  Normal respiratory effort.  Lungs clear to auscultation bilaterally.  CARDIOVASCULAR:  Normal S1, S2.  No murmurs rubs or gallops.  No significant lower extremity edema.  GASTROINTESTINAL: Abdomen appears unremarkable.  Nontender.  No hepatomegaly.  No " splenomegaly.  LYMPHATIC:  No cervical, supraclavicular, axillary lymphadenopathy.  SKIN:  Warm.  No rashes.  PSYCHIATRIC:  Normal judgment and insight.  Normal mood and affect.    RECENT LABS:        WBC   Date/Time Value Ref Range Status   07/03/2019 03:26 PM 6.69 3.40 - 10.80 10*3/mm3 Final   09/08/2018 04:25 AM 5.69 4.5 - 11.0 10*3/uL Final     Hemoglobin   Date/Time Value Ref Range Status   07/03/2019 03:26 PM 10.3 (L) 12.0 - 15.9 g/dL Final   09/08/2018 04:25 AM 8.6 (L) 12.0 - 16.0 g/dL Final     Platelets   Date/Time Value Ref Range Status   07/03/2019 03:26  140 - 450 10*3/mm3 Final   09/08/2018 04:25  140 - 440 10*3/uL Final       Assessment/Plan   ASSESSMENT:  There are no diagnoses linked to this encounter.    *Breast cancer.  Right-sided invasive ductal carcinoma, papillary type, removed with lumpectomy on 08/26/2014 by Dr. Sung Griggs. Only 1.7 mm of remaining invasive carcinoma. Node negative. Completed radiation.   Arimidex 10/17/2014 until planned 10/17/2019.  Continues to tolerate Arimidex well   no symptoms or physical exam findings to suggest recurrence today.    *Bone health. Normal bone density on DEXA scan 11/5/18. Calcium carbonate 600 mg with vitamin D once daily, changed to calcium citrate 1000 mg with vitamin D when Arimidex was started (she is also on lansoprazole).   · Calcium stopped 2/28/19 due to hypercalcemia    *Anemia of chronic renal insufficiency, stage 3.   Procrit p.r.n. for hemoglobin less than 10.   On the late March 2016 visit, changed from every 5 week CBC to every 2 week CBC due to a fall in hemoglobin.  She has not needed much Procrit on the every 3 week interval she is currently on.    Hb 10.3 today.  Therefore, no Procrit today    *Iron deficiency anemia. History of Venofer and Feraheme use. Does not respond well to p.o. iron but is taking ferrous sulfate 1 tablet 3 times per day without any problems (she will continue this).   In May 2015, she received one  dose of Feraheme due to an iron percent saturation of around 10% and MCV of 82.6 despite an elevated ferritin. I think her ferritin is chronically high because of inflammation).   Recent iron labs show no deficiency.    *Elevated transaminases 1/4/18.  This prompted CT abdomen 1/11/18 (without contrast due to chronic renal insufficiency):  No evidence of malignancy.  Transaminases are much better today, but still significantly elevated.  She was advised to follow up with her PCP.  ALT and AST elevated March 2019.  Patient's daughter reports her PCP repeated these and repeat labs were fine.    *Hypercalcemia.  Calcium was stopped 2/28/2019.    PLAN:  · CBC every month  · Procrit if hemoglobin less than 10.    · Iron labs 2 months  · (Defer CMP lab monitoring to PCP)  · M.D. CBC Procrit 3 months.  (At that MD visit, she will complete Arimidex.  Patient and daughter were told this today)  · She states Dr. Plascencia does her breast exams and orders her mammograms and she is up-to-date.  ·   last mammogram BI-RADS 2 (3/27/2019, [facility])  · Continue Arimidex (5 years completes 10/17/2019)    Daughter assisted with history.

## 2019-07-31 ENCOUNTER — APPOINTMENT (OUTPATIENT)
Dept: ONCOLOGY | Facility: HOSPITAL | Age: 84
End: 2019-07-31

## 2019-07-31 ENCOUNTER — APPOINTMENT (OUTPATIENT)
Dept: LAB | Facility: HOSPITAL | Age: 84
End: 2019-07-31

## 2019-08-06 ENCOUNTER — INFUSION (OUTPATIENT)
Dept: ONCOLOGY | Facility: HOSPITAL | Age: 84
End: 2019-08-06

## 2019-08-06 ENCOUNTER — LAB (OUTPATIENT)
Dept: LAB | Facility: HOSPITAL | Age: 84
End: 2019-08-06

## 2019-08-06 DIAGNOSIS — Z17.0 MALIGNANT NEOPLASM OF UPPER-INNER QUADRANT OF RIGHT BREAST IN FEMALE, ESTROGEN RECEPTOR POSITIVE (HCC): ICD-10-CM

## 2019-08-06 DIAGNOSIS — C50.211 MALIGNANT NEOPLASM OF UPPER-INNER QUADRANT OF RIGHT BREAST IN FEMALE, ESTROGEN RECEPTOR POSITIVE (HCC): ICD-10-CM

## 2019-08-06 LAB
BASOPHILS # BLD AUTO: 0.01 10*3/MM3 (ref 0–0.2)
BASOPHILS NFR BLD AUTO: 0.2 % (ref 0–1.5)
DEPRECATED RDW RBC AUTO: 39.9 FL (ref 37–54)
EOSINOPHIL # BLD AUTO: 0 10*3/MM3 (ref 0–0.4)
EOSINOPHIL NFR BLD AUTO: 0 % (ref 0.3–6.2)
ERYTHROCYTE [DISTWIDTH] IN BLOOD BY AUTOMATED COUNT: 12 % (ref 12.3–15.4)
HCT VFR BLD AUTO: 30.9 % (ref 34–46.6)
HGB BLD-MCNC: 9.6 G/DL (ref 12–15.9)
IMM GRANULOCYTES # BLD AUTO: 0.04 10*3/MM3 (ref 0–0.05)
IMM GRANULOCYTES NFR BLD AUTO: 0.6 % (ref 0–0.5)
LYMPHOCYTES # BLD AUTO: 1.25 10*3/MM3 (ref 0.7–3.1)
LYMPHOCYTES NFR BLD AUTO: 19 % (ref 19.6–45.3)
MCH RBC QN AUTO: 28.2 PG (ref 26.6–33)
MCHC RBC AUTO-ENTMCNC: 31.1 G/DL (ref 31.5–35.7)
MCV RBC AUTO: 90.6 FL (ref 79–97)
MONOCYTES # BLD AUTO: 0.66 10*3/MM3 (ref 0.1–0.9)
MONOCYTES NFR BLD AUTO: 10 % (ref 5–12)
NEUTROPHILS # BLD AUTO: 4.61 10*3/MM3 (ref 1.7–7)
NEUTROPHILS NFR BLD AUTO: 70.2 % (ref 42.7–76)
NRBC BLD AUTO-RTO: 0 /100 WBC (ref 0–0.2)
PLATELET # BLD AUTO: 195 10*3/MM3 (ref 140–450)
PMV BLD AUTO: 10.4 FL (ref 6–12)
RBC # BLD AUTO: 3.41 10*6/MM3 (ref 3.77–5.28)
WBC NRBC COR # BLD: 6.57 10*3/MM3 (ref 3.4–10.8)

## 2019-08-06 PROCEDURE — 85025 COMPLETE CBC W/AUTO DIFF WBC: CPT | Performed by: INTERNAL MEDICINE

## 2019-08-06 PROCEDURE — 36415 COLL VENOUS BLD VENIPUNCTURE: CPT | Performed by: INTERNAL MEDICINE

## 2019-08-06 NOTE — PROGRESS NOTES
Hgb today is 9.6.  Patient without questions or concerns at this time.   Patient needs new PA. Called [facility] to get new PA.  We will call patient when PA obtained and schedule her for procrit or retacrit whichever is approved.   Patient and family v/u.

## 2019-08-09 ENCOUNTER — INFUSION (OUTPATIENT)
Dept: ONCOLOGY | Facility: HOSPITAL | Age: 84
End: 2019-08-09

## 2019-08-09 DIAGNOSIS — N18.30 ANEMIA OF CHRONIC RENAL FAILURE, STAGE 3 (MODERATE) (HCC): Primary | ICD-10-CM

## 2019-08-09 DIAGNOSIS — D63.1 ANEMIA OF CHRONIC RENAL FAILURE, STAGE 3 (MODERATE) (HCC): Primary | ICD-10-CM

## 2019-08-09 PROCEDURE — 96372 THER/PROPH/DIAG INJ SC/IM: CPT

## 2019-08-09 PROCEDURE — 25010000002 EPOETIN ALFA PER 1000 UNITS: Performed by: INTERNAL MEDICINE

## 2019-08-09 RX ADMIN — ERYTHROPOIETIN 5000 UNITS: 20000 INJECTION, SOLUTION INTRAVENOUS; SUBCUTANEOUS at 15:41

## 2019-08-28 ENCOUNTER — APPOINTMENT (OUTPATIENT)
Dept: LAB | Facility: HOSPITAL | Age: 84
End: 2019-08-28

## 2019-08-28 ENCOUNTER — APPOINTMENT (OUTPATIENT)
Dept: ONCOLOGY | Facility: HOSPITAL | Age: 84
End: 2019-08-28

## 2019-09-05 ENCOUNTER — LAB (OUTPATIENT)
Dept: LAB | Facility: HOSPITAL | Age: 84
End: 2019-09-05

## 2019-09-05 ENCOUNTER — INFUSION (OUTPATIENT)
Dept: ONCOLOGY | Facility: HOSPITAL | Age: 84
End: 2019-09-05

## 2019-09-05 DIAGNOSIS — C50.211 MALIGNANT NEOPLASM OF UPPER-INNER QUADRANT OF RIGHT BREAST IN FEMALE, ESTROGEN RECEPTOR POSITIVE (HCC): ICD-10-CM

## 2019-09-05 DIAGNOSIS — D63.1 ANEMIA OF CHRONIC RENAL FAILURE, STAGE 3 (MODERATE) (HCC): Primary | ICD-10-CM

## 2019-09-05 DIAGNOSIS — Z17.0 MALIGNANT NEOPLASM OF UPPER-INNER QUADRANT OF RIGHT BREAST IN FEMALE, ESTROGEN RECEPTOR POSITIVE (HCC): ICD-10-CM

## 2019-09-05 DIAGNOSIS — N18.30 ANEMIA OF CHRONIC RENAL FAILURE, STAGE 3 (MODERATE) (HCC): Primary | ICD-10-CM

## 2019-09-05 LAB
BASOPHILS # BLD AUTO: 0.01 10*3/MM3 (ref 0–0.2)
BASOPHILS NFR BLD AUTO: 0.2 % (ref 0–1.5)
DEPRECATED RDW RBC AUTO: 40 FL (ref 37–54)
EOSINOPHIL # BLD AUTO: 0 10*3/MM3 (ref 0–0.4)
EOSINOPHIL NFR BLD AUTO: 0 % (ref 0.3–6.2)
ERYTHROCYTE [DISTWIDTH] IN BLOOD BY AUTOMATED COUNT: 11.9 % (ref 12.3–15.4)
FERRITIN SERPL-MCNC: 827.4 NG/ML (ref 13–150)
HCT VFR BLD AUTO: 31.3 % (ref 34–46.6)
HGB BLD-MCNC: 9.4 G/DL (ref 12–15.9)
HGB RETIC QN AUTO: 30.9 PG (ref 29.8–36.1)
IMM GRANULOCYTES # BLD AUTO: 0.01 10*3/MM3 (ref 0–0.05)
IMM GRANULOCYTES NFR BLD AUTO: 0.2 % (ref 0–0.5)
IMM RETICS NFR: 10.6 % (ref 3–15.8)
IRON 24H UR-MRATE: 52 MCG/DL (ref 37–145)
IRON SATN MFR SERPL: 19 % (ref 14–48)
LYMPHOCYTES # BLD AUTO: 1.42 10*3/MM3 (ref 0.7–3.1)
LYMPHOCYTES NFR BLD AUTO: 28.8 % (ref 19.6–45.3)
MCH RBC QN AUTO: 28.1 PG (ref 26.6–33)
MCHC RBC AUTO-ENTMCNC: 30 G/DL (ref 31.5–35.7)
MCV RBC AUTO: 93.4 FL (ref 79–97)
MONOCYTES # BLD AUTO: 0.57 10*3/MM3 (ref 0.1–0.9)
MONOCYTES NFR BLD AUTO: 11.6 % (ref 5–12)
NEUTROPHILS # BLD AUTO: 2.92 10*3/MM3 (ref 1.7–7)
NEUTROPHILS NFR BLD AUTO: 59.2 % (ref 42.7–76)
NRBC BLD AUTO-RTO: 0 /100 WBC (ref 0–0.2)
PLATELET # BLD AUTO: 214 10*3/MM3 (ref 140–450)
PMV BLD AUTO: 9.4 FL (ref 6–12)
RBC # BLD AUTO: 3.35 10*6/MM3 (ref 3.77–5.28)
RETICS/RBC NFR AUTO: 2.03 % (ref 0.7–1.9)
TIBC SERPL-MCNC: 280 MCG/DL (ref 249–505)
TRANSFERRIN SERPL-MCNC: 200 MG/DL (ref 200–360)
WBC NRBC COR # BLD: 4.93 10*3/MM3 (ref 3.4–10.8)

## 2019-09-05 PROCEDURE — 82728 ASSAY OF FERRITIN: CPT

## 2019-09-05 PROCEDURE — 96372 THER/PROPH/DIAG INJ SC/IM: CPT | Performed by: INTERNAL MEDICINE

## 2019-09-05 PROCEDURE — 85046 RETICYTE/HGB CONCENTRATE: CPT

## 2019-09-05 PROCEDURE — 85025 COMPLETE CBC W/AUTO DIFF WBC: CPT

## 2019-09-05 PROCEDURE — 36415 COLL VENOUS BLD VENIPUNCTURE: CPT

## 2019-09-05 PROCEDURE — 83540 ASSAY OF IRON: CPT

## 2019-09-05 PROCEDURE — 84466 ASSAY OF TRANSFERRIN: CPT

## 2019-09-05 PROCEDURE — 25010000002 EPOETIN ALFA PER 1000 UNITS: Performed by: INTERNAL MEDICINE

## 2019-09-05 RX ADMIN — ERYTHROPOIETIN 5000 UNITS: 20000 INJECTION, SOLUTION INTRAVENOUS; SUBCUTANEOUS at 16:22

## 2019-09-23 ENCOUNTER — OFFICE VISIT (OUTPATIENT)
Dept: ONCOLOGY | Facility: CLINIC | Age: 84
End: 2019-09-23

## 2019-09-23 ENCOUNTER — LAB (OUTPATIENT)
Dept: LAB | Facility: HOSPITAL | Age: 84
End: 2019-09-23

## 2019-09-23 ENCOUNTER — INFUSION (OUTPATIENT)
Dept: ONCOLOGY | Facility: HOSPITAL | Age: 84
End: 2019-09-23

## 2019-09-23 VITALS
WEIGHT: 182.7 LBS | DIASTOLIC BLOOD PRESSURE: 70 MMHG | BODY MASS INDEX: 31.19 KG/M2 | RESPIRATION RATE: 18 BRPM | HEART RATE: 91 BPM | HEIGHT: 64 IN | OXYGEN SATURATION: 91 % | TEMPERATURE: 97.9 F | SYSTOLIC BLOOD PRESSURE: 149 MMHG

## 2019-09-23 DIAGNOSIS — N18.30 ANEMIA OF CHRONIC RENAL FAILURE, STAGE 3 (MODERATE) (HCC): Primary | ICD-10-CM

## 2019-09-23 DIAGNOSIS — Z17.0 MALIGNANT NEOPLASM OF UPPER-INNER QUADRANT OF RIGHT BREAST IN FEMALE, ESTROGEN RECEPTOR POSITIVE (HCC): ICD-10-CM

## 2019-09-23 DIAGNOSIS — C50.211 MALIGNANT NEOPLASM OF UPPER-INNER QUADRANT OF RIGHT BREAST IN FEMALE, ESTROGEN RECEPTOR POSITIVE (HCC): ICD-10-CM

## 2019-09-23 DIAGNOSIS — D50.9 IRON DEFICIENCY ANEMIA, UNSPECIFIED IRON DEFICIENCY ANEMIA TYPE: Primary | ICD-10-CM

## 2019-09-23 DIAGNOSIS — D63.1 ANEMIA OF CHRONIC RENAL FAILURE, STAGE 3 (MODERATE) (HCC): Primary | ICD-10-CM

## 2019-09-23 LAB
BASOPHILS # BLD AUTO: 0.01 10*3/MM3 (ref 0–0.2)
BASOPHILS NFR BLD AUTO: 0.2 % (ref 0–1.5)
DEPRECATED RDW RBC AUTO: 39.5 FL (ref 37–54)
EOSINOPHIL # BLD AUTO: 0 10*3/MM3 (ref 0–0.4)
EOSINOPHIL NFR BLD AUTO: 0 % (ref 0.3–6.2)
ERYTHROCYTE [DISTWIDTH] IN BLOOD BY AUTOMATED COUNT: 11.8 % (ref 12.3–15.4)
HCT VFR BLD AUTO: 32.3 % (ref 34–46.6)
HGB BLD-MCNC: 9.6 G/DL (ref 12–15.9)
IMM GRANULOCYTES # BLD AUTO: 0.01 10*3/MM3 (ref 0–0.05)
IMM GRANULOCYTES NFR BLD AUTO: 0.2 % (ref 0–0.5)
LYMPHOCYTES # BLD AUTO: 1.38 10*3/MM3 (ref 0.7–3.1)
LYMPHOCYTES NFR BLD AUTO: 27.1 % (ref 19.6–45.3)
MCH RBC QN AUTO: 27.4 PG (ref 26.6–33)
MCHC RBC AUTO-ENTMCNC: 29.7 G/DL (ref 31.5–35.7)
MCV RBC AUTO: 92.3 FL (ref 79–97)
MONOCYTES # BLD AUTO: 0.59 10*3/MM3 (ref 0.1–0.9)
MONOCYTES NFR BLD AUTO: 11.6 % (ref 5–12)
NEUTROPHILS # BLD AUTO: 3.1 10*3/MM3 (ref 1.7–7)
NEUTROPHILS NFR BLD AUTO: 60.9 % (ref 42.7–76)
NRBC BLD AUTO-RTO: 0 /100 WBC (ref 0–0.2)
PLATELET # BLD AUTO: 205 10*3/MM3 (ref 140–450)
PMV BLD AUTO: 9.4 FL (ref 6–12)
RBC # BLD AUTO: 3.5 10*6/MM3 (ref 3.77–5.28)
WBC NRBC COR # BLD: 5.09 10*3/MM3 (ref 3.4–10.8)

## 2019-09-23 PROCEDURE — 99214 OFFICE O/P EST MOD 30 MIN: CPT | Performed by: INTERNAL MEDICINE

## 2019-09-23 PROCEDURE — 36416 COLLJ CAPILLARY BLOOD SPEC: CPT | Performed by: INTERNAL MEDICINE

## 2019-09-23 PROCEDURE — 85025 COMPLETE CBC W/AUTO DIFF WBC: CPT | Performed by: INTERNAL MEDICINE

## 2019-09-23 PROCEDURE — 96372 THER/PROPH/DIAG INJ SC/IM: CPT

## 2019-09-23 PROCEDURE — 25010000002 EPOETIN ALFA PER 1000 UNITS: Performed by: INTERNAL MEDICINE

## 2019-09-23 RX ORDER — PRAVASTATIN SODIUM 40 MG
TABLET ORAL
COMMUNITY
Start: 2019-08-13 | End: 2020-06-12

## 2019-09-23 RX ORDER — HYDROCODONE BITARTRATE AND ACETAMINOPHEN 5; 325 MG/1; MG/1
1 TABLET ORAL
COMMUNITY
Start: 2019-08-21 | End: 2020-06-12

## 2019-09-23 RX ORDER — METOCLOPRAMIDE 10 MG/1
TABLET ORAL
COMMUNITY
Start: 2019-09-04 | End: 2020-06-12

## 2019-09-23 RX ADMIN — ERYTHROPOIETIN 5000 UNITS: 20000 INJECTION, SOLUTION INTRAVENOUS; SUBCUTANEOUS at 16:20

## 2019-09-23 NOTE — PROGRESS NOTES
Subjective .     REASONS FOR FOLLOWUP:  Breast cancer, anemia    HISTORY OF PRESENT ILLNESS:  The patient is a 83 y.o. year old female  who is here for follow-up with the above-mentioned history.    No new issues since last visit.  Continues Arimidex.  Denies nausea, shortness of breath, weight loss.  Denies pain.    Denies chest pain    Past Medical History:   Diagnosis Date   • Anemia     Iron deficiency anemia    • Anxiety    • Arthritis    • Breast cancer (CMS/HCC) 08/2014    right    • Breast cancer (CMS/HCC) 12/2006    Left   • Chronic kidney disease     Anemia of chronic renal insufficency, stage 3   • COPD (chronic obstructive pulmonary disease) (CMS/[facility])    • Depression    • Diabetes mellitus (CMS/HCC)    • Hyperlipidemia    • Hypertension    • YARY (obstructive sleep apnea)    • Poor circulation    • Stroke (CMS/[facility])     CVA in 1990.       HEMATOLOGIC/ONCOLOGIC HISTORY:  (History from previous dates can be found in the separate document.)    MEDICATIONS    Current Outpatient Medications:   •  amLODIPine (NORVASC) 10 MG tablet, Take 1 tablet by mouth daily., Disp: , Rfl:   •  anastrozole (ARIMIDEX) 1 MG tablet, TAKE 1 TABLET BY MOUTH ONCE DAILY, Disp: 90 tablet, Rfl: 1  •  aspirin 81 MG tablet, Take 81 mg by mouth., Disp: , Rfl:   •  busPIRone (BUSPAR) 15 MG tablet, Take 1 tablet by mouth every morning. And 2 tablets in the evening, Disp: , Rfl:   •  cyanocobalamin (VITAMIN B-12) 1000 MCG tablet, Take 100 mcg by mouth., Disp: , Rfl:   •  diclofenac (VOLTAREN) 1 % gel gel, OBIE 2 GRAMS EXT AA TID, Disp: , Rfl: 3  •  doxepin (SINEquan) 50 MG capsule, 3 tablets at bedtime, Disp: , Rfl: 0  •  epoetin joe (EPOGEN,PROCRIT) 28531 UNIT/ML injection, Epogen SOLN; Patient Sig: Epogen SOLN 5,000u if hgb is below 12   subq injectable; 0; 22-Jul-2015; Active, Disp: , Rfl:   •  FERROUS SULFATE PO, Take  by mouth., Disp: , Rfl:   •  furosemide (LASIX) 40 MG tablet, Take 1 tablet by mouth daily., Disp: , Rfl:   •  glucose  blood test strip, TRUEtest Test In Vitro Strip; Patient Sig: TRUEtest Test In Vitro Strip ; 200; 0; 27-May-2014; Active, Disp: , Rfl:   •  HYDROcodone-acetaminophen (NORCO) 5-325 MG per tablet, Take 1 tablet by mouth every 6 (six) hours as needed. For pain, Disp: , Rfl:   •  HYDROcodone-acetaminophen (NORCO) 5-325 MG per tablet, Take 1 tablet by mouth., Disp: , Rfl:   •  HYDROcodone-acetaminophen (NORCO) 5-325 MG per tablet, Take 1 tablet by mouth., Disp: , Rfl:   •  insulin aspart (NovoLOG) 100 UNIT/ML injection, Inject 8 Units under the skin Medrol Dose Pack scheduling ONLY., Disp: , Rfl:   •  Insulin Glargine 100 UNIT/ML solution pen-injector, Inject 20 Units under the skin daily., Disp: , Rfl:   •  ipratropium-albuterol (DUONEB) 0.5-2.5 mg/mL nebulizer, Inhale 3 mL As Needed., Disp: , Rfl:   •  lansoprazole (PREVACID) 30 MG capsule, Take 1 capsule by mouth daily., Disp: , Rfl:   •  LANTUS 100 UNIT/ML injection, 22 Units Daily., Disp: , Rfl: 0  •  levETIRAcetam (KEPPRA) 500 MG tablet, Take 1 tablet by mouth 2 (two) times a day., Disp: , Rfl:   •  losartan (COZAAR) 100 MG tablet, Take 1 tablet by mouth daily., Disp: , Rfl:   •  metoclopramide (REGLAN) 10 MG tablet, Take 1 tablet by mouth daily. Before a meal, Disp: , Rfl:   •  metoclopramide (REGLAN) 10 MG tablet, TAKE 1 TABLET BY MOUTH TWICE DAILY BEFORE BREAKFAST AND AT BEDTIME, Disp: , Rfl:   •  Multiple Vitamins-Minerals (MULTIVITAL PO), Take 1 tablet by mouth daily., Disp: , Rfl:   •  O2 (OXYGEN), Inhale 2 L/min Daily., Disp: , Rfl:   •  pravastatin (PRAVACHOL) 40 MG tablet, Take 1 tablet by mouth nightly., Disp: , Rfl:   •  pravastatin (PRAVACHOL) 40 MG tablet, TAKE 1 TABLET BY MOUTH EVERY NIGHT, Disp: , Rfl:   •  temazepam (RESTORIL) 30 MG capsule, Take 1 capsule by mouth nightly as needed. At bedtime, Disp: , Rfl:     ALLERGIES:     Allergies   Allergen Reactions   • Sulfa Antibiotics Other (See Comments)     GI upset   • Penicillins Itching and Rash  "      SOCIAL HISTORY:       Social History     Socioeconomic History   • Marital status:      Spouse name: Not on file   • Number of children: Not on file   • Years of education: High School   • Highest education level: Not on file   Occupational History   • Occupation: Nurse aid     Employer: RETIRED   Tobacco Use   • Smoking status: Never Smoker   • Smokeless tobacco: Never Used   Substance and Sexual Activity   • Alcohol use: No   • Drug use: No   • Sexual activity: Defer         FAMILY HISTORY:  Family History   Problem Relation Age of Onset   • Cancer Father    • Cancer Brother    • Diabetes Brother    • Heart disease Brother    • Cancer Other        REVIEW OF SYSTEMS:  Review of Systems   Constitutional: Negative for activity change.   HENT: Negative for nosebleeds and trouble swallowing.    Respiratory: Negative for shortness of breath and wheezing.    Cardiovascular: Negative for chest pain and palpitations.   Gastrointestinal: Negative for constipation, diarrhea and nausea.   Genitourinary: Negative for dysuria and hematuria.   Musculoskeletal: Negative for arthralgias and myalgias.   Skin: Negative for rash and wound.   Neurological: Negative for seizures and syncope.   Hematological: Negative for adenopathy. Does not bruise/bleed easily.   Psychiatric/Behavioral: Negative for confusion.             Objective    Vitals:    09/23/19 1545   BP: 149/70   Pulse: 91   Resp: 18   Temp: 97.9 °F (36.6 °C)   TempSrc: Oral   SpO2: 91%   Weight: 82.9 kg (182 lb 11.2 oz)   Height: 162.6 cm (64.02\")   PainSc: 0-No pain     Current Status 9/23/2019   ECOG score 1      PHYSICAL EXAM:    CONSTITUTIONAL:  Vital signs reviewed.  No distress, looks comfortable.  EYES:  Conjunctiva and lids unremarkable.  PERRLA  EARS,NOSE,MOUTH,THROAT:  Ears and nose appear unremarkable.  Lips, teeth, gums appear unremarkable.  RESPIRATORY:  Normal respiratory effort.  Lungs clear to auscultation bilaterally.  CARDIOVASCULAR:  Normal " S1, S2.  No murmurs rubs or gallops.  No significant lower extremity edema.  GASTROINTESTINAL: Abdomen appears unremarkable.  Nontender.  No hepatomegaly.  No splenomegaly.  LYMPHATIC:  No cervical, supraclavicular, axillary lymphadenopathy.  SKIN:  Warm.  No rashes.  PSYCHIATRIC:  Normal judgment and insight.  Normal mood and affect.      RECENT LABS:        WBC   Date/Time Value Ref Range Status   09/23/2019 03:32 PM 5.09 3.40 - 10.80 10*3/mm3 Final   09/08/2018 04:25 AM 5.69 4.5 - 11.0 10*3/uL Final     Hemoglobin   Date/Time Value Ref Range Status   09/23/2019 03:32 PM 9.6 (L) 12.0 - 15.9 g/dL Final   09/08/2018 04:25 AM 8.6 (L) 12.0 - 16.0 g/dL Final     Platelets   Date/Time Value Ref Range Status   09/23/2019 03:32  140 - 450 10*3/mm3 Final   09/08/2018 04:25  140 - 440 10*3/uL Final       Assessment/Plan   ASSESSMENT:  Iron deficiency anemia, unspecified iron deficiency anemia type  - CBC & Differential  - CBC & Differential  - CBC & Differential  - Ferritin  - Iron Profile  - Retic With IRF & RET-He      *Breast cancer.  Right-sided invasive ductal carcinoma, papillary type, removed with lumpectomy on 08/26/2014 by Dr. Sung Griggs. Only 1.7 mm of remaining invasive carcinoma. Node negative. Completed radiation.   Arimidex 10/17/2014 until planned 10/17/2019.  Continues to tolerate Arimidex well   No clinical signs of recurrence.  Remains in remission.  I wrote down for them to stop the Arimidex around 10/17/2019.    *Bone health. Normal bone density on DEXA scan 11/5/18. Calcium carbonate 600 mg with vitamin D once daily, changed to calcium citrate 1000 mg with vitamin D when Arimidex was started (she is also on lansoprazole).   · Calcium stopped 2/28/19 due to hypercalcemia    *Anemia of chronic renal insufficiency, stage 3.   Procrit p.r.n. for hemoglobin less than 10.   On the late March 2016 visit, changed from every 5 week CBC to every 2 week CBC due to a fall in hemoglobin.  She has not  needed much Procrit on the every 3 week interval she is currently on.    Hb 9.6 today.      *Iron deficiency anemia. History of Venofer and Feraheme use. Does not respond well to p.o. iron but is taking ferrous sulfate 1 tablet 3 times per day without any problems (she will continue this).   In May 2015, she received one dose of Feraheme due to an iron percent saturation of around 10% and MCV of 82.6 despite an elevated ferritin. I think her ferritin is chronically high because of inflammation).   Recent iron labs are not low    *Elevated transaminases 1/4/18.  This prompted CT abdomen 1/11/18 (without contrast due to chronic renal insufficiency):  No evidence of malignancy.  Transaminases are much better today, but still significantly elevated.  She was advised to follow up with her PCP.  ALT and AST elevated March 2019.  Patient's daughter reports her PCP repeated these and repeat labs were fine.    *Hypercalcemia.  Calcium was stopped 2/28/2019.    PLAN:  · CBC every month  · Procrit if hemoglobin less than 10.    · Iron labs 2 months  · (Defer CMP lab monitoring to PCP)  · M.D. CBC Procrit 3 months.   · Stop Arimidex sometime around 10/17/2019.  (This completes 5 years)  · She states Dr. Plascencia does her breast exams and orders her mammograms and she is up-to-date.  ·   last mammogram BI-RADS 2 (3/27/2019, Mike)      Daughter assisted with history.

## 2019-10-21 ENCOUNTER — INFUSION (OUTPATIENT)
Dept: ONCOLOGY | Facility: HOSPITAL | Age: 84
End: 2019-10-21

## 2019-10-21 ENCOUNTER — LAB (OUTPATIENT)
Dept: LAB | Facility: HOSPITAL | Age: 84
End: 2019-10-21

## 2019-10-21 DIAGNOSIS — D50.9 IRON DEFICIENCY ANEMIA, UNSPECIFIED IRON DEFICIENCY ANEMIA TYPE: ICD-10-CM

## 2019-10-21 LAB
BASOPHILS # BLD AUTO: 0.01 10*3/MM3 (ref 0–0.2)
BASOPHILS NFR BLD AUTO: 0.2 % (ref 0–1.5)
DEPRECATED RDW RBC AUTO: 39.5 FL (ref 37–54)
EOSINOPHIL # BLD AUTO: 0 10*3/MM3 (ref 0–0.4)
EOSINOPHIL NFR BLD AUTO: 0 % (ref 0.3–6.2)
ERYTHROCYTE [DISTWIDTH] IN BLOOD BY AUTOMATED COUNT: 12 % (ref 12.3–15.4)
HCT VFR BLD AUTO: 34.2 % (ref 34–46.6)
HGB BLD-MCNC: 10.5 G/DL (ref 12–15.9)
IMM GRANULOCYTES # BLD AUTO: 0.03 10*3/MM3 (ref 0–0.05)
IMM GRANULOCYTES NFR BLD AUTO: 0.5 % (ref 0–0.5)
LYMPHOCYTES # BLD AUTO: 1.52 10*3/MM3 (ref 0.7–3.1)
LYMPHOCYTES NFR BLD AUTO: 23.5 % (ref 19.6–45.3)
MCH RBC QN AUTO: 27.7 PG (ref 26.6–33)
MCHC RBC AUTO-ENTMCNC: 30.7 G/DL (ref 31.5–35.7)
MCV RBC AUTO: 90.2 FL (ref 79–97)
MONOCYTES # BLD AUTO: 0.62 10*3/MM3 (ref 0.1–0.9)
MONOCYTES NFR BLD AUTO: 9.6 % (ref 5–12)
NEUTROPHILS # BLD AUTO: 4.28 10*3/MM3 (ref 1.7–7)
NEUTROPHILS NFR BLD AUTO: 66.2 % (ref 42.7–76)
NRBC BLD AUTO-RTO: 0 /100 WBC (ref 0–0.2)
PLATELET # BLD AUTO: 177 10*3/MM3 (ref 140–450)
PMV BLD AUTO: 10.5 FL (ref 6–12)
RBC # BLD AUTO: 3.79 10*6/MM3 (ref 3.77–5.28)
WBC NRBC COR # BLD: 6.46 10*3/MM3 (ref 3.4–10.8)

## 2019-10-21 PROCEDURE — 85025 COMPLETE CBC W/AUTO DIFF WBC: CPT

## 2019-10-21 PROCEDURE — 36415 COLL VENOUS BLD VENIPUNCTURE: CPT

## 2019-10-21 NOTE — PROGRESS NOTES
Hgb today is 10.5.  No procrit today per guidelines.   Patient has c/o feeling tired, no other c/o.  Given copy of labs.

## 2019-11-18 ENCOUNTER — INFUSION (OUTPATIENT)
Dept: ONCOLOGY | Facility: HOSPITAL | Age: 84
End: 2019-11-18

## 2019-11-18 ENCOUNTER — LAB (OUTPATIENT)
Dept: LAB | Facility: HOSPITAL | Age: 84
End: 2019-11-18

## 2019-11-18 DIAGNOSIS — D63.1 ANEMIA OF CHRONIC RENAL FAILURE, STAGE 3 (MODERATE) (HCC): Primary | ICD-10-CM

## 2019-11-18 DIAGNOSIS — N18.30 ANEMIA OF CHRONIC RENAL FAILURE, STAGE 3 (MODERATE) (HCC): Primary | ICD-10-CM

## 2019-11-18 DIAGNOSIS — D50.9 IRON DEFICIENCY ANEMIA, UNSPECIFIED IRON DEFICIENCY ANEMIA TYPE: ICD-10-CM

## 2019-11-18 LAB
BASOPHILS # BLD AUTO: 0.01 10*3/MM3 (ref 0–0.2)
BASOPHILS NFR BLD AUTO: 0.2 % (ref 0–1.5)
DEPRECATED RDW RBC AUTO: 40.5 FL (ref 37–54)
EOSINOPHIL # BLD AUTO: 0 10*3/MM3 (ref 0–0.4)
EOSINOPHIL NFR BLD AUTO: 0 % (ref 0.3–6.2)
ERYTHROCYTE [DISTWIDTH] IN BLOOD BY AUTOMATED COUNT: 12 % (ref 12.3–15.4)
FERRITIN SERPL-MCNC: 1033.6 NG/ML (ref 13–150)
HCT VFR BLD AUTO: 32.3 % (ref 34–46.6)
HGB BLD-MCNC: 9.9 G/DL (ref 12–15.9)
HGB RETIC QN AUTO: 30.5 PG (ref 29.8–36.1)
IMM GRANULOCYTES # BLD AUTO: 0.02 10*3/MM3 (ref 0–0.05)
IMM GRANULOCYTES NFR BLD AUTO: 0.3 % (ref 0–0.5)
IMM RETICS NFR: 14.2 % (ref 3–15.8)
IRON 24H UR-MRATE: 41 MCG/DL (ref 37–145)
IRON SATN MFR SERPL: 17 % (ref 14–48)
LYMPHOCYTES # BLD AUTO: 1.27 10*3/MM3 (ref 0.7–3.1)
LYMPHOCYTES NFR BLD AUTO: 20.6 % (ref 19.6–45.3)
MCH RBC QN AUTO: 27.9 PG (ref 26.6–33)
MCHC RBC AUTO-ENTMCNC: 30.7 G/DL (ref 31.5–35.7)
MCV RBC AUTO: 91 FL (ref 79–97)
MONOCYTES # BLD AUTO: 0.62 10*3/MM3 (ref 0.1–0.9)
MONOCYTES NFR BLD AUTO: 10 % (ref 5–12)
NEUTROPHILS # BLD AUTO: 4.26 10*3/MM3 (ref 1.7–7)
NEUTROPHILS NFR BLD AUTO: 68.9 % (ref 42.7–76)
NRBC BLD AUTO-RTO: 0 /100 WBC (ref 0–0.2)
PLATELET # BLD AUTO: 219 10*3/MM3 (ref 140–450)
PMV BLD AUTO: 9.4 FL (ref 6–12)
RBC # BLD AUTO: 3.55 10*6/MM3 (ref 3.77–5.28)
RETICS/RBC NFR AUTO: 2.31 % (ref 0.7–1.9)
TIBC SERPL-MCNC: 246 MCG/DL (ref 249–505)
TRANSFERRIN SERPL-MCNC: 176 MG/DL (ref 200–360)
WBC NRBC COR # BLD: 6.18 10*3/MM3 (ref 3.4–10.8)

## 2019-11-18 PROCEDURE — 83540 ASSAY OF IRON: CPT

## 2019-11-18 PROCEDURE — 82728 ASSAY OF FERRITIN: CPT

## 2019-11-18 PROCEDURE — 85046 RETICYTE/HGB CONCENTRATE: CPT

## 2019-11-18 PROCEDURE — 85025 COMPLETE CBC W/AUTO DIFF WBC: CPT

## 2019-11-18 PROCEDURE — 96372 THER/PROPH/DIAG INJ SC/IM: CPT

## 2019-11-18 PROCEDURE — 25010000002 EPOETIN ALFA-EPBX 10000 UNIT/ML SOLUTION: Performed by: INTERNAL MEDICINE

## 2019-11-18 PROCEDURE — 84466 ASSAY OF TRANSFERRIN: CPT

## 2019-11-18 PROCEDURE — 36415 COLL VENOUS BLD VENIPUNCTURE: CPT

## 2019-11-18 RX ADMIN — EPOETIN ALFA-EPBX 5000 UNITS: 10000 INJECTION, SOLUTION INTRAVENOUS; SUBCUTANEOUS at 15:01

## 2020-01-08 ENCOUNTER — LAB (OUTPATIENT)
Dept: LAB | Facility: HOSPITAL | Age: 85
End: 2020-01-08

## 2020-01-08 ENCOUNTER — OFFICE VISIT (OUTPATIENT)
Dept: ONCOLOGY | Facility: CLINIC | Age: 85
End: 2020-01-08

## 2020-01-08 ENCOUNTER — APPOINTMENT (OUTPATIENT)
Dept: ONCOLOGY | Facility: HOSPITAL | Age: 85
End: 2020-01-08

## 2020-01-08 VITALS
OXYGEN SATURATION: 84 % | HEIGHT: 64 IN | WEIGHT: 188.9 LBS | BODY MASS INDEX: 32.25 KG/M2 | TEMPERATURE: 98 F | HEART RATE: 99 BPM | RESPIRATION RATE: 18 BRPM | SYSTOLIC BLOOD PRESSURE: 152 MMHG | DIASTOLIC BLOOD PRESSURE: 69 MMHG

## 2020-01-08 DIAGNOSIS — D50.9 IRON DEFICIENCY ANEMIA, UNSPECIFIED IRON DEFICIENCY ANEMIA TYPE: Primary | ICD-10-CM

## 2020-01-08 DIAGNOSIS — D63.1 ANEMIA OF CHRONIC RENAL FAILURE, STAGE 3 (MODERATE) (HCC): Primary | ICD-10-CM

## 2020-01-08 DIAGNOSIS — N18.30 ANEMIA OF CHRONIC RENAL FAILURE, STAGE 3 (MODERATE) (HCC): Primary | ICD-10-CM

## 2020-01-08 LAB
BASOPHILS # BLD AUTO: 0.01 10*3/MM3 (ref 0–0.2)
BASOPHILS NFR BLD AUTO: 0.2 % (ref 0–1.5)
DEPRECATED RDW RBC AUTO: 39.3 FL (ref 37–54)
EOSINOPHIL # BLD AUTO: 0.01 10*3/MM3 (ref 0–0.4)
EOSINOPHIL NFR BLD AUTO: 0.2 % (ref 0.3–6.2)
ERYTHROCYTE [DISTWIDTH] IN BLOOD BY AUTOMATED COUNT: 12.1 % (ref 12.3–15.4)
HCT VFR BLD AUTO: 32.5 % (ref 34–46.6)
HGB BLD-MCNC: 10.2 G/DL (ref 12–15.9)
IMM GRANULOCYTES # BLD AUTO: 0.05 10*3/MM3 (ref 0–0.05)
IMM GRANULOCYTES NFR BLD AUTO: 0.9 % (ref 0–0.5)
LYMPHOCYTES # BLD AUTO: 1.33 10*3/MM3 (ref 0.7–3.1)
LYMPHOCYTES NFR BLD AUTO: 23.3 % (ref 19.6–45.3)
MCH RBC QN AUTO: 27.9 PG (ref 26.6–33)
MCHC RBC AUTO-ENTMCNC: 31.4 G/DL (ref 31.5–35.7)
MCV RBC AUTO: 88.8 FL (ref 79–97)
MONOCYTES # BLD AUTO: 0.71 10*3/MM3 (ref 0.1–0.9)
MONOCYTES NFR BLD AUTO: 12.4 % (ref 5–12)
NEUTROPHILS # BLD AUTO: 3.6 10*3/MM3 (ref 1.7–7)
NEUTROPHILS NFR BLD AUTO: 63 % (ref 42.7–76)
NRBC BLD AUTO-RTO: 0 /100 WBC (ref 0–0.2)
PLATELET # BLD AUTO: 175 10*3/MM3 (ref 140–450)
PMV BLD AUTO: 10.3 FL (ref 6–12)
RBC # BLD AUTO: 3.66 10*6/MM3 (ref 3.77–5.28)
WBC NRBC COR # BLD: 5.71 10*3/MM3 (ref 3.4–10.8)

## 2020-01-08 PROCEDURE — 99214 OFFICE O/P EST MOD 30 MIN: CPT | Performed by: INTERNAL MEDICINE

## 2020-01-08 PROCEDURE — 85025 COMPLETE CBC W/AUTO DIFF WBC: CPT

## 2020-01-08 PROCEDURE — 36415 COLL VENOUS BLD VENIPUNCTURE: CPT

## 2020-01-08 NOTE — PROGRESS NOTES
Subjective .     REASONS FOR FOLLOWUP:  Breast cancer, anemia    HISTORY OF PRESENT ILLNESS:  The patient is a 84 y.o. year old female  who is here for follow-up with the above-mentioned history.    No new problems.  Denies bleeding, chest pain, no change in baseline SOA.    O2 sat is 84% today.  However, she did not bring her home oxygen and because she is having trouble with her travel oxygen tank.  Her daughter is going to call the oxygen company today.    Stopped Rheumatrex as instructed October 2019.    Past Medical History:   Diagnosis Date   • Anemia     Iron deficiency anemia    • Anxiety    • Arthritis    • Breast cancer (CMS/HCC) 08/2014    right    • Breast cancer (CMS/HCC) 12/2006    Left   • Chronic kidney disease     Anemia of chronic renal insufficency, stage 3   • COPD (chronic obstructive pulmonary disease) (CMS/HCC)    • Depression    • Diabetes mellitus (CMS/HCC)    • Hyperlipidemia    • Hypertension    • YARY (obstructive sleep apnea)    • Poor circulation    • Stroke (CMS/HCC)     CVA in 1990.       HEMATOLOGIC/ONCOLOGIC HISTORY:  (History from previous dates can be found in the separate document.)    MEDICATIONS    Current Outpatient Medications:   •  amLODIPine (NORVASC) 10 MG tablet, Take 1 tablet by mouth daily., Disp: , Rfl:   •  anastrozole (ARIMIDEX) 1 MG tablet, TAKE 1 TABLET BY MOUTH ONCE DAILY, Disp: 90 tablet, Rfl: 1  •  aspirin 81 MG tablet, Take 81 mg by mouth., Disp: , Rfl:   •  busPIRone (BUSPAR) 15 MG tablet, Take 1 tablet by mouth every morning. And 2 tablets in the evening, Disp: , Rfl:   •  cyanocobalamin (VITAMIN B-12) 1000 MCG tablet, Take 100 mcg by mouth., Disp: , Rfl:   •  diclofenac (VOLTAREN) 1 % gel gel, OBIE 2 GRAMS EXT AA TID, Disp: , Rfl: 3  •  doxepin (SINEquan) 50 MG capsule, 3 tablets at bedtime, Disp: , Rfl: 0  •  epoetin joe (EPOGEN,PROCRIT) 67600 UNIT/ML injection, Epogen SOLN; Patient Sig: Epogen SOLN 5,000u if hgb is below 12   subq injectable; 0;  22-Jul-2015; Active, Disp: , Rfl:   •  FERROUS SULFATE PO, Take  by mouth., Disp: , Rfl:   •  furosemide (LASIX) 40 MG tablet, Take 1 tablet by mouth daily., Disp: , Rfl:   •  glucose blood test strip, TRUEtest Test In Vitro Strip; Patient Sig: TRUEtest Test In Vitro Strip ; 200; 0; 27-May-2014; Active, Disp: , Rfl:   •  HYDROcodone-acetaminophen (NORCO) 5-325 MG per tablet, Take 1 tablet by mouth every 6 (six) hours as needed. For pain, Disp: , Rfl:   •  insulin aspart (NovoLOG) 100 UNIT/ML injection, Inject 8 Units under the skin Medrol Dose Pack scheduling ONLY., Disp: , Rfl:   •  Insulin Glargine 100 UNIT/ML solution pen-injector, Inject 20 Units under the skin daily., Disp: , Rfl:   •  ipratropium-albuterol (DUONEB) 0.5-2.5 mg/mL nebulizer, Inhale 3 mL As Needed., Disp: , Rfl:   •  lansoprazole (PREVACID) 30 MG capsule, Take 1 capsule by mouth daily., Disp: , Rfl:   •  LANTUS 100 UNIT/ML injection, 22 Units Daily., Disp: , Rfl: 0  •  levETIRAcetam (KEPPRA) 500 MG tablet, Take 1 tablet by mouth 2 (two) times a day., Disp: , Rfl:   •  losartan (COZAAR) 100 MG tablet, Take 1 tablet by mouth daily., Disp: , Rfl:   •  metoclopramide (REGLAN) 10 MG tablet, TAKE 1 TABLET BY MOUTH TWICE DAILY BEFORE BREAKFAST AND AT BEDTIME, Disp: , Rfl:   •  Multiple Vitamins-Minerals (MULTIVITAL PO), Take 1 tablet by mouth daily., Disp: , Rfl:   •  O2 (OXYGEN), Inhale 2 L/min Daily., Disp: , Rfl:   •  pravastatin (PRAVACHOL) 40 MG tablet, Take 1 tablet by mouth nightly., Disp: , Rfl:   •  temazepam (RESTORIL) 30 MG capsule, Take 1 capsule by mouth nightly as needed. At bedtime, Disp: , Rfl:   •  HYDROcodone-acetaminophen (NORCO) 5-325 MG per tablet, Take 1 tablet by mouth., Disp: , Rfl:   •  HYDROcodone-acetaminophen (NORCO) 5-325 MG per tablet, Take 1 tablet by mouth., Disp: , Rfl:   •  metoclopramide (REGLAN) 10 MG tablet, Take 1 tablet by mouth daily. Before a meal, Disp: , Rfl:   •  pravastatin (PRAVACHOL) 40 MG tablet, TAKE 1  "TABLET BY MOUTH EVERY NIGHT, Disp: , Rfl:     ALLERGIES:     Allergies   Allergen Reactions   • Sulfa Antibiotics Unknown - Low Severity     GI upset   • Penicillins Itching and Rash       SOCIAL HISTORY:       Social History     Socioeconomic History   • Marital status:      Spouse name: Not on file   • Number of children: Not on file   • Years of education: High School   • Highest education level: Not on file   Occupational History   • Occupation: Nurse aid     Employer: RETIRED   Tobacco Use   • Smoking status: Never Smoker   • Smokeless tobacco: Never Used   Substance and Sexual Activity   • Alcohol use: No   • Drug use: No   • Sexual activity: Defer         FAMILY HISTORY:  Family History   Problem Relation Age of Onset   • Cancer Father    • Cancer Brother    • Diabetes Brother    • Heart disease Brother    • Cancer Other        REVIEW OF SYSTEMS:  Review of Systems   Constitutional: Negative for activity change.   HENT: Negative for nosebleeds and trouble swallowing.    Respiratory: Negative for shortness of breath and wheezing.    Cardiovascular: Negative for chest pain and palpitations.   Gastrointestinal: Negative for constipation, diarrhea and nausea.   Genitourinary: Negative for dysuria and hematuria.   Musculoskeletal: Negative for arthralgias and myalgias.   Skin: Negative for rash and wound.   Neurological: Negative for seizures and syncope.   Hematological: Negative for adenopathy. Does not bruise/bleed easily.   Psychiatric/Behavioral: Negative for confusion.             Objective    Vitals:    01/08/20 0800   BP: 152/69   Pulse: 99   Resp: 18   Temp: 98 °F (36.7 °C)   TempSrc: Oral   SpO2: (!) 84%   Weight: 85.7 kg (188 lb 14.4 oz)   Height: 162.6 cm (64.02\")   PainSc: 0-No pain     Current Status 1/8/2020   ECOG score 2      PHYSICAL EXAM:    CONSTITUTIONAL:  Vital signs reviewed.  No distress, looks comfortable.  EYES:  Conjunctiva and lids unremarkable.  PERRLA  EARS,NOSE,MOUTH,THROAT:  " Ears and nose appear unremarkable.  Lips, teeth, gums appear unremarkable.  RESPIRATORY:  Normal respiratory effort.  Lungs clear to auscultation bilaterally.  CARDIOVASCULAR:  Normal S1, S2.  No murmurs rubs or gallops.  No significant lower extremity edema.  GASTROINTESTINAL: Abdomen appears unremarkable.  Nontender.  No hepatomegaly.  No splenomegaly.  LYMPHATIC:  No cervical, supraclavicular, axillary lymphadenopathy.  SKIN:  Warm.  No rashes.  PSYCHIATRIC:  Normal judgment and insight.  Normal mood and affect.        RECENT LABS:        WBC   Date/Time Value Ref Range Status   01/08/2020 07:41 AM 5.71 3.40 - 10.80 10*3/mm3 Final   09/08/2018 04:25 AM 5.69 4.5 - 11.0 10*3/uL Final     Hemoglobin   Date/Time Value Ref Range Status   01/08/2020 07:41 AM 10.2 (L) 12.0 - 15.9 g/dL Final   09/08/2018 04:25 AM 8.6 (L) 12.0 - 16.0 g/dL Final     Platelets   Date/Time Value Ref Range Status   01/08/2020 07:41  140 - 450 10*3/mm3 Final   09/08/2018 04:25  140 - 440 10*3/uL Final       Assessment/Plan   ASSESSMENT:  There are no diagnoses linked to this encounter.    *Breast cancer.  Right-sided invasive ductal carcinoma, papillary type, removed with lumpectomy on 08/26/2014 by Dr. Sung Griggs. Only 1.7 mm of remaining invasive carcinoma. Node negative. Completed radiation.   Arimidex 10/17/2014-10/17/2019.    No clinical evidence of recurrence.  Remains in remission.    *Bone health. Normal bone density on DEXA scan 11/5/18. Calcium carbonate 600 mg with vitamin D once daily, changed to calcium citrate 1000 mg with vitamin D when Arimidex was started (she is also on lansoprazole).   · Calcium stopped 2/28/19 due to hypercalcemia  Remain off calcium.    *Anemia of chronic renal insufficiency, stage 3.   Procrit p.r.n. for hemoglobin less than 10.   On the late March 2016 visit, changed from every 5 week CBC to every 2 week CBC due to a fall in hemoglobin.  She has not needed much Procrit on the every 3 week  interval she is currently on.    · On the 1/8/2020 (today) office visit, last Procrit was 5000 units on 11/18/2019.  Therefore, change Procrit threshold to Hb <9 (since she has been receiving such a low dose of Procrit, Procrit might not be needed)  Hb 10.2    *Iron deficiency anemia. History of Venofer and Feraheme use. Does not respond well to p.o. iron but is taking ferrous sulfate 1 tablet 3 times per day without any problems (she will continue this).   In May 2015, she received one dose of Feraheme due to an iron percent saturation of around 10% and MCV of 82.6 despite an elevated ferritin. I think her ferritin is chronically high because of inflammation).   Last iron labs from 11/18/2019, not low.      *Elevated ferritin  · On the 1/8/2020 (today) office visit, daughter reports patient has only received 2 transfusions.  Therefore, suspect this is inflammatory in nature.  Doubt it is due to her oral iron.  However, stop oral iron 1 tablet 3 times per day.    *Elevated transaminases 1/4/18.  This prompted CT abdomen 1/11/18 (without contrast due to chronic renal insufficiency):  No evidence of malignancy.  Transaminases are much better today, but still significantly elevated.  She was advised to follow up with her PCP.  ALT and AST elevated March 2019.  Patient's daughter reports her PCP repeated these and repeat labs were fine.    *Hypercalcemia.  Calcium was stopped 2/28/2019.    PLAN:  · CBC every month  · Procrit if Hb <9.    · Iron labs 1 month and 3 months  · (Defer CMP lab monitoring to PCP)  · M.D. CBC Procrit 3-4 months.   · Stop Arimidex sometime around 10/17/2019.  (This completes 5 years)  · She states Dr. Plascencia does her breast exams and orders her mammograms and she is up-to-date.  ·   last mammogram BI-RADS 2 (3/27/2019, Mike).  I reminded them of when the next mammogram is due.      Daughter assisted with history.

## 2020-01-22 RX ORDER — ANASTROZOLE 1 MG/1
TABLET ORAL
OUTPATIENT
Start: 2020-01-22

## 2020-02-05 ENCOUNTER — INFUSION (OUTPATIENT)
Dept: ONCOLOGY | Facility: HOSPITAL | Age: 85
End: 2020-02-05

## 2020-02-05 ENCOUNTER — LAB (OUTPATIENT)
Dept: LAB | Facility: HOSPITAL | Age: 85
End: 2020-02-05

## 2020-02-05 DIAGNOSIS — N18.30 ANEMIA OF CHRONIC RENAL FAILURE, STAGE 3 (MODERATE) (HCC): Primary | ICD-10-CM

## 2020-02-05 DIAGNOSIS — N18.30 ANEMIA OF CHRONIC RENAL FAILURE, STAGE 3 (MODERATE) (HCC): ICD-10-CM

## 2020-02-05 DIAGNOSIS — D63.1 ANEMIA OF CHRONIC RENAL FAILURE, STAGE 3 (MODERATE) (HCC): ICD-10-CM

## 2020-02-05 DIAGNOSIS — D63.1 ANEMIA OF CHRONIC RENAL FAILURE, STAGE 3 (MODERATE) (HCC): Primary | ICD-10-CM

## 2020-02-05 LAB
BASOPHILS # BLD AUTO: 0.01 10*3/MM3 (ref 0–0.2)
BASOPHILS NFR BLD AUTO: 0.2 % (ref 0–1.5)
DEPRECATED RDW RBC AUTO: 41.1 FL (ref 37–54)
EOSINOPHIL # BLD AUTO: 0 10*3/MM3 (ref 0–0.4)
EOSINOPHIL NFR BLD AUTO: 0 % (ref 0.3–6.2)
ERYTHROCYTE [DISTWIDTH] IN BLOOD BY AUTOMATED COUNT: 12.2 % (ref 12.3–15.4)
FERRITIN SERPL-MCNC: 595.8 NG/ML (ref 13–150)
HCT VFR BLD AUTO: 28.9 % (ref 34–46.6)
HGB BLD-MCNC: 8.5 G/DL (ref 12–15.9)
IMM GRANULOCYTES # BLD AUTO: 0.02 10*3/MM3 (ref 0–0.05)
IMM GRANULOCYTES NFR BLD AUTO: 0.4 % (ref 0–0.5)
IRON 24H UR-MRATE: 73 MCG/DL (ref 37–145)
IRON SATN MFR SERPL: 29 % (ref 14–48)
LYMPHOCYTES # BLD AUTO: 1.08 10*3/MM3 (ref 0.7–3.1)
LYMPHOCYTES NFR BLD AUTO: 23.3 % (ref 19.6–45.3)
MCH RBC QN AUTO: 27.5 PG (ref 26.6–33)
MCHC RBC AUTO-ENTMCNC: 29.4 G/DL (ref 31.5–35.7)
MCV RBC AUTO: 93.5 FL (ref 79–97)
MONOCYTES # BLD AUTO: 0.48 10*3/MM3 (ref 0.1–0.9)
MONOCYTES NFR BLD AUTO: 10.4 % (ref 5–12)
NEUTROPHILS # BLD AUTO: 3.04 10*3/MM3 (ref 1.7–7)
NEUTROPHILS NFR BLD AUTO: 65.7 % (ref 42.7–76)
NRBC BLD AUTO-RTO: 0 /100 WBC (ref 0–0.2)
PLATELET # BLD AUTO: 210 10*3/MM3 (ref 140–450)
PMV BLD AUTO: 9.5 FL (ref 6–12)
RBC # BLD AUTO: 3.09 10*6/MM3 (ref 3.77–5.28)
TIBC SERPL-MCNC: 255 MCG/DL (ref 249–505)
TRANSFERRIN SERPL-MCNC: 182 MG/DL (ref 200–360)
WBC NRBC COR # BLD: 4.63 10*3/MM3 (ref 3.4–10.8)

## 2020-02-05 PROCEDURE — 82728 ASSAY OF FERRITIN: CPT

## 2020-02-05 PROCEDURE — 25010000002 EPOETIN ALFA-EPBX 10000 UNIT/ML SOLUTION: Performed by: INTERNAL MEDICINE

## 2020-02-05 PROCEDURE — 85025 COMPLETE CBC W/AUTO DIFF WBC: CPT

## 2020-02-05 PROCEDURE — 83540 ASSAY OF IRON: CPT

## 2020-02-05 PROCEDURE — 36415 COLL VENOUS BLD VENIPUNCTURE: CPT

## 2020-02-05 PROCEDURE — 84466 ASSAY OF TRANSFERRIN: CPT

## 2020-02-05 PROCEDURE — 96372 THER/PROPH/DIAG INJ SC/IM: CPT

## 2020-02-05 RX ADMIN — EPOETIN ALFA-EPBX 5000 UNITS: 10000 INJECTION, SOLUTION INTRAVENOUS; SUBCUTANEOUS at 10:56

## 2020-03-04 ENCOUNTER — INFUSION (OUTPATIENT)
Dept: ONCOLOGY | Facility: HOSPITAL | Age: 85
End: 2020-03-04

## 2020-03-04 ENCOUNTER — LAB (OUTPATIENT)
Dept: LAB | Facility: HOSPITAL | Age: 85
End: 2020-03-04

## 2020-03-04 DIAGNOSIS — D63.1 ANEMIA OF CHRONIC RENAL FAILURE, STAGE 3 (MODERATE) (HCC): ICD-10-CM

## 2020-03-04 DIAGNOSIS — N18.30 ANEMIA OF CHRONIC RENAL FAILURE, STAGE 3 (MODERATE) (HCC): ICD-10-CM

## 2020-03-04 LAB
BASOPHILS # BLD AUTO: 0.02 10*3/MM3 (ref 0–0.2)
BASOPHILS NFR BLD AUTO: 0.3 % (ref 0–1.5)
DEPRECATED RDW RBC AUTO: 41.5 FL (ref 37–54)
EOSINOPHIL # BLD AUTO: 0 10*3/MM3 (ref 0–0.4)
EOSINOPHIL NFR BLD AUTO: 0 % (ref 0.3–6.2)
ERYTHROCYTE [DISTWIDTH] IN BLOOD BY AUTOMATED COUNT: 12.4 % (ref 12.3–15.4)
HCT VFR BLD AUTO: 30.4 % (ref 34–46.6)
HGB BLD-MCNC: 9.3 G/DL (ref 12–15.9)
IMM GRANULOCYTES # BLD AUTO: 0.05 10*3/MM3 (ref 0–0.05)
IMM GRANULOCYTES NFR BLD AUTO: 0.8 % (ref 0–0.5)
LYMPHOCYTES # BLD AUTO: 1.32 10*3/MM3 (ref 0.7–3.1)
LYMPHOCYTES NFR BLD AUTO: 21.4 % (ref 19.6–45.3)
MCH RBC QN AUTO: 27.8 PG (ref 26.6–33)
MCHC RBC AUTO-ENTMCNC: 30.6 G/DL (ref 31.5–35.7)
MCV RBC AUTO: 91 FL (ref 79–97)
MONOCYTES # BLD AUTO: 0.56 10*3/MM3 (ref 0.1–0.9)
MONOCYTES NFR BLD AUTO: 9.1 % (ref 5–12)
NEUTROPHILS # BLD AUTO: 4.23 10*3/MM3 (ref 1.7–7)
NEUTROPHILS NFR BLD AUTO: 68.4 % (ref 42.7–76)
NRBC BLD AUTO-RTO: 0 /100 WBC (ref 0–0.2)
PLATELET # BLD AUTO: 226 10*3/MM3 (ref 140–450)
PMV BLD AUTO: 10.4 FL (ref 6–12)
RBC # BLD AUTO: 3.34 10*6/MM3 (ref 3.77–5.28)
WBC NRBC COR # BLD: 6.18 10*3/MM3 (ref 3.4–10.8)

## 2020-03-04 PROCEDURE — 85025 COMPLETE CBC W/AUTO DIFF WBC: CPT

## 2020-03-04 PROCEDURE — 36415 COLL VENOUS BLD VENIPUNCTURE: CPT

## 2020-03-04 NOTE — PROGRESS NOTES
Hgb today is 9.3.  No procrit today per Dr Trinh's note, procrit when Hgb less than 9.0.  Patient without questions or concerns at this time.   Given copy of labs.

## 2020-04-01 ENCOUNTER — LAB (OUTPATIENT)
Dept: LAB | Facility: HOSPITAL | Age: 85
End: 2020-04-01

## 2020-04-01 ENCOUNTER — INFUSION (OUTPATIENT)
Dept: ONCOLOGY | Facility: HOSPITAL | Age: 85
End: 2020-04-01

## 2020-04-01 DIAGNOSIS — D63.1 ANEMIA OF CHRONIC RENAL FAILURE, STAGE 3 (MODERATE) (HCC): ICD-10-CM

## 2020-04-01 DIAGNOSIS — N18.30 ANEMIA OF CHRONIC RENAL FAILURE, STAGE 3 (MODERATE) (HCC): ICD-10-CM

## 2020-04-01 LAB
BASOPHILS # BLD AUTO: 0.02 10*3/MM3 (ref 0–0.2)
BASOPHILS NFR BLD AUTO: 0.3 % (ref 0–1.5)
DEPRECATED RDW RBC AUTO: 39.5 FL (ref 37–54)
EOSINOPHIL # BLD AUTO: 0.01 10*3/MM3 (ref 0–0.4)
EOSINOPHIL NFR BLD AUTO: 0.2 % (ref 0.3–6.2)
ERYTHROCYTE [DISTWIDTH] IN BLOOD BY AUTOMATED COUNT: 12.2 % (ref 12.3–15.4)
HCT VFR BLD AUTO: 31.9 % (ref 34–46.6)
HGB BLD-MCNC: 10 G/DL (ref 12–15.9)
IMM GRANULOCYTES # BLD AUTO: 0.07 10*3/MM3 (ref 0–0.05)
IMM GRANULOCYTES NFR BLD AUTO: 1.1 % (ref 0–0.5)
LYMPHOCYTES # BLD AUTO: 1.5 10*3/MM3 (ref 0.7–3.1)
LYMPHOCYTES NFR BLD AUTO: 22.8 % (ref 19.6–45.3)
MCH RBC QN AUTO: 27.5 PG (ref 26.6–33)
MCHC RBC AUTO-ENTMCNC: 31.3 G/DL (ref 31.5–35.7)
MCV RBC AUTO: 87.9 FL (ref 79–97)
MONOCYTES # BLD AUTO: 0.62 10*3/MM3 (ref 0.1–0.9)
MONOCYTES NFR BLD AUTO: 9.4 % (ref 5–12)
NEUTROPHILS # BLD AUTO: 4.37 10*3/MM3 (ref 1.7–7)
NEUTROPHILS NFR BLD AUTO: 66.2 % (ref 42.7–76)
NRBC BLD AUTO-RTO: 0 /100 WBC (ref 0–0.2)
PLATELET # BLD AUTO: 230 10*3/MM3 (ref 140–450)
PMV BLD AUTO: 10.2 FL (ref 6–12)
RBC # BLD AUTO: 3.63 10*6/MM3 (ref 3.77–5.28)
WBC NRBC COR # BLD: 6.59 10*3/MM3 (ref 3.4–10.8)

## 2020-04-01 PROCEDURE — G0463 HOSPITAL OUTPT CLINIC VISIT: HCPCS

## 2020-04-01 PROCEDURE — 36415 COLL VENOUS BLD VENIPUNCTURE: CPT

## 2020-04-01 PROCEDURE — 85025 COMPLETE CBC W/AUTO DIFF WBC: CPT

## 2020-04-01 NOTE — PROGRESS NOTES
Hgb today is 10.0.  No procrit today per guidelines, and Dr Trinh's note procrit when Hgb less than 9.0.  Patient without questions or concerns at this time.  Given copy of labs.

## 2020-04-27 NOTE — PROGRESS NOTES
Subjective .     REASONS FOR FOLLOWUP:  Breast cancer, anemia    HISTORY OF PRESENT ILLNESS:  The patient is a 84 y.o. year old female  who is here for follow-up with the above-mentioned history.    No new problems.  Feeling fine.  Denies neurological symptoms, nausea, unusual areas of pain, change in baseline SOA.  Denies chest pain.  Denies bleeding.    Oxygen saturation only 80%.  However, she is on home oxygen.  The patient and daughter did not think she could use oxygen and wear a facemask at the same time.  We supplied some oxygen in the office and her oxygen saturation came up to 89%.  The daughter states she now realizes the patient can wear oxygen and a facemask at the same time and will do this from now on.    Past Medical History:   Diagnosis Date   • Anemia     Iron deficiency anemia    • Anxiety    • Arthritis    • Breast cancer (CMS/HCC) 08/2014    right    • Breast cancer (CMS/HCC) 12/2006    Left   • Chronic kidney disease     Anemia of chronic renal insufficency, stage 3   • COPD (chronic obstructive pulmonary disease) (CMS/[facility])    • Depression    • Diabetes mellitus (CMS/HCC)    • Hyperlipidemia    • Hypertension    • YARY (obstructive sleep apnea)    • Poor circulation    • Stroke (CMS/[facility])     CVA in 1990.       HEMATOLOGIC/ONCOLOGIC HISTORY:  (History from previous dates can be found in the separate document.)    MEDICATIONS    Current Outpatient Medications:   •  amLODIPine (NORVASC) 10 MG tablet, Take 1 tablet by mouth daily., Disp: , Rfl:   •  anastrozole (ARIMIDEX) 1 MG tablet, TAKE 1 TABLET BY MOUTH ONCE DAILY, Disp: 90 tablet, Rfl: 1  •  aspirin 81 MG tablet, Take 81 mg by mouth., Disp: , Rfl:   •  busPIRone (BUSPAR) 15 MG tablet, Take 1 tablet by mouth every morning. And 2 tablets in the evening, Disp: , Rfl:   •  cyanocobalamin (VITAMIN B-12) 1000 MCG tablet, Take 100 mcg by mouth., Disp: , Rfl:   •  diclofenac (VOLTAREN) 1 % gel gel, OBIE 2 GRAMS EXT AA TID, Disp: , Rfl: 3  •  doxepin  (SINEquan) 50 MG capsule, 3 tablets at bedtime, Disp: , Rfl: 0  •  epoetin joe (EPOGEN,PROCRIT) 03908 UNIT/ML injection, Epogen SOLN; Patient Sig: Epogen SOLN 5,000u if hgb is below 12   subq injectable; 0; 22-Jul-2015; Active, Disp: , Rfl:   •  FERROUS SULFATE PO, Take  by mouth., Disp: , Rfl:   •  furosemide (LASIX) 40 MG tablet, Take 1 tablet by mouth daily., Disp: , Rfl:   •  glucose blood test strip, TRUEtest Test In Vitro Strip; Patient Sig: TRUEtest Test In Vitro Strip ; 200; 0; 27-May-2014; Active, Disp: , Rfl:   •  HYDROcodone-acetaminophen (NORCO) 5-325 MG per tablet, Take 1 tablet by mouth every 6 (six) hours as needed. For pain, Disp: , Rfl:   •  HYDROcodone-acetaminophen (NORCO) 5-325 MG per tablet, Take 1 tablet by mouth., Disp: , Rfl:   •  HYDROcodone-acetaminophen (NORCO) 5-325 MG per tablet, Take 1 tablet by mouth., Disp: , Rfl:   •  insulin aspart (NovoLOG) 100 UNIT/ML injection, Inject 8 Units under the skin Medrol Dose Pack scheduling ONLY., Disp: , Rfl:   •  Insulin Glargine 100 UNIT/ML solution pen-injector, Inject 20 Units under the skin daily., Disp: , Rfl:   •  ipratropium-albuterol (DUONEB) 0.5-2.5 mg/mL nebulizer, Inhale 3 mL As Needed., Disp: , Rfl:   •  lansoprazole (PREVACID) 30 MG capsule, Take 1 capsule by mouth daily., Disp: , Rfl:   •  LANTUS 100 UNIT/ML injection, 22 Units Daily., Disp: , Rfl: 0  •  levETIRAcetam (KEPPRA) 500 MG tablet, Take 1 tablet by mouth 2 (two) times a day., Disp: , Rfl:   •  losartan (COZAAR) 100 MG tablet, Take 1 tablet by mouth daily., Disp: , Rfl:   •  metoclopramide (REGLAN) 10 MG tablet, Take 1 tablet by mouth daily. Before a meal, Disp: , Rfl:   •  metoclopramide (REGLAN) 10 MG tablet, TAKE 1 TABLET BY MOUTH TWICE DAILY BEFORE BREAKFAST AND AT BEDTIME, Disp: , Rfl:   •  Multiple Vitamins-Minerals (MULTIVITAL PO), Take 1 tablet by mouth daily., Disp: , Rfl:   •  O2 (OXYGEN), Inhale 2 L/min Daily., Disp: , Rfl:   •  pravastatin (PRAVACHOL) 40 MG tablet,  Take 1 tablet by mouth nightly., Disp: , Rfl:   •  pravastatin (PRAVACHOL) 40 MG tablet, TAKE 1 TABLET BY MOUTH EVERY NIGHT, Disp: , Rfl:   •  temazepam (RESTORIL) 30 MG capsule, Take 1 capsule by mouth nightly as needed. At bedtime, Disp: , Rfl:     ALLERGIES:     Allergies   Allergen Reactions   • Sulfa Antibiotics Unknown - Low Severity     GI upset   • Penicillins Itching and Rash       SOCIAL HISTORY:       Social History     Socioeconomic History   • Marital status:      Spouse name: Not on file   • Number of children: Not on file   • Years of education: High School   • Highest education level: Not on file   Occupational History   • Occupation: Nurse aid     Employer: RETIRED   Tobacco Use   • Smoking status: Never Smoker   • Smokeless tobacco: Never Used   Substance and Sexual Activity   • Alcohol use: No   • Drug use: No   • Sexual activity: Defer         FAMILY HISTORY:  Family History   Problem Relation Age of Onset   • Cancer Father    • Cancer Brother    • Diabetes Brother    • Heart disease Brother    • Cancer Other        REVIEW OF SYSTEMS:  Review of Systems   Constitutional: Negative for activity change.   HENT: Negative for nosebleeds and trouble swallowing.    Respiratory: Negative for shortness of breath and wheezing.    Cardiovascular: Negative for chest pain and palpitations.   Gastrointestinal: Negative for constipation, diarrhea and nausea.   Genitourinary: Negative for dysuria and hematuria.   Musculoskeletal: Negative for arthralgias and myalgias.   Skin: Negative for rash and wound.   Neurological: Negative for seizures and syncope.   Hematological: Negative for adenopathy. Does not bruise/bleed easily.   Psychiatric/Behavioral: Negative for confusion.             Objective    There were no vitals filed for this visit.  Current Status 1/8/2020   ECOG score 2      PHYSICAL EXAM:      Patient screened positive for depression based on a PHQ-9 score of 0 on 4/29/2020.     CONSTITUTIONAL:   Vital signs reviewed.  No distress, looks comfortable.  Overweight pleasant lady  EYES:  Conjunctiva and lids unremarkable.  PERRLA  EARS,NOSE,MOUTH,THROAT:  Ears and nose appear unremarkable.  Lips, teeth, gums appear unremarkable.  RESPIRATORY:  Normal respiratory effort.  Lungs clear to auscultation bilaterally.  CARDIOVASCULAR:  Normal S1, S2.  No murmurs rubs or gallops.  No significant lower extremity edema.  GASTROINTESTINAL: Abdomen appears unremarkable.  Nontender.  No hepatomegaly.  No splenomegaly.  LYMPHATIC:  No cervical, supraclavicular, axillary lymphadenopathy.  SKIN:  Warm.  No rashes.  PSYCHIATRIC:  Normal judgment and insight.  Normal mood and affect.     RECENT LABS:        WBC   Date/Time Value Ref Range Status   04/01/2020 10:08 AM 6.59 3.40 - 10.80 10*3/mm3 Final   09/08/2018 04:25 AM 5.69 4.5 - 11.0 10*3/uL Final     Hemoglobin   Date/Time Value Ref Range Status   04/01/2020 10:08 AM 10.0 (L) 12.0 - 15.9 g/dL Final   09/08/2018 04:25 AM 8.6 (L) 12.0 - 16.0 g/dL Final     Platelets   Date/Time Value Ref Range Status   04/01/2020 10:08  140 - 450 10*3/mm3 Final   09/08/2018 04:25  140 - 440 10*3/uL Final       Assessment/Plan   ASSESSMENT:  There are no diagnoses linked to this encounter.    *Breast cancer.  Right-sided invasive ductal carcinoma, papillary type, removed with lumpectomy on 08/26/2014 by Dr. Sung Griggs. Only 1.7 mm of remaining invasive carcinoma. Node negative. Completed radiation.   Arimidex 10/17/2014-10/17/2019.    No signs of recurrence.  Remission continues.    *Anemia of chronic renal insufficiency, stage 3.   Procrit p.r.n. for hemoglobin less than 10.   On the late March 2016 visit, changed from every 5 week CBC to every 2 week CBC due to a fall in hemoglobin.  She has not needed much Procrit on the every 3 week interval she is currently on.    · On the 1/8/2020 (today) office visit, last Procrit was 5000 units on 11/18/2019.  Therefore, change Procrit  threshold to Hb <9 (since she has been receiving such a low dose of Procrit, Procrit might not be needed)  Hb 9.2    *Iron deficiency anemia. History of Venofer and Feraheme use. Does not respond well to p.o. iron but is taking ferrous sulfate 1 tablet 3 times per day without any problems (she will continue this).   In May 2015, she received one dose of Feraheme due to an iron percent saturation of around 10% and MCV of 82.6 despite an elevated ferritin. I think her ferritin is chronically high because of inflammation).   Last iron labs from 2/5/2020 not low    *Elevated ferritin  · On the 1/8/2020 office visit, daughter reports patient has only received 2 transfusions.  Therefore, suspect this is inflammatory in nature.  Doubt it is due to her oral iron.    · On 1/8/2020, stopped oral iron 1 tablet 3 times per day.  Ferritin is better, 596 on 2/5/2020 from 1034 on 11/18/2019    *Elevated transaminases 1/4/18.  This prompted CT abdomen 1/11/18 (without contrast due to chronic renal insufficiency):  No evidence of malignancy.  Transaminases are much better today, but still significantly elevated.  She was advised to follow up with her PCP.  ALT and AST elevated March 2019.  Patient's daughter reports her PCP repeated these and repeat labs were fine.    *Hypercalcemia.  Calcium was stopped 2/28/2019.  Remain off calcium      PLAN:  · CBC every month  · Procrit if Hb <9.    · Iron labs 1 month and 3 months  · (Defer CMP lab monitoring to PCP)  · M.D. CBC Procrit 4 months.   · She states Dr. Bruce Maldonado (PCP) does her breast exams and orders her mammograms and she is up-to-date.  ·   last mammogram BI-RADS 2 (3/27/2019, Mike).  Daughter states she is a little behind on her mammogram because it is getting delayed due to the coronavirus pandemic      Daughter assisted with history.    Send copy this to PCP, Dr. Bruce Maldonado

## 2020-04-29 ENCOUNTER — LAB (OUTPATIENT)
Dept: LAB | Facility: HOSPITAL | Age: 85
End: 2020-04-29

## 2020-04-29 ENCOUNTER — OFFICE VISIT (OUTPATIENT)
Dept: ONCOLOGY | Facility: CLINIC | Age: 85
End: 2020-04-29

## 2020-04-29 ENCOUNTER — APPOINTMENT (OUTPATIENT)
Dept: ONCOLOGY | Facility: HOSPITAL | Age: 85
End: 2020-04-29

## 2020-04-29 VITALS
RESPIRATION RATE: 16 BRPM | BODY MASS INDEX: 32.95 KG/M2 | OXYGEN SATURATION: 89 % | SYSTOLIC BLOOD PRESSURE: 165 MMHG | WEIGHT: 193 LBS | TEMPERATURE: 98.7 F | DIASTOLIC BLOOD PRESSURE: 70 MMHG | HEART RATE: 103 BPM | HEIGHT: 64 IN

## 2020-04-29 DIAGNOSIS — D63.1 ANEMIA OF CHRONIC RENAL FAILURE, STAGE 3 (MODERATE) (HCC): ICD-10-CM

## 2020-04-29 DIAGNOSIS — Z17.0 MALIGNANT NEOPLASM OF UPPER-INNER QUADRANT OF RIGHT BREAST IN FEMALE, ESTROGEN RECEPTOR POSITIVE (HCC): Primary | ICD-10-CM

## 2020-04-29 DIAGNOSIS — N18.30 ANEMIA OF CHRONIC RENAL FAILURE, STAGE 3 (MODERATE) (HCC): ICD-10-CM

## 2020-04-29 DIAGNOSIS — C50.211 MALIGNANT NEOPLASM OF UPPER-INNER QUADRANT OF RIGHT BREAST IN FEMALE, ESTROGEN RECEPTOR POSITIVE (HCC): Primary | ICD-10-CM

## 2020-04-29 LAB
BASOPHILS # BLD AUTO: 0.02 10*3/MM3 (ref 0–0.2)
BASOPHILS NFR BLD AUTO: 0.3 % (ref 0–1.5)
DEPRECATED RDW RBC AUTO: 41.3 FL (ref 37–54)
EOSINOPHIL # BLD AUTO: 0 10*3/MM3 (ref 0–0.4)
EOSINOPHIL NFR BLD AUTO: 0 % (ref 0.3–6.2)
ERYTHROCYTE [DISTWIDTH] IN BLOOD BY AUTOMATED COUNT: 12.7 % (ref 12.3–15.4)
FERRITIN SERPL-MCNC: 488.7 NG/ML (ref 13–150)
HCT VFR BLD AUTO: 30.5 % (ref 34–46.6)
HGB BLD-MCNC: 9.2 G/DL (ref 12–15.9)
IMM GRANULOCYTES # BLD AUTO: 0.04 10*3/MM3 (ref 0–0.05)
IMM GRANULOCYTES NFR BLD AUTO: 0.6 % (ref 0–0.5)
IRON 24H UR-MRATE: 41 MCG/DL (ref 37–145)
IRON SATN MFR SERPL: 16 % (ref 14–48)
LYMPHOCYTES # BLD AUTO: 1.83 10*3/MM3 (ref 0.7–3.1)
LYMPHOCYTES NFR BLD AUTO: 26.7 % (ref 19.6–45.3)
MCH RBC QN AUTO: 27.2 PG (ref 26.6–33)
MCHC RBC AUTO-ENTMCNC: 30.2 G/DL (ref 31.5–35.7)
MCV RBC AUTO: 90.2 FL (ref 79–97)
MONOCYTES # BLD AUTO: 0.71 10*3/MM3 (ref 0.1–0.9)
MONOCYTES NFR BLD AUTO: 10.4 % (ref 5–12)
NEUTROPHILS # BLD AUTO: 4.25 10*3/MM3 (ref 1.7–7)
NEUTROPHILS NFR BLD AUTO: 62 % (ref 42.7–76)
NRBC BLD AUTO-RTO: 0 /100 WBC (ref 0–0.2)
PLATELET # BLD AUTO: 220 10*3/MM3 (ref 140–450)
PMV BLD AUTO: 8.8 FL (ref 6–12)
RBC # BLD AUTO: 3.38 10*6/MM3 (ref 3.77–5.28)
TIBC SERPL-MCNC: 256 MCG/DL (ref 249–505)
TRANSFERRIN SERPL-MCNC: 183 MG/DL (ref 200–360)
WBC NRBC COR # BLD: 6.85 10*3/MM3 (ref 3.4–10.8)

## 2020-04-29 PROCEDURE — 99214 OFFICE O/P EST MOD 30 MIN: CPT | Performed by: INTERNAL MEDICINE

## 2020-04-29 PROCEDURE — 82728 ASSAY OF FERRITIN: CPT

## 2020-04-29 PROCEDURE — 83540 ASSAY OF IRON: CPT

## 2020-04-29 PROCEDURE — 84466 ASSAY OF TRANSFERRIN: CPT

## 2020-04-29 PROCEDURE — 85025 COMPLETE CBC W/AUTO DIFF WBC: CPT

## 2020-04-29 PROCEDURE — 36415 COLL VENOUS BLD VENIPUNCTURE: CPT

## 2020-05-27 ENCOUNTER — INFUSION (OUTPATIENT)
Dept: ONCOLOGY | Facility: HOSPITAL | Age: 85
End: 2020-05-27

## 2020-05-27 ENCOUNTER — LAB (OUTPATIENT)
Dept: LAB | Facility: HOSPITAL | Age: 85
End: 2020-05-27

## 2020-05-27 DIAGNOSIS — C50.211 MALIGNANT NEOPLASM OF UPPER-INNER QUADRANT OF RIGHT BREAST IN FEMALE, ESTROGEN RECEPTOR POSITIVE (HCC): ICD-10-CM

## 2020-05-27 DIAGNOSIS — Z17.0 MALIGNANT NEOPLASM OF UPPER-INNER QUADRANT OF RIGHT BREAST IN FEMALE, ESTROGEN RECEPTOR POSITIVE (HCC): ICD-10-CM

## 2020-05-27 LAB
BASOPHILS # BLD AUTO: 0.03 10*3/MM3 (ref 0–0.2)
BASOPHILS NFR BLD AUTO: 0.4 % (ref 0–1.5)
DEPRECATED RDW RBC AUTO: 43 FL (ref 37–54)
EOSINOPHIL # BLD AUTO: 0 10*3/MM3 (ref 0–0.4)
EOSINOPHIL NFR BLD AUTO: 0 % (ref 0.3–6.2)
ERYTHROCYTE [DISTWIDTH] IN BLOOD BY AUTOMATED COUNT: 13.2 % (ref 12.3–15.4)
FERRITIN SERPL-MCNC: 467 NG/ML (ref 13–150)
HCT VFR BLD AUTO: 31.6 % (ref 34–46.6)
HGB BLD-MCNC: 9.2 G/DL (ref 12–15.9)
IMM GRANULOCYTES # BLD AUTO: 0.02 10*3/MM3 (ref 0–0.05)
IMM GRANULOCYTES NFR BLD AUTO: 0.2 % (ref 0–0.5)
IRON 24H UR-MRATE: 39 MCG/DL (ref 37–145)
IRON SATN MFR SERPL: 15 % (ref 14–48)
LYMPHOCYTES # BLD AUTO: 2.22 10*3/MM3 (ref 0.7–3.1)
LYMPHOCYTES NFR BLD AUTO: 27.5 % (ref 19.6–45.3)
MCH RBC QN AUTO: 26.4 PG (ref 26.6–33)
MCHC RBC AUTO-ENTMCNC: 29.1 G/DL (ref 31.5–35.7)
MCV RBC AUTO: 90.8 FL (ref 79–97)
MONOCYTES # BLD AUTO: 0.69 10*3/MM3 (ref 0.1–0.9)
MONOCYTES NFR BLD AUTO: 8.6 % (ref 5–12)
NEUTROPHILS # BLD AUTO: 5.11 10*3/MM3 (ref 1.7–7)
NEUTROPHILS NFR BLD AUTO: 63.3 % (ref 42.7–76)
NRBC BLD AUTO-RTO: 0 /100 WBC (ref 0–0.2)
PLATELET # BLD AUTO: 258 10*3/MM3 (ref 140–450)
PMV BLD AUTO: 9.9 FL (ref 6–12)
RBC # BLD AUTO: 3.48 10*6/MM3 (ref 3.77–5.28)
TIBC SERPL-MCNC: 269 MCG/DL (ref 249–505)
TRANSFERRIN SERPL-MCNC: 192 MG/DL (ref 200–360)
WBC NRBC COR # BLD: 8.07 10*3/MM3 (ref 3.4–10.8)

## 2020-05-27 PROCEDURE — 82728 ASSAY OF FERRITIN: CPT

## 2020-05-27 PROCEDURE — 85025 COMPLETE CBC W/AUTO DIFF WBC: CPT

## 2020-05-27 PROCEDURE — 84466 ASSAY OF TRANSFERRIN: CPT

## 2020-05-27 PROCEDURE — G0463 HOSPITAL OUTPT CLINIC VISIT: HCPCS

## 2020-05-27 PROCEDURE — 83540 ASSAY OF IRON: CPT

## 2020-05-27 PROCEDURE — 36415 COLL VENOUS BLD VENIPUNCTURE: CPT

## 2020-05-27 NOTE — PROGRESS NOTES
Hgb today is 9.2.  No procrit today per Dr Trinh's note procrit when Hgb less than 9.0.  Patient without questions or concerns at this time.  Given copy of labs.

## 2020-06-10 ENCOUNTER — TELEPHONE (OUTPATIENT)
Dept: ONCOLOGY | Facility: CLINIC | Age: 85
End: 2020-06-10

## 2020-06-10 DIAGNOSIS — D63.1 ANEMIA OF CHRONIC RENAL FAILURE, STAGE 3 (MODERATE) (HCC): Primary | ICD-10-CM

## 2020-06-10 DIAGNOSIS — D50.9 IRON DEFICIENCY ANEMIA, UNSPECIFIED IRON DEFICIENCY ANEMIA TYPE: ICD-10-CM

## 2020-06-10 DIAGNOSIS — N18.30 ANEMIA OF CHRONIC RENAL FAILURE, STAGE 3 (MODERATE) (HCC): Primary | ICD-10-CM

## 2020-06-10 RX ORDER — FUROSEMIDE 10 MG/ML
20 INJECTION INTRAMUSCULAR; INTRAVENOUS ONCE
Status: CANCELLED | OUTPATIENT
Start: 2020-06-12 | End: 2020-06-10

## 2020-06-10 RX ORDER — SODIUM CHLORIDE 9 MG/ML
250 INJECTION, SOLUTION INTRAVENOUS AS NEEDED
Status: CANCELLED | OUTPATIENT
Start: 2020-06-12

## 2020-06-10 RX ORDER — ACETAMINOPHEN 325 MG/1
650 TABLET ORAL ONCE
Status: CANCELLED | OUTPATIENT
Start: 2020-06-12 | End: 2020-06-10

## 2020-06-10 RX ORDER — DIPHENHYDRAMINE HCL 25 MG
25 CAPSULE ORAL ONCE
Status: CANCELLED | OUTPATIENT
Start: 2020-06-12 | End: 2020-06-10

## 2020-06-10 NOTE — TELEPHONE ENCOUNTER
Pt's daughter called to let us know that pt saw her PCP 2 days ago and her Hgb was 7.8. They also found her to have small pleural effusions and pulmonary edema. She is scheduled to see cardiology and having an ECHO to further assess this. Her PCP wanted her to reach out to Dr. Trinh about the Hgb. Pt is currently on lasix BID. Reviewed all with Dr. Trinh. Per Dr. Trinh, order 2 units PRBC with IV lasix 20mg to be given after the 1st and 2nd units. Also, blood needs to be ran as slow as possible to avoid volume overload. Informed pt's daughter of orders. Message sent to scheduling and informatics to place order and schedule.

## 2020-06-10 NOTE — TELEPHONE ENCOUNTER
----- Message from Florence Soto, RN sent at 6/10/2020  4:32 PM EDT -----  Please schedule pt for 2 units PRBC--first available. Pt will need T&C at hospital. Please call pt's daughter Dorcas Mcknight [phone] to schedule.     Informatics: please place order for 2 units PRBC per Dr. Trinh. Pt will need 20mg IV lasix after the FIRST AND SECOND units d/t pulmonary edema. Also please put on order to run the blood as slow as allowed.

## 2020-06-12 ENCOUNTER — APPOINTMENT (OUTPATIENT)
Dept: GENERAL RADIOLOGY | Facility: HOSPITAL | Age: 85
End: 2020-06-12

## 2020-06-12 ENCOUNTER — HOSPITAL ENCOUNTER (OUTPATIENT)
Dept: INFUSION THERAPY | Facility: HOSPITAL | Age: 85
Setting detail: INFUSION SERIES
Discharge: HOME | End: 2020-06-12

## 2020-06-12 ENCOUNTER — HOSPITAL ENCOUNTER (INPATIENT)
Facility: HOSPITAL | Age: 85
LOS: 21 days | Discharge: HOME HEALTH | End: 2020-07-03
Attending: EMERGENCY MEDICINE | Admitting: HOSPITALIST

## 2020-06-12 VITALS
OXYGEN SATURATION: 100 % | RESPIRATION RATE: 24 BRPM | DIASTOLIC BLOOD PRESSURE: 88 MMHG | SYSTOLIC BLOOD PRESSURE: 154 MMHG | HEART RATE: 118 BPM | TEMPERATURE: 99.1 F

## 2020-06-12 DIAGNOSIS — J96.10 CHRONIC RESPIRATORY FAILURE (HCC): ICD-10-CM

## 2020-06-12 DIAGNOSIS — N18.30 ANEMIA OF CHRONIC RENAL FAILURE, STAGE 3 (MODERATE) (HCC): ICD-10-CM

## 2020-06-12 DIAGNOSIS — R13.12 OROPHARYNGEAL DYSPHAGIA: ICD-10-CM

## 2020-06-12 DIAGNOSIS — R47.01 EXPRESSIVE APHASIA: ICD-10-CM

## 2020-06-12 DIAGNOSIS — D50.9 IRON DEFICIENCY ANEMIA, UNSPECIFIED IRON DEFICIENCY ANEMIA TYPE: ICD-10-CM

## 2020-06-12 DIAGNOSIS — J96.20 ACUTE AND CHRONIC RESPIRATORY FAILURE (ACUTE-ON-CHRONIC) (HCC): ICD-10-CM

## 2020-06-12 DIAGNOSIS — D63.1 ANEMIA OF CHRONIC RENAL FAILURE, STAGE 3 (MODERATE) (HCC): ICD-10-CM

## 2020-06-12 DIAGNOSIS — I48.91 NEW ONSET ATRIAL FIBRILLATION (HCC): Primary | ICD-10-CM

## 2020-06-12 DIAGNOSIS — I69.30 SEQUELAE, POST-STROKE: ICD-10-CM

## 2020-06-12 DIAGNOSIS — I50.9 CHF (CONGESTIVE HEART FAILURE) (HCC): ICD-10-CM

## 2020-06-12 DIAGNOSIS — R06.02 SHORTNESS OF BREATH: ICD-10-CM

## 2020-06-12 PROBLEM — I10 HYPERTENSION: Status: ACTIVE | Noted: 2020-06-12

## 2020-06-12 PROBLEM — E11.22 TYPE 2 DIABETES MELLITUS WITH STAGE 3 CHRONIC KIDNEY DISEASE, WITH LONG-TERM CURRENT USE OF INSULIN (HCC): Status: ACTIVE | Noted: 2020-06-12

## 2020-06-12 PROBLEM — R79.89 ELEVATED TROPONIN: Status: ACTIVE | Noted: 2020-06-12

## 2020-06-12 PROBLEM — E78.5 HYPERLIPIDEMIA: Status: ACTIVE | Noted: 2020-06-12

## 2020-06-12 PROBLEM — E66.01 MORBID OBESITY WITH BMI OF 40.0-44.9, ADULT (HCC): Status: ACTIVE | Noted: 2020-06-12

## 2020-06-12 PROBLEM — Z79.4 TYPE 2 DIABETES MELLITUS WITH STAGE 3 CHRONIC KIDNEY DISEASE, WITH LONG-TERM CURRENT USE OF INSULIN (HCC): Status: ACTIVE | Noted: 2020-06-12

## 2020-06-12 PROBLEM — E11.9 DIABETES MELLITUS (HCC): Status: ACTIVE | Noted: 2020-06-12

## 2020-06-12 PROBLEM — J44.9 COPD (CHRONIC OBSTRUCTIVE PULMONARY DISEASE) (HCC): Status: ACTIVE | Noted: 2020-06-12

## 2020-06-12 PROBLEM — R79.89 ELEVATED LFTS: Status: ACTIVE | Noted: 2020-06-12

## 2020-06-12 PROBLEM — R77.8 ELEVATED TROPONIN: Status: ACTIVE | Noted: 2020-06-12

## 2020-06-12 LAB
ABO GROUP BLD: NORMAL
ALBUMIN SERPL-MCNC: 3.5 G/DL (ref 3.5–5.2)
ALBUMIN/GLOB SERPL: 1.2 G/DL
ALP SERPL-CCNC: 108 U/L (ref 39–117)
ALT SERPL W P-5'-P-CCNC: 71 U/L (ref 1–33)
ANION GAP SERPL CALCULATED.3IONS-SCNC: 7.2 MMOL/L (ref 5–15)
AST SERPL-CCNC: 47 U/L (ref 1–32)
BASOPHILS # BLD AUTO: 0.02 10*3/MM3 (ref 0–0.2)
BASOPHILS NFR BLD AUTO: 0.3 % (ref 0–1.5)
BILIRUB SERPL-MCNC: 0.2 MG/DL (ref 0.2–1.2)
BLD GP AB SCN SERPL QL: NEGATIVE
BUN BLD-MCNC: 26 MG/DL (ref 8–23)
BUN/CREAT SERPL: 19.7 (ref 7–25)
CALCIUM SPEC-SCNC: 8.1 MG/DL (ref 8.6–10.5)
CHLORIDE SERPL-SCNC: 103 MMOL/L (ref 98–107)
CO2 SERPL-SCNC: 32.8 MMOL/L (ref 22–29)
CREAT BLD-MCNC: 1.32 MG/DL (ref 0.57–1)
DEPRECATED RDW RBC AUTO: 44.9 FL (ref 37–54)
EOSINOPHIL # BLD AUTO: 0 10*3/MM3 (ref 0–0.4)
EOSINOPHIL NFR BLD AUTO: 0 % (ref 0.3–6.2)
ERYTHROCYTE [DISTWIDTH] IN BLOOD BY AUTOMATED COUNT: 15 % (ref 12.3–15.4)
GFR SERPL CREATININE-BSD FRML MDRD: 47 ML/MIN/1.73
GLOBULIN UR ELPH-MCNC: 2.9 GM/DL
GLUCOSE BLD-MCNC: 122 MG/DL (ref 65–99)
HCT VFR BLD AUTO: 30.6 % (ref 34–46.6)
HGB BLD-MCNC: 9.5 G/DL (ref 12–15.9)
IMM GRANULOCYTES # BLD AUTO: 0.07 10*3/MM3 (ref 0–0.05)
IMM GRANULOCYTES NFR BLD AUTO: 1 % (ref 0–0.5)
INR PPP: 1.09 (ref 0.9–1.1)
LYMPHOCYTES # BLD AUTO: 1.14 10*3/MM3 (ref 0.7–3.1)
LYMPHOCYTES NFR BLD AUTO: 16.3 % (ref 19.6–45.3)
MAGNESIUM SERPL-MCNC: 2 MG/DL (ref 1.6–2.4)
MCH RBC QN AUTO: 25.9 PG (ref 26.6–33)
MCHC RBC AUTO-ENTMCNC: 31 G/DL (ref 31.5–35.7)
MCV RBC AUTO: 83.4 FL (ref 79–97)
MONOCYTES # BLD AUTO: 0.97 10*3/MM3 (ref 0.1–0.9)
MONOCYTES NFR BLD AUTO: 13.8 % (ref 5–12)
NEUTROPHILS # BLD AUTO: 4.81 10*3/MM3 (ref 1.7–7)
NEUTROPHILS NFR BLD AUTO: 68.6 % (ref 42.7–76)
NRBC BLD AUTO-RTO: 1 /100 WBC (ref 0–0.2)
NT-PROBNP SERPL-MCNC: 4222 PG/ML (ref 5–1800)
PLATELET # BLD AUTO: 267 10*3/MM3 (ref 140–450)
PMV BLD AUTO: 9.3 FL (ref 6–12)
POTASSIUM BLD-SCNC: 4.4 MMOL/L (ref 3.5–5.2)
PROT SERPL-MCNC: 6.4 G/DL (ref 6–8.5)
PROTHROMBIN TIME: 13.8 SECONDS (ref 11.7–14.2)
RBC # BLD AUTO: 3.67 10*6/MM3 (ref 3.77–5.28)
RH BLD: POSITIVE
SODIUM BLD-SCNC: 143 MMOL/L (ref 136–145)
T&S EXPIRATION DATE: NORMAL
TROPONIN T SERPL-MCNC: 0.08 NG/ML (ref 0–0.03)
WBC NRBC COR # BLD: 7.01 10*3/MM3 (ref 3.4–10.8)

## 2020-06-12 PROCEDURE — 93010 ELECTROCARDIOGRAM REPORT: CPT | Performed by: INTERNAL MEDICINE

## 2020-06-12 PROCEDURE — 93005 ELECTROCARDIOGRAM TRACING: CPT | Performed by: EMERGENCY MEDICINE

## 2020-06-12 PROCEDURE — 71045 X-RAY EXAM CHEST 1 VIEW: CPT

## 2020-06-12 PROCEDURE — 85025 COMPLETE CBC W/AUTO DIFF WBC: CPT | Performed by: NURSE PRACTITIONER

## 2020-06-12 PROCEDURE — 85610 PROTHROMBIN TIME: CPT | Performed by: NURSE PRACTITIONER

## 2020-06-12 PROCEDURE — 99284 EMERGENCY DEPT VISIT MOD MDM: CPT

## 2020-06-12 PROCEDURE — 86900 BLOOD TYPING SEROLOGIC ABO: CPT

## 2020-06-12 PROCEDURE — 86850 RBC ANTIBODY SCREEN: CPT | Performed by: INTERNAL MEDICINE

## 2020-06-12 PROCEDURE — 36430 TRANSFUSION BLD/BLD COMPNT: CPT

## 2020-06-12 PROCEDURE — 63710000001 DIPHENHYDRAMINE PER 50 MG: Performed by: INTERNAL MEDICINE

## 2020-06-12 PROCEDURE — 86900 BLOOD TYPING SEROLOGIC ABO: CPT | Performed by: INTERNAL MEDICINE

## 2020-06-12 PROCEDURE — 83735 ASSAY OF MAGNESIUM: CPT | Performed by: NURSE PRACTITIONER

## 2020-06-12 PROCEDURE — 93005 ELECTROCARDIOGRAM TRACING: CPT

## 2020-06-12 PROCEDURE — 86901 BLOOD TYPING SEROLOGIC RH(D): CPT | Performed by: INTERNAL MEDICINE

## 2020-06-12 PROCEDURE — 80053 COMPREHEN METABOLIC PANEL: CPT | Performed by: NURSE PRACTITIONER

## 2020-06-12 PROCEDURE — G0463 HOSPITAL OUTPT CLINIC VISIT: HCPCS

## 2020-06-12 PROCEDURE — 86923 COMPATIBILITY TEST ELECTRIC: CPT

## 2020-06-12 PROCEDURE — 84484 ASSAY OF TROPONIN QUANT: CPT | Performed by: NURSE PRACTITIONER

## 2020-06-12 PROCEDURE — 83880 ASSAY OF NATRIURETIC PEPTIDE: CPT | Performed by: NURSE PRACTITIONER

## 2020-06-12 PROCEDURE — 25010000002 FUROSEMIDE PER 20 MG: Performed by: INTERNAL MEDICINE

## 2020-06-12 PROCEDURE — P9016 RBC LEUKOCYTES REDUCED: HCPCS

## 2020-06-12 PROCEDURE — 96374 THER/PROPH/DIAG INJ IV PUSH: CPT

## 2020-06-12 PROCEDURE — 25010000002 FUROSEMIDE PER 20 MG: Performed by: NURSE PRACTITIONER

## 2020-06-12 RX ORDER — INSULIN GLARGINE 100 [IU]/ML
22 INJECTION, SOLUTION SUBCUTANEOUS DAILY
Status: DISCONTINUED | OUTPATIENT
Start: 2020-06-13 | End: 2020-06-13

## 2020-06-12 RX ORDER — FUROSEMIDE 10 MG/ML
20 INJECTION INTRAMUSCULAR; INTRAVENOUS ONCE
Status: COMPLETED | OUTPATIENT
Start: 2020-06-12 | End: 2020-06-12

## 2020-06-12 RX ORDER — ACETAMINOPHEN 160 MG/5ML
650 SOLUTION ORAL EVERY 4 HOURS PRN
Status: DISCONTINUED | OUTPATIENT
Start: 2020-06-12 | End: 2020-06-12

## 2020-06-12 RX ORDER — ACETAMINOPHEN 650 MG/1
650 SUPPOSITORY RECTAL EVERY 4 HOURS PRN
Status: DISCONTINUED | OUTPATIENT
Start: 2020-06-12 | End: 2020-06-12

## 2020-06-12 RX ORDER — ONDANSETRON 2 MG/ML
4 INJECTION INTRAMUSCULAR; INTRAVENOUS EVERY 6 HOURS PRN
Status: DISCONTINUED | OUTPATIENT
Start: 2020-06-12 | End: 2020-07-03 | Stop reason: HOSPADM

## 2020-06-12 RX ORDER — FUROSEMIDE 10 MG/ML
20 INJECTION INTRAMUSCULAR; INTRAVENOUS ONCE
Status: DISCONTINUED | OUTPATIENT
Start: 2020-06-12 | End: 2020-06-13

## 2020-06-12 RX ORDER — ACETAMINOPHEN 325 MG/1
650 TABLET ORAL EVERY 4 HOURS PRN
Status: DISCONTINUED | OUTPATIENT
Start: 2020-06-12 | End: 2020-06-12

## 2020-06-12 RX ORDER — SODIUM CHLORIDE 9 MG/ML
250 INJECTION, SOLUTION INTRAVENOUS AS NEEDED
Status: DISCONTINUED | OUTPATIENT
Start: 2020-06-12 | End: 2020-06-14 | Stop reason: HOSPADM

## 2020-06-12 RX ORDER — TEMAZEPAM 15 MG/1
15 CAPSULE ORAL NIGHTLY PRN
Status: DISCONTINUED | OUTPATIENT
Start: 2020-06-12 | End: 2020-06-19

## 2020-06-12 RX ORDER — BUSPIRONE HYDROCHLORIDE 15 MG/1
15 TABLET ORAL EVERY MORNING
Status: DISCONTINUED | OUTPATIENT
Start: 2020-06-13 | End: 2020-07-03 | Stop reason: HOSPADM

## 2020-06-12 RX ORDER — PANTOPRAZOLE SODIUM 40 MG/1
40 TABLET, DELAYED RELEASE ORAL EVERY MORNING
Status: DISCONTINUED | OUTPATIENT
Start: 2020-06-13 | End: 2020-06-15

## 2020-06-12 RX ORDER — SODIUM CHLORIDE 0.9 % (FLUSH) 0.9 %
10 SYRINGE (ML) INJECTION AS NEEDED
Status: DISCONTINUED | OUTPATIENT
Start: 2020-06-12 | End: 2020-06-12

## 2020-06-12 RX ORDER — NITROGLYCERIN 0.4 MG/1
0.4 TABLET SUBLINGUAL
Status: DISCONTINUED | OUTPATIENT
Start: 2020-06-12 | End: 2020-07-03 | Stop reason: HOSPADM

## 2020-06-12 RX ORDER — MULTIPLE VITAMINS W/ MINERALS TAB 9MG-400MCG
1 TAB ORAL DAILY
Status: DISCONTINUED | OUTPATIENT
Start: 2020-06-13 | End: 2020-07-03 | Stop reason: HOSPADM

## 2020-06-12 RX ORDER — ASPIRIN 81 MG/1
81 TABLET ORAL DAILY
Status: DISCONTINUED | OUTPATIENT
Start: 2020-06-13 | End: 2020-06-13

## 2020-06-12 RX ORDER — HYDROCODONE BITARTRATE AND ACETAMINOPHEN 5; 325 MG/1; MG/1
1 TABLET ORAL EVERY 8 HOURS PRN
Status: DISCONTINUED | OUTPATIENT
Start: 2020-06-12 | End: 2020-07-03 | Stop reason: HOSPADM

## 2020-06-12 RX ORDER — NICOTINE POLACRILEX 4 MG
15 LOZENGE BUCCAL
Status: DISCONTINUED | OUTPATIENT
Start: 2020-06-12 | End: 2020-06-20

## 2020-06-12 RX ORDER — METOCLOPRAMIDE 5 MG/1
7.5 TABLET ORAL
Status: DISCONTINUED | OUTPATIENT
Start: 2020-06-13 | End: 2020-06-19

## 2020-06-12 RX ORDER — DEXTROSE MONOHYDRATE 25 G/50ML
25 INJECTION, SOLUTION INTRAVENOUS
Status: DISCONTINUED | OUTPATIENT
Start: 2020-06-12 | End: 2020-06-20

## 2020-06-12 RX ORDER — DOXEPIN HYDROCHLORIDE 50 MG/1
100 CAPSULE ORAL
COMMUNITY

## 2020-06-12 RX ORDER — LEVETIRACETAM 500 MG/1
500 TABLET ORAL EVERY 12 HOURS SCHEDULED
Status: DISCONTINUED | OUTPATIENT
Start: 2020-06-12 | End: 2020-06-13

## 2020-06-12 RX ORDER — DIPHENHYDRAMINE HCL 25 MG
25 CAPSULE ORAL ONCE
Status: COMPLETED | OUTPATIENT
Start: 2020-06-12 | End: 2020-06-12

## 2020-06-12 RX ORDER — BUSPIRONE HYDROCHLORIDE 15 MG/1
30 TABLET ORAL EVERY EVENING
COMMUNITY

## 2020-06-12 RX ORDER — FUROSEMIDE 10 MG/ML
40 INJECTION INTRAMUSCULAR; INTRAVENOUS ONCE
Status: COMPLETED | OUTPATIENT
Start: 2020-06-12 | End: 2020-06-12

## 2020-06-12 RX ORDER — DIGOXIN 0.25 MG/ML
250 INJECTION INTRAMUSCULAR; INTRAVENOUS ONCE
Status: DISCONTINUED | OUTPATIENT
Start: 2020-06-12 | End: 2020-06-12

## 2020-06-12 RX ORDER — ACETAMINOPHEN 325 MG/1
650 TABLET ORAL EVERY 6 HOURS PRN
Status: DISCONTINUED | OUTPATIENT
Start: 2020-06-12 | End: 2020-07-03 | Stop reason: HOSPADM

## 2020-06-12 RX ORDER — DEXLANSOPRAZOLE 30 MG/1
30 CAPSULE, DELAYED RELEASE ORAL DAILY
Status: ON HOLD | COMMUNITY
End: 2020-07-03 | Stop reason: SDUPTHER

## 2020-06-12 RX ORDER — SODIUM CHLORIDE 0.9 % (FLUSH) 0.9 %
10 SYRINGE (ML) INJECTION EVERY 12 HOURS SCHEDULED
Status: DISCONTINUED | OUTPATIENT
Start: 2020-06-12 | End: 2020-06-12

## 2020-06-12 RX ORDER — ACETAMINOPHEN 325 MG/1
650 TABLET ORAL ONCE
Status: COMPLETED | OUTPATIENT
Start: 2020-06-12 | End: 2020-06-12

## 2020-06-12 RX ORDER — BUSPIRONE HYDROCHLORIDE 15 MG/1
30 TABLET ORAL EVERY EVENING
Status: DISCONTINUED | OUTPATIENT
Start: 2020-06-12 | End: 2020-07-03 | Stop reason: HOSPADM

## 2020-06-12 RX ADMIN — METOPROLOL TARTRATE 25 MG: 25 TABLET ORAL at 22:12

## 2020-06-12 RX ADMIN — ACETAMINOPHEN 650 MG: 325 TABLET, FILM COATED ORAL at 10:00

## 2020-06-12 RX ADMIN — DICLOFENAC SODIUM 2 G: 10 GEL TOPICAL at 22:09

## 2020-06-12 RX ADMIN — FUROSEMIDE 20 MG: 10 INJECTION, SOLUTION INTRAMUSCULAR; INTRAVENOUS at 13:30

## 2020-06-12 RX ADMIN — Medication 10 ML: at 20:50

## 2020-06-12 RX ADMIN — METOPROLOL TARTRATE 5 MG: 5 INJECTION, SOLUTION INTRAVENOUS at 16:45

## 2020-06-12 RX ADMIN — BUSPIRONE HYDROCHLORIDE 30 MG: 15 TABLET ORAL at 22:10

## 2020-06-12 RX ADMIN — LEVETIRACETAM 500 MG: 500 TABLET, FILM COATED ORAL at 22:10

## 2020-06-12 RX ADMIN — DIPHENHYDRAMINE HYDROCHLORIDE 25 MG: 25 CAPSULE ORAL at 10:00

## 2020-06-12 RX ADMIN — DOXEPIN HYDROCHLORIDE 100 MG: 25 CAPSULE ORAL at 22:10

## 2020-06-12 RX ADMIN — FUROSEMIDE 40 MG: 10 INJECTION, SOLUTION INTRAMUSCULAR; INTRAVENOUS at 16:38

## 2020-06-12 NOTE — PATIENT INSTRUCTIONS
Blood Transfusion, Adult, Care After  This sheet gives you information about how to care for yourself after your procedure. Your health care provider may also give you more specific instructions. If you have problems or questions, contact your health care provider.  What can I expect after the procedure?  After your procedure, it is common to have:  · Bruising and soreness where the IV tube was inserted.  · Headache.  Follow these instructions at home:    · Take over-the-counter and prescription medicines only as told by your health care provider.  · Return to your normal activities as told by your health care provider.  · Follow instructions from your health care provider about how to take care of your IV insertion site. Make sure you:  ? Wash your hands with soap and water before you change your bandage (dressing). If soap and water are not available, use hand .  ? Change your dressing as told by your health care provider.  · Check your IV insertion site every day for signs of infection. Check for:  ? More redness, swelling, or pain.  ? More fluid or blood.  ? Warmth.  ? Pus or a bad smell.  Contact a health care provider if:  · You have more redness, swelling, or pain around the IV insertion site.  · You have more fluid or blood coming from the IV insertion site.  · Your IV insertion site feels warm to the touch.  · You have pus or a bad smell coming from the IV insertion site.  · Your urine turns pink, red, or brown.  · You feel weak after doing your normal activities.  Get help right away if:  · You have signs of a serious allergic or immune system reaction, including:  ? Itchiness.  ? Hives.  ? Trouble breathing.  ? Anxiety.  ? Chest or lower back pain.  ? Fever, flushing, and chills.  ? Rapid pulse.  ? Rash.  ? Diarrhea.  ? Vomiting.  ? Dark urine.  ? Serious headache.  ? Dizziness.  ? Stiff neck.  ? Yellow coloration of the face or the white parts of the eyes (jaundice).  This information is not  intended to replace advice given to you by your health care provider. Make sure you discuss any questions you have with your health care provider.  Document Released: 01/08/2016 Document Revised: 10/15/2018 Document Reviewed: 07/03/2017  Elsevier Patient Education © 2020 [employer]

## 2020-06-12 NOTE — PROGRESS NOTES
Patient here with daughter at bedside, patient lives with her daughter and daughter provides health history information when patient unable.     Patient declined breakfast, given hot lunch tray and diet ginger-azael, ate 1/4-1/2 of lunch.    Patient repositioned and kuldeep-care provided, patient incontinent of urine, brief changed. BSC and assist to bathroom offered, declined.    Patient has irregular heartbeat throughout shift, patient's daughter states she has never been told her mom's heart rate is irregular but patient is scheduled to have an ECHO next Friday and is scheduled to see cardiologist 8/7/2020. RN placed call to Dr. Trinh's office, spoke with MAX Abdi who spoke with Dr. Trinh who states he is fine with her receiving PRBC's today and PCP can be notified regarding irregular heartbeat. See order.    Order received to transfer patient to ER to rule out new onset atrial fibrillation. See orders.    Patient repositioned and kuldeep care and new brief provided, patient incontinent of large amount urine.    Rn called ER, report given ro ER Charge nurseAmy. Will transport patient via stretcher to ER triage. Daughter to accompany us to ER. RN awaiting transport or respiratory therapy to accompany us with oxygen.    Patient up with assist of 2 to BSC once this shift. Patient reports remains SOA as has for few days.    1540-Patient transferred via stretcher with oxygen @3L N/C, PRBC's infusing and RNJuan Antonio (ACU Nurse) with current RN at patient bedside in ER. Patient transferred to ER stretcher. Patient personal belongings including portable O2 tank, cane and clothes with patient in ER Room #7. Current RN gave report at ER bedside to ER NurseSidra, transfer of care of patient and PRBC's completed at 1557. RN also spoke to ER charge nurseAmy to notify of how PRBC charting being transferred to ER RN. Patient's daughter, Dorcas transferred to ER waiting room via wheelchair with own personal belongings  and cane.

## 2020-06-13 ENCOUNTER — APPOINTMENT (OUTPATIENT)
Dept: MRI IMAGING | Facility: HOSPITAL | Age: 85
End: 2020-06-13

## 2020-06-13 ENCOUNTER — APPOINTMENT (OUTPATIENT)
Dept: CARDIOLOGY | Facility: HOSPITAL | Age: 85
End: 2020-06-13

## 2020-06-13 ENCOUNTER — APPOINTMENT (OUTPATIENT)
Dept: GENERAL RADIOLOGY | Facility: HOSPITAL | Age: 85
End: 2020-06-13

## 2020-06-13 ENCOUNTER — APPOINTMENT (OUTPATIENT)
Dept: CT IMAGING | Facility: HOSPITAL | Age: 85
End: 2020-06-13

## 2020-06-13 PROBLEM — N17.9 ACUTE KIDNEY INJURY (HCC): Status: ACTIVE | Noted: 2020-06-13

## 2020-06-13 LAB
ANION GAP SERPL CALCULATED.3IONS-SCNC: 9 MMOL/L (ref 5–15)
AORTIC DIMENSIONLESS INDEX: 0.5 (DI)
APTT PPP: 27.9 SECONDS (ref 22.7–35.4)
APTT PPP: 68.3 SECONDS (ref 22.7–35.4)
BH BB BLOOD EXPIRATION DATE: NORMAL
BH BB BLOOD EXPIRATION DATE: NORMAL
BH BB BLOOD TYPE BARCODE: 5100
BH BB BLOOD TYPE BARCODE: 5100
BH BB DISPENSE STATUS: NORMAL
BH BB DISPENSE STATUS: NORMAL
BH BB PRODUCT CODE: NORMAL
BH BB PRODUCT CODE: NORMAL
BH BB UNIT NUMBER: NORMAL
BH BB UNIT NUMBER: NORMAL
BH CV ECHO MEAS - ACS: 1.8 CM
BH CV ECHO MEAS - AO MAX PG (FULL): 4.6 MMHG
BH CV ECHO MEAS - AO MAX PG: 6.7 MMHG
BH CV ECHO MEAS - AO MEAN PG (FULL): 2.6 MMHG
BH CV ECHO MEAS - AO MEAN PG: 3.6 MMHG
BH CV ECHO MEAS - AO ROOT AREA (BSA CORRECTED): 1.6
BH CV ECHO MEAS - AO ROOT AREA: 8.1 CM^2
BH CV ECHO MEAS - AO ROOT DIAM: 3.2 CM
BH CV ECHO MEAS - AO V2 MAX: 129.4 CM/SEC
BH CV ECHO MEAS - AO V2 MEAN: 89.1 CM/SEC
BH CV ECHO MEAS - AO V2 VTI: 24.2 CM
BH CV ECHO MEAS - AVA(I,A): 1.4 CM^2
BH CV ECHO MEAS - AVA(I,D): 1.4 CM^2
BH CV ECHO MEAS - AVA(V,A): 1.5 CM^2
BH CV ECHO MEAS - AVA(V,D): 1.5 CM^2
BH CV ECHO MEAS - BSA(HAYCOCK): 2.1 M^2
BH CV ECHO MEAS - BSA: 2 M^2
BH CV ECHO MEAS - BZI_BMI: 43.4 KILOGRAMS/M^2
BH CV ECHO MEAS - BZI_METRIC_HEIGHT: 152.4 CM
BH CV ECHO MEAS - BZI_METRIC_WEIGHT: 100.7 KG
BH CV ECHO MEAS - EDV(CUBED): 90.6 ML
BH CV ECHO MEAS - EDV(MOD-SP2): 115 ML
BH CV ECHO MEAS - EDV(MOD-SP4): 111 ML
BH CV ECHO MEAS - EDV(TEICH): 92 ML
BH CV ECHO MEAS - EF(CUBED): 27.8 %
BH CV ECHO MEAS - EF(MOD-BP): 48.2 %
BH CV ECHO MEAS - EF(MOD-SP2): 47 %
BH CV ECHO MEAS - EF(MOD-SP4): 49.5 %
BH CV ECHO MEAS - EF(TEICH): 22.6 %
BH CV ECHO MEAS - ESV(CUBED): 65.4 ML
BH CV ECHO MEAS - ESV(MOD-SP2): 61 ML
BH CV ECHO MEAS - ESV(MOD-SP4): 56 ML
BH CV ECHO MEAS - ESV(TEICH): 71.2 ML
BH CV ECHO MEAS - FS: 10.3 %
BH CV ECHO MEAS - IVS/LVPW: 1
BH CV ECHO MEAS - IVSD: 0.83 CM
BH CV ECHO MEAS - LA DIMENSION: 3.3 CM
BH CV ECHO MEAS - LA/AO: 1
BH CV ECHO MEAS - LAT PEAK E' VEL: 10 CM/SEC
BH CV ECHO MEAS - LV DIASTOLIC VOL/BSA (35-75): 56.9 ML/M^2
BH CV ECHO MEAS - LV MASS(C)D: 118.1 GRAMS
BH CV ECHO MEAS - LV MASS(C)DI: 60.5 GRAMS/M^2
BH CV ECHO MEAS - LV MAX PG: 2.1 MMHG
BH CV ECHO MEAS - LV MEAN PG: 1 MMHG
BH CV ECHO MEAS - LV SYSTOLIC VOL/BSA (12-30): 28.7 ML/M^2
BH CV ECHO MEAS - LV V1 MAX: 72.9 CM/SEC
BH CV ECHO MEAS - LV V1 MEAN: 45.7 CM/SEC
BH CV ECHO MEAS - LV V1 VTI: 12 CM
BH CV ECHO MEAS - LVIDD: 4.5 CM
BH CV ECHO MEAS - LVIDS: 4 CM
BH CV ECHO MEAS - LVLD AP2: 7.6 CM
BH CV ECHO MEAS - LVLD AP4: 7.5 CM
BH CV ECHO MEAS - LVLS AP2: 7 CM
BH CV ECHO MEAS - LVLS AP4: 6.8 CM
BH CV ECHO MEAS - LVOT AREA (M): 2.8 CM^2
BH CV ECHO MEAS - LVOT AREA: 2.7 CM^2
BH CV ECHO MEAS - LVOT DIAM: 1.9 CM
BH CV ECHO MEAS - LVPWD: 0.82 CM
BH CV ECHO MEAS - MED PEAK E' VEL: 8.3 CM/SEC
BH CV ECHO MEAS - MR MAX PG: 97.4 MMHG
BH CV ECHO MEAS - MR MAX VEL: 493.4 CM/SEC
BH CV ECHO MEAS - MV DEC SLOPE: 804.3 CM/SEC^2
BH CV ECHO MEAS - MV DEC TIME: 170 SEC
BH CV ECHO MEAS - MV E MAX VEL: 120 CM/SEC
BH CV ECHO MEAS - MV MAX PG: 7.2 MMHG
BH CV ECHO MEAS - MV MEAN PG: 3.3 MMHG
BH CV ECHO MEAS - MV P1/2T MAX VEL: 137.7 CM/SEC
BH CV ECHO MEAS - MV P1/2T: 50.1 MSEC
BH CV ECHO MEAS - MV V2 MAX: 133.8 CM/SEC
BH CV ECHO MEAS - MV V2 MEAN: 81.1 CM/SEC
BH CV ECHO MEAS - MV V2 VTI: 23.6 CM
BH CV ECHO MEAS - MVA P1/2T LCG: 1.6 CM^2
BH CV ECHO MEAS - MVA(P1/2T): 4.4 CM^2
BH CV ECHO MEAS - MVA(VTI): 1.4 CM^2
BH CV ECHO MEAS - PA ACC TIME: 0.06 SEC
BH CV ECHO MEAS - PA MAX PG (FULL): 1.4 MMHG
BH CV ECHO MEAS - PA MAX PG: 2.4 MMHG
BH CV ECHO MEAS - PA PR(ACCEL): 50.5 MMHG
BH CV ECHO MEAS - PA V2 MAX: 78.2 CM/SEC
BH CV ECHO MEAS - PULM DIAS VEL: 57.9 CM/SEC
BH CV ECHO MEAS - PULM S/D: 0.6
BH CV ECHO MEAS - PULM SYS VEL: 34.5 CM/SEC
BH CV ECHO MEAS - PVA(V,A): 1.5 CM^2
BH CV ECHO MEAS - PVA(V,D): 1.5 CM^2
BH CV ECHO MEAS - QP/QS: 0.69
BH CV ECHO MEAS - RAP SYSTOLE: 8 MMHG
BH CV ECHO MEAS - RV MAX PG: 1.1 MMHG
BH CV ECHO MEAS - RV MEAN PG: 0.58 MMHG
BH CV ECHO MEAS - RV V1 MAX: 51.9 CM/SEC
BH CV ECHO MEAS - RV V1 MEAN: 35.6 CM/SEC
BH CV ECHO MEAS - RV V1 VTI: 10.2 CM
BH CV ECHO MEAS - RVOT AREA: 2.2 CM^2
BH CV ECHO MEAS - RVOT DIAM: 1.7 CM
BH CV ECHO MEAS - RVSP: 63.3 MMHG
BH CV ECHO MEAS - SI(AO): 100.9 ML/M^2
BH CV ECHO MEAS - SI(CUBED): 12.9 ML/M^2
BH CV ECHO MEAS - SI(LVOT): 16.8 ML/M^2
BH CV ECHO MEAS - SI(MOD-SP2): 27.7 ML/M^2
BH CV ECHO MEAS - SI(MOD-SP4): 28.2 ML/M^2
BH CV ECHO MEAS - SI(TEICH): 10.6 ML/M^2
BH CV ECHO MEAS - SV(AO): 196.9 ML
BH CV ECHO MEAS - SV(CUBED): 25.1 ML
BH CV ECHO MEAS - SV(LVOT): 32.8 ML
BH CV ECHO MEAS - SV(MOD-SP2): 54 ML
BH CV ECHO MEAS - SV(MOD-SP4): 55 ML
BH CV ECHO MEAS - SV(RVOT): 22.6 ML
BH CV ECHO MEAS - SV(TEICH): 20.8 ML
BH CV ECHO MEAS - TAPSE (>1.6): 1.2 CM2
BH CV ECHO MEAS - TR MAX VEL: 371.9 CM/SEC
BH CV ECHO MEASUREMENTS AVERAGE E/E' RATIO: 13.11
BH CV VAS BP RIGHT ARM: NORMAL MMHG
BH CV XLRA - RV BASE: 3.9 CM
BH CV XLRA - RV LENGTH: 7.2 CM
BH CV XLRA - RV MID: 2.9 CM
BH CV XLRA - TDI S': 9 CM/SEC
BH CV XLRA MEAS LEFT DIST CCA EDV: -18.9 CM/SEC
BH CV XLRA MEAS LEFT DIST CCA PSV: -50.3 CM/SEC
BH CV XLRA MEAS LEFT DIST ICA EDV: 23.3 CM/SEC
BH CV XLRA MEAS LEFT DIST ICA PSV: 54.7 CM/SEC
BH CV XLRA MEAS LEFT ICA/CCA RATIO: 1.5
BH CV XLRA MEAS LEFT MID ICA EDV: 23.2 CM/SEC
BH CV XLRA MEAS LEFT MID ICA PSV: 73.9 CM/SEC
BH CV XLRA MEAS LEFT PROX CCA EDV: -25.1 CM/SEC
BH CV XLRA MEAS LEFT PROX CCA PSV: -62.1 CM/SEC
BH CV XLRA MEAS LEFT PROX ECA EDV: 11.4 CM/SEC
BH CV XLRA MEAS LEFT PROX ECA PSV: 47.1 CM/SEC
BH CV XLRA MEAS LEFT PROX ICA EDV: 17.8 CM/SEC
BH CV XLRA MEAS LEFT PROX ICA PSV: 46.7 CM/SEC
BH CV XLRA MEAS LEFT PROX SCLA PSV: 105 CM/SEC
BH CV XLRA MEAS LEFT VERTEBRAL A EDV: 47.4 CM/SEC
BH CV XLRA MEAS LEFT VERTEBRAL A PSV: 106 CM/SEC
BH CV XLRA MEAS RIGHT DIST CCA EDV: -23.2 CM/SEC
BH CV XLRA MEAS RIGHT DIST CCA PSV: -57.4 CM/SEC
BH CV XLRA MEAS RIGHT DIST ICA EDV: 92.9 CM/SEC
BH CV XLRA MEAS RIGHT DIST ICA PSV: 191 CM/SEC
BH CV XLRA MEAS RIGHT ICA/CCA RATIO: 3.3
BH CV XLRA MEAS RIGHT MID ICA EDV: 57.2 CM/SEC
BH CV XLRA MEAS RIGHT MID ICA PSV: 109 CM/SEC
BH CV XLRA MEAS RIGHT PROX CCA EDV: 23.6 CM/SEC
BH CV XLRA MEAS RIGHT PROX CCA PSV: 67.6 CM/SEC
BH CV XLRA MEAS RIGHT PROX ECA EDV: 14.9 CM/SEC
BH CV XLRA MEAS RIGHT PROX ECA PSV: 83 CM/SEC
BH CV XLRA MEAS RIGHT PROX ICA EDV: 28.9 CM/SEC
BH CV XLRA MEAS RIGHT PROX ICA PSV: 53.7 CM/SEC
BH CV XLRA MEAS RIGHT PROX SCLA PSV: 77.4 CM/SEC
BH CV XLRA MEAS RIGHT VERTEBRAL A EDV: 30.6 CM/SEC
BH CV XLRA MEAS RIGHT VERTEBRAL A PSV: 79.4 CM/SEC
BUN BLD-MCNC: 28 MG/DL (ref 8–23)
BUN/CREAT SERPL: 17.4 (ref 7–25)
CALCIUM SPEC-SCNC: 9.3 MG/DL (ref 8.6–10.5)
CHLORIDE SERPL-SCNC: 97 MMOL/L (ref 98–107)
CO2 SERPL-SCNC: 38 MMOL/L (ref 22–29)
CREAT BLD-MCNC: 1.61 MG/DL (ref 0.57–1)
CROSSMATCH INTERPRETATION: NORMAL
CROSSMATCH INTERPRETATION: NORMAL
DEPRECATED RDW RBC AUTO: 44.7 FL (ref 37–54)
ERYTHROCYTE [DISTWIDTH] IN BLOOD BY AUTOMATED COUNT: 14.9 % (ref 12.3–15.4)
GFR SERPL CREATININE-BSD FRML MDRD: 37 ML/MIN/1.73
GLUCOSE BLD-MCNC: 73 MG/DL (ref 65–99)
GLUCOSE BLDC GLUCOMTR-MCNC: 106 MG/DL (ref 70–130)
GLUCOSE BLDC GLUCOMTR-MCNC: 123 MG/DL (ref 70–130)
GLUCOSE BLDC GLUCOMTR-MCNC: 148 MG/DL (ref 70–130)
GLUCOSE BLDC GLUCOMTR-MCNC: 69 MG/DL (ref 70–130)
GLUCOSE BLDC GLUCOMTR-MCNC: 90 MG/DL (ref 70–130)
HBA1C MFR BLD: 5.7 % (ref 4.8–5.6)
HCT VFR BLD AUTO: 32.6 % (ref 34–46.6)
HGB BLD-MCNC: 10 G/DL (ref 12–15.9)
INR PPP: 1.1 (ref 0.9–1.1)
LEFT ARM BP: NORMAL MMHG
LEFT ATRIUM VOLUME INDEX: 32 ML/M2
LEFT ATRIUM VOLUME: 60 CM3
LV EF 2D ECHO EST: 50 %
MCH RBC QN AUTO: 25.8 PG (ref 26.6–33)
MCHC RBC AUTO-ENTMCNC: 30.7 G/DL (ref 31.5–35.7)
MCV RBC AUTO: 84.2 FL (ref 79–97)
PLATELET # BLD AUTO: 298 10*3/MM3 (ref 140–450)
PMV BLD AUTO: 9.4 FL (ref 6–12)
POTASSIUM BLD-SCNC: 4.5 MMOL/L (ref 3.5–5.2)
PROTHROMBIN TIME: 13.9 SECONDS (ref 11.7–14.2)
RBC # BLD AUTO: 3.87 10*6/MM3 (ref 3.77–5.28)
RIGHT ARM BP: NORMAL MMHG
SODIUM BLD-SCNC: 144 MMOL/L (ref 136–145)
TROPONIN T SERPL-MCNC: 0.07 NG/ML (ref 0–0.03)
TROPONIN T SERPL-MCNC: 0.08 NG/ML (ref 0–0.03)
TROPONIN T SERPL-MCNC: 0.09 NG/ML (ref 0–0.03)
TSH SERPL DL<=0.05 MIU/L-ACNC: 1 UIU/ML (ref 0.27–4.2)
UNIT  ABO: NORMAL
UNIT  ABO: NORMAL
UNIT  RH: NORMAL
UNIT  RH: NORMAL
WBC NRBC COR # BLD: 8.54 10*3/MM3 (ref 3.4–10.8)

## 2020-06-13 PROCEDURE — 85027 COMPLETE CBC AUTOMATED: CPT | Performed by: NURSE PRACTITIONER

## 2020-06-13 PROCEDURE — 25010000002 HEPARIN (PORCINE) PER 1000 UNITS: Performed by: PSYCHIATRY & NEUROLOGY

## 2020-06-13 PROCEDURE — 94799 UNLISTED PULMONARY SVC/PX: CPT

## 2020-06-13 PROCEDURE — 99222 1ST HOSP IP/OBS MODERATE 55: CPT | Performed by: PSYCHIATRY & NEUROLOGY

## 2020-06-13 PROCEDURE — 85730 THROMBOPLASTIN TIME PARTIAL: CPT | Performed by: INTERNAL MEDICINE

## 2020-06-13 PROCEDURE — 93306 TTE W/DOPPLER COMPLETE: CPT

## 2020-06-13 PROCEDURE — 93306 TTE W/DOPPLER COMPLETE: CPT | Performed by: INTERNAL MEDICINE

## 2020-06-13 PROCEDURE — 84484 ASSAY OF TROPONIN QUANT: CPT | Performed by: NURSE PRACTITIONER

## 2020-06-13 PROCEDURE — 93880 EXTRACRANIAL BILAT STUDY: CPT

## 2020-06-13 PROCEDURE — 71045 X-RAY EXAM CHEST 1 VIEW: CPT

## 2020-06-13 PROCEDURE — 70450 CT HEAD/BRAIN W/O DYE: CPT

## 2020-06-13 PROCEDURE — 85610 PROTHROMBIN TIME: CPT | Performed by: INTERNAL MEDICINE

## 2020-06-13 PROCEDURE — 84443 ASSAY THYROID STIM HORMONE: CPT | Performed by: HOSPITALIST

## 2020-06-13 PROCEDURE — 80048 BASIC METABOLIC PNL TOTAL CA: CPT | Performed by: NURSE PRACTITIONER

## 2020-06-13 PROCEDURE — 36415 COLL VENOUS BLD VENIPUNCTURE: CPT | Performed by: NURSE PRACTITIONER

## 2020-06-13 PROCEDURE — 82962 GLUCOSE BLOOD TEST: CPT

## 2020-06-13 PROCEDURE — 63710000001 INSULIN GLARGINE PER 5 UNITS: Performed by: NURSE PRACTITIONER

## 2020-06-13 PROCEDURE — 25010000002 LEVETIRACETAM IN NACL 0.82% 500 MG/100ML SOLUTION: Performed by: INTERNAL MEDICINE

## 2020-06-13 PROCEDURE — 25010000002 PERFLUTREN (DEFINITY) 8.476 MG IN SODIUM CHLORIDE 0.9 % 10 ML INJECTION: Performed by: NURSE PRACTITIONER

## 2020-06-13 PROCEDURE — 83036 HEMOGLOBIN GLYCOSYLATED A1C: CPT | Performed by: HOSPITALIST

## 2020-06-13 PROCEDURE — 70551 MRI BRAIN STEM W/O DYE: CPT

## 2020-06-13 PROCEDURE — 25010000002 FUROSEMIDE PER 20 MG: Performed by: INTERNAL MEDICINE

## 2020-06-13 PROCEDURE — 84484 ASSAY OF TROPONIN QUANT: CPT | Performed by: INTERNAL MEDICINE

## 2020-06-13 PROCEDURE — 99222 1ST HOSP IP/OBS MODERATE 55: CPT | Performed by: INTERNAL MEDICINE

## 2020-06-13 PROCEDURE — 99223 1ST HOSP IP/OBS HIGH 75: CPT | Performed by: INTERNAL MEDICINE

## 2020-06-13 RX ORDER — SODIUM CHLORIDE 0.9 % (FLUSH) 0.9 %
10 SYRINGE (ML) INJECTION AS NEEDED
Status: DISCONTINUED | OUTPATIENT
Start: 2020-06-13 | End: 2020-07-03 | Stop reason: HOSPADM

## 2020-06-13 RX ORDER — HEPARIN SODIUM 10000 [USP'U]/100ML
9.9 INJECTION, SOLUTION INTRAVENOUS
Status: DISCONTINUED | OUTPATIENT
Start: 2020-06-13 | End: 2020-06-18

## 2020-06-13 RX ORDER — ASPIRIN 325 MG
325 TABLET ORAL DAILY
Status: DISCONTINUED | OUTPATIENT
Start: 2020-06-13 | End: 2020-06-18

## 2020-06-13 RX ORDER — METOPROLOL TARTRATE 50 MG/1
50 TABLET, FILM COATED ORAL EVERY 12 HOURS SCHEDULED
Status: DISCONTINUED | OUTPATIENT
Start: 2020-06-13 | End: 2020-07-03 | Stop reason: HOSPADM

## 2020-06-13 RX ORDER — BUMETANIDE 0.25 MG/ML
2 INJECTION INTRAMUSCULAR; INTRAVENOUS EVERY 8 HOURS
Status: COMPLETED | OUTPATIENT
Start: 2020-06-13 | End: 2020-06-14

## 2020-06-13 RX ORDER — ASPIRIN 300 MG/1
300 SUPPOSITORY RECTAL DAILY
Status: DISCONTINUED | OUTPATIENT
Start: 2020-06-13 | End: 2020-06-18

## 2020-06-13 RX ORDER — LEVETIRACETAM 5 MG/ML
500 INJECTION INTRAVASCULAR EVERY 12 HOURS SCHEDULED
Status: DISCONTINUED | OUTPATIENT
Start: 2020-06-13 | End: 2020-06-17

## 2020-06-13 RX ORDER — ATORVASTATIN CALCIUM 80 MG/1
80 TABLET, FILM COATED ORAL NIGHTLY
Status: DISCONTINUED | OUTPATIENT
Start: 2020-06-13 | End: 2020-06-16

## 2020-06-13 RX ORDER — SODIUM CHLORIDE 0.9 % (FLUSH) 0.9 %
10 SYRINGE (ML) INJECTION EVERY 12 HOURS SCHEDULED
Status: DISCONTINUED | OUTPATIENT
Start: 2020-06-13 | End: 2020-07-03 | Stop reason: HOSPADM

## 2020-06-13 RX ORDER — FUROSEMIDE 10 MG/ML
40 INJECTION INTRAMUSCULAR; INTRAVENOUS ONCE
Status: COMPLETED | OUTPATIENT
Start: 2020-06-13 | End: 2020-06-13

## 2020-06-13 RX ADMIN — BUMETANIDE 2 MG: 0.25 INJECTION INTRAMUSCULAR; INTRAVENOUS at 21:31

## 2020-06-13 RX ADMIN — DICLOFENAC SODIUM 2 G: 10 GEL TOPICAL at 09:21

## 2020-06-13 RX ADMIN — PANTOPRAZOLE SODIUM 40 MG: 40 TABLET, DELAYED RELEASE ORAL at 06:19

## 2020-06-13 RX ADMIN — LEVETIRACETAM 500 MG: 500 TABLET, FILM COATED ORAL at 09:15

## 2020-06-13 RX ADMIN — MULTIPLE VITAMINS W/ MINERALS TAB 1 TABLET: TAB at 09:15

## 2020-06-13 RX ADMIN — BUSPIRONE HYDROCHLORIDE 15 MG: 15 TABLET ORAL at 06:19

## 2020-06-13 RX ADMIN — ASPIRIN 81 MG: 81 TABLET, COATED ORAL at 09:15

## 2020-06-13 RX ADMIN — SODIUM CHLORIDE, PRESERVATIVE FREE 10 ML: 5 INJECTION INTRAVENOUS at 21:36

## 2020-06-13 RX ADMIN — METOCLOPRAMIDE 7.5 MG: 5 TABLET ORAL at 09:15

## 2020-06-13 RX ADMIN — HEPARIN SODIUM 9.9 UNITS/KG/HR: 10000 INJECTION, SOLUTION INTRAVENOUS at 16:39

## 2020-06-13 RX ADMIN — FUROSEMIDE 40 MG: 10 INJECTION, SOLUTION INTRAMUSCULAR; INTRAVENOUS at 16:32

## 2020-06-13 RX ADMIN — HYDROCODONE BITARTRATE AND ACETAMINOPHEN 1 TABLET: 5; 325 TABLET ORAL at 04:03

## 2020-06-13 RX ADMIN — ASPIRIN 300 MG: 300 SUPPOSITORY RECTAL at 21:27

## 2020-06-13 RX ADMIN — PERFLUTREN 2 ML: 6.52 INJECTION, SUSPENSION INTRAVENOUS at 11:00

## 2020-06-13 RX ADMIN — DICLOFENAC SODIUM 2 G: 10 GEL TOPICAL at 21:31

## 2020-06-13 RX ADMIN — INSULIN GLARGINE 22 UNITS: 100 INJECTION, SOLUTION SUBCUTANEOUS at 09:16

## 2020-06-13 RX ADMIN — LEVETIRACETAM 500 MG: 5 INJECTION INTRAVENOUS at 21:36

## 2020-06-13 RX ADMIN — METOPROLOL TARTRATE 25 MG: 25 TABLET ORAL at 09:16

## 2020-06-14 ENCOUNTER — APPOINTMENT (OUTPATIENT)
Dept: GENERAL RADIOLOGY | Facility: HOSPITAL | Age: 85
End: 2020-06-14

## 2020-06-14 ENCOUNTER — APPOINTMENT (OUTPATIENT)
Dept: CT IMAGING | Facility: HOSPITAL | Age: 85
End: 2020-06-14

## 2020-06-14 PROBLEM — J96.02 ACUTE RESPIRATORY FAILURE WITH HYPERCAPNIA (HCC): Status: ACTIVE | Noted: 2020-06-14

## 2020-06-14 PROBLEM — G93.41 METABOLIC ENCEPHALOPATHY: Status: ACTIVE | Noted: 2020-06-14

## 2020-06-14 LAB
ALBUMIN SERPL-MCNC: 3.6 G/DL (ref 3.5–5.2)
ANION GAP SERPL CALCULATED.3IONS-SCNC: 11 MMOL/L (ref 5–15)
APTT PPP: 73.4 SECONDS (ref 22.7–35.4)
ARTERIAL PATENCY WRIST A: POSITIVE
ATMOSPHERIC PRESS: 755.2 MMHG
BACTERIA UR QL AUTO: NORMAL /HPF
BASE EXCESS BLDA CALC-SCNC: 19.5 MMOL/L (ref 0–2)
BASOPHILS # BLD AUTO: 0.02 10*3/MM3 (ref 0–0.2)
BASOPHILS NFR BLD AUTO: 0.3 % (ref 0–1.5)
BDY SITE: ABNORMAL
BILIRUB UR QL STRIP: NEGATIVE
BUN BLD-MCNC: 30 MG/DL (ref 8–23)
BUN/CREAT SERPL: 20 (ref 7–25)
CALCIUM SPEC-SCNC: 9.2 MG/DL (ref 8.6–10.5)
CHLORIDE SERPL-SCNC: 96 MMOL/L (ref 98–107)
CHLORIDE UR-SCNC: 114 MMOL/L
CHOLEST SERPL-MCNC: 110 MG/DL (ref 0–200)
CLARITY UR: CLEAR
CO2 SERPL-SCNC: 42 MMOL/L (ref 22–29)
COLOR UR: YELLOW
CREAT BLD-MCNC: 1.5 MG/DL (ref 0.57–1)
CREAT UR-MCNC: 27.8 MG/DL
DEPRECATED RDW RBC AUTO: 45.4 FL (ref 37–54)
EOSINOPHIL # BLD AUTO: 0 10*3/MM3 (ref 0–0.4)
EOSINOPHIL NFR BLD AUTO: 0 % (ref 0.3–6.2)
ERYTHROCYTE [DISTWIDTH] IN BLOOD BY AUTOMATED COUNT: 14.6 % (ref 12.3–15.4)
GAS FLOW AIRWAY: 3.5 LPM
GFR SERPL CREATININE-BSD FRML MDRD: 40 ML/MIN/1.73
GLUCOSE BLD-MCNC: 200 MG/DL (ref 65–99)
GLUCOSE BLDC GLUCOMTR-MCNC: 106 MG/DL (ref 70–130)
GLUCOSE BLDC GLUCOMTR-MCNC: 119 MG/DL (ref 70–130)
GLUCOSE BLDC GLUCOMTR-MCNC: 191 MG/DL (ref 70–130)
GLUCOSE BLDC GLUCOMTR-MCNC: 232 MG/DL (ref 70–130)
GLUCOSE BLDC GLUCOMTR-MCNC: 244 MG/DL (ref 70–130)
GLUCOSE BLDC GLUCOMTR-MCNC: 55 MG/DL (ref 70–130)
GLUCOSE BLDC GLUCOMTR-MCNC: 58 MG/DL (ref 70–130)
GLUCOSE BLDC GLUCOMTR-MCNC: 99 MG/DL (ref 70–130)
GLUCOSE UR STRIP-MCNC: NEGATIVE MG/DL
HCO3 BLDA-SCNC: 50.4 MMOL/L (ref 22–28)
HCT VFR BLD AUTO: 32.8 % (ref 34–46.6)
HDLC SERPL-MCNC: 63 MG/DL (ref 40–60)
HGB BLD-MCNC: 9.8 G/DL (ref 12–15.9)
HGB UR QL STRIP.AUTO: ABNORMAL
HYALINE CASTS UR QL AUTO: NORMAL /LPF
IMM GRANULOCYTES # BLD AUTO: 0.04 10*3/MM3 (ref 0–0.05)
IMM GRANULOCYTES NFR BLD AUTO: 0.5 % (ref 0–0.5)
KETONES UR QL STRIP: ABNORMAL
LDLC SERPL CALC-MCNC: 36 MG/DL (ref 0–100)
LDLC/HDLC SERPL: 0.57 {RATIO}
LEUKOCYTE ESTERASE UR QL STRIP.AUTO: NEGATIVE
LYMPHOCYTES # BLD AUTO: 0.77 10*3/MM3 (ref 0.7–3.1)
LYMPHOCYTES NFR BLD AUTO: 9.8 % (ref 19.6–45.3)
MAGNESIUM SERPL-MCNC: 1.9 MG/DL (ref 1.6–2.4)
MCH RBC QN AUTO: 25.9 PG (ref 26.6–33)
MCHC RBC AUTO-ENTMCNC: 29.9 G/DL (ref 31.5–35.7)
MCV RBC AUTO: 86.5 FL (ref 79–97)
MODALITY: ABNORMAL
MONOCYTES # BLD AUTO: 0.93 10*3/MM3 (ref 0.1–0.9)
MONOCYTES NFR BLD AUTO: 11.9 % (ref 5–12)
NEUTROPHILS # BLD AUTO: 6.08 10*3/MM3 (ref 1.7–7)
NEUTROPHILS NFR BLD AUTO: 77.5 % (ref 42.7–76)
NITRITE UR QL STRIP: NEGATIVE
NRBC BLD AUTO-RTO: 0.4 /100 WBC (ref 0–0.2)
NT-PROBNP SERPL-MCNC: 6083 PG/ML (ref 5–1800)
PCO2 BLDA: 96.5 MM HG (ref 35–45)
PH BLDA: 7.33 PH UNITS (ref 7.35–7.45)
PH UR STRIP.AUTO: <=5 [PH] (ref 5–8)
PHOSPHATE SERPL-MCNC: 4 MG/DL (ref 2.5–4.5)
PLATELET # BLD AUTO: 262 10*3/MM3 (ref 140–450)
PMV BLD AUTO: 9.8 FL (ref 6–12)
PO2 BLDA: 105 MM HG (ref 80–100)
POTASSIUM BLD-SCNC: 3.5 MMOL/L (ref 3.5–5.2)
PROT UR QL STRIP: ABNORMAL
PROT UR-MCNC: 15 MG/DL
PROT/CREAT UR: 539.6 MG/G CREA (ref 0–200)
RBC # BLD AUTO: 3.79 10*6/MM3 (ref 3.77–5.28)
RBC # UR: NORMAL /HPF
REF LAB TEST METHOD: NORMAL
SAO2 % BLDCOA: 96.9 % (ref 92–99)
SODIUM BLD-SCNC: 149 MMOL/L (ref 136–145)
SODIUM UR-SCNC: 109 MMOL/L
SP GR UR STRIP: 1.01 (ref 1–1.03)
SQUAMOUS #/AREA URNS HPF: NORMAL /HPF
TOTAL RATE: 18 BREATHS/MINUTE
TRIGL SERPL-MCNC: 57 MG/DL (ref 0–150)
URATE SERPL-MCNC: 8.7 MG/DL (ref 2.4–5.7)
UROBILINOGEN UR QL STRIP: ABNORMAL
VLDLC SERPL-MCNC: 11.4 MG/DL (ref 5–40)
WBC NRBC COR # BLD: 7.84 10*3/MM3 (ref 3.4–10.8)
WBC UR QL AUTO: NORMAL /HPF

## 2020-06-14 PROCEDURE — 83735 ASSAY OF MAGNESIUM: CPT | Performed by: INTERNAL MEDICINE

## 2020-06-14 PROCEDURE — 84550 ASSAY OF BLOOD/URIC ACID: CPT | Performed by: INTERNAL MEDICINE

## 2020-06-14 PROCEDURE — 80069 RENAL FUNCTION PANEL: CPT | Performed by: INTERNAL MEDICINE

## 2020-06-14 PROCEDURE — 82962 GLUCOSE BLOOD TEST: CPT

## 2020-06-14 PROCEDURE — 83880 ASSAY OF NATRIURETIC PEPTIDE: CPT | Performed by: INTERNAL MEDICINE

## 2020-06-14 PROCEDURE — 25010000002 HEPARIN (PORCINE) PER 1000 UNITS: Performed by: PSYCHIATRY & NEUROLOGY

## 2020-06-14 PROCEDURE — 85025 COMPLETE CBC W/AUTO DIFF WBC: CPT | Performed by: PSYCHIATRY & NEUROLOGY

## 2020-06-14 PROCEDURE — 99233 SBSQ HOSP IP/OBS HIGH 50: CPT | Performed by: NURSE PRACTITIONER

## 2020-06-14 PROCEDURE — 94660 CPAP INITIATION&MGMT: CPT

## 2020-06-14 PROCEDURE — 84156 ASSAY OF PROTEIN URINE: CPT | Performed by: INTERNAL MEDICINE

## 2020-06-14 PROCEDURE — 94799 UNLISTED PULMONARY SVC/PX: CPT

## 2020-06-14 PROCEDURE — 82803 BLOOD GASES ANY COMBINATION: CPT

## 2020-06-14 PROCEDURE — 36600 WITHDRAWAL OF ARTERIAL BLOOD: CPT

## 2020-06-14 PROCEDURE — 25010000002 LEVETIRACETAM IN NACL 0.82% 500 MG/100ML SOLUTION: Performed by: INTERNAL MEDICINE

## 2020-06-14 PROCEDURE — 99233 SBSQ HOSP IP/OBS HIGH 50: CPT | Performed by: INTERNAL MEDICINE

## 2020-06-14 PROCEDURE — 84300 ASSAY OF URINE SODIUM: CPT | Performed by: INTERNAL MEDICINE

## 2020-06-14 PROCEDURE — 82570 ASSAY OF URINE CREATININE: CPT | Performed by: INTERNAL MEDICINE

## 2020-06-14 PROCEDURE — 82436 ASSAY OF URINE CHLORIDE: CPT | Performed by: INTERNAL MEDICINE

## 2020-06-14 PROCEDURE — 85730 THROMBOPLASTIN TIME PARTIAL: CPT | Performed by: PSYCHIATRY & NEUROLOGY

## 2020-06-14 PROCEDURE — 70450 CT HEAD/BRAIN W/O DYE: CPT

## 2020-06-14 PROCEDURE — 81001 URINALYSIS AUTO W/SCOPE: CPT | Performed by: NURSE PRACTITIONER

## 2020-06-14 PROCEDURE — 99231 SBSQ HOSP IP/OBS SF/LOW 25: CPT | Performed by: INTERNAL MEDICINE

## 2020-06-14 PROCEDURE — 71045 X-RAY EXAM CHEST 1 VIEW: CPT

## 2020-06-14 PROCEDURE — 80061 LIPID PANEL: CPT | Performed by: INTERNAL MEDICINE

## 2020-06-14 RX ORDER — DILTIAZEM HYDROCHLORIDE 60 MG/1
60 TABLET, FILM COATED ORAL EVERY 8 HOURS SCHEDULED
Status: DISCONTINUED | OUTPATIENT
Start: 2020-06-14 | End: 2020-06-22

## 2020-06-14 RX ORDER — DEXTROSE AND SODIUM CHLORIDE 5; .45 G/100ML; G/100ML
100 INJECTION, SOLUTION INTRAVENOUS CONTINUOUS
Status: DISCONTINUED | OUTPATIENT
Start: 2020-06-14 | End: 2020-06-15

## 2020-06-14 RX ADMIN — DICLOFENAC SODIUM 2 G: 10 GEL TOPICAL at 10:13

## 2020-06-14 RX ADMIN — DEXTROSE AND SODIUM CHLORIDE 100 ML/HR: 5; 450 INJECTION, SOLUTION INTRAVENOUS at 12:59

## 2020-06-14 RX ADMIN — SODIUM CHLORIDE, PRESERVATIVE FREE 10 ML: 5 INJECTION INTRAVENOUS at 03:04

## 2020-06-14 RX ADMIN — DICLOFENAC SODIUM 2 G: 10 GEL TOPICAL at 21:26

## 2020-06-14 RX ADMIN — HEPARIN SODIUM 9.9 UNITS/KG/HR: 10000 INJECTION, SOLUTION INTRAVENOUS at 16:42

## 2020-06-14 RX ADMIN — LEVETIRACETAM 500 MG: 5 INJECTION INTRAVENOUS at 10:13

## 2020-06-14 RX ADMIN — BUMETANIDE 2 MG: 0.25 INJECTION INTRAMUSCULAR; INTRAVENOUS at 10:36

## 2020-06-14 RX ADMIN — DEXTROSE MONOHYDRATE 25 G: 25 INJECTION, SOLUTION INTRAVENOUS at 06:52

## 2020-06-14 RX ADMIN — BUMETANIDE 2 MG: 0.25 INJECTION INTRAMUSCULAR; INTRAVENOUS at 03:02

## 2020-06-14 RX ADMIN — SODIUM CHLORIDE, PRESERVATIVE FREE 10 ML: 5 INJECTION INTRAVENOUS at 10:13

## 2020-06-14 RX ADMIN — ASPIRIN 300 MG: 300 SUPPOSITORY RECTAL at 14:57

## 2020-06-14 RX ADMIN — LEVETIRACETAM 500 MG: 5 INJECTION INTRAVENOUS at 21:25

## 2020-06-14 RX ADMIN — DICLOFENAC SODIUM 2 G: 10 GEL TOPICAL at 17:10

## 2020-06-14 RX ADMIN — SODIUM CHLORIDE 5 MG/HR: 900 INJECTION, SOLUTION INTRAVENOUS at 21:25

## 2020-06-14 RX ADMIN — SODIUM CHLORIDE, PRESERVATIVE FREE 10 ML: 5 INJECTION INTRAVENOUS at 21:26

## 2020-06-14 RX ADMIN — DEXTROSE AND SODIUM CHLORIDE 100 ML/HR: 5; 450 INJECTION, SOLUTION INTRAVENOUS at 23:49

## 2020-06-15 PROBLEM — E87.6 HYPOKALEMIA: Status: ACTIVE | Noted: 2020-06-15

## 2020-06-15 LAB
ALBUMIN SERPL-MCNC: 3.6 G/DL (ref 3.5–5.2)
ALBUMIN/GLOB SERPL: 1.2 G/DL
ALP SERPL-CCNC: 110 U/L (ref 39–117)
ALT SERPL W P-5'-P-CCNC: 48 U/L (ref 1–33)
ANION GAP SERPL CALCULATED.3IONS-SCNC: 8.1 MMOL/L (ref 5–15)
APTT PPP: 63.8 SECONDS (ref 22.7–35.4)
ARTERIAL PATENCY WRIST A: POSITIVE
AST SERPL-CCNC: 28 U/L (ref 1–32)
ATMOSPHERIC PRESS: 755.8 MMHG
BASE EXCESS BLDA CALC-SCNC: 18.2 MMOL/L (ref 0–2)
BASOPHILS # BLD AUTO: 0.01 10*3/MM3 (ref 0–0.2)
BASOPHILS NFR BLD AUTO: 0.1 % (ref 0–1.5)
BDY SITE: ABNORMAL
BILIRUB SERPL-MCNC: 0.6 MG/DL (ref 0.2–1.2)
BUN BLD-MCNC: 22 MG/DL (ref 8–23)
BUN/CREAT SERPL: 18.6 (ref 7–25)
CALCIUM SPEC-SCNC: 9.2 MG/DL (ref 8.6–10.5)
CHLORIDE SERPL-SCNC: 94 MMOL/L (ref 98–107)
CO2 SERPL-SCNC: 43.9 MMOL/L (ref 22–29)
CREAT BLD-MCNC: 1.18 MG/DL (ref 0.57–1)
DEPRECATED RDW RBC AUTO: 46.5 FL (ref 37–54)
EOSINOPHIL # BLD AUTO: 0 10*3/MM3 (ref 0–0.4)
EOSINOPHIL NFR BLD AUTO: 0 % (ref 0.3–6.2)
ERYTHROCYTE [DISTWIDTH] IN BLOOD BY AUTOMATED COUNT: 14.9 % (ref 12.3–15.4)
GAS FLOW AIRWAY: 2 LPM
GFR SERPL CREATININE-BSD FRML MDRD: 53 ML/MIN/1.73
GLOBULIN UR ELPH-MCNC: 3 GM/DL
GLUCOSE BLD-MCNC: 180 MG/DL (ref 65–99)
GLUCOSE BLDC GLUCOMTR-MCNC: 153 MG/DL (ref 70–130)
GLUCOSE BLDC GLUCOMTR-MCNC: 174 MG/DL (ref 70–130)
GLUCOSE BLDC GLUCOMTR-MCNC: 174 MG/DL (ref 70–130)
GLUCOSE BLDC GLUCOMTR-MCNC: 189 MG/DL (ref 70–130)
GLUCOSE BLDC GLUCOMTR-MCNC: 194 MG/DL (ref 70–130)
HCO3 BLDA-SCNC: 47.1 MMOL/L (ref 22–28)
HCT VFR BLD AUTO: 32.8 % (ref 34–46.6)
HGB BLD-MCNC: 9.9 G/DL (ref 12–15.9)
IMM GRANULOCYTES # BLD AUTO: 0.03 10*3/MM3 (ref 0–0.05)
IMM GRANULOCYTES NFR BLD AUTO: 0.3 % (ref 0–0.5)
LYMPHOCYTES # BLD AUTO: 0.62 10*3/MM3 (ref 0.7–3.1)
LYMPHOCYTES NFR BLD AUTO: 7 % (ref 19.6–45.3)
MAGNESIUM SERPL-MCNC: 1.8 MG/DL (ref 1.6–2.4)
MCH RBC QN AUTO: 26 PG (ref 26.6–33)
MCHC RBC AUTO-ENTMCNC: 30.2 G/DL (ref 31.5–35.7)
MCV RBC AUTO: 86.1 FL (ref 79–97)
MODALITY: ABNORMAL
MONOCYTES # BLD AUTO: 1 10*3/MM3 (ref 0.1–0.9)
MONOCYTES NFR BLD AUTO: 11.3 % (ref 5–12)
NEUTROPHILS # BLD AUTO: 7.19 10*3/MM3 (ref 1.7–7)
NEUTROPHILS NFR BLD AUTO: 81.3 % (ref 42.7–76)
NRBC BLD AUTO-RTO: 0.3 /100 WBC (ref 0–0.2)
PCO2 BLDA: 75.6 MM HG (ref 35–45)
PH BLDA: 7.4 PH UNITS (ref 7.35–7.45)
PHOSPHATE SERPL-MCNC: 2.1 MG/DL (ref 2.5–4.5)
PLATELET # BLD AUTO: 286 10*3/MM3 (ref 140–450)
PMV BLD AUTO: 9.7 FL (ref 6–12)
PO2 BLDA: 78.7 MM HG (ref 80–100)
POTASSIUM BLD-SCNC: 3.1 MMOL/L (ref 3.5–5.2)
PROT SERPL-MCNC: 6.6 G/DL (ref 6–8.5)
RBC # BLD AUTO: 3.81 10*6/MM3 (ref 3.77–5.28)
SAO2 % BLDCOA: 94.6 % (ref 92–99)
SODIUM BLD-SCNC: 146 MMOL/L (ref 136–145)
TOTAL RATE: 20 BREATHS/MINUTE
URATE SERPL-MCNC: 8.6 MG/DL (ref 2.4–5.7)
WBC NRBC COR # BLD: 8.85 10*3/MM3 (ref 3.4–10.8)

## 2020-06-15 PROCEDURE — 85025 COMPLETE CBC W/AUTO DIFF WBC: CPT | Performed by: PSYCHIATRY & NEUROLOGY

## 2020-06-15 PROCEDURE — 36600 WITHDRAWAL OF ARTERIAL BLOOD: CPT

## 2020-06-15 PROCEDURE — 63710000001 INSULIN REGULAR HUMAN PER 5 UNITS: Performed by: INTERNAL MEDICINE

## 2020-06-15 PROCEDURE — 80053 COMPREHEN METABOLIC PANEL: CPT | Performed by: INTERNAL MEDICINE

## 2020-06-15 PROCEDURE — 85730 THROMBOPLASTIN TIME PARTIAL: CPT | Performed by: PSYCHIATRY & NEUROLOGY

## 2020-06-15 PROCEDURE — 97162 PT EVAL MOD COMPLEX 30 MIN: CPT

## 2020-06-15 PROCEDURE — 25010000002 LEVETIRACETAM IN NACL 0.82% 500 MG/100ML SOLUTION: Performed by: INTERNAL MEDICINE

## 2020-06-15 PROCEDURE — 99233 SBSQ HOSP IP/OBS HIGH 50: CPT | Performed by: INTERNAL MEDICINE

## 2020-06-15 PROCEDURE — 94799 UNLISTED PULMONARY SVC/PX: CPT

## 2020-06-15 PROCEDURE — 97165 OT EVAL LOW COMPLEX 30 MIN: CPT

## 2020-06-15 PROCEDURE — 83735 ASSAY OF MAGNESIUM: CPT | Performed by: INTERNAL MEDICINE

## 2020-06-15 PROCEDURE — 82803 BLOOD GASES ANY COMBINATION: CPT

## 2020-06-15 PROCEDURE — 92610 EVALUATE SWALLOWING FUNCTION: CPT | Performed by: SPEECH-LANGUAGE PATHOLOGIST

## 2020-06-15 PROCEDURE — 82962 GLUCOSE BLOOD TEST: CPT

## 2020-06-15 PROCEDURE — 84100 ASSAY OF PHOSPHORUS: CPT | Performed by: INTERNAL MEDICINE

## 2020-06-15 PROCEDURE — 99232 SBSQ HOSP IP/OBS MODERATE 35: CPT | Performed by: PSYCHIATRY & NEUROLOGY

## 2020-06-15 PROCEDURE — 97110 THERAPEUTIC EXERCISES: CPT

## 2020-06-15 PROCEDURE — 25010000002 HEPARIN (PORCINE) PER 1000 UNITS: Performed by: PSYCHIATRY & NEUROLOGY

## 2020-06-15 PROCEDURE — 84550 ASSAY OF BLOOD/URIC ACID: CPT | Performed by: INTERNAL MEDICINE

## 2020-06-15 RX ORDER — LANSOPRAZOLE
30 KIT EVERY MORNING
Status: DISCONTINUED | OUTPATIENT
Start: 2020-06-16 | End: 2020-07-03 | Stop reason: HOSPADM

## 2020-06-15 RX ORDER — DEXTROSE, SODIUM CHLORIDE, AND POTASSIUM CHLORIDE 5; .45; .15 G/100ML; G/100ML; G/100ML
100 INJECTION INTRAVENOUS CONTINUOUS
Status: DISCONTINUED | OUTPATIENT
Start: 2020-06-15 | End: 2020-06-16

## 2020-06-15 RX ADMIN — DILTIAZEM HYDROCHLORIDE 60 MG: 60 TABLET, FILM COATED ORAL at 21:00

## 2020-06-15 RX ADMIN — HEPARIN SODIUM 9.9 UNITS/KG/HR: 10000 INJECTION, SOLUTION INTRAVENOUS at 17:22

## 2020-06-15 RX ADMIN — DICLOFENAC SODIUM 2 G: 10 GEL TOPICAL at 08:32

## 2020-06-15 RX ADMIN — DOXEPIN HYDROCHLORIDE 100 MG: 25 CAPSULE ORAL at 20:26

## 2020-06-15 RX ADMIN — HYDROCODONE BITARTRATE AND ACETAMINOPHEN 1 TABLET: 5; 325 TABLET ORAL at 18:24

## 2020-06-15 RX ADMIN — DICLOFENAC SODIUM 2 G: 10 GEL TOPICAL at 16:34

## 2020-06-15 RX ADMIN — INSULIN HUMAN 4 UNITS: 100 INJECTION, SOLUTION PARENTERAL at 05:21

## 2020-06-15 RX ADMIN — INSULIN HUMAN 11 UNITS: 100 INJECTION, SOLUTION PARENTERAL at 00:19

## 2020-06-15 RX ADMIN — METOPROLOL TARTRATE 50 MG: 50 TABLET, FILM COATED ORAL at 20:26

## 2020-06-15 RX ADMIN — ATORVASTATIN CALCIUM 80 MG: 80 TABLET, FILM COATED ORAL at 20:26

## 2020-06-15 RX ADMIN — INSULIN HUMAN 6 UNITS: 100 INJECTION, SOLUTION PARENTERAL at 13:15

## 2020-06-15 RX ADMIN — LEVETIRACETAM 500 MG: 5 INJECTION INTRAVENOUS at 20:27

## 2020-06-15 RX ADMIN — POTASSIUM CHLORIDE, DEXTROSE MONOHYDRATE AND SODIUM CHLORIDE 100 ML/HR: 150; 5; 450 INJECTION, SOLUTION INTRAVENOUS at 10:40

## 2020-06-15 RX ADMIN — METOCLOPRAMIDE 7.5 MG: 5 TABLET ORAL at 16:33

## 2020-06-15 RX ADMIN — INSULIN HUMAN 5 UNITS: 100 INJECTION, SOLUTION PARENTERAL at 17:24

## 2020-06-15 RX ADMIN — DILTIAZEM HYDROCHLORIDE 60 MG: 60 TABLET, FILM COATED ORAL at 13:15

## 2020-06-15 RX ADMIN — SODIUM CHLORIDE, PRESERVATIVE FREE 10 ML: 5 INJECTION INTRAVENOUS at 09:08

## 2020-06-15 RX ADMIN — INSULIN HUMAN 4 UNITS: 100 INJECTION, SOLUTION PARENTERAL at 20:57

## 2020-06-15 RX ADMIN — DEXTROSE AND SODIUM CHLORIDE 100 ML/HR: 5; 450 INJECTION, SOLUTION INTRAVENOUS at 09:40

## 2020-06-15 RX ADMIN — DICLOFENAC SODIUM 2 G: 10 GEL TOPICAL at 20:27

## 2020-06-15 RX ADMIN — SODIUM CHLORIDE, PRESERVATIVE FREE 10 ML: 5 INJECTION INTRAVENOUS at 20:27

## 2020-06-15 RX ADMIN — BUSPIRONE HYDROCHLORIDE 30 MG: 15 TABLET ORAL at 16:33

## 2020-06-15 RX ADMIN — LEVETIRACETAM 500 MG: 5 INJECTION INTRAVENOUS at 08:31

## 2020-06-15 RX ADMIN — ASPIRIN 325 MG: 325 TABLET ORAL at 11:52

## 2020-06-15 RX ADMIN — POTASSIUM CHLORIDE, DEXTROSE MONOHYDRATE AND SODIUM CHLORIDE 100 ML/HR: 150; 5; 450 INJECTION, SOLUTION INTRAVENOUS at 21:01

## 2020-06-16 ENCOUNTER — APPOINTMENT (OUTPATIENT)
Dept: NEUROLOGY | Facility: HOSPITAL | Age: 85
End: 2020-06-16

## 2020-06-16 ENCOUNTER — APPOINTMENT (OUTPATIENT)
Dept: GENERAL RADIOLOGY | Facility: HOSPITAL | Age: 85
End: 2020-06-16

## 2020-06-16 PROBLEM — R47.01 EXPRESSIVE APHASIA: Status: ACTIVE | Noted: 2020-06-16

## 2020-06-16 LAB
ALBUMIN SERPL-MCNC: 3.1 G/DL (ref 3.5–5.2)
ANION GAP SERPL CALCULATED.3IONS-SCNC: 3.4 MMOL/L (ref 5–15)
APTT PPP: 82.5 SECONDS (ref 22.7–35.4)
BASOPHILS # BLD AUTO: 0.02 10*3/MM3 (ref 0–0.2)
BASOPHILS NFR BLD AUTO: 0.3 % (ref 0–1.5)
BUN BLD-MCNC: 20 MG/DL (ref 8–23)
BUN/CREAT SERPL: 17.1 (ref 7–25)
CALCIUM SPEC-SCNC: 9.1 MG/DL (ref 8.6–10.5)
CHLORIDE SERPL-SCNC: 95 MMOL/L (ref 98–107)
CO2 SERPL-SCNC: 42.6 MMOL/L (ref 22–29)
CREAT BLD-MCNC: 1.17 MG/DL (ref 0.57–1)
DEPRECATED RDW RBC AUTO: 46.1 FL (ref 37–54)
EOSINOPHIL # BLD AUTO: 0 10*3/MM3 (ref 0–0.4)
EOSINOPHIL NFR BLD AUTO: 0 % (ref 0.3–6.2)
ERYTHROCYTE [DISTWIDTH] IN BLOOD BY AUTOMATED COUNT: 14.6 % (ref 12.3–15.4)
GFR SERPL CREATININE-BSD FRML MDRD: 54 ML/MIN/1.73
GLUCOSE BLD-MCNC: 174 MG/DL (ref 65–99)
GLUCOSE BLDC GLUCOMTR-MCNC: 155 MG/DL (ref 70–130)
GLUCOSE BLDC GLUCOMTR-MCNC: 156 MG/DL (ref 70–130)
GLUCOSE BLDC GLUCOMTR-MCNC: 186 MG/DL (ref 70–130)
GLUCOSE BLDC GLUCOMTR-MCNC: 189 MG/DL (ref 70–130)
GLUCOSE BLDC GLUCOMTR-MCNC: 219 MG/DL (ref 70–130)
GLUCOSE BLDC GLUCOMTR-MCNC: 234 MG/DL (ref 70–130)
HCT VFR BLD AUTO: 31 % (ref 34–46.6)
HGB BLD-MCNC: 9.5 G/DL (ref 12–15.9)
IMM GRANULOCYTES # BLD AUTO: 0.02 10*3/MM3 (ref 0–0.05)
IMM GRANULOCYTES NFR BLD AUTO: 0.3 % (ref 0–0.5)
LYMPHOCYTES # BLD AUTO: 0.86 10*3/MM3 (ref 0.7–3.1)
LYMPHOCYTES NFR BLD AUTO: 12.1 % (ref 19.6–45.3)
MAGNESIUM SERPL-MCNC: 2 MG/DL (ref 1.6–2.4)
MCH RBC QN AUTO: 26.3 PG (ref 26.6–33)
MCHC RBC AUTO-ENTMCNC: 30.6 G/DL (ref 31.5–35.7)
MCV RBC AUTO: 85.9 FL (ref 79–97)
MONOCYTES # BLD AUTO: 1 10*3/MM3 (ref 0.1–0.9)
MONOCYTES NFR BLD AUTO: 14.1 % (ref 5–12)
NEUTROPHILS # BLD AUTO: 5.21 10*3/MM3 (ref 1.7–7)
NEUTROPHILS NFR BLD AUTO: 73.2 % (ref 42.7–76)
NRBC BLD AUTO-RTO: 0.3 /100 WBC (ref 0–0.2)
PHOSPHATE SERPL-MCNC: 2.5 MG/DL (ref 2.5–4.5)
PLATELET # BLD AUTO: 235 10*3/MM3 (ref 140–450)
PMV BLD AUTO: 9.3 FL (ref 6–12)
POTASSIUM BLD-SCNC: 3.5 MMOL/L (ref 3.5–5.2)
RBC # BLD AUTO: 3.61 10*6/MM3 (ref 3.77–5.28)
SODIUM BLD-SCNC: 141 MMOL/L (ref 136–145)
URATE SERPL-MCNC: 7.3 MG/DL (ref 2.4–5.7)
WBC NRBC COR # BLD: 7.11 10*3/MM3 (ref 3.4–10.8)

## 2020-06-16 PROCEDURE — 82962 GLUCOSE BLOOD TEST: CPT

## 2020-06-16 PROCEDURE — 94799 UNLISTED PULMONARY SVC/PX: CPT

## 2020-06-16 PROCEDURE — 99233 SBSQ HOSP IP/OBS HIGH 50: CPT | Performed by: INTERNAL MEDICINE

## 2020-06-16 PROCEDURE — 99233 SBSQ HOSP IP/OBS HIGH 50: CPT | Performed by: NURSE PRACTITIONER

## 2020-06-16 PROCEDURE — 95816 EEG AWAKE AND DROWSY: CPT | Performed by: PSYCHIATRY & NEUROLOGY

## 2020-06-16 PROCEDURE — 95816 EEG AWAKE AND DROWSY: CPT

## 2020-06-16 PROCEDURE — 97110 THERAPEUTIC EXERCISES: CPT

## 2020-06-16 PROCEDURE — 63710000001 INSULIN REGULAR HUMAN PER 5 UNITS: Performed by: INTERNAL MEDICINE

## 2020-06-16 PROCEDURE — 83735 ASSAY OF MAGNESIUM: CPT | Performed by: INTERNAL MEDICINE

## 2020-06-16 PROCEDURE — 84550 ASSAY OF BLOOD/URIC ACID: CPT | Performed by: INTERNAL MEDICINE

## 2020-06-16 PROCEDURE — 71045 X-RAY EXAM CHEST 1 VIEW: CPT

## 2020-06-16 PROCEDURE — 80069 RENAL FUNCTION PANEL: CPT | Performed by: INTERNAL MEDICINE

## 2020-06-16 PROCEDURE — 85025 COMPLETE CBC W/AUTO DIFF WBC: CPT | Performed by: INTERNAL MEDICINE

## 2020-06-16 PROCEDURE — 25010000002 LEVETIRACETAM IN NACL 0.82% 500 MG/100ML SOLUTION: Performed by: INTERNAL MEDICINE

## 2020-06-16 PROCEDURE — 85730 THROMBOPLASTIN TIME PARTIAL: CPT | Performed by: INTERNAL MEDICINE

## 2020-06-16 RX ORDER — POTASSIUM CHLORIDE 1.5 G/1.77G
40 POWDER, FOR SOLUTION ORAL ONCE
Status: COMPLETED | OUTPATIENT
Start: 2020-06-16 | End: 2020-06-16

## 2020-06-16 RX ORDER — ATORVASTATIN CALCIUM 20 MG/1
40 TABLET, FILM COATED ORAL NIGHTLY
Status: DISCONTINUED | OUTPATIENT
Start: 2020-06-16 | End: 2020-07-01

## 2020-06-16 RX ADMIN — DICLOFENAC SODIUM 2 G: 10 GEL TOPICAL at 18:16

## 2020-06-16 RX ADMIN — METOPROLOL TARTRATE 50 MG: 50 TABLET, FILM COATED ORAL at 09:20

## 2020-06-16 RX ADMIN — METOCLOPRAMIDE 7.5 MG: 5 TABLET ORAL at 18:15

## 2020-06-16 RX ADMIN — DILTIAZEM HYDROCHLORIDE 60 MG: 60 TABLET, FILM COATED ORAL at 13:22

## 2020-06-16 RX ADMIN — INSULIN HUMAN 8 UNITS: 100 INJECTION, SOLUTION PARENTERAL at 18:16

## 2020-06-16 RX ADMIN — DICLOFENAC SODIUM 2 G: 10 GEL TOPICAL at 09:20

## 2020-06-16 RX ADMIN — DILTIAZEM HYDROCHLORIDE 60 MG: 60 TABLET, FILM COATED ORAL at 21:31

## 2020-06-16 RX ADMIN — SODIUM CHLORIDE, PRESERVATIVE FREE 10 ML: 5 INJECTION INTRAVENOUS at 21:19

## 2020-06-16 RX ADMIN — LEVETIRACETAM 500 MG: 5 INJECTION INTRAVENOUS at 21:18

## 2020-06-16 RX ADMIN — METOPROLOL TARTRATE 50 MG: 50 TABLET, FILM COATED ORAL at 21:25

## 2020-06-16 RX ADMIN — DOXEPIN HYDROCHLORIDE 100 MG: 25 CAPSULE ORAL at 21:18

## 2020-06-16 RX ADMIN — ATORVASTATIN CALCIUM 40 MG: 20 TABLET, FILM COATED ORAL at 21:25

## 2020-06-16 RX ADMIN — BUSPIRONE HYDROCHLORIDE 30 MG: 15 TABLET ORAL at 18:15

## 2020-06-16 RX ADMIN — MULTIPLE VITAMINS W/ MINERALS TAB 1 TABLET: TAB at 09:20

## 2020-06-16 RX ADMIN — INSULIN HUMAN 10 UNITS: 100 INJECTION, SOLUTION PARENTERAL at 13:22

## 2020-06-16 RX ADMIN — DICLOFENAC SODIUM 2 G: 10 GEL TOPICAL at 21:19

## 2020-06-16 RX ADMIN — SODIUM CHLORIDE, PRESERVATIVE FREE 10 ML: 5 INJECTION INTRAVENOUS at 09:20

## 2020-06-16 RX ADMIN — DILTIAZEM HYDROCHLORIDE 60 MG: 60 TABLET, FILM COATED ORAL at 06:00

## 2020-06-16 RX ADMIN — POTASSIUM CHLORIDE 40 MEQ: 1.5 POWDER, FOR SOLUTION ORAL at 13:22

## 2020-06-16 RX ADMIN — BUSPIRONE HYDROCHLORIDE 15 MG: 15 TABLET ORAL at 06:00

## 2020-06-16 RX ADMIN — LANSOPRAZOLE 30 MG: KIT at 06:00

## 2020-06-16 RX ADMIN — METOCLOPRAMIDE 7.5 MG: 5 TABLET ORAL at 09:20

## 2020-06-16 RX ADMIN — INSULIN HUMAN 4 UNITS: 100 INJECTION, SOLUTION PARENTERAL at 05:58

## 2020-06-16 RX ADMIN — INSULIN HUMAN 5 UNITS: 100 INJECTION, SOLUTION PARENTERAL at 09:20

## 2020-06-16 RX ADMIN — LEVETIRACETAM 500 MG: 5 INJECTION INTRAVENOUS at 09:20

## 2020-06-16 RX ADMIN — ASPIRIN 325 MG: 325 TABLET ORAL at 09:20

## 2020-06-17 LAB
ALBUMIN SERPL-MCNC: 3.3 G/DL (ref 3.5–5.2)
AMMONIA BLD-SCNC: 39 UMOL/L (ref 11–51)
ANION GAP SERPL CALCULATED.3IONS-SCNC: 7.4 MMOL/L (ref 5–15)
APTT PPP: 68.1 SECONDS (ref 22.7–35.4)
APTT PPP: 97.7 SECONDS (ref 22.7–35.4)
BASOPHILS # BLD AUTO: 0.01 10*3/MM3 (ref 0–0.2)
BASOPHILS NFR BLD AUTO: 0.1 % (ref 0–1.5)
BUN BLD-MCNC: 27 MG/DL (ref 8–23)
BUN/CREAT SERPL: 22.5 (ref 7–25)
CALCIUM SPEC-SCNC: 9.3 MG/DL (ref 8.6–10.5)
CHLORIDE SERPL-SCNC: 94 MMOL/L (ref 98–107)
CO2 SERPL-SCNC: 40.6 MMOL/L (ref 22–29)
CREAT BLD-MCNC: 1.2 MG/DL (ref 0.57–1)
DEPRECATED RDW RBC AUTO: 44.9 FL (ref 37–54)
EOSINOPHIL # BLD AUTO: 0 10*3/MM3 (ref 0–0.4)
EOSINOPHIL NFR BLD AUTO: 0 % (ref 0.3–6.2)
ERYTHROCYTE [DISTWIDTH] IN BLOOD BY AUTOMATED COUNT: 14.4 % (ref 12.3–15.4)
GFR SERPL CREATININE-BSD FRML MDRD: 52 ML/MIN/1.73
GLUCOSE BLD-MCNC: 158 MG/DL (ref 65–99)
GLUCOSE BLDC GLUCOMTR-MCNC: 139 MG/DL (ref 70–130)
GLUCOSE BLDC GLUCOMTR-MCNC: 162 MG/DL (ref 70–130)
GLUCOSE BLDC GLUCOMTR-MCNC: 232 MG/DL (ref 70–130)
GLUCOSE BLDC GLUCOMTR-MCNC: 252 MG/DL (ref 70–130)
HCT VFR BLD AUTO: 33.1 % (ref 34–46.6)
HGB BLD-MCNC: 10 G/DL (ref 12–15.9)
IMM GRANULOCYTES # BLD AUTO: 0.02 10*3/MM3 (ref 0–0.05)
IMM GRANULOCYTES NFR BLD AUTO: 0.2 % (ref 0–0.5)
LYMPHOCYTES # BLD AUTO: 1.19 10*3/MM3 (ref 0.7–3.1)
LYMPHOCYTES NFR BLD AUTO: 14.8 % (ref 19.6–45.3)
MAGNESIUM SERPL-MCNC: 2.1 MG/DL (ref 1.6–2.4)
MCH RBC QN AUTO: 26.1 PG (ref 26.6–33)
MCHC RBC AUTO-ENTMCNC: 30.2 G/DL (ref 31.5–35.7)
MCV RBC AUTO: 86.4 FL (ref 79–97)
MONOCYTES # BLD AUTO: 0.94 10*3/MM3 (ref 0.1–0.9)
MONOCYTES NFR BLD AUTO: 11.7 % (ref 5–12)
NEUTROPHILS # BLD AUTO: 5.88 10*3/MM3 (ref 1.7–7)
NEUTROPHILS NFR BLD AUTO: 73.2 % (ref 42.7–76)
NRBC BLD AUTO-RTO: 0.1 /100 WBC (ref 0–0.2)
PHOSPHATE SERPL-MCNC: 2.8 MG/DL (ref 2.5–4.5)
PLATELET # BLD AUTO: 249 10*3/MM3 (ref 140–450)
PMV BLD AUTO: 10.2 FL (ref 6–12)
POTASSIUM BLD-SCNC: 4.1 MMOL/L (ref 3.5–5.2)
RBC # BLD AUTO: 3.83 10*6/MM3 (ref 3.77–5.28)
SODIUM BLD-SCNC: 142 MMOL/L (ref 136–145)
URATE SERPL-MCNC: 7.2 MG/DL (ref 2.4–5.7)
VIT B12 BLD-MCNC: >2000 PG/ML (ref 211–946)
WBC NRBC COR # BLD: 8.04 10*3/MM3 (ref 3.4–10.8)

## 2020-06-17 PROCEDURE — 92610 EVALUATE SWALLOWING FUNCTION: CPT

## 2020-06-17 PROCEDURE — 63710000001 INSULIN REGULAR HUMAN PER 5 UNITS: Performed by: INTERNAL MEDICINE

## 2020-06-17 PROCEDURE — 97530 THERAPEUTIC ACTIVITIES: CPT

## 2020-06-17 PROCEDURE — 94799 UNLISTED PULMONARY SVC/PX: CPT

## 2020-06-17 PROCEDURE — 97535 SELF CARE MNGMENT TRAINING: CPT

## 2020-06-17 PROCEDURE — 85730 THROMBOPLASTIN TIME PARTIAL: CPT | Performed by: INTERNAL MEDICINE

## 2020-06-17 PROCEDURE — 82962 GLUCOSE BLOOD TEST: CPT

## 2020-06-17 PROCEDURE — 99232 SBSQ HOSP IP/OBS MODERATE 35: CPT | Performed by: NURSE PRACTITIONER

## 2020-06-17 PROCEDURE — 82140 ASSAY OF AMMONIA: CPT | Performed by: HOSPITALIST

## 2020-06-17 PROCEDURE — 25010000002 LEVETIRACETAM IN NACL 0.82% 500 MG/100ML SOLUTION: Performed by: INTERNAL MEDICINE

## 2020-06-17 PROCEDURE — 85730 THROMBOPLASTIN TIME PARTIAL: CPT | Performed by: PSYCHIATRY & NEUROLOGY

## 2020-06-17 PROCEDURE — 97110 THERAPEUTIC EXERCISES: CPT

## 2020-06-17 PROCEDURE — 85025 COMPLETE CBC W/AUTO DIFF WBC: CPT | Performed by: INTERNAL MEDICINE

## 2020-06-17 PROCEDURE — 82607 VITAMIN B-12: CPT | Performed by: NURSE PRACTITIONER

## 2020-06-17 PROCEDURE — 84550 ASSAY OF BLOOD/URIC ACID: CPT | Performed by: INTERNAL MEDICINE

## 2020-06-17 PROCEDURE — 80069 RENAL FUNCTION PANEL: CPT | Performed by: INTERNAL MEDICINE

## 2020-06-17 PROCEDURE — 83735 ASSAY OF MAGNESIUM: CPT | Performed by: INTERNAL MEDICINE

## 2020-06-17 RX ADMIN — DOXEPIN HYDROCHLORIDE 100 MG: 25 CAPSULE ORAL at 21:53

## 2020-06-17 RX ADMIN — DILTIAZEM HYDROCHLORIDE 60 MG: 60 TABLET, FILM COATED ORAL at 16:14

## 2020-06-17 RX ADMIN — LANSOPRAZOLE 30 MG: KIT at 06:34

## 2020-06-17 RX ADMIN — DICLOFENAC SODIUM 2 G: 10 GEL TOPICAL at 16:14

## 2020-06-17 RX ADMIN — DILTIAZEM HYDROCHLORIDE 60 MG: 60 TABLET, FILM COATED ORAL at 06:34

## 2020-06-17 RX ADMIN — DICLOFENAC SODIUM 2 G: 10 GEL TOPICAL at 21:52

## 2020-06-17 RX ADMIN — INSULIN HUMAN 12 UNITS: 100 INJECTION, SOLUTION PARENTERAL at 18:53

## 2020-06-17 RX ADMIN — DICLOFENAC SODIUM 2 G: 10 GEL TOPICAL at 09:26

## 2020-06-17 RX ADMIN — INSULIN HUMAN 9 UNITS: 100 INJECTION, SOLUTION PARENTERAL at 12:52

## 2020-06-17 RX ADMIN — DILTIAZEM HYDROCHLORIDE 60 MG: 60 TABLET, FILM COATED ORAL at 21:54

## 2020-06-17 RX ADMIN — METOPROLOL TARTRATE 50 MG: 50 TABLET, FILM COATED ORAL at 09:18

## 2020-06-17 RX ADMIN — SODIUM CHLORIDE, PRESERVATIVE FREE 10 ML: 5 INJECTION INTRAVENOUS at 21:54

## 2020-06-17 RX ADMIN — LEVETIRACETAM 500 MG: 5 INJECTION INTRAVENOUS at 09:18

## 2020-06-17 RX ADMIN — METOCLOPRAMIDE 7.5 MG: 5 TABLET ORAL at 06:34

## 2020-06-17 RX ADMIN — BUSPIRONE HYDROCHLORIDE 15 MG: 15 TABLET ORAL at 06:34

## 2020-06-17 RX ADMIN — INSULIN HUMAN 3 UNITS: 100 INJECTION, SOLUTION PARENTERAL at 09:18

## 2020-06-17 RX ADMIN — BUSPIRONE HYDROCHLORIDE 30 MG: 15 TABLET ORAL at 18:53

## 2020-06-17 RX ADMIN — SODIUM CHLORIDE, PRESERVATIVE FREE 10 ML: 5 INJECTION INTRAVENOUS at 09:18

## 2020-06-17 RX ADMIN — ATORVASTATIN CALCIUM 40 MG: 20 TABLET, FILM COATED ORAL at 21:52

## 2020-06-17 RX ADMIN — METOCLOPRAMIDE 7.5 MG: 5 TABLET ORAL at 18:53

## 2020-06-17 RX ADMIN — ASPIRIN 325 MG: 325 TABLET ORAL at 09:18

## 2020-06-17 RX ADMIN — METOPROLOL TARTRATE 50 MG: 50 TABLET, FILM COATED ORAL at 21:52

## 2020-06-17 RX ADMIN — MULTIPLE VITAMINS W/ MINERALS TAB 1 TABLET: TAB at 09:18

## 2020-06-18 LAB
ALBUMIN SERPL-MCNC: 3.1 G/DL (ref 3.5–5.2)
ANION GAP SERPL CALCULATED.3IONS-SCNC: 3.9 MMOL/L (ref 5–15)
APTT PPP: 52.7 SECONDS (ref 22.7–35.4)
BASOPHILS # BLD AUTO: 0.01 10*3/MM3 (ref 0–0.2)
BASOPHILS NFR BLD AUTO: 0.1 % (ref 0–1.5)
BUN BLD-MCNC: 32 MG/DL (ref 8–23)
BUN/CREAT SERPL: 24.1 (ref 7–25)
CALCIUM SPEC-SCNC: 9.3 MG/DL (ref 8.6–10.5)
CHLORIDE SERPL-SCNC: 95 MMOL/L (ref 98–107)
CO2 SERPL-SCNC: 43.1 MMOL/L (ref 22–29)
CREAT BLD-MCNC: 1.33 MG/DL (ref 0.57–1)
DEPRECATED RDW RBC AUTO: 43.2 FL (ref 37–54)
EOSINOPHIL # BLD AUTO: 0 10*3/MM3 (ref 0–0.4)
EOSINOPHIL NFR BLD AUTO: 0 % (ref 0.3–6.2)
ERYTHROCYTE [DISTWIDTH] IN BLOOD BY AUTOMATED COUNT: 14 % (ref 12.3–15.4)
GFR SERPL CREATININE-BSD FRML MDRD: 46 ML/MIN/1.73
GLUCOSE BLD-MCNC: 136 MG/DL (ref 65–99)
GLUCOSE BLDC GLUCOMTR-MCNC: 120 MG/DL (ref 70–130)
GLUCOSE BLDC GLUCOMTR-MCNC: 142 MG/DL (ref 70–130)
GLUCOSE BLDC GLUCOMTR-MCNC: 204 MG/DL (ref 70–130)
GLUCOSE BLDC GLUCOMTR-MCNC: 213 MG/DL (ref 70–130)
GLUCOSE BLDC GLUCOMTR-MCNC: 215 MG/DL (ref 70–130)
HCT VFR BLD AUTO: 31.8 % (ref 34–46.6)
HGB BLD-MCNC: 9.8 G/DL (ref 12–15.9)
IMM GRANULOCYTES # BLD AUTO: 0.03 10*3/MM3 (ref 0–0.05)
IMM GRANULOCYTES NFR BLD AUTO: 0.4 % (ref 0–0.5)
LYMPHOCYTES # BLD AUTO: 0.78 10*3/MM3 (ref 0.7–3.1)
LYMPHOCYTES NFR BLD AUTO: 10.4 % (ref 19.6–45.3)
MAGNESIUM SERPL-MCNC: 2.3 MG/DL (ref 1.6–2.4)
MCH RBC QN AUTO: 26.2 PG (ref 26.6–33)
MCHC RBC AUTO-ENTMCNC: 30.8 G/DL (ref 31.5–35.7)
MCV RBC AUTO: 85 FL (ref 79–97)
MONOCYTES # BLD AUTO: 1 10*3/MM3 (ref 0.1–0.9)
MONOCYTES NFR BLD AUTO: 13.3 % (ref 5–12)
NEUTROPHILS # BLD AUTO: 5.7 10*3/MM3 (ref 1.7–7)
NEUTROPHILS NFR BLD AUTO: 75.8 % (ref 42.7–76)
NRBC BLD AUTO-RTO: 0 /100 WBC (ref 0–0.2)
PHOSPHATE SERPL-MCNC: 3.3 MG/DL (ref 2.5–4.5)
PLATELET # BLD AUTO: 218 10*3/MM3 (ref 140–450)
PMV BLD AUTO: 10.8 FL (ref 6–12)
POTASSIUM BLD-SCNC: 4.2 MMOL/L (ref 3.5–5.2)
RBC # BLD AUTO: 3.74 10*6/MM3 (ref 3.77–5.28)
SODIUM BLD-SCNC: 142 MMOL/L (ref 136–145)
URATE SERPL-MCNC: 7.5 MG/DL (ref 2.4–5.7)
WBC NRBC COR # BLD: 7.52 10*3/MM3 (ref 3.4–10.8)

## 2020-06-18 PROCEDURE — 99232 SBSQ HOSP IP/OBS MODERATE 35: CPT | Performed by: NURSE PRACTITIONER

## 2020-06-18 PROCEDURE — 85730 THROMBOPLASTIN TIME PARTIAL: CPT | Performed by: INTERNAL MEDICINE

## 2020-06-18 PROCEDURE — 85025 COMPLETE CBC W/AUTO DIFF WBC: CPT | Performed by: INTERNAL MEDICINE

## 2020-06-18 PROCEDURE — 83735 ASSAY OF MAGNESIUM: CPT | Performed by: INTERNAL MEDICINE

## 2020-06-18 PROCEDURE — 97530 THERAPEUTIC ACTIVITIES: CPT

## 2020-06-18 PROCEDURE — 80069 RENAL FUNCTION PANEL: CPT | Performed by: INTERNAL MEDICINE

## 2020-06-18 PROCEDURE — 84550 ASSAY OF BLOOD/URIC ACID: CPT | Performed by: INTERNAL MEDICINE

## 2020-06-18 PROCEDURE — 25010000002 LEVETRIRACETAM PER 10 MG: Performed by: NURSE PRACTITIONER

## 2020-06-18 PROCEDURE — 82962 GLUCOSE BLOOD TEST: CPT

## 2020-06-18 PROCEDURE — 97110 THERAPEUTIC EXERCISES: CPT

## 2020-06-18 PROCEDURE — 63710000001 INSULIN REGULAR HUMAN PER 5 UNITS: Performed by: INTERNAL MEDICINE

## 2020-06-18 RX ORDER — ASPIRIN 81 MG/1
81 TABLET ORAL DAILY
Status: DISCONTINUED | OUTPATIENT
Start: 2020-06-19 | End: 2020-07-03 | Stop reason: HOSPADM

## 2020-06-18 RX ADMIN — INSULIN HUMAN 8 UNITS: 100 INJECTION, SOLUTION PARENTERAL at 22:02

## 2020-06-18 RX ADMIN — MULTIPLE VITAMINS W/ MINERALS TAB 1 TABLET: TAB at 08:23

## 2020-06-18 RX ADMIN — DILTIAZEM HYDROCHLORIDE 60 MG: 60 TABLET, FILM COATED ORAL at 17:51

## 2020-06-18 RX ADMIN — APIXABAN 2.5 MG: 2.5 TABLET, FILM COATED ORAL at 21:59

## 2020-06-18 RX ADMIN — DICLOFENAC SODIUM 2 G: 10 GEL TOPICAL at 08:24

## 2020-06-18 RX ADMIN — DILTIAZEM HYDROCHLORIDE 60 MG: 60 TABLET, FILM COATED ORAL at 22:59

## 2020-06-18 RX ADMIN — LEVETIRACETAM 750 MG: 500 INJECTION, SOLUTION INTRAVENOUS at 08:23

## 2020-06-18 RX ADMIN — METOPROLOL TARTRATE 50 MG: 50 TABLET, FILM COATED ORAL at 22:02

## 2020-06-18 RX ADMIN — APIXABAN 2.5 MG: 2.5 TABLET, FILM COATED ORAL at 17:44

## 2020-06-18 RX ADMIN — LANSOPRAZOLE 30 MG: KIT at 06:34

## 2020-06-18 RX ADMIN — ASPIRIN 325 MG: 325 TABLET ORAL at 08:23

## 2020-06-18 RX ADMIN — INSULIN HUMAN 1 UNITS: 100 INJECTION, SOLUTION PARENTERAL at 08:23

## 2020-06-18 RX ADMIN — LEVETIRACETAM 750 MG: 500 INJECTION, SOLUTION INTRAVENOUS at 21:58

## 2020-06-18 RX ADMIN — BUSPIRONE HYDROCHLORIDE 15 MG: 15 TABLET ORAL at 06:34

## 2020-06-18 RX ADMIN — DICLOFENAC SODIUM 2 G: 10 GEL TOPICAL at 21:58

## 2020-06-18 RX ADMIN — INSULIN HUMAN 7 UNITS: 100 INJECTION, SOLUTION PARENTERAL at 12:24

## 2020-06-18 RX ADMIN — SODIUM CHLORIDE, PRESERVATIVE FREE 10 ML: 5 INJECTION INTRAVENOUS at 22:05

## 2020-06-18 RX ADMIN — ATORVASTATIN CALCIUM 40 MG: 20 TABLET, FILM COATED ORAL at 22:05

## 2020-06-18 RX ADMIN — DILTIAZEM HYDROCHLORIDE 60 MG: 60 TABLET, FILM COATED ORAL at 06:34

## 2020-06-18 RX ADMIN — BUSPIRONE HYDROCHLORIDE 30 MG: 15 TABLET ORAL at 17:43

## 2020-06-18 RX ADMIN — METOCLOPRAMIDE 7.5 MG: 5 TABLET ORAL at 17:43

## 2020-06-18 RX ADMIN — INSULIN HUMAN 8 UNITS: 100 INJECTION, SOLUTION PARENTERAL at 17:43

## 2020-06-18 RX ADMIN — DICLOFENAC SODIUM 2 G: 10 GEL TOPICAL at 17:51

## 2020-06-18 RX ADMIN — LEVETIRACETAM 750 MG: 500 INJECTION, SOLUTION INTRAVENOUS at 00:42

## 2020-06-18 RX ADMIN — SODIUM CHLORIDE, PRESERVATIVE FREE 10 ML: 5 INJECTION INTRAVENOUS at 08:23

## 2020-06-18 RX ADMIN — METOPROLOL TARTRATE 50 MG: 50 TABLET, FILM COATED ORAL at 08:23

## 2020-06-18 RX ADMIN — METOCLOPRAMIDE 7.5 MG: 5 TABLET ORAL at 06:34

## 2020-06-18 RX ADMIN — DOXEPIN HYDROCHLORIDE 100 MG: 25 CAPSULE ORAL at 23:00

## 2020-06-19 LAB
ALBUMIN SERPL-MCNC: 3.5 G/DL (ref 3.5–5.2)
ANION GAP SERPL CALCULATED.3IONS-SCNC: 6.5 MMOL/L (ref 5–15)
ARTERIAL PATENCY WRIST A: POSITIVE
ARTERIAL PATENCY WRIST A: POSITIVE
ATMOSPHERIC PRESS: 749.1 MMHG
ATMOSPHERIC PRESS: 749.4 MMHG
BASE EXCESS BLDA CALC-SCNC: 19.8 MMOL/L (ref 0–2)
BASE EXCESS BLDA CALC-SCNC: 20.4 MMOL/L (ref 0–2)
BASOPHILS # BLD AUTO: 0.01 10*3/MM3 (ref 0–0.2)
BASOPHILS NFR BLD AUTO: 0.1 % (ref 0–1.5)
BDY SITE: ABNORMAL
BDY SITE: ABNORMAL
BUN BLD-MCNC: 40 MG/DL (ref 8–23)
BUN/CREAT SERPL: 28.8 (ref 7–25)
CALCIUM SPEC-SCNC: 9.7 MG/DL (ref 8.6–10.5)
CHLORIDE SERPL-SCNC: 94 MMOL/L (ref 98–107)
CO2 SERPL-SCNC: 41.5 MMOL/L (ref 22–29)
CREAT BLD-MCNC: 1.39 MG/DL (ref 0.57–1)
DEPRECATED RDW RBC AUTO: 42.8 FL (ref 37–54)
EOSINOPHIL # BLD AUTO: 0 10*3/MM3 (ref 0–0.4)
EOSINOPHIL NFR BLD AUTO: 0 % (ref 0.3–6.2)
ERYTHROCYTE [DISTWIDTH] IN BLOOD BY AUTOMATED COUNT: 13.7 % (ref 12.3–15.4)
GAS FLOW AIRWAY: 2 LPM
GFR SERPL CREATININE-BSD FRML MDRD: 44 ML/MIN/1.73
GLUCOSE BLD-MCNC: 204 MG/DL (ref 65–99)
GLUCOSE BLDC GLUCOMTR-MCNC: 163 MG/DL (ref 70–130)
GLUCOSE BLDC GLUCOMTR-MCNC: 183 MG/DL (ref 70–130)
GLUCOSE BLDC GLUCOMTR-MCNC: 191 MG/DL (ref 70–130)
GLUCOSE BLDC GLUCOMTR-MCNC: 232 MG/DL (ref 70–130)
GLUCOSE BLDC GLUCOMTR-MCNC: 253 MG/DL (ref 70–130)
HCO3 BLDA-SCNC: 49.9 MMOL/L (ref 22–28)
HCO3 BLDA-SCNC: 51.4 MMOL/L (ref 22–28)
HCT VFR BLD AUTO: 32.2 % (ref 34–46.6)
HGB BLD-MCNC: 9.8 G/DL (ref 12–15.9)
HOROWITZ INDEX BLD+IHG-RTO: 30 %
IMM GRANULOCYTES # BLD AUTO: 0.04 10*3/MM3 (ref 0–0.05)
IMM GRANULOCYTES NFR BLD AUTO: 0.5 % (ref 0–0.5)
LYMPHOCYTES # BLD AUTO: 1.06 10*3/MM3 (ref 0.7–3.1)
LYMPHOCYTES NFR BLD AUTO: 13 % (ref 19.6–45.3)
MAGNESIUM SERPL-MCNC: 2.5 MG/DL (ref 1.6–2.4)
MCH RBC QN AUTO: 26.4 PG (ref 26.6–33)
MCHC RBC AUTO-ENTMCNC: 30.4 G/DL (ref 31.5–35.7)
MCV RBC AUTO: 86.8 FL (ref 79–97)
MODALITY: ABNORMAL
MODALITY: ABNORMAL
MONOCYTES # BLD AUTO: 1.06 10*3/MM3 (ref 0.1–0.9)
MONOCYTES NFR BLD AUTO: 13 % (ref 5–12)
NEUTROPHILS # BLD AUTO: 6 10*3/MM3 (ref 1.7–7)
NEUTROPHILS NFR BLD AUTO: 73.4 % (ref 42.7–76)
NRBC BLD AUTO-RTO: 0 /100 WBC (ref 0–0.2)
O2 A-A PPRESDIFF RESPIRATORY: 0.6 MMHG
PCO2 BLDA: 110.3 MM HG (ref 35–45)
PCO2 BLDA: 88 MM HG (ref 35–45)
PEEP RESPIRATORY: 6 CM[H2O]
PH BLDA: 7.28 PH UNITS (ref 7.35–7.45)
PH BLDA: 7.36 PH UNITS (ref 7.35–7.45)
PHOSPHATE SERPL-MCNC: 3.5 MG/DL (ref 2.5–4.5)
PLATELET # BLD AUTO: 178 10*3/MM3 (ref 140–450)
PMV BLD AUTO: 10.7 FL (ref 6–12)
PO2 BLDA: 64.9 MM HG (ref 80–100)
PO2 BLDA: 66.8 MM HG (ref 80–100)
POTASSIUM BLD-SCNC: 4.5 MMOL/L (ref 3.5–5.2)
RBC # BLD AUTO: 3.71 10*6/MM3 (ref 3.77–5.28)
SAO2 % BLDCOA: 86.6 % (ref 92–99)
SAO2 % BLDCOA: 90.3 % (ref 92–99)
SET MECH RESP RATE: 22
SODIUM BLD-SCNC: 142 MMOL/L (ref 136–145)
TOTAL RATE: 12 BREATHS/MINUTE
TOTAL RATE: 22 BREATHS/MINUTE
URATE SERPL-MCNC: 7.3 MG/DL (ref 2.4–5.7)
WBC NRBC COR # BLD: 8.17 10*3/MM3 (ref 3.4–10.8)

## 2020-06-19 PROCEDURE — 99232 SBSQ HOSP IP/OBS MODERATE 35: CPT | Performed by: INTERNAL MEDICINE

## 2020-06-19 PROCEDURE — 94660 CPAP INITIATION&MGMT: CPT

## 2020-06-19 PROCEDURE — 63710000001 INSULIN REGULAR HUMAN PER 5 UNITS: Performed by: INTERNAL MEDICINE

## 2020-06-19 PROCEDURE — 80069 RENAL FUNCTION PANEL: CPT | Performed by: INTERNAL MEDICINE

## 2020-06-19 PROCEDURE — 83735 ASSAY OF MAGNESIUM: CPT | Performed by: INTERNAL MEDICINE

## 2020-06-19 PROCEDURE — 82962 GLUCOSE BLOOD TEST: CPT

## 2020-06-19 PROCEDURE — 94799 UNLISTED PULMONARY SVC/PX: CPT

## 2020-06-19 PROCEDURE — 36600 WITHDRAWAL OF ARTERIAL BLOOD: CPT

## 2020-06-19 PROCEDURE — 85025 COMPLETE CBC W/AUTO DIFF WBC: CPT | Performed by: INTERNAL MEDICINE

## 2020-06-19 PROCEDURE — 82803 BLOOD GASES ANY COMBINATION: CPT

## 2020-06-19 PROCEDURE — 84550 ASSAY OF BLOOD/URIC ACID: CPT | Performed by: INTERNAL MEDICINE

## 2020-06-19 PROCEDURE — 25010000002 LEVETRIRACETAM PER 10 MG: Performed by: NURSE PRACTITIONER

## 2020-06-19 PROCEDURE — 94760 N-INVAS EAR/PLS OXIMETRY 1: CPT

## 2020-06-19 RX ORDER — DOXEPIN HYDROCHLORIDE 10 MG/1
10 CAPSULE ORAL NIGHTLY
Status: DISCONTINUED | OUTPATIENT
Start: 2020-06-19 | End: 2020-07-03 | Stop reason: HOSPADM

## 2020-06-19 RX ADMIN — MULTIPLE VITAMINS W/ MINERALS TAB 1 TABLET: TAB at 08:53

## 2020-06-19 RX ADMIN — METOPROLOL TARTRATE 50 MG: 50 TABLET, FILM COATED ORAL at 21:19

## 2020-06-19 RX ADMIN — ASPIRIN 81 MG: 81 TABLET, COATED ORAL at 08:54

## 2020-06-19 RX ADMIN — DILTIAZEM HYDROCHLORIDE 60 MG: 60 TABLET, FILM COATED ORAL at 05:39

## 2020-06-19 RX ADMIN — INSULIN HUMAN 5 UNITS: 100 INJECTION, SOLUTION PARENTERAL at 08:53

## 2020-06-19 RX ADMIN — LEVETIRACETAM 750 MG: 500 INJECTION, SOLUTION INTRAVENOUS at 09:59

## 2020-06-19 RX ADMIN — LANSOPRAZOLE 30 MG: KIT at 09:58

## 2020-06-19 RX ADMIN — INSULIN HUMAN 3 UNITS: 100 INJECTION, SOLUTION PARENTERAL at 15:40

## 2020-06-19 RX ADMIN — METOPROLOL TARTRATE 50 MG: 50 TABLET, FILM COATED ORAL at 08:53

## 2020-06-19 RX ADMIN — METOCLOPRAMIDE 7.5 MG: 5 TABLET ORAL at 08:54

## 2020-06-19 RX ADMIN — DILTIAZEM HYDROCHLORIDE 60 MG: 60 TABLET, FILM COATED ORAL at 15:38

## 2020-06-19 RX ADMIN — INSULIN HUMAN 6 UNITS: 100 INJECTION, SOLUTION PARENTERAL at 05:39

## 2020-06-19 RX ADMIN — DOXEPIN HYDROCHLORIDE 10 MG: 10 CAPSULE ORAL at 21:19

## 2020-06-19 RX ADMIN — INSULIN HUMAN 9 UNITS: 100 INJECTION, SOLUTION PARENTERAL at 17:36

## 2020-06-19 RX ADMIN — APIXABAN 2.5 MG: 2.5 TABLET, FILM COATED ORAL at 08:54

## 2020-06-19 RX ADMIN — ATORVASTATIN CALCIUM 40 MG: 20 TABLET, FILM COATED ORAL at 21:19

## 2020-06-19 RX ADMIN — SODIUM CHLORIDE, PRESERVATIVE FREE 10 ML: 5 INJECTION INTRAVENOUS at 09:00

## 2020-06-19 RX ADMIN — APIXABAN 2.5 MG: 2.5 TABLET, FILM COATED ORAL at 21:19

## 2020-06-19 RX ADMIN — SODIUM CHLORIDE, PRESERVATIVE FREE 10 ML: 5 INJECTION INTRAVENOUS at 21:20

## 2020-06-19 RX ADMIN — INSULIN HUMAN 5 UNITS: 100 INJECTION, SOLUTION PARENTERAL at 01:08

## 2020-06-19 RX ADMIN — DILTIAZEM HYDROCHLORIDE 60 MG: 60 TABLET, FILM COATED ORAL at 21:19

## 2020-06-19 RX ADMIN — BUSPIRONE HYDROCHLORIDE 15 MG: 15 TABLET ORAL at 08:54

## 2020-06-19 RX ADMIN — LEVETIRACETAM 750 MG: 500 INJECTION, SOLUTION INTRAVENOUS at 21:19

## 2020-06-20 ENCOUNTER — APPOINTMENT (OUTPATIENT)
Dept: GENERAL RADIOLOGY | Facility: HOSPITAL | Age: 85
End: 2020-06-20

## 2020-06-20 LAB
ALBUMIN SERPL-MCNC: 3.4 G/DL (ref 3.5–5.2)
ANION GAP SERPL CALCULATED.3IONS-SCNC: 5.5 MMOL/L (ref 5–15)
ARTERIAL PATENCY WRIST A: POSITIVE
ATMOSPHERIC PRESS: 746.6 MMHG
BASE EXCESS BLDA CALC-SCNC: 18 MMOL/L (ref 0–2)
BASOPHILS # BLD AUTO: 0.01 10*3/MM3 (ref 0–0.2)
BASOPHILS NFR BLD AUTO: 0.1 % (ref 0–1.5)
BDY SITE: ABNORMAL
BUN BLD-MCNC: 42 MG/DL (ref 8–23)
BUN/CREAT SERPL: 35.9 (ref 7–25)
CALCIUM SPEC-SCNC: 9.2 MG/DL (ref 8.6–10.5)
CHLORIDE SERPL-SCNC: 91 MMOL/L (ref 98–107)
CO2 SERPL-SCNC: 40.5 MMOL/L (ref 22–29)
CREAT BLD-MCNC: 1.17 MG/DL (ref 0.57–1)
DEPRECATED RDW RBC AUTO: 44.2 FL (ref 37–54)
EOSINOPHIL # BLD AUTO: 0 10*3/MM3 (ref 0–0.4)
EOSINOPHIL NFR BLD AUTO: 0 % (ref 0.3–6.2)
ERYTHROCYTE [DISTWIDTH] IN BLOOD BY AUTOMATED COUNT: 14 % (ref 12.3–15.4)
GAS FLOW AIRWAY: 2 LPM
GFR SERPL CREATININE-BSD FRML MDRD: 54 ML/MIN/1.73
GLUCOSE BLD-MCNC: 285 MG/DL (ref 65–99)
GLUCOSE BLDC GLUCOMTR-MCNC: 193 MG/DL (ref 70–130)
GLUCOSE BLDC GLUCOMTR-MCNC: 197 MG/DL (ref 70–130)
GLUCOSE BLDC GLUCOMTR-MCNC: 238 MG/DL (ref 70–130)
GLUCOSE BLDC GLUCOMTR-MCNC: 277 MG/DL (ref 70–130)
HCO3 BLDA-SCNC: 48.7 MMOL/L (ref 22–28)
HCT VFR BLD AUTO: 32.1 % (ref 34–46.6)
HGB BLD-MCNC: 9.7 G/DL (ref 12–15.9)
IMM GRANULOCYTES # BLD AUTO: 0.04 10*3/MM3 (ref 0–0.05)
IMM GRANULOCYTES NFR BLD AUTO: 0.5 % (ref 0–0.5)
LYMPHOCYTES # BLD AUTO: 0.59 10*3/MM3 (ref 0.7–3.1)
LYMPHOCYTES NFR BLD AUTO: 7.2 % (ref 19.6–45.3)
MAGNESIUM SERPL-MCNC: 2.5 MG/DL (ref 1.6–2.4)
MCH RBC QN AUTO: 26.4 PG (ref 26.6–33)
MCHC RBC AUTO-ENTMCNC: 30.2 G/DL (ref 31.5–35.7)
MCV RBC AUTO: 87.2 FL (ref 79–97)
MODALITY: ABNORMAL
MONOCYTES # BLD AUTO: 0.86 10*3/MM3 (ref 0.1–0.9)
MONOCYTES NFR BLD AUTO: 10.5 % (ref 5–12)
NEUTROPHILS # BLD AUTO: 6.66 10*3/MM3 (ref 1.7–7)
NEUTROPHILS NFR BLD AUTO: 81.7 % (ref 42.7–76)
NRBC BLD AUTO-RTO: 0.1 /100 WBC (ref 0–0.2)
PCO2 BLDA: 98.2 MM HG (ref 35–45)
PH BLDA: 7.3 PH UNITS (ref 7.35–7.45)
PHOSPHATE SERPL-MCNC: 2.5 MG/DL (ref 2.5–4.5)
PLATELET # BLD AUTO: 173 10*3/MM3 (ref 140–450)
PMV BLD AUTO: 11.4 FL (ref 6–12)
PO2 BLDA: 94.4 MM HG (ref 80–100)
POTASSIUM BLD-SCNC: 4.4 MMOL/L (ref 3.5–5.2)
RBC # BLD AUTO: 3.68 10*6/MM3 (ref 3.77–5.28)
SAO2 % BLDCOA: 95.6 % (ref 92–99)
SODIUM BLD-SCNC: 137 MMOL/L (ref 136–145)
TOTAL RATE: 20 BREATHS/MINUTE
URATE SERPL-MCNC: 6.7 MG/DL (ref 2.4–5.7)
WBC NRBC COR # BLD: 8.16 10*3/MM3 (ref 3.4–10.8)

## 2020-06-20 PROCEDURE — 97110 THERAPEUTIC EXERCISES: CPT

## 2020-06-20 PROCEDURE — 82803 BLOOD GASES ANY COMBINATION: CPT

## 2020-06-20 PROCEDURE — 84550 ASSAY OF BLOOD/URIC ACID: CPT | Performed by: INTERNAL MEDICINE

## 2020-06-20 PROCEDURE — 71045 X-RAY EXAM CHEST 1 VIEW: CPT

## 2020-06-20 PROCEDURE — 83735 ASSAY OF MAGNESIUM: CPT | Performed by: INTERNAL MEDICINE

## 2020-06-20 PROCEDURE — 94799 UNLISTED PULMONARY SVC/PX: CPT

## 2020-06-20 PROCEDURE — 25010000002 LEVETRIRACETAM PER 10 MG: Performed by: NURSE PRACTITIONER

## 2020-06-20 PROCEDURE — 63710000001 INSULIN REGULAR HUMAN PER 5 UNITS: Performed by: INTERNAL MEDICINE

## 2020-06-20 PROCEDURE — 85025 COMPLETE CBC W/AUTO DIFF WBC: CPT | Performed by: INTERNAL MEDICINE

## 2020-06-20 PROCEDURE — 80069 RENAL FUNCTION PANEL: CPT | Performed by: INTERNAL MEDICINE

## 2020-06-20 PROCEDURE — 94660 CPAP INITIATION&MGMT: CPT

## 2020-06-20 PROCEDURE — 36600 WITHDRAWAL OF ARTERIAL BLOOD: CPT

## 2020-06-20 PROCEDURE — 82962 GLUCOSE BLOOD TEST: CPT

## 2020-06-20 RX ORDER — NICOTINE POLACRILEX 4 MG
15 LOZENGE BUCCAL
Status: DISCONTINUED | OUTPATIENT
Start: 2020-06-20 | End: 2020-07-03 | Stop reason: HOSPADM

## 2020-06-20 RX ORDER — DEXTROSE MONOHYDRATE 25 G/50ML
25 INJECTION, SOLUTION INTRAVENOUS
Status: DISCONTINUED | OUTPATIENT
Start: 2020-06-20 | End: 2020-07-03 | Stop reason: HOSPADM

## 2020-06-20 RX ADMIN — DOXEPIN HYDROCHLORIDE 10 MG: 10 CAPSULE ORAL at 21:03

## 2020-06-20 RX ADMIN — LEVETIRACETAM 750 MG: 500 INJECTION, SOLUTION INTRAVENOUS at 10:05

## 2020-06-20 RX ADMIN — ASPIRIN 81 MG: 81 TABLET, COATED ORAL at 10:03

## 2020-06-20 RX ADMIN — ATORVASTATIN CALCIUM 40 MG: 20 TABLET, FILM COATED ORAL at 21:03

## 2020-06-20 RX ADMIN — DILTIAZEM HYDROCHLORIDE 60 MG: 60 TABLET, FILM COATED ORAL at 14:26

## 2020-06-20 RX ADMIN — METOPROLOL TARTRATE 50 MG: 50 TABLET, FILM COATED ORAL at 10:02

## 2020-06-20 RX ADMIN — BUSPIRONE HYDROCHLORIDE 30 MG: 15 TABLET ORAL at 17:57

## 2020-06-20 RX ADMIN — INSULIN HUMAN 14 UNITS: 100 INJECTION, SOLUTION PARENTERAL at 00:12

## 2020-06-20 RX ADMIN — LEVETIRACETAM 750 MG: 500 INJECTION, SOLUTION INTRAVENOUS at 21:04

## 2020-06-20 RX ADMIN — DILTIAZEM HYDROCHLORIDE 60 MG: 60 TABLET, FILM COATED ORAL at 06:37

## 2020-06-20 RX ADMIN — LANSOPRAZOLE 30 MG: KIT at 06:37

## 2020-06-20 RX ADMIN — APIXABAN 2.5 MG: 2.5 TABLET, FILM COATED ORAL at 21:05

## 2020-06-20 RX ADMIN — MULTIPLE VITAMINS W/ MINERALS TAB 1 TABLET: TAB at 10:02

## 2020-06-20 RX ADMIN — INSULIN HUMAN 10 UNITS: 100 INJECTION, SOLUTION PARENTERAL at 06:37

## 2020-06-20 RX ADMIN — SODIUM CHLORIDE, PRESERVATIVE FREE 10 ML: 5 INJECTION INTRAVENOUS at 21:05

## 2020-06-20 RX ADMIN — SODIUM CHLORIDE, PRESERVATIVE FREE 10 ML: 5 INJECTION INTRAVENOUS at 10:02

## 2020-06-20 RX ADMIN — INSULIN HUMAN 6 UNITS: 100 INJECTION, SOLUTION PARENTERAL at 12:16

## 2020-06-20 RX ADMIN — METOPROLOL TARTRATE 50 MG: 50 TABLET, FILM COATED ORAL at 21:04

## 2020-06-20 RX ADMIN — INSULIN HUMAN 6 UNITS: 100 INJECTION, SOLUTION PARENTERAL at 18:15

## 2020-06-20 RX ADMIN — DILTIAZEM HYDROCHLORIDE 60 MG: 60 TABLET, FILM COATED ORAL at 21:03

## 2020-06-20 RX ADMIN — APIXABAN 2.5 MG: 2.5 TABLET, FILM COATED ORAL at 10:02

## 2020-06-20 RX ADMIN — BUSPIRONE HYDROCHLORIDE 15 MG: 15 TABLET ORAL at 06:37

## 2020-06-21 LAB
ALBUMIN SERPL-MCNC: 3.2 G/DL (ref 3.5–5.2)
ANION GAP SERPL CALCULATED.3IONS-SCNC: 7.2 MMOL/L (ref 5–15)
ARTERIAL PATENCY WRIST A: POSITIVE
ATMOSPHERIC PRESS: 745.5 MMHG
BASE EXCESS BLDA CALC-SCNC: 17.7 MMOL/L (ref 0–2)
BASOPHILS # BLD AUTO: 0.03 10*3/MM3 (ref 0–0.2)
BASOPHILS NFR BLD AUTO: 0.3 % (ref 0–1.5)
BDY SITE: ABNORMAL
BUN BLD-MCNC: 40 MG/DL (ref 8–23)
BUN/CREAT SERPL: 34.8 (ref 7–25)
CALCIUM SPEC-SCNC: 9.3 MG/DL (ref 8.6–10.5)
CHLORIDE SERPL-SCNC: 92 MMOL/L (ref 98–107)
CO2 SERPL-SCNC: 38.8 MMOL/L (ref 22–29)
CREAT BLD-MCNC: 1.15 MG/DL (ref 0.57–1)
DEPRECATED RDW RBC AUTO: 46.3 FL (ref 37–54)
EOSINOPHIL # BLD AUTO: 0 10*3/MM3 (ref 0–0.4)
EOSINOPHIL NFR BLD AUTO: 0 % (ref 0.3–6.2)
ERYTHROCYTE [DISTWIDTH] IN BLOOD BY AUTOMATED COUNT: 14.5 % (ref 12.3–15.4)
GFR SERPL CREATININE-BSD FRML MDRD: 55 ML/MIN/1.73
GLUCOSE BLD-MCNC: 328 MG/DL (ref 65–99)
GLUCOSE BLDC GLUCOMTR-MCNC: 138 MG/DL (ref 70–130)
GLUCOSE BLDC GLUCOMTR-MCNC: 234 MG/DL (ref 70–130)
GLUCOSE BLDC GLUCOMTR-MCNC: 264 MG/DL (ref 70–130)
GLUCOSE BLDC GLUCOMTR-MCNC: 310 MG/DL (ref 70–130)
GLUCOSE BLDC GLUCOMTR-MCNC: 349 MG/DL (ref 70–130)
HCO3 BLDA-SCNC: 46.4 MMOL/L (ref 22–28)
HCT VFR BLD AUTO: 32.4 % (ref 34–46.6)
HGB BLD-MCNC: 9.7 G/DL (ref 12–15.9)
HOROWITZ INDEX BLD+IHG-RTO: 30 %
IMM GRANULOCYTES # BLD AUTO: 0.04 10*3/MM3 (ref 0–0.05)
IMM GRANULOCYTES NFR BLD AUTO: 0.4 % (ref 0–0.5)
LYMPHOCYTES # BLD AUTO: 0.86 10*3/MM3 (ref 0.7–3.1)
LYMPHOCYTES NFR BLD AUTO: 8.5 % (ref 19.6–45.3)
MCH RBC QN AUTO: 26.4 PG (ref 26.6–33)
MCHC RBC AUTO-ENTMCNC: 29.9 G/DL (ref 31.5–35.7)
MCV RBC AUTO: 88 FL (ref 79–97)
MODALITY: ABNORMAL
MONOCYTES # BLD AUTO: 0.93 10*3/MM3 (ref 0.1–0.9)
MONOCYTES NFR BLD AUTO: 9.2 % (ref 5–12)
NEUTROPHILS # BLD AUTO: 8.3 10*3/MM3 (ref 1.7–7)
NEUTROPHILS NFR BLD AUTO: 81.6 % (ref 42.7–76)
NRBC BLD AUTO-RTO: 0.1 /100 WBC (ref 0–0.2)
O2 A-A PPRESDIFF RESPIRATORY: 0.5 MMHG
PCO2 BLDA: 79.5 MM HG (ref 35–45)
PEEP RESPIRATORY: 8 CM[H2O]
PH BLDA: 7.37 PH UNITS (ref 7.35–7.45)
PHOSPHATE SERPL-MCNC: 1.8 MG/DL (ref 2.5–4.5)
PHOSPHATE SERPL-MCNC: 4.2 MG/DL (ref 2.5–4.5)
PLATELET # BLD AUTO: 190 10*3/MM3 (ref 140–450)
PMV BLD AUTO: 11.6 FL (ref 6–12)
PO2 BLDA: 56.3 MM HG (ref 80–100)
POTASSIUM BLD-SCNC: 4.8 MMOL/L (ref 3.5–5.2)
RBC # BLD AUTO: 3.68 10*6/MM3 (ref 3.77–5.28)
SAO2 % BLDCOA: 85.9 % (ref 92–99)
SET MECH RESP RATE: 18
SODIUM BLD-SCNC: 138 MMOL/L (ref 136–145)
TOTAL RATE: 28 BREATHS/MINUTE
TROPONIN T SERPL-MCNC: <0.01 NG/ML (ref 0–0.03)
VENTILATOR MODE: ABNORMAL
WBC NRBC COR # BLD: 10.16 10*3/MM3 (ref 3.4–10.8)

## 2020-06-21 PROCEDURE — 82962 GLUCOSE BLOOD TEST: CPT

## 2020-06-21 PROCEDURE — 63710000001 INSULIN REGULAR HUMAN PER 5 UNITS: Performed by: INTERNAL MEDICINE

## 2020-06-21 PROCEDURE — 94799 UNLISTED PULMONARY SVC/PX: CPT

## 2020-06-21 PROCEDURE — 93010 ELECTROCARDIOGRAM REPORT: CPT | Performed by: INTERNAL MEDICINE

## 2020-06-21 PROCEDURE — 80069 RENAL FUNCTION PANEL: CPT | Performed by: INTERNAL MEDICINE

## 2020-06-21 PROCEDURE — 93005 ELECTROCARDIOGRAM TRACING: CPT | Performed by: INTERNAL MEDICINE

## 2020-06-21 PROCEDURE — 94660 CPAP INITIATION&MGMT: CPT

## 2020-06-21 PROCEDURE — 82803 BLOOD GASES ANY COMBINATION: CPT

## 2020-06-21 PROCEDURE — 84484 ASSAY OF TROPONIN QUANT: CPT | Performed by: INTERNAL MEDICINE

## 2020-06-21 PROCEDURE — 84100 ASSAY OF PHOSPHORUS: CPT | Performed by: INTERNAL MEDICINE

## 2020-06-21 PROCEDURE — 36600 WITHDRAWAL OF ARTERIAL BLOOD: CPT

## 2020-06-21 PROCEDURE — 25010000002 LEVETRIRACETAM PER 10 MG: Performed by: NURSE PRACTITIONER

## 2020-06-21 PROCEDURE — 85025 COMPLETE CBC W/AUTO DIFF WBC: CPT | Performed by: INTERNAL MEDICINE

## 2020-06-21 RX ADMIN — LANSOPRAZOLE 30 MG: KIT at 06:51

## 2020-06-21 RX ADMIN — BUSPIRONE HYDROCHLORIDE 15 MG: 15 TABLET ORAL at 06:51

## 2020-06-21 RX ADMIN — ASPIRIN 81 MG: 81 TABLET, COATED ORAL at 09:15

## 2020-06-21 RX ADMIN — INSULIN HUMAN 18 UNITS: 100 INJECTION, SOLUTION PARENTERAL at 06:51

## 2020-06-21 RX ADMIN — METOPROLOL TARTRATE 50 MG: 50 TABLET, FILM COATED ORAL at 09:15

## 2020-06-21 RX ADMIN — POTASSIUM & SODIUM PHOSPHATES POWDER PACK 280-160-250 MG 2 PACKET: 280-160-250 PACK at 13:33

## 2020-06-21 RX ADMIN — LEVETIRACETAM 750 MG: 500 INJECTION, SOLUTION INTRAVENOUS at 09:15

## 2020-06-21 RX ADMIN — INSULIN HUMAN 13 UNITS: 100 INJECTION, SOLUTION PARENTERAL at 00:58

## 2020-06-21 RX ADMIN — APIXABAN 2.5 MG: 2.5 TABLET, FILM COATED ORAL at 09:15

## 2020-06-21 RX ADMIN — METOPROLOL TARTRATE 50 MG: 50 TABLET, FILM COATED ORAL at 20:25

## 2020-06-21 RX ADMIN — APIXABAN 2.5 MG: 2.5 TABLET, FILM COATED ORAL at 20:25

## 2020-06-21 RX ADMIN — MULTIPLE VITAMINS W/ MINERALS TAB 1 TABLET: TAB at 09:15

## 2020-06-21 RX ADMIN — DOXEPIN HYDROCHLORIDE 10 MG: 10 CAPSULE ORAL at 20:25

## 2020-06-21 RX ADMIN — DICLOFENAC SODIUM 2 G: 10 GEL TOPICAL at 17:42

## 2020-06-21 RX ADMIN — BUSPIRONE HYDROCHLORIDE 30 MG: 15 TABLET ORAL at 17:43

## 2020-06-21 RX ADMIN — LEVETIRACETAM 750 MG: 500 INJECTION, SOLUTION INTRAVENOUS at 20:25

## 2020-06-21 RX ADMIN — SODIUM CHLORIDE, PRESERVATIVE FREE 10 ML: 5 INJECTION INTRAVENOUS at 20:25

## 2020-06-21 RX ADMIN — INSULIN HUMAN 20 UNITS: 100 INJECTION, SOLUTION PARENTERAL at 13:33

## 2020-06-21 RX ADMIN — DILTIAZEM HYDROCHLORIDE 60 MG: 60 TABLET, FILM COATED ORAL at 06:50

## 2020-06-21 RX ADMIN — DICLOFENAC SODIUM 2 G: 10 GEL TOPICAL at 20:26

## 2020-06-21 RX ADMIN — SODIUM CHLORIDE, PRESERVATIVE FREE 10 ML: 5 INJECTION INTRAVENOUS at 09:15

## 2020-06-21 RX ADMIN — INSULIN HUMAN 10 UNITS: 100 INJECTION, SOLUTION PARENTERAL at 17:51

## 2020-06-21 RX ADMIN — ATORVASTATIN CALCIUM 40 MG: 20 TABLET, FILM COATED ORAL at 20:25

## 2020-06-21 RX ADMIN — DILTIAZEM HYDROCHLORIDE 60 MG: 60 TABLET, FILM COATED ORAL at 13:33

## 2020-06-21 RX ADMIN — DICLOFENAC SODIUM 2 G: 10 GEL TOPICAL at 09:15

## 2020-06-22 ENCOUNTER — APPOINTMENT (OUTPATIENT)
Dept: GENERAL RADIOLOGY | Facility: HOSPITAL | Age: 85
End: 2020-06-22

## 2020-06-22 LAB
ALBUMIN SERPL-MCNC: 3.4 G/DL (ref 3.5–5.2)
ANION GAP SERPL CALCULATED.3IONS-SCNC: 6.3 MMOL/L (ref 5–15)
BASOPHILS # BLD AUTO: 0.02 10*3/MM3 (ref 0–0.2)
BASOPHILS NFR BLD AUTO: 0.2 % (ref 0–1.5)
BUN BLD-MCNC: 48 MG/DL (ref 8–23)
BUN/CREAT SERPL: 39 (ref 7–25)
CALCIUM SPEC-SCNC: 9.2 MG/DL (ref 8.6–10.5)
CHLORIDE SERPL-SCNC: 91 MMOL/L (ref 98–107)
CO2 SERPL-SCNC: 44.7 MMOL/L (ref 22–29)
CREAT BLD-MCNC: 1.23 MG/DL (ref 0.57–1)
DEPRECATED RDW RBC AUTO: 45.5 FL (ref 37–54)
EOSINOPHIL # BLD AUTO: 0 10*3/MM3 (ref 0–0.4)
EOSINOPHIL NFR BLD AUTO: 0 % (ref 0.3–6.2)
ERYTHROCYTE [DISTWIDTH] IN BLOOD BY AUTOMATED COUNT: 14.5 % (ref 12.3–15.4)
GFR SERPL CREATININE-BSD FRML MDRD: 51 ML/MIN/1.73
GLUCOSE BLD-MCNC: 148 MG/DL (ref 65–99)
GLUCOSE BLDC GLUCOMTR-MCNC: 166 MG/DL (ref 70–130)
GLUCOSE BLDC GLUCOMTR-MCNC: 220 MG/DL (ref 70–130)
GLUCOSE BLDC GLUCOMTR-MCNC: 243 MG/DL (ref 70–130)
GLUCOSE BLDC GLUCOMTR-MCNC: 372 MG/DL (ref 70–130)
GLUCOSE BLDC GLUCOMTR-MCNC: 96 MG/DL (ref 70–130)
HCT VFR BLD AUTO: 31 % (ref 34–46.6)
HGB BLD-MCNC: 9.3 G/DL (ref 12–15.9)
IMM GRANULOCYTES # BLD AUTO: 0.06 10*3/MM3 (ref 0–0.05)
IMM GRANULOCYTES NFR BLD AUTO: 0.7 % (ref 0–0.5)
LDH SERPL-CCNC: 253 U/L (ref 135–214)
LYMPHOCYTES # BLD AUTO: 1.09 10*3/MM3 (ref 0.7–3.1)
LYMPHOCYTES NFR BLD AUTO: 12.6 % (ref 19.6–45.3)
MCH RBC QN AUTO: 26.1 PG (ref 26.6–33)
MCHC RBC AUTO-ENTMCNC: 30 G/DL (ref 31.5–35.7)
MCV RBC AUTO: 87.1 FL (ref 79–97)
MONOCYTES # BLD AUTO: 1.38 10*3/MM3 (ref 0.1–0.9)
MONOCYTES NFR BLD AUTO: 15.9 % (ref 5–12)
NEUTROPHILS # BLD AUTO: 6.13 10*3/MM3 (ref 1.7–7)
NEUTROPHILS NFR BLD AUTO: 70.6 % (ref 42.7–76)
NRBC BLD AUTO-RTO: 0.1 /100 WBC (ref 0–0.2)
PHOSPHATE SERPL-MCNC: 3.3 MG/DL (ref 2.5–4.5)
PLATELET # BLD AUTO: 183 10*3/MM3 (ref 140–450)
PMV BLD AUTO: 11.9 FL (ref 6–12)
POTASSIUM BLD-SCNC: 4.5 MMOL/L (ref 3.5–5.2)
PROT SERPL-MCNC: 6.2 G/DL (ref 6–8.5)
RBC # BLD AUTO: 3.56 10*6/MM3 (ref 3.77–5.28)
SODIUM BLD-SCNC: 142 MMOL/L (ref 136–145)
WBC NRBC COR # BLD: 8.68 10*3/MM3 (ref 3.4–10.8)

## 2020-06-22 PROCEDURE — U0004 COV-19 TEST NON-CDC HGH THRU: HCPCS | Performed by: INTERNAL MEDICINE

## 2020-06-22 PROCEDURE — 80069 RENAL FUNCTION PANEL: CPT | Performed by: INTERNAL MEDICINE

## 2020-06-22 PROCEDURE — 92526 ORAL FUNCTION THERAPY: CPT | Performed by: SPEECH-LANGUAGE PATHOLOGIST

## 2020-06-22 PROCEDURE — 63710000001 INSULIN REGULAR HUMAN PER 5 UNITS: Performed by: INTERNAL MEDICINE

## 2020-06-22 PROCEDURE — 94799 UNLISTED PULMONARY SVC/PX: CPT

## 2020-06-22 PROCEDURE — 85025 COMPLETE CBC W/AUTO DIFF WBC: CPT | Performed by: INTERNAL MEDICINE

## 2020-06-22 PROCEDURE — 83615 LACTATE (LD) (LDH) ENZYME: CPT | Performed by: INTERNAL MEDICINE

## 2020-06-22 PROCEDURE — 25010000002 LEVETRIRACETAM PER 10 MG: Performed by: NURSE PRACTITIONER

## 2020-06-22 PROCEDURE — 82962 GLUCOSE BLOOD TEST: CPT

## 2020-06-22 PROCEDURE — 97110 THERAPEUTIC EXERCISES: CPT

## 2020-06-22 PROCEDURE — 84155 ASSAY OF PROTEIN SERUM: CPT | Performed by: INTERNAL MEDICINE

## 2020-06-22 PROCEDURE — 63710000001 INSULIN LISPRO (HUMAN) PER 5 UNITS: Performed by: INTERNAL MEDICINE

## 2020-06-22 PROCEDURE — 71045 X-RAY EXAM CHEST 1 VIEW: CPT

## 2020-06-22 PROCEDURE — 94640 AIRWAY INHALATION TREATMENT: CPT

## 2020-06-22 RX ORDER — NICOTINE POLACRILEX 4 MG
15 LOZENGE BUCCAL
Status: DISCONTINUED | OUTPATIENT
Start: 2020-06-22 | End: 2020-07-03 | Stop reason: HOSPADM

## 2020-06-22 RX ORDER — IPRATROPIUM BROMIDE AND ALBUTEROL SULFATE 2.5; .5 MG/3ML; MG/3ML
3 SOLUTION RESPIRATORY (INHALATION)
Status: DISCONTINUED | OUTPATIENT
Start: 2020-06-22 | End: 2020-07-03 | Stop reason: HOSPADM

## 2020-06-22 RX ORDER — BUDESONIDE 0.5 MG/2ML
0.5 INHALANT ORAL
Status: DISCONTINUED | OUTPATIENT
Start: 2020-06-22 | End: 2020-07-03 | Stop reason: HOSPADM

## 2020-06-22 RX ORDER — DEXTROSE MONOHYDRATE 25 G/50ML
25 INJECTION, SOLUTION INTRAVENOUS
Status: DISCONTINUED | OUTPATIENT
Start: 2020-06-22 | End: 2020-07-03 | Stop reason: HOSPADM

## 2020-06-22 RX ADMIN — LANSOPRAZOLE 30 MG: KIT at 06:48

## 2020-06-22 RX ADMIN — IPRATROPIUM BROMIDE AND ALBUTEROL SULFATE 3 ML: 2.5; .5 SOLUTION RESPIRATORY (INHALATION) at 19:53

## 2020-06-22 RX ADMIN — ASPIRIN 81 MG: 81 TABLET, COATED ORAL at 09:38

## 2020-06-22 RX ADMIN — SODIUM CHLORIDE, PRESERVATIVE FREE 10 ML: 5 INJECTION INTRAVENOUS at 09:38

## 2020-06-22 RX ADMIN — BUSPIRONE HYDROCHLORIDE 15 MG: 15 TABLET ORAL at 06:48

## 2020-06-22 RX ADMIN — DILTIAZEM HYDROCHLORIDE 60 MG: 60 TABLET, FILM COATED ORAL at 01:16

## 2020-06-22 RX ADMIN — LEVETIRACETAM 750 MG: 500 INJECTION, SOLUTION INTRAVENOUS at 09:36

## 2020-06-22 RX ADMIN — ATORVASTATIN CALCIUM 40 MG: 20 TABLET, FILM COATED ORAL at 21:30

## 2020-06-22 RX ADMIN — DICLOFENAC SODIUM 2 G: 10 GEL TOPICAL at 21:35

## 2020-06-22 RX ADMIN — INSULIN HUMAN 20 UNITS: 100 INJECTION, SOLUTION PARENTERAL at 17:35

## 2020-06-22 RX ADMIN — METOPROLOL TARTRATE 50 MG: 50 TABLET, FILM COATED ORAL at 09:38

## 2020-06-22 RX ADMIN — SODIUM CHLORIDE, PRESERVATIVE FREE 10 ML: 5 INJECTION INTRAVENOUS at 21:36

## 2020-06-22 RX ADMIN — DICLOFENAC SODIUM 2 G: 10 GEL TOPICAL at 09:38

## 2020-06-22 RX ADMIN — MULTIPLE VITAMINS W/ MINERALS TAB 1 TABLET: TAB at 09:38

## 2020-06-22 RX ADMIN — INSULIN LISPRO 8 UNITS: 100 INJECTION, SOLUTION INTRAVENOUS; SUBCUTANEOUS at 22:24

## 2020-06-22 RX ADMIN — METOPROLOL TARTRATE 50 MG: 50 TABLET, FILM COATED ORAL at 21:30

## 2020-06-22 RX ADMIN — BUDESONIDE 0.5 MG: 0.5 INHALANT RESPIRATORY (INHALATION) at 11:28

## 2020-06-22 RX ADMIN — DILTIAZEM HYDROCHLORIDE 90 MG: 60 TABLET, FILM COATED ORAL at 14:49

## 2020-06-22 RX ADMIN — INSULIN HUMAN 3 UNITS: 100 INJECTION, SOLUTION PARENTERAL at 07:01

## 2020-06-22 RX ADMIN — DILTIAZEM HYDROCHLORIDE 60 MG: 60 TABLET, FILM COATED ORAL at 09:40

## 2020-06-22 RX ADMIN — DOXEPIN HYDROCHLORIDE 10 MG: 10 CAPSULE ORAL at 21:30

## 2020-06-22 RX ADMIN — IPRATROPIUM BROMIDE AND ALBUTEROL SULFATE 3 ML: 2.5; .5 SOLUTION RESPIRATORY (INHALATION) at 11:29

## 2020-06-22 RX ADMIN — DICLOFENAC SODIUM 2 G: 10 GEL TOPICAL at 16:44

## 2020-06-22 RX ADMIN — BUDESONIDE 0.5 MG: 0.5 INHALANT RESPIRATORY (INHALATION) at 19:53

## 2020-06-22 RX ADMIN — IPRATROPIUM BROMIDE AND ALBUTEROL SULFATE 3 ML: 2.5; .5 SOLUTION RESPIRATORY (INHALATION) at 16:00

## 2020-06-22 RX ADMIN — INSULIN HUMAN 11 UNITS: 100 INJECTION, SOLUTION PARENTERAL at 12:23

## 2020-06-22 RX ADMIN — BUSPIRONE HYDROCHLORIDE 30 MG: 15 TABLET ORAL at 17:35

## 2020-06-23 ENCOUNTER — APPOINTMENT (OUTPATIENT)
Dept: ULTRASOUND IMAGING | Facility: HOSPITAL | Age: 85
End: 2020-06-23

## 2020-06-23 LAB
ALBUMIN SERPL-MCNC: 3.4 G/DL (ref 3.5–5.2)
ANION GAP SERPL CALCULATED.3IONS-SCNC: 9.4 MMOL/L (ref 5–15)
APPEARANCE FLD: ABNORMAL
BASOPHILS # BLD AUTO: 0.02 10*3/MM3 (ref 0–0.2)
BASOPHILS NFR BLD AUTO: 0.2 % (ref 0–1.5)
BUN BLD-MCNC: 48 MG/DL (ref 8–23)
BUN/CREAT SERPL: 41.7 (ref 7–25)
CALCIUM SPEC-SCNC: 9.4 MG/DL (ref 8.6–10.5)
CHLORIDE SERPL-SCNC: 92 MMOL/L (ref 98–107)
CO2 SERPL-SCNC: 41.6 MMOL/L (ref 22–29)
COLOR FLD: ABNORMAL
CREAT BLD-MCNC: 1.15 MG/DL (ref 0.57–1)
DEPRECATED RDW RBC AUTO: 44.8 FL (ref 37–54)
EOSINOPHIL # BLD AUTO: 0 10*3/MM3 (ref 0–0.4)
EOSINOPHIL NFR BLD AUTO: 0 % (ref 0.3–6.2)
ERYTHROCYTE [DISTWIDTH] IN BLOOD BY AUTOMATED COUNT: 14.5 % (ref 12.3–15.4)
GFR SERPL CREATININE-BSD FRML MDRD: 55 ML/MIN/1.73
GLUCOSE BLD-MCNC: 199 MG/DL (ref 65–99)
GLUCOSE BLDC GLUCOMTR-MCNC: 182 MG/DL (ref 70–130)
GLUCOSE BLDC GLUCOMTR-MCNC: 189 MG/DL (ref 70–130)
GLUCOSE BLDC GLUCOMTR-MCNC: 200 MG/DL (ref 70–130)
GLUCOSE BLDC GLUCOMTR-MCNC: 208 MG/DL (ref 70–130)
GLUCOSE BLDC GLUCOMTR-MCNC: 214 MG/DL (ref 70–130)
GLUCOSE FLD-MCNC: 210 MG/DL
HCT VFR BLD AUTO: 29.5 % (ref 34–46.6)
HGB BLD-MCNC: 9.1 G/DL (ref 12–15.9)
IMM GRANULOCYTES # BLD AUTO: 0.07 10*3/MM3 (ref 0–0.05)
IMM GRANULOCYTES NFR BLD AUTO: 0.8 % (ref 0–0.5)
LDH FLD-CCNC: 98 U/L
LYMPHOCYTES # BLD AUTO: 1.12 10*3/MM3 (ref 0.7–3.1)
LYMPHOCYTES NFR BLD AUTO: 12.8 % (ref 19.6–45.3)
LYMPHOCYTES NFR FLD MANUAL: 38 %
MAGNESIUM SERPL-MCNC: 2.6 MG/DL (ref 1.6–2.4)
MCH RBC QN AUTO: 26.5 PG (ref 26.6–33)
MCHC RBC AUTO-ENTMCNC: 30.8 G/DL (ref 31.5–35.7)
MCV RBC AUTO: 86 FL (ref 79–97)
METHOD: ABNORMAL
MONOCYTES # BLD AUTO: 1.62 10*3/MM3 (ref 0.1–0.9)
MONOCYTES NFR BLD AUTO: 18.5 % (ref 5–12)
MONOCYTES NFR FLD: 7 %
MONOS+MACROS NFR FLD: 47 %
NEUTROPHILS # BLD AUTO: 5.91 10*3/MM3 (ref 1.7–7)
NEUTROPHILS NFR BLD AUTO: 67.7 % (ref 42.7–76)
NEUTROPHILS NFR FLD MANUAL: 8 %
NRBC BLD AUTO-RTO: 0.2 /100 WBC (ref 0–0.2)
NUC CELL # FLD: 733 /MM3
PH FLD: 7.74 [PH]
PHOSPHATE SERPL-MCNC: 3.1 MG/DL (ref 2.5–4.5)
PLATELET # BLD AUTO: 191 10*3/MM3 (ref 140–450)
PMV BLD AUTO: 12.1 FL (ref 6–12)
POTASSIUM BLD-SCNC: 4.3 MMOL/L (ref 3.5–5.2)
PROT FLD-MCNC: 1.9 G/DL
RBC # BLD AUTO: 3.43 10*6/MM3 (ref 3.77–5.28)
RBC # FLD AUTO: ABNORMAL /MM3
REF LAB TEST METHOD: NORMAL
SARS-COV-2 RNA RESP QL NAA+PROBE: NOT DETECTED
SODIUM BLD-SCNC: 143 MMOL/L (ref 136–145)
URATE SERPL-MCNC: 7.2 MG/DL (ref 2.4–5.7)
WBC NRBC COR # BLD: 8.74 10*3/MM3 (ref 3.4–10.8)

## 2020-06-23 PROCEDURE — 87205 SMEAR GRAM STAIN: CPT | Performed by: INTERNAL MEDICINE

## 2020-06-23 PROCEDURE — 0W993ZZ DRAINAGE OF RIGHT PLEURAL CAVITY, PERCUTANEOUS APPROACH: ICD-10-PCS | Performed by: INTERNAL MEDICINE

## 2020-06-23 PROCEDURE — 63710000001 INSULIN REGULAR HUMAN PER 5 UNITS: Performed by: NURSE PRACTITIONER

## 2020-06-23 PROCEDURE — 84157 ASSAY OF PROTEIN OTHER: CPT | Performed by: INTERNAL MEDICINE

## 2020-06-23 PROCEDURE — 84550 ASSAY OF BLOOD/URIC ACID: CPT | Performed by: INTERNAL MEDICINE

## 2020-06-23 PROCEDURE — 87015 SPECIMEN INFECT AGNT CONCNTJ: CPT | Performed by: INTERNAL MEDICINE

## 2020-06-23 PROCEDURE — 89051 BODY FLUID CELL COUNT: CPT | Performed by: INTERNAL MEDICINE

## 2020-06-23 PROCEDURE — 80069 RENAL FUNCTION PANEL: CPT | Performed by: INTERNAL MEDICINE

## 2020-06-23 PROCEDURE — 94660 CPAP INITIATION&MGMT: CPT

## 2020-06-23 PROCEDURE — 87116 MYCOBACTERIA CULTURE: CPT | Performed by: INTERNAL MEDICINE

## 2020-06-23 PROCEDURE — 94799 UNLISTED PULMONARY SVC/PX: CPT

## 2020-06-23 PROCEDURE — 87070 CULTURE OTHR SPECIMN AEROBIC: CPT | Performed by: INTERNAL MEDICINE

## 2020-06-23 PROCEDURE — 63710000001 INSULIN LISPRO (HUMAN) PER 5 UNITS: Performed by: INTERNAL MEDICINE

## 2020-06-23 PROCEDURE — 82945 GLUCOSE OTHER FLUID: CPT | Performed by: INTERNAL MEDICINE

## 2020-06-23 PROCEDURE — 83615 LACTATE (LD) (LDH) ENZYME: CPT | Performed by: INTERNAL MEDICINE

## 2020-06-23 PROCEDURE — 82962 GLUCOSE BLOOD TEST: CPT

## 2020-06-23 PROCEDURE — 25010000003 LIDOCAINE 1 % SOLUTION: Performed by: RADIOLOGY

## 2020-06-23 PROCEDURE — 83986 ASSAY PH BODY FLUID NOS: CPT | Performed by: INTERNAL MEDICINE

## 2020-06-23 PROCEDURE — 85025 COMPLETE CBC W/AUTO DIFF WBC: CPT | Performed by: INTERNAL MEDICINE

## 2020-06-23 PROCEDURE — 83735 ASSAY OF MAGNESIUM: CPT | Performed by: INTERNAL MEDICINE

## 2020-06-23 PROCEDURE — 88305 TISSUE EXAM BY PATHOLOGIST: CPT | Performed by: INTERNAL MEDICINE

## 2020-06-23 PROCEDURE — 25010000002 LEVETRIRACETAM PER 10 MG: Performed by: NURSE PRACTITIONER

## 2020-06-23 PROCEDURE — 87206 SMEAR FLUORESCENT/ACID STAI: CPT | Performed by: INTERNAL MEDICINE

## 2020-06-23 PROCEDURE — 88112 CYTOPATH CELL ENHANCE TECH: CPT | Performed by: INTERNAL MEDICINE

## 2020-06-23 PROCEDURE — 76942 ECHO GUIDE FOR BIOPSY: CPT

## 2020-06-23 RX ORDER — NICOTINE POLACRILEX 4 MG
15 LOZENGE BUCCAL
Status: DISCONTINUED | OUTPATIENT
Start: 2020-06-23 | End: 2020-07-03 | Stop reason: HOSPADM

## 2020-06-23 RX ORDER — TORSEMIDE 20 MG/1
40 TABLET ORAL DAILY
Status: DISCONTINUED | OUTPATIENT
Start: 2020-06-23 | End: 2020-06-24

## 2020-06-23 RX ORDER — LIDOCAINE HYDROCHLORIDE 10 MG/ML
20 INJECTION, SOLUTION INFILTRATION; PERINEURAL ONCE
Status: COMPLETED | OUTPATIENT
Start: 2020-06-23 | End: 2020-06-23

## 2020-06-23 RX ORDER — DEXTROSE MONOHYDRATE 25 G/50ML
25 INJECTION, SOLUTION INTRAVENOUS
Status: DISCONTINUED | OUTPATIENT
Start: 2020-06-23 | End: 2020-07-03 | Stop reason: HOSPADM

## 2020-06-23 RX ADMIN — METOPROLOL TARTRATE 50 MG: 50 TABLET, FILM COATED ORAL at 09:51

## 2020-06-23 RX ADMIN — ASPIRIN 81 MG: 81 TABLET, COATED ORAL at 09:53

## 2020-06-23 RX ADMIN — LIDOCAINE HYDROCHLORIDE 20 ML: 10 INJECTION, SOLUTION INFILTRATION; PERINEURAL at 15:48

## 2020-06-23 RX ADMIN — INSULIN LISPRO 4 UNITS: 100 INJECTION, SOLUTION INTRAVENOUS; SUBCUTANEOUS at 09:50

## 2020-06-23 RX ADMIN — MULTIPLE VITAMINS W/ MINERALS TAB 1 TABLET: TAB at 09:51

## 2020-06-23 RX ADMIN — LANSOPRAZOLE 30 MG: KIT at 06:36

## 2020-06-23 RX ADMIN — DICLOFENAC SODIUM 2 G: 10 GEL TOPICAL at 09:53

## 2020-06-23 RX ADMIN — SODIUM CHLORIDE, PRESERVATIVE FREE 10 ML: 5 INJECTION INTRAVENOUS at 23:28

## 2020-06-23 RX ADMIN — LEVETIRACETAM 750 MG: 500 INJECTION, SOLUTION INTRAVENOUS at 11:09

## 2020-06-23 RX ADMIN — IPRATROPIUM BROMIDE AND ALBUTEROL SULFATE 3 ML: 2.5; .5 SOLUTION RESPIRATORY (INHALATION) at 20:34

## 2020-06-23 RX ADMIN — METOPROLOL TARTRATE 50 MG: 50 TABLET, FILM COATED ORAL at 23:28

## 2020-06-23 RX ADMIN — LEVETIRACETAM 750 MG: 500 INJECTION, SOLUTION INTRAVENOUS at 00:07

## 2020-06-23 RX ADMIN — SODIUM CHLORIDE, PRESERVATIVE FREE 10 ML: 5 INJECTION INTRAVENOUS at 09:51

## 2020-06-23 RX ADMIN — IPRATROPIUM BROMIDE AND ALBUTEROL SULFATE 3 ML: 2.5; .5 SOLUTION RESPIRATORY (INHALATION) at 08:00

## 2020-06-23 RX ADMIN — LEVETIRACETAM 750 MG: 500 INJECTION, SOLUTION INTRAVENOUS at 23:27

## 2020-06-23 RX ADMIN — BUDESONIDE 0.5 MG: 0.5 INHALANT RESPIRATORY (INHALATION) at 20:34

## 2020-06-23 RX ADMIN — ACETAMINOPHEN 650 MG: 325 TABLET, FILM COATED ORAL at 23:27

## 2020-06-23 RX ADMIN — DICLOFENAC SODIUM 2 G: 10 GEL TOPICAL at 20:31

## 2020-06-23 RX ADMIN — BUSPIRONE HYDROCHLORIDE 30 MG: 15 TABLET ORAL at 20:31

## 2020-06-23 RX ADMIN — BUSPIRONE HYDROCHLORIDE 15 MG: 15 TABLET ORAL at 06:36

## 2020-06-23 RX ADMIN — INSULIN HUMAN 3 UNITS: 100 INJECTION, SOLUTION PARENTERAL at 23:27

## 2020-06-23 RX ADMIN — DILTIAZEM HYDROCHLORIDE 90 MG: 60 TABLET, FILM COATED ORAL at 09:51

## 2020-06-23 RX ADMIN — IPRATROPIUM BROMIDE AND ALBUTEROL SULFATE 3 ML: 2.5; .5 SOLUTION RESPIRATORY (INHALATION) at 11:22

## 2020-06-23 RX ADMIN — DOXEPIN HYDROCHLORIDE 10 MG: 10 CAPSULE ORAL at 23:27

## 2020-06-23 RX ADMIN — ATORVASTATIN CALCIUM 40 MG: 20 TABLET, FILM COATED ORAL at 23:28

## 2020-06-23 RX ADMIN — DILTIAZEM HYDROCHLORIDE 90 MG: 60 TABLET, FILM COATED ORAL at 00:07

## 2020-06-23 RX ADMIN — BUDESONIDE 0.5 MG: 0.5 INHALANT RESPIRATORY (INHALATION) at 08:00

## 2020-06-24 ENCOUNTER — APPOINTMENT (OUTPATIENT)
Dept: CARDIOLOGY | Facility: HOSPITAL | Age: 85
End: 2020-06-24

## 2020-06-24 ENCOUNTER — APPOINTMENT (OUTPATIENT)
Dept: ULTRASOUND IMAGING | Facility: HOSPITAL | Age: 85
End: 2020-06-24

## 2020-06-24 PROBLEM — M79.644 PAIN OF RIGHT MIDDLE FINGER: Status: ACTIVE | Noted: 2020-06-24

## 2020-06-24 LAB
ALBUMIN SERPL-MCNC: 3 G/DL (ref 3.5–5.2)
ALBUMIN/GLOB SERPL: 1.1 G/DL
ALP SERPL-CCNC: 81 U/L (ref 39–117)
ALT SERPL W P-5'-P-CCNC: 27 U/L (ref 1–33)
ANION GAP SERPL CALCULATED.3IONS-SCNC: 4.3 MMOL/L (ref 5–15)
AST SERPL-CCNC: 20 U/L (ref 1–32)
BASOPHILS # BLD AUTO: 0.02 10*3/MM3 (ref 0–0.2)
BASOPHILS NFR BLD AUTO: 0.3 % (ref 0–1.5)
BH CV UPPER ARTERIAL LEFT 2ND DIGIT SYS MAX: 124 MMHG
BH CV UPPER ARTERIAL LEFT FBI 2ND DIGIT RATIO: 0.87
BH CV UPPER ARTERIAL RIGHT 2ND DIGIT SYS MAX: 114 MMHG
BH CV UPPER ARTERIAL RIGHT 3RD DIGIT SYS MAX: 135 MMHG
BH CV UPPER ARTERIAL RIGHT FBI 2ND DIGIT RATIO: 0.79
BH CV UPPER ARTERIAL RIGHT FBI 3RD DIGIT RATIO: 0.94
BH CV UPPER VENOUS LEFT INTERNAL JUGULAR AUGMENT: NORMAL
BH CV UPPER VENOUS LEFT INTERNAL JUGULAR COMPETENT: NORMAL
BH CV UPPER VENOUS LEFT INTERNAL JUGULAR COMPRESS: NORMAL
BH CV UPPER VENOUS LEFT INTERNAL JUGULAR PHASIC: NORMAL
BH CV UPPER VENOUS LEFT INTERNAL JUGULAR SPONT: NORMAL
BH CV UPPER VENOUS LEFT SUBCLAVIAN AUGMENT: NORMAL
BH CV UPPER VENOUS LEFT SUBCLAVIAN COMPETENT: NORMAL
BH CV UPPER VENOUS LEFT SUBCLAVIAN COMPRESS: NORMAL
BH CV UPPER VENOUS LEFT SUBCLAVIAN PHASIC: NORMAL
BH CV UPPER VENOUS LEFT SUBCLAVIAN SPONT: NORMAL
BH CV UPPER VENOUS RIGHT AXILLARY AUGMENT: NORMAL
BH CV UPPER VENOUS RIGHT AXILLARY COMPETENT: NORMAL
BH CV UPPER VENOUS RIGHT AXILLARY COMPRESS: NORMAL
BH CV UPPER VENOUS RIGHT AXILLARY PHASIC: NORMAL
BH CV UPPER VENOUS RIGHT AXILLARY SPONT: NORMAL
BH CV UPPER VENOUS RIGHT BASILIC FOREARM COMPRESS: NORMAL
BH CV UPPER VENOUS RIGHT BASILIC UPPER COMPRESS: NORMAL
BH CV UPPER VENOUS RIGHT BRACHIAL COMPRESS: NORMAL
BH CV UPPER VENOUS RIGHT CEPHALIC FOREARM COMPRESS: NORMAL
BH CV UPPER VENOUS RIGHT CEPHALIC UPPER COMPRESS: NORMAL
BH CV UPPER VENOUS RIGHT INTERNAL JUGULAR AUGMENT: NORMAL
BH CV UPPER VENOUS RIGHT INTERNAL JUGULAR COMPETENT: NORMAL
BH CV UPPER VENOUS RIGHT INTERNAL JUGULAR COMPRESS: NORMAL
BH CV UPPER VENOUS RIGHT INTERNAL JUGULAR PHASIC: NORMAL
BH CV UPPER VENOUS RIGHT INTERNAL JUGULAR SPONT: NORMAL
BH CV UPPER VENOUS RIGHT RADIAL COMPRESS: NORMAL
BH CV UPPER VENOUS RIGHT SUBCLAVIAN AUGMENT: NORMAL
BH CV UPPER VENOUS RIGHT SUBCLAVIAN COMPETENT: NORMAL
BH CV UPPER VENOUS RIGHT SUBCLAVIAN COMPRESS: NORMAL
BH CV UPPER VENOUS RIGHT SUBCLAVIAN PHASIC: NORMAL
BH CV UPPER VENOUS RIGHT SUBCLAVIAN SPONT: NORMAL
BH CV UPPER VENOUS RIGHT ULNAR COMPRESS: NORMAL
BILIRUB SERPL-MCNC: 0.4 MG/DL (ref 0.2–1.2)
BUN BLD-MCNC: 48 MG/DL (ref 8–23)
BUN/CREAT SERPL: 35.3 (ref 7–25)
CALCIUM SPEC-SCNC: 9 MG/DL (ref 8.6–10.5)
CHLORIDE SERPL-SCNC: 92 MMOL/L (ref 98–107)
CO2 SERPL-SCNC: 46.7 MMOL/L (ref 22–29)
CREAT BLD-MCNC: 1.36 MG/DL (ref 0.57–1)
DEPRECATED RDW RBC AUTO: 46.5 FL (ref 37–54)
EOSINOPHIL # BLD AUTO: 0 10*3/MM3 (ref 0–0.4)
EOSINOPHIL NFR BLD AUTO: 0 % (ref 0.3–6.2)
ERYTHROCYTE [DISTWIDTH] IN BLOOD BY AUTOMATED COUNT: 14.7 % (ref 12.3–15.4)
GFR SERPL CREATININE-BSD FRML MDRD: 45 ML/MIN/1.73
GLOBULIN UR ELPH-MCNC: 2.8 GM/DL
GLUCOSE BLD-MCNC: 219 MG/DL (ref 65–99)
GLUCOSE BLDC GLUCOMTR-MCNC: 224 MG/DL (ref 70–130)
GLUCOSE BLDC GLUCOMTR-MCNC: 231 MG/DL (ref 70–130)
GLUCOSE BLDC GLUCOMTR-MCNC: 247 MG/DL (ref 70–130)
GLUCOSE BLDC GLUCOMTR-MCNC: 248 MG/DL (ref 70–130)
HCT VFR BLD AUTO: 29.3 % (ref 34–46.6)
HGB BLD-MCNC: 8.9 G/DL (ref 12–15.9)
IMM GRANULOCYTES # BLD AUTO: 0.03 10*3/MM3 (ref 0–0.05)
IMM GRANULOCYTES NFR BLD AUTO: 0.5 % (ref 0–0.5)
LYMPHOCYTES # BLD AUTO: 0.91 10*3/MM3 (ref 0.7–3.1)
LYMPHOCYTES NFR BLD AUTO: 14.2 % (ref 19.6–45.3)
MCH RBC QN AUTO: 26.3 PG (ref 26.6–33)
MCHC RBC AUTO-ENTMCNC: 30.4 G/DL (ref 31.5–35.7)
MCV RBC AUTO: 86.4 FL (ref 79–97)
MONOCYTES # BLD AUTO: 1.11 10*3/MM3 (ref 0.1–0.9)
MONOCYTES NFR BLD AUTO: 17.3 % (ref 5–12)
NEUTROPHILS # BLD AUTO: 4.33 10*3/MM3 (ref 1.7–7)
NEUTROPHILS NFR BLD AUTO: 67.7 % (ref 42.7–76)
NRBC BLD AUTO-RTO: 0.2 /100 WBC (ref 0–0.2)
PLATELET # BLD AUTO: 212 10*3/MM3 (ref 140–450)
PMV BLD AUTO: 11.3 FL (ref 6–12)
POTASSIUM BLD-SCNC: 4.4 MMOL/L (ref 3.5–5.2)
PROT SERPL-MCNC: 5.8 G/DL (ref 6–8.5)
RBC # BLD AUTO: 3.39 10*6/MM3 (ref 3.77–5.28)
SODIUM BLD-SCNC: 143 MMOL/L (ref 136–145)
UPPER ARTERIAL LEFT ARM BRACHIAL SYS MAX: 137 MMHG
UPPER ARTERIAL LEFT ARM RADIAL SYS MAX: 147 MMHG
UPPER ARTERIAL LEFT ARM ULNAR SYS MAX: 141 MMHG
UPPER ARTERIAL RIGHT ARM BRACHIAL SYS MAX: 144 MMHG
UPPER ARTERIAL RIGHT ARM RADIAL SYS MAX: 146 MMHG
UPPER ARTERIAL RIGHT ARM ULNAR SYS MAX: 140 MMHG
WBC NRBC COR # BLD: 6.4 10*3/MM3 (ref 3.4–10.8)

## 2020-06-24 PROCEDURE — 63710000001 INSULIN REGULAR HUMAN PER 5 UNITS: Performed by: NURSE PRACTITIONER

## 2020-06-24 PROCEDURE — 80053 COMPREHEN METABOLIC PANEL: CPT | Performed by: INTERNAL MEDICINE

## 2020-06-24 PROCEDURE — 93971 EXTREMITY STUDY: CPT

## 2020-06-24 PROCEDURE — 99233 SBSQ HOSP IP/OBS HIGH 50: CPT | Performed by: INTERNAL MEDICINE

## 2020-06-24 PROCEDURE — 97530 THERAPEUTIC ACTIVITIES: CPT

## 2020-06-24 PROCEDURE — 94799 UNLISTED PULMONARY SVC/PX: CPT

## 2020-06-24 PROCEDURE — 93922 UPR/L XTREMITY ART 2 LEVELS: CPT

## 2020-06-24 PROCEDURE — 85025 COMPLETE CBC W/AUTO DIFF WBC: CPT | Performed by: INTERNAL MEDICINE

## 2020-06-24 PROCEDURE — 97110 THERAPEUTIC EXERCISES: CPT

## 2020-06-24 PROCEDURE — 82962 GLUCOSE BLOOD TEST: CPT

## 2020-06-24 PROCEDURE — 0W9B3ZZ DRAINAGE OF LEFT PLEURAL CAVITY, PERCUTANEOUS APPROACH: ICD-10-PCS | Performed by: INTERNAL MEDICINE

## 2020-06-24 PROCEDURE — 25010000002 LEVETRIRACETAM PER 10 MG: Performed by: NURSE PRACTITIONER

## 2020-06-24 PROCEDURE — 76942 ECHO GUIDE FOR BIOPSY: CPT

## 2020-06-24 PROCEDURE — 25010000002 ACETAZOLAMIDE PER 500 MG: Performed by: INTERNAL MEDICINE

## 2020-06-24 RX ORDER — LIDOCAINE HYDROCHLORIDE 10 MG/ML
20 INJECTION, SOLUTION INFILTRATION; PERINEURAL ONCE
Status: DISCONTINUED | OUTPATIENT
Start: 2020-06-24 | End: 2020-06-24

## 2020-06-24 RX ORDER — ACETAZOLAMIDE 500 MG/5ML
500 INJECTION, POWDER, LYOPHILIZED, FOR SOLUTION INTRAVENOUS EVERY 6 HOURS
Status: COMPLETED | OUTPATIENT
Start: 2020-06-24 | End: 2020-06-25

## 2020-06-24 RX ADMIN — BUSPIRONE HYDROCHLORIDE 30 MG: 15 TABLET ORAL at 18:46

## 2020-06-24 RX ADMIN — LEVETIRACETAM 750 MG: 500 INJECTION, SOLUTION INTRAVENOUS at 10:21

## 2020-06-24 RX ADMIN — DOXEPIN HYDROCHLORIDE 10 MG: 10 CAPSULE ORAL at 23:47

## 2020-06-24 RX ADMIN — LEVETIRACETAM 750 MG: 500 INJECTION, SOLUTION INTRAVENOUS at 23:49

## 2020-06-24 RX ADMIN — ACETAZOLAMIDE SODIUM 500 MG: 500 INJECTION, POWDER, LYOPHILIZED, FOR SOLUTION INTRAVENOUS at 18:45

## 2020-06-24 RX ADMIN — IPRATROPIUM BROMIDE AND ALBUTEROL SULFATE 3 ML: 2.5; .5 SOLUTION RESPIRATORY (INHALATION) at 20:46

## 2020-06-24 RX ADMIN — ATORVASTATIN CALCIUM 40 MG: 20 TABLET, FILM COATED ORAL at 23:48

## 2020-06-24 RX ADMIN — DICLOFENAC SODIUM 2 G: 10 GEL TOPICAL at 18:47

## 2020-06-24 RX ADMIN — BUDESONIDE 0.5 MG: 0.5 INHALANT RESPIRATORY (INHALATION) at 20:46

## 2020-06-24 RX ADMIN — SODIUM CHLORIDE, PRESERVATIVE FREE 10 ML: 5 INJECTION INTRAVENOUS at 23:48

## 2020-06-24 RX ADMIN — METOPROLOL TARTRATE 50 MG: 50 TABLET, FILM COATED ORAL at 23:47

## 2020-06-24 RX ADMIN — METOPROLOL TARTRATE 50 MG: 50 TABLET, FILM COATED ORAL at 10:21

## 2020-06-24 RX ADMIN — INSULIN HUMAN 3 UNITS: 100 INJECTION, SOLUTION PARENTERAL at 06:19

## 2020-06-24 RX ADMIN — INSULIN HUMAN 3 UNITS: 100 INJECTION, SOLUTION PARENTERAL at 18:45

## 2020-06-24 RX ADMIN — INSULIN HUMAN 3 UNITS: 100 INJECTION, SOLUTION PARENTERAL at 12:06

## 2020-06-24 RX ADMIN — ACETAMINOPHEN 650 MG: 325 TABLET, FILM COATED ORAL at 23:47

## 2020-06-24 RX ADMIN — DICLOFENAC SODIUM 2 G: 10 GEL TOPICAL at 10:21

## 2020-06-24 RX ADMIN — ACETAZOLAMIDE SODIUM 500 MG: 500 INJECTION, POWDER, LYOPHILIZED, FOR SOLUTION INTRAVENOUS at 23:48

## 2020-06-24 RX ADMIN — ACETAZOLAMIDE SODIUM 500 MG: 500 INJECTION, POWDER, LYOPHILIZED, FOR SOLUTION INTRAVENOUS at 12:05

## 2020-06-24 RX ADMIN — IPRATROPIUM BROMIDE AND ALBUTEROL SULFATE 3 ML: 2.5; .5 SOLUTION RESPIRATORY (INHALATION) at 12:24

## 2020-06-24 RX ADMIN — LANSOPRAZOLE 30 MG: KIT at 06:19

## 2020-06-24 RX ADMIN — ASPIRIN 81 MG: 81 TABLET, COATED ORAL at 10:22

## 2020-06-24 RX ADMIN — IPRATROPIUM BROMIDE AND ALBUTEROL SULFATE 3 ML: 2.5; .5 SOLUTION RESPIRATORY (INHALATION) at 07:19

## 2020-06-24 RX ADMIN — SODIUM CHLORIDE, PRESERVATIVE FREE 10 ML: 5 INJECTION INTRAVENOUS at 10:22

## 2020-06-24 RX ADMIN — BUSPIRONE HYDROCHLORIDE 15 MG: 15 TABLET ORAL at 06:20

## 2020-06-24 RX ADMIN — MULTIPLE VITAMINS W/ MINERALS TAB 1 TABLET: TAB at 10:21

## 2020-06-24 RX ADMIN — APIXABAN 2.5 MG: 2.5 TABLET, FILM COATED ORAL at 23:47

## 2020-06-24 RX ADMIN — BUDESONIDE 0.5 MG: 0.5 INHALANT RESPIRATORY (INHALATION) at 07:19

## 2020-06-25 ENCOUNTER — TELEPHONE (OUTPATIENT)
Dept: NEUROSURGERY | Facility: CLINIC | Age: 85
End: 2020-06-25

## 2020-06-25 ENCOUNTER — APPOINTMENT (OUTPATIENT)
Dept: GENERAL RADIOLOGY | Facility: HOSPITAL | Age: 85
End: 2020-06-25

## 2020-06-25 DIAGNOSIS — D32.0 CEREBRAL MENINGIOMA (HCC): Primary | ICD-10-CM

## 2020-06-25 PROBLEM — I82.722 CHRONIC DEEP VEIN THROMBOSIS (DVT) OF LEFT UPPER EXTREMITY (HCC): Status: ACTIVE | Noted: 2020-06-25

## 2020-06-25 LAB
ALBUMIN SERPL-MCNC: 3.1 G/DL (ref 3.5–5.2)
ANION GAP SERPL CALCULATED.3IONS-SCNC: 6.8 MMOL/L (ref 5–15)
ARTERIAL PATENCY WRIST A: POSITIVE
ATMOSPHERIC PRESS: 748.3 MMHG
BASE EXCESS BLDA CALC-SCNC: 14.8 MMOL/L (ref 0–2)
BASOPHILS # BLD AUTO: 0.02 10*3/MM3 (ref 0–0.2)
BASOPHILS NFR BLD AUTO: 0.3 % (ref 0–1.5)
BDY SITE: ABNORMAL
BUN BLD-MCNC: 38 MG/DL (ref 8–23)
BUN/CREAT SERPL: 34.2 (ref 7–25)
CALCIUM SPEC-SCNC: 8.9 MG/DL (ref 8.6–10.5)
CHLORIDE SERPL-SCNC: 95 MMOL/L (ref 98–107)
CO2 SERPL-SCNC: 40.2 MMOL/L (ref 22–29)
CREAT BLD-MCNC: 1.11 MG/DL (ref 0.57–1)
CYTO UR: NORMAL
DEPRECATED RDW RBC AUTO: 45.9 FL (ref 37–54)
EOSINOPHIL # BLD AUTO: 0 10*3/MM3 (ref 0–0.4)
EOSINOPHIL NFR BLD AUTO: 0 % (ref 0.3–6.2)
ERYTHROCYTE [DISTWIDTH] IN BLOOD BY AUTOMATED COUNT: 14.5 % (ref 12.3–15.4)
GAS FLOW AIRWAY: 1 LPM
GFR SERPL CREATININE-BSD FRML MDRD: 57 ML/MIN/1.73
GLUCOSE BLD-MCNC: 291 MG/DL (ref 65–99)
GLUCOSE BLDC GLUCOMTR-MCNC: 254 MG/DL (ref 70–130)
GLUCOSE BLDC GLUCOMTR-MCNC: 254 MG/DL (ref 70–130)
GLUCOSE BLDC GLUCOMTR-MCNC: 300 MG/DL (ref 70–130)
GLUCOSE BLDC GLUCOMTR-MCNC: 356 MG/DL (ref 70–130)
HCO3 BLDA-SCNC: 43.4 MMOL/L (ref 22–28)
HCT VFR BLD AUTO: 30.5 % (ref 34–46.6)
HGB BLD-MCNC: 9.2 G/DL (ref 12–15.9)
IMM GRANULOCYTES # BLD AUTO: 0.05 10*3/MM3 (ref 0–0.05)
IMM GRANULOCYTES NFR BLD AUTO: 0.7 % (ref 0–0.5)
LAB AP CASE REPORT: NORMAL
LYMPHOCYTES # BLD AUTO: 0.91 10*3/MM3 (ref 0.7–3.1)
LYMPHOCYTES NFR BLD AUTO: 12.1 % (ref 19.6–45.3)
MAGNESIUM SERPL-MCNC: 2.3 MG/DL (ref 1.6–2.4)
MCH RBC QN AUTO: 26.1 PG (ref 26.6–33)
MCHC RBC AUTO-ENTMCNC: 30.2 G/DL (ref 31.5–35.7)
MCV RBC AUTO: 86.6 FL (ref 79–97)
MODALITY: ABNORMAL
MONOCYTES # BLD AUTO: 1.06 10*3/MM3 (ref 0.1–0.9)
MONOCYTES NFR BLD AUTO: 14.1 % (ref 5–12)
NEUTROPHILS # BLD AUTO: 5.49 10*3/MM3 (ref 1.7–7)
NEUTROPHILS NFR BLD AUTO: 72.8 % (ref 42.7–76)
NRBC BLD AUTO-RTO: 0.1 /100 WBC (ref 0–0.2)
PATH REPORT.FINAL DX SPEC: NORMAL
PATH REPORT.GROSS SPEC: NORMAL
PCO2 BLDA: 79.3 MM HG (ref 35–45)
PH BLDA: 7.35 PH UNITS (ref 7.35–7.45)
PHOSPHATE SERPL-MCNC: 3.2 MG/DL (ref 2.5–4.5)
PLATELET # BLD AUTO: 211 10*3/MM3 (ref 140–450)
PMV BLD AUTO: 10.8 FL (ref 6–12)
PO2 BLDA: 79.1 MM HG (ref 80–100)
POTASSIUM BLD-SCNC: 3.4 MMOL/L (ref 3.5–5.2)
RBC # BLD AUTO: 3.52 10*6/MM3 (ref 3.77–5.28)
SAO2 % BLDCOA: 93.9 % (ref 92–99)
SET MECH RESP RATE: 20
SODIUM BLD-SCNC: 142 MMOL/L (ref 136–145)
URATE SERPL-MCNC: 7.5 MG/DL (ref 2.4–5.7)
WBC NRBC COR # BLD: 7.53 10*3/MM3 (ref 3.4–10.8)

## 2020-06-25 PROCEDURE — 94799 UNLISTED PULMONARY SVC/PX: CPT

## 2020-06-25 PROCEDURE — 25010000002 ACETAZOLAMIDE PER 500 MG: Performed by: INTERNAL MEDICINE

## 2020-06-25 PROCEDURE — 85025 COMPLETE CBC W/AUTO DIFF WBC: CPT | Performed by: INTERNAL MEDICINE

## 2020-06-25 PROCEDURE — 99233 SBSQ HOSP IP/OBS HIGH 50: CPT | Performed by: INTERNAL MEDICINE

## 2020-06-25 PROCEDURE — 82962 GLUCOSE BLOOD TEST: CPT

## 2020-06-25 PROCEDURE — 63710000001 INSULIN REGULAR HUMAN PER 5 UNITS: Performed by: NURSE PRACTITIONER

## 2020-06-25 PROCEDURE — 25010000002 LEVETRIRACETAM PER 10 MG: Performed by: NURSE PRACTITIONER

## 2020-06-25 PROCEDURE — 83735 ASSAY OF MAGNESIUM: CPT | Performed by: INTERNAL MEDICINE

## 2020-06-25 PROCEDURE — 80069 RENAL FUNCTION PANEL: CPT | Performed by: INTERNAL MEDICINE

## 2020-06-25 PROCEDURE — 84550 ASSAY OF BLOOD/URIC ACID: CPT | Performed by: INTERNAL MEDICINE

## 2020-06-25 PROCEDURE — 82803 BLOOD GASES ANY COMBINATION: CPT

## 2020-06-25 PROCEDURE — 36600 WITHDRAWAL OF ARTERIAL BLOOD: CPT

## 2020-06-25 PROCEDURE — 71045 X-RAY EXAM CHEST 1 VIEW: CPT

## 2020-06-25 RX ORDER — ACETAZOLAMIDE 500 MG/5ML
500 INJECTION, POWDER, LYOPHILIZED, FOR SOLUTION INTRAVENOUS EVERY 6 HOURS
Status: DISPENSED | OUTPATIENT
Start: 2020-06-25 | End: 2020-06-26

## 2020-06-25 RX ADMIN — BUDESONIDE 0.5 MG: 0.5 INHALANT RESPIRATORY (INHALATION) at 21:54

## 2020-06-25 RX ADMIN — IPRATROPIUM BROMIDE AND ALBUTEROL SULFATE 3 ML: 2.5; .5 SOLUTION RESPIRATORY (INHALATION) at 15:50

## 2020-06-25 RX ADMIN — DICLOFENAC SODIUM 2 G: 10 GEL TOPICAL at 20:44

## 2020-06-25 RX ADMIN — DOXEPIN HYDROCHLORIDE 10 MG: 10 CAPSULE ORAL at 20:42

## 2020-06-25 RX ADMIN — ACETAMINOPHEN 650 MG: 325 TABLET, FILM COATED ORAL at 06:25

## 2020-06-25 RX ADMIN — DICLOFENAC SODIUM 2 G: 10 GEL TOPICAL at 00:08

## 2020-06-25 RX ADMIN — ACETAZOLAMIDE SODIUM 500 MG: 500 INJECTION, POWDER, LYOPHILIZED, FOR SOLUTION INTRAVENOUS at 11:26

## 2020-06-25 RX ADMIN — APIXABAN 2.5 MG: 2.5 TABLET, FILM COATED ORAL at 20:44

## 2020-06-25 RX ADMIN — LEVETIRACETAM 750 MG: 500 INJECTION, SOLUTION INTRAVENOUS at 20:42

## 2020-06-25 RX ADMIN — BUSPIRONE HYDROCHLORIDE 30 MG: 15 TABLET ORAL at 18:36

## 2020-06-25 RX ADMIN — SODIUM CHLORIDE, PRESERVATIVE FREE 10 ML: 5 INJECTION INTRAVENOUS at 20:41

## 2020-06-25 RX ADMIN — LANSOPRAZOLE 30 MG: KIT at 06:25

## 2020-06-25 RX ADMIN — SODIUM CHLORIDE, PRESERVATIVE FREE 10 ML: 5 INJECTION INTRAVENOUS at 09:55

## 2020-06-25 RX ADMIN — DICLOFENAC SODIUM 2 G: 10 GEL TOPICAL at 17:00

## 2020-06-25 RX ADMIN — IPRATROPIUM BROMIDE AND ALBUTEROL SULFATE 3 ML: 2.5; .5 SOLUTION RESPIRATORY (INHALATION) at 21:54

## 2020-06-25 RX ADMIN — METOPROLOL TARTRATE 50 MG: 50 TABLET, FILM COATED ORAL at 09:53

## 2020-06-25 RX ADMIN — IPRATROPIUM BROMIDE AND ALBUTEROL SULFATE 3 ML: 2.5; .5 SOLUTION RESPIRATORY (INHALATION) at 13:51

## 2020-06-25 RX ADMIN — ACETAZOLAMIDE SODIUM 500 MG: 500 INJECTION, POWDER, LYOPHILIZED, FOR SOLUTION INTRAVENOUS at 18:36

## 2020-06-25 RX ADMIN — BUDESONIDE 0.5 MG: 0.5 INHALANT RESPIRATORY (INHALATION) at 08:54

## 2020-06-25 RX ADMIN — DICLOFENAC SODIUM 2 G: 10 GEL TOPICAL at 09:53

## 2020-06-25 RX ADMIN — MULTIPLE VITAMINS W/ MINERALS TAB 1 TABLET: TAB at 09:53

## 2020-06-25 RX ADMIN — INSULIN HUMAN 8 UNITS: 100 INJECTION, SOLUTION PARENTERAL at 16:57

## 2020-06-25 RX ADMIN — INSULIN HUMAN 4 UNITS: 100 INJECTION, SOLUTION PARENTERAL at 06:55

## 2020-06-25 RX ADMIN — LEVETIRACETAM 750 MG: 500 INJECTION, SOLUTION INTRAVENOUS at 09:53

## 2020-06-25 RX ADMIN — ACETAZOLAMIDE SODIUM 500 MG: 500 INJECTION, POWDER, LYOPHILIZED, FOR SOLUTION INTRAVENOUS at 06:25

## 2020-06-25 RX ADMIN — ASPIRIN 81 MG: 81 TABLET, COATED ORAL at 09:53

## 2020-06-25 RX ADMIN — APIXABAN 2.5 MG: 2.5 TABLET, FILM COATED ORAL at 09:53

## 2020-06-25 RX ADMIN — ATORVASTATIN CALCIUM 40 MG: 20 TABLET, FILM COATED ORAL at 20:44

## 2020-06-25 RX ADMIN — INSULIN HUMAN 4 UNITS: 100 INJECTION, SOLUTION PARENTERAL at 00:11

## 2020-06-25 RX ADMIN — IPRATROPIUM BROMIDE AND ALBUTEROL SULFATE 3 ML: 2.5; .5 SOLUTION RESPIRATORY (INHALATION) at 08:54

## 2020-06-25 RX ADMIN — BUSPIRONE HYDROCHLORIDE 15 MG: 15 TABLET ORAL at 06:25

## 2020-06-25 RX ADMIN — METOPROLOL TARTRATE 50 MG: 50 TABLET, FILM COATED ORAL at 20:44

## 2020-06-26 LAB
ALBUMIN SERPL-MCNC: 3.2 G/DL (ref 3.5–5.2)
ANION GAP SERPL CALCULATED.3IONS-SCNC: 5.9 MMOL/L (ref 5–15)
ANION GAP SERPL CALCULATED.3IONS-SCNC: 9.9 MMOL/L (ref 5–15)
BASOPHILS # BLD AUTO: 0.02 10*3/MM3 (ref 0–0.2)
BASOPHILS NFR BLD AUTO: 0.2 % (ref 0–1.5)
BUN BLD-MCNC: 35 MG/DL (ref 8–23)
BUN BLD-MCNC: 38 MG/DL (ref 8–23)
BUN/CREAT SERPL: 28.9 (ref 7–25)
BUN/CREAT SERPL: 32.5 (ref 7–25)
CALCIUM SPEC-SCNC: 8.9 MG/DL (ref 8.6–10.5)
CALCIUM SPEC-SCNC: 8.9 MG/DL (ref 8.6–10.5)
CHLORIDE SERPL-SCNC: 96 MMOL/L (ref 98–107)
CHLORIDE SERPL-SCNC: 99 MMOL/L (ref 98–107)
CO2 SERPL-SCNC: 33.1 MMOL/L (ref 22–29)
CO2 SERPL-SCNC: 33.1 MMOL/L (ref 22–29)
CREAT BLD-MCNC: 1.17 MG/DL (ref 0.57–1)
CREAT BLD-MCNC: 1.21 MG/DL (ref 0.57–1)
DEPRECATED RDW RBC AUTO: 47.2 FL (ref 37–54)
EOSINOPHIL # BLD AUTO: 0 10*3/MM3 (ref 0–0.4)
EOSINOPHIL NFR BLD AUTO: 0 % (ref 0.3–6.2)
ERYTHROCYTE [DISTWIDTH] IN BLOOD BY AUTOMATED COUNT: 14.7 % (ref 12.3–15.4)
GFR SERPL CREATININE-BSD FRML MDRD: 52 ML/MIN/1.73
GFR SERPL CREATININE-BSD FRML MDRD: 54 ML/MIN/1.73
GLUCOSE BLD-MCNC: 307 MG/DL (ref 65–99)
GLUCOSE BLD-MCNC: 321 MG/DL (ref 65–99)
GLUCOSE BLDC GLUCOMTR-MCNC: 266 MG/DL (ref 70–130)
GLUCOSE BLDC GLUCOMTR-MCNC: 279 MG/DL (ref 70–130)
GLUCOSE BLDC GLUCOMTR-MCNC: 297 MG/DL (ref 70–130)
GLUCOSE BLDC GLUCOMTR-MCNC: 310 MG/DL (ref 70–130)
GLUCOSE BLDC GLUCOMTR-MCNC: 317 MG/DL (ref 70–130)
HCT VFR BLD AUTO: 31.3 % (ref 34–46.6)
HGB BLD-MCNC: 9.5 G/DL (ref 12–15.9)
IMM GRANULOCYTES # BLD AUTO: 0.04 10*3/MM3 (ref 0–0.05)
IMM GRANULOCYTES NFR BLD AUTO: 0.5 % (ref 0–0.5)
LYMPHOCYTES # BLD AUTO: 1.17 10*3/MM3 (ref 0.7–3.1)
LYMPHOCYTES NFR BLD AUTO: 13.7 % (ref 19.6–45.3)
MAGNESIUM SERPL-MCNC: 2.2 MG/DL (ref 1.6–2.4)
MCH RBC QN AUTO: 26.6 PG (ref 26.6–33)
MCHC RBC AUTO-ENTMCNC: 30.4 G/DL (ref 31.5–35.7)
MCV RBC AUTO: 87.7 FL (ref 79–97)
MONOCYTES # BLD AUTO: 1.08 10*3/MM3 (ref 0.1–0.9)
MONOCYTES NFR BLD AUTO: 12.6 % (ref 5–12)
NEUTROPHILS # BLD AUTO: 6.26 10*3/MM3 (ref 1.7–7)
NEUTROPHILS NFR BLD AUTO: 73 % (ref 42.7–76)
NRBC BLD AUTO-RTO: 0 /100 WBC (ref 0–0.2)
PHOSPHATE SERPL-MCNC: 2.3 MG/DL (ref 2.5–4.5)
PLATELET # BLD AUTO: 223 10*3/MM3 (ref 140–450)
PMV BLD AUTO: 11.2 FL (ref 6–12)
POTASSIUM BLD-SCNC: 2.8 MMOL/L (ref 3.5–5.2)
POTASSIUM BLD-SCNC: 2.8 MMOL/L (ref 3.5–5.2)
RBC # BLD AUTO: 3.57 10*6/MM3 (ref 3.77–5.28)
SODIUM BLD-SCNC: 135 MMOL/L (ref 136–145)
SODIUM BLD-SCNC: 142 MMOL/L (ref 136–145)
URATE SERPL-MCNC: 6.4 MG/DL (ref 2.4–5.7)
WBC NRBC COR # BLD: 8.57 10*3/MM3 (ref 3.4–10.8)

## 2020-06-26 PROCEDURE — 99232 SBSQ HOSP IP/OBS MODERATE 35: CPT | Performed by: INTERNAL MEDICINE

## 2020-06-26 PROCEDURE — 80048 BASIC METABOLIC PNL TOTAL CA: CPT | Performed by: NURSE PRACTITIONER

## 2020-06-26 PROCEDURE — 94799 UNLISTED PULMONARY SVC/PX: CPT

## 2020-06-26 PROCEDURE — 84550 ASSAY OF BLOOD/URIC ACID: CPT | Performed by: INTERNAL MEDICINE

## 2020-06-26 PROCEDURE — 80069 RENAL FUNCTION PANEL: CPT | Performed by: INTERNAL MEDICINE

## 2020-06-26 PROCEDURE — 63710000001 INSULIN REGULAR HUMAN PER 5 UNITS: Performed by: NURSE PRACTITIONER

## 2020-06-26 PROCEDURE — 25010000003 POTASSIUM CHLORIDE 10 MEQ/100ML SOLUTION: Performed by: NURSE PRACTITIONER

## 2020-06-26 PROCEDURE — 25010000002 LEVETRIRACETAM PER 10 MG: Performed by: NURSE PRACTITIONER

## 2020-06-26 PROCEDURE — 25010000002 ACETAZOLAMIDE PER 500 MG: Performed by: INTERNAL MEDICINE

## 2020-06-26 PROCEDURE — 85025 COMPLETE CBC W/AUTO DIFF WBC: CPT | Performed by: INTERNAL MEDICINE

## 2020-06-26 PROCEDURE — 83735 ASSAY OF MAGNESIUM: CPT | Performed by: INTERNAL MEDICINE

## 2020-06-26 PROCEDURE — 97530 THERAPEUTIC ACTIVITIES: CPT

## 2020-06-26 PROCEDURE — 82962 GLUCOSE BLOOD TEST: CPT

## 2020-06-26 PROCEDURE — 97110 THERAPEUTIC EXERCISES: CPT

## 2020-06-26 PROCEDURE — 97535 SELF CARE MNGMENT TRAINING: CPT | Performed by: OCCUPATIONAL THERAPIST

## 2020-06-26 PROCEDURE — 63710000001 INSULIN GLARGINE PER 5 UNITS: Performed by: NURSE PRACTITIONER

## 2020-06-26 RX ORDER — POTASSIUM CHLORIDE 1.5 G/1.77G
40 POWDER, FOR SOLUTION ORAL
Status: COMPLETED | OUTPATIENT
Start: 2020-06-26 | End: 2020-06-26

## 2020-06-26 RX ORDER — INSULIN GLARGINE 100 [IU]/ML
10 INJECTION, SOLUTION SUBCUTANEOUS EVERY 12 HOURS SCHEDULED
Status: DISCONTINUED | OUTPATIENT
Start: 2020-06-26 | End: 2020-06-27

## 2020-06-26 RX ORDER — POTASSIUM CHLORIDE 7.45 MG/ML
10 INJECTION INTRAVENOUS
Status: DISCONTINUED | OUTPATIENT
Start: 2020-06-26 | End: 2020-06-26

## 2020-06-26 RX ADMIN — APIXABAN 2.5 MG: 2.5 TABLET, FILM COATED ORAL at 21:45

## 2020-06-26 RX ADMIN — LANSOPRAZOLE 30 MG: KIT at 06:16

## 2020-06-26 RX ADMIN — INSULIN HUMAN 7 UNITS: 100 INJECTION, SOLUTION PARENTERAL at 00:32

## 2020-06-26 RX ADMIN — DICLOFENAC SODIUM 2 G: 10 GEL TOPICAL at 21:44

## 2020-06-26 RX ADMIN — INSULIN HUMAN 6 UNITS: 100 INJECTION, SOLUTION PARENTERAL at 06:15

## 2020-06-26 RX ADMIN — BUSPIRONE HYDROCHLORIDE 15 MG: 15 TABLET ORAL at 06:16

## 2020-06-26 RX ADMIN — IPRATROPIUM BROMIDE AND ALBUTEROL SULFATE 3 ML: 2.5; .5 SOLUTION RESPIRATORY (INHALATION) at 22:02

## 2020-06-26 RX ADMIN — APIXABAN 2.5 MG: 2.5 TABLET, FILM COATED ORAL at 09:42

## 2020-06-26 RX ADMIN — ATORVASTATIN CALCIUM 40 MG: 20 TABLET, FILM COATED ORAL at 21:45

## 2020-06-26 RX ADMIN — SODIUM CHLORIDE, PRESERVATIVE FREE 10 ML: 5 INJECTION INTRAVENOUS at 09:43

## 2020-06-26 RX ADMIN — LEVETIRACETAM 750 MG: 500 INJECTION, SOLUTION INTRAVENOUS at 21:45

## 2020-06-26 RX ADMIN — POTASSIUM CHLORIDE 40 MEQ: 1.5 POWDER, FOR SOLUTION ORAL at 21:45

## 2020-06-26 RX ADMIN — METOPROLOL TARTRATE 50 MG: 50 TABLET, FILM COATED ORAL at 21:45

## 2020-06-26 RX ADMIN — INSULIN HUMAN 7 UNITS: 100 INJECTION, SOLUTION PARENTERAL at 12:11

## 2020-06-26 RX ADMIN — POTASSIUM CHLORIDE 40 MEQ: 1.5 POWDER, FOR SOLUTION ORAL at 17:27

## 2020-06-26 RX ADMIN — INSULIN HUMAN 7 UNITS: 100 INJECTION, SOLUTION PARENTERAL at 17:27

## 2020-06-26 RX ADMIN — INSULIN GLARGINE 10 UNITS: 100 INJECTION, SOLUTION SUBCUTANEOUS at 21:45

## 2020-06-26 RX ADMIN — POTASSIUM CHLORIDE 40 MEQ: 1.5 POWDER, FOR SOLUTION ORAL at 15:31

## 2020-06-26 RX ADMIN — BUSPIRONE HYDROCHLORIDE 30 MG: 15 TABLET ORAL at 17:27

## 2020-06-26 RX ADMIN — IPRATROPIUM BROMIDE AND ALBUTEROL SULFATE 3 ML: 2.5; .5 SOLUTION RESPIRATORY (INHALATION) at 15:50

## 2020-06-26 RX ADMIN — ACETAZOLAMIDE SODIUM 500 MG: 500 INJECTION, POWDER, LYOPHILIZED, FOR SOLUTION INTRAVENOUS at 00:33

## 2020-06-26 RX ADMIN — IPRATROPIUM BROMIDE AND ALBUTEROL SULFATE 3 ML: 2.5; .5 SOLUTION RESPIRATORY (INHALATION) at 07:27

## 2020-06-26 RX ADMIN — ASPIRIN 81 MG: 81 TABLET, COATED ORAL at 09:42

## 2020-06-26 RX ADMIN — DOXEPIN HYDROCHLORIDE 10 MG: 10 CAPSULE ORAL at 21:45

## 2020-06-26 RX ADMIN — BUDESONIDE 0.5 MG: 0.5 INHALANT RESPIRATORY (INHALATION) at 07:27

## 2020-06-26 RX ADMIN — METOPROLOL TARTRATE 50 MG: 50 TABLET, FILM COATED ORAL at 09:42

## 2020-06-26 RX ADMIN — BUDESONIDE 0.5 MG: 0.5 INHALANT RESPIRATORY (INHALATION) at 22:02

## 2020-06-26 RX ADMIN — DICLOFENAC SODIUM 2 G: 10 GEL TOPICAL at 15:31

## 2020-06-26 RX ADMIN — LEVETIRACETAM 750 MG: 500 INJECTION, SOLUTION INTRAVENOUS at 09:42

## 2020-06-26 RX ADMIN — MULTIPLE VITAMINS W/ MINERALS TAB 1 TABLET: TAB at 09:42

## 2020-06-26 RX ADMIN — IPRATROPIUM BROMIDE AND ALBUTEROL SULFATE 3 ML: 2.5; .5 SOLUTION RESPIRATORY (INHALATION) at 11:45

## 2020-06-26 RX ADMIN — POTASSIUM CHLORIDE 10 MEQ: 7.46 INJECTION, SOLUTION INTRAVENOUS at 13:07

## 2020-06-26 RX ADMIN — SODIUM CHLORIDE, PRESERVATIVE FREE 10 ML: 5 INJECTION INTRAVENOUS at 21:44

## 2020-06-26 RX ADMIN — DICLOFENAC SODIUM 2 G: 10 GEL TOPICAL at 09:43

## 2020-06-27 LAB
ALBUMIN SERPL-MCNC: 3.1 G/DL (ref 3.5–5.2)
ANION GAP SERPL CALCULATED.3IONS-SCNC: 4.7 MMOL/L (ref 5–15)
BASOPHILS # BLD AUTO: 0.01 10*3/MM3 (ref 0–0.2)
BASOPHILS NFR BLD AUTO: 0.1 % (ref 0–1.5)
BUN BLD-MCNC: 31 MG/DL (ref 8–23)
BUN/CREAT SERPL: 26.7 (ref 7–25)
CALCIUM SPEC-SCNC: 8.7 MG/DL (ref 8.6–10.5)
CHLORIDE SERPL-SCNC: 99 MMOL/L (ref 98–107)
CO2 SERPL-SCNC: 31.3 MMOL/L (ref 22–29)
CREAT BLD-MCNC: 1.16 MG/DL (ref 0.57–1)
DEPRECATED RDW RBC AUTO: 46.4 FL (ref 37–54)
EOSINOPHIL # BLD AUTO: 0 10*3/MM3 (ref 0–0.4)
EOSINOPHIL NFR BLD AUTO: 0 % (ref 0.3–6.2)
ERYTHROCYTE [DISTWIDTH] IN BLOOD BY AUTOMATED COUNT: 14.7 % (ref 12.3–15.4)
GFR SERPL CREATININE-BSD FRML MDRD: 54 ML/MIN/1.73
GLUCOSE BLD-MCNC: 350 MG/DL (ref 65–99)
GLUCOSE BLDC GLUCOMTR-MCNC: 240 MG/DL (ref 70–130)
GLUCOSE BLDC GLUCOMTR-MCNC: 321 MG/DL (ref 70–130)
GLUCOSE BLDC GLUCOMTR-MCNC: 352 MG/DL (ref 70–130)
HCT VFR BLD AUTO: 30.6 % (ref 34–46.6)
HGB BLD-MCNC: 9.1 G/DL (ref 12–15.9)
IMM GRANULOCYTES # BLD AUTO: 0.03 10*3/MM3 (ref 0–0.05)
IMM GRANULOCYTES NFR BLD AUTO: 0.4 % (ref 0–0.5)
LYMPHOCYTES # BLD AUTO: 1.04 10*3/MM3 (ref 0.7–3.1)
LYMPHOCYTES NFR BLD AUTO: 13 % (ref 19.6–45.3)
MAGNESIUM SERPL-MCNC: 2.2 MG/DL (ref 1.6–2.4)
MCH RBC QN AUTO: 26 PG (ref 26.6–33)
MCHC RBC AUTO-ENTMCNC: 29.7 G/DL (ref 31.5–35.7)
MCV RBC AUTO: 87.4 FL (ref 79–97)
MONOCYTES # BLD AUTO: 0.79 10*3/MM3 (ref 0.1–0.9)
MONOCYTES NFR BLD AUTO: 9.9 % (ref 5–12)
NEUTROPHILS # BLD AUTO: 6.14 10*3/MM3 (ref 1.7–7)
NEUTROPHILS NFR BLD AUTO: 76.6 % (ref 42.7–76)
NRBC BLD AUTO-RTO: 0 /100 WBC (ref 0–0.2)
PHOSPHATE SERPL-MCNC: 2.7 MG/DL (ref 2.5–4.5)
PLATELET # BLD AUTO: 213 10*3/MM3 (ref 140–450)
PMV BLD AUTO: 11.2 FL (ref 6–12)
POTASSIUM BLD-SCNC: 3.4 MMOL/L (ref 3.5–5.2)
RBC # BLD AUTO: 3.5 10*6/MM3 (ref 3.77–5.28)
SODIUM BLD-SCNC: 135 MMOL/L (ref 136–145)
URATE SERPL-MCNC: 6.6 MG/DL (ref 2.4–5.7)
WBC NRBC COR # BLD: 8.01 10*3/MM3 (ref 3.4–10.8)

## 2020-06-27 PROCEDURE — 83735 ASSAY OF MAGNESIUM: CPT | Performed by: INTERNAL MEDICINE

## 2020-06-27 PROCEDURE — 63710000001 INSULIN GLARGINE PER 5 UNITS: Performed by: NURSE PRACTITIONER

## 2020-06-27 PROCEDURE — 85025 COMPLETE CBC W/AUTO DIFF WBC: CPT | Performed by: INTERNAL MEDICINE

## 2020-06-27 PROCEDURE — 25010000002 FUROSEMIDE PER 20 MG: Performed by: INTERNAL MEDICINE

## 2020-06-27 PROCEDURE — 94799 UNLISTED PULMONARY SVC/PX: CPT

## 2020-06-27 PROCEDURE — 82962 GLUCOSE BLOOD TEST: CPT

## 2020-06-27 PROCEDURE — 63710000001 INSULIN REGULAR HUMAN PER 5 UNITS: Performed by: NURSE PRACTITIONER

## 2020-06-27 PROCEDURE — 80069 RENAL FUNCTION PANEL: CPT | Performed by: INTERNAL MEDICINE

## 2020-06-27 PROCEDURE — 84550 ASSAY OF BLOOD/URIC ACID: CPT | Performed by: INTERNAL MEDICINE

## 2020-06-27 PROCEDURE — 25010000002 LEVETRIRACETAM PER 10 MG: Performed by: NURSE PRACTITIONER

## 2020-06-27 RX ORDER — INSULIN GLARGINE 100 [IU]/ML
15 INJECTION, SOLUTION SUBCUTANEOUS EVERY 12 HOURS SCHEDULED
Status: DISCONTINUED | OUTPATIENT
Start: 2020-06-27 | End: 2020-06-27

## 2020-06-27 RX ORDER — INSULIN GLARGINE 100 [IU]/ML
22 INJECTION, SOLUTION SUBCUTANEOUS EVERY MORNING
Status: DISCONTINUED | OUTPATIENT
Start: 2020-06-28 | End: 2020-07-03 | Stop reason: HOSPADM

## 2020-06-27 RX ORDER — POTASSIUM CHLORIDE 1.5 G/1.77G
60 POWDER, FOR SOLUTION ORAL ONCE
Status: COMPLETED | OUTPATIENT
Start: 2020-06-27 | End: 2020-06-27

## 2020-06-27 RX ORDER — FUROSEMIDE 10 MG/ML
40 INJECTION INTRAMUSCULAR; INTRAVENOUS ONCE
Status: COMPLETED | OUTPATIENT
Start: 2020-06-27 | End: 2020-06-27

## 2020-06-27 RX ADMIN — INSULIN HUMAN 6 UNITS: 100 INJECTION, SOLUTION PARENTERAL at 00:25

## 2020-06-27 RX ADMIN — INSULIN GLARGINE 10 UNITS: 100 INJECTION, SOLUTION SUBCUTANEOUS at 08:32

## 2020-06-27 RX ADMIN — DICLOFENAC SODIUM 2 G: 10 GEL TOPICAL at 22:29

## 2020-06-27 RX ADMIN — LEVETIRACETAM 750 MG: 500 INJECTION, SOLUTION INTRAVENOUS at 22:28

## 2020-06-27 RX ADMIN — BUSPIRONE HYDROCHLORIDE 15 MG: 15 TABLET ORAL at 07:32

## 2020-06-27 RX ADMIN — DICLOFENAC SODIUM 2 G: 10 GEL TOPICAL at 08:39

## 2020-06-27 RX ADMIN — BUSPIRONE HYDROCHLORIDE 30 MG: 15 TABLET ORAL at 17:29

## 2020-06-27 RX ADMIN — DOXEPIN HYDROCHLORIDE 10 MG: 10 CAPSULE ORAL at 23:19

## 2020-06-27 RX ADMIN — LEVETIRACETAM 750 MG: 500 INJECTION, SOLUTION INTRAVENOUS at 08:35

## 2020-06-27 RX ADMIN — METOPROLOL TARTRATE 50 MG: 50 TABLET, FILM COATED ORAL at 21:15

## 2020-06-27 RX ADMIN — INSULIN HUMAN 8 UNITS: 100 INJECTION, SOLUTION PARENTERAL at 17:31

## 2020-06-27 RX ADMIN — BUDESONIDE 0.5 MG: 0.5 INHALANT RESPIRATORY (INHALATION) at 22:03

## 2020-06-27 RX ADMIN — IPRATROPIUM BROMIDE AND ALBUTEROL SULFATE 3 ML: 2.5; .5 SOLUTION RESPIRATORY (INHALATION) at 11:19

## 2020-06-27 RX ADMIN — IPRATROPIUM BROMIDE AND ALBUTEROL SULFATE 3 ML: 2.5; .5 SOLUTION RESPIRATORY (INHALATION) at 07:40

## 2020-06-27 RX ADMIN — SODIUM CHLORIDE, PRESERVATIVE FREE 10 ML: 5 INJECTION INTRAVENOUS at 08:36

## 2020-06-27 RX ADMIN — ATORVASTATIN CALCIUM 40 MG: 20 TABLET, FILM COATED ORAL at 21:15

## 2020-06-27 RX ADMIN — INSULIN HUMAN 7 UNITS: 100 INJECTION, SOLUTION PARENTERAL at 07:32

## 2020-06-27 RX ADMIN — POTASSIUM CHLORIDE 60 MEQ: 1.5 POWDER, FOR SOLUTION ORAL at 12:05

## 2020-06-27 RX ADMIN — METOPROLOL TARTRATE 50 MG: 50 TABLET, FILM COATED ORAL at 08:35

## 2020-06-27 RX ADMIN — ASPIRIN 81 MG: 81 TABLET, COATED ORAL at 08:35

## 2020-06-27 RX ADMIN — LANSOPRAZOLE 30 MG: KIT at 07:33

## 2020-06-27 RX ADMIN — MULTIPLE VITAMINS W/ MINERALS TAB 1 TABLET: TAB at 08:35

## 2020-06-27 RX ADMIN — APIXABAN 2.5 MG: 2.5 TABLET, FILM COATED ORAL at 21:15

## 2020-06-27 RX ADMIN — FUROSEMIDE 40 MG: 10 INJECTION, SOLUTION INTRAMUSCULAR; INTRAVENOUS at 12:05

## 2020-06-27 RX ADMIN — APIXABAN 2.5 MG: 2.5 TABLET, FILM COATED ORAL at 08:35

## 2020-06-27 RX ADMIN — IPRATROPIUM BROMIDE AND ALBUTEROL SULFATE 3 ML: 2.5; .5 SOLUTION RESPIRATORY (INHALATION) at 15:09

## 2020-06-27 RX ADMIN — BUDESONIDE 0.5 MG: 0.5 INHALANT RESPIRATORY (INHALATION) at 07:40

## 2020-06-27 RX ADMIN — DICLOFENAC SODIUM 2 G: 10 GEL TOPICAL at 17:29

## 2020-06-27 RX ADMIN — IPRATROPIUM BROMIDE AND ALBUTEROL SULFATE 3 ML: 2.5; .5 SOLUTION RESPIRATORY (INHALATION) at 22:03

## 2020-06-27 RX ADMIN — SODIUM CHLORIDE, PRESERVATIVE FREE 10 ML: 5 INJECTION INTRAVENOUS at 21:14

## 2020-06-27 RX ADMIN — INSULIN HUMAN 8 UNITS: 100 INJECTION, SOLUTION PARENTERAL at 12:05

## 2020-06-28 LAB
ANION GAP SERPL CALCULATED.3IONS-SCNC: 8.9 MMOL/L (ref 5–15)
BACTERIA FLD CULT: NORMAL
BASOPHILS # BLD AUTO: 0.02 10*3/MM3 (ref 0–0.2)
BASOPHILS NFR BLD AUTO: 0.3 % (ref 0–1.5)
BUN BLD-MCNC: 34 MG/DL (ref 8–23)
BUN/CREAT SERPL: 31.8 (ref 7–25)
CALCIUM SPEC-SCNC: 9.3 MG/DL (ref 8.6–10.5)
CHLORIDE SERPL-SCNC: 102 MMOL/L (ref 98–107)
CO2 SERPL-SCNC: 30.1 MMOL/L (ref 22–29)
CREAT BLD-MCNC: 1.07 MG/DL (ref 0.57–1)
DEPRECATED RDW RBC AUTO: 45.2 FL (ref 37–54)
EOSINOPHIL # BLD AUTO: 0 10*3/MM3 (ref 0–0.4)
EOSINOPHIL NFR BLD AUTO: 0 % (ref 0.3–6.2)
ERYTHROCYTE [DISTWIDTH] IN BLOOD BY AUTOMATED COUNT: 15 % (ref 12.3–15.4)
GFR SERPL CREATININE-BSD FRML MDRD: 59 ML/MIN/1.73
GLUCOSE BLD-MCNC: 221 MG/DL (ref 65–99)
GLUCOSE BLDC GLUCOMTR-MCNC: 150 MG/DL (ref 70–130)
GLUCOSE BLDC GLUCOMTR-MCNC: 158 MG/DL (ref 70–130)
GLUCOSE BLDC GLUCOMTR-MCNC: 201 MG/DL (ref 70–130)
GLUCOSE BLDC GLUCOMTR-MCNC: 216 MG/DL (ref 70–130)
GLUCOSE BLDC GLUCOMTR-MCNC: 218 MG/DL (ref 70–130)
GLUCOSE BLDC GLUCOMTR-MCNC: 241 MG/DL (ref 70–130)
GRAM STN SPEC: NORMAL
GRAM STN SPEC: NORMAL
HCT VFR BLD AUTO: 30.4 % (ref 34–46.6)
HGB BLD-MCNC: 9.6 G/DL (ref 12–15.9)
IMM GRANULOCYTES # BLD AUTO: 0.03 10*3/MM3 (ref 0–0.05)
IMM GRANULOCYTES NFR BLD AUTO: 0.4 % (ref 0–0.5)
LYMPHOCYTES # BLD AUTO: 1.3 10*3/MM3 (ref 0.7–3.1)
LYMPHOCYTES NFR BLD AUTO: 16.4 % (ref 19.6–45.3)
MCH RBC QN AUTO: 26.5 PG (ref 26.6–33)
MCHC RBC AUTO-ENTMCNC: 31.6 G/DL (ref 31.5–35.7)
MCV RBC AUTO: 84 FL (ref 79–97)
MONOCYTES # BLD AUTO: 0.71 10*3/MM3 (ref 0.1–0.9)
MONOCYTES NFR BLD AUTO: 9 % (ref 5–12)
NEUTROPHILS # BLD AUTO: 5.87 10*3/MM3 (ref 1.7–7)
NEUTROPHILS NFR BLD AUTO: 73.9 % (ref 42.7–76)
NRBC BLD AUTO-RTO: 0 /100 WBC (ref 0–0.2)
PLATELET # BLD AUTO: 204 10*3/MM3 (ref 140–450)
PMV BLD AUTO: 11.4 FL (ref 6–12)
POTASSIUM BLD-SCNC: 3.9 MMOL/L (ref 3.5–5.2)
RBC # BLD AUTO: 3.62 10*6/MM3 (ref 3.77–5.28)
SODIUM BLD-SCNC: 141 MMOL/L (ref 136–145)
WBC NRBC COR # BLD: 7.93 10*3/MM3 (ref 3.4–10.8)

## 2020-06-28 PROCEDURE — 80048 BASIC METABOLIC PNL TOTAL CA: CPT | Performed by: NURSE PRACTITIONER

## 2020-06-28 PROCEDURE — 97110 THERAPEUTIC EXERCISES: CPT

## 2020-06-28 PROCEDURE — 82962 GLUCOSE BLOOD TEST: CPT

## 2020-06-28 PROCEDURE — 94799 UNLISTED PULMONARY SVC/PX: CPT

## 2020-06-28 PROCEDURE — 63710000001 INSULIN REGULAR HUMAN PER 5 UNITS: Performed by: NURSE PRACTITIONER

## 2020-06-28 PROCEDURE — 85025 COMPLETE CBC W/AUTO DIFF WBC: CPT | Performed by: INTERNAL MEDICINE

## 2020-06-28 PROCEDURE — 63710000001 INSULIN GLARGINE PER 5 UNITS: Performed by: NURSE PRACTITIONER

## 2020-06-28 PROCEDURE — 25010000002 LEVETRIRACETAM PER 10 MG: Performed by: NURSE PRACTITIONER

## 2020-06-28 RX ADMIN — DICLOFENAC SODIUM 2 G: 10 GEL TOPICAL at 10:28

## 2020-06-28 RX ADMIN — ACETAMINOPHEN 650 MG: 325 TABLET, FILM COATED ORAL at 23:55

## 2020-06-28 RX ADMIN — APIXABAN 2.5 MG: 2.5 TABLET, FILM COATED ORAL at 23:54

## 2020-06-28 RX ADMIN — INSULIN HUMAN 8 UNITS: 100 INJECTION, SOLUTION PARENTERAL at 18:49

## 2020-06-28 RX ADMIN — IPRATROPIUM BROMIDE AND ALBUTEROL SULFATE 3 ML: 2.5; .5 SOLUTION RESPIRATORY (INHALATION) at 17:25

## 2020-06-28 RX ADMIN — BUSPIRONE HYDROCHLORIDE 15 MG: 15 TABLET ORAL at 06:10

## 2020-06-28 RX ADMIN — INSULIN HUMAN 4 UNITS: 100 INJECTION, SOLUTION PARENTERAL at 23:54

## 2020-06-28 RX ADMIN — DOXEPIN HYDROCHLORIDE 10 MG: 10 CAPSULE ORAL at 23:55

## 2020-06-28 RX ADMIN — IPRATROPIUM BROMIDE AND ALBUTEROL SULFATE 3 ML: 2.5; .5 SOLUTION RESPIRATORY (INHALATION) at 09:45

## 2020-06-28 RX ADMIN — BUDESONIDE 0.5 MG: 0.5 INHALANT RESPIRATORY (INHALATION) at 19:52

## 2020-06-28 RX ADMIN — APIXABAN 2.5 MG: 2.5 TABLET, FILM COATED ORAL at 10:25

## 2020-06-28 RX ADMIN — IPRATROPIUM BROMIDE AND ALBUTEROL SULFATE 3 ML: 2.5; .5 SOLUTION RESPIRATORY (INHALATION) at 19:52

## 2020-06-28 RX ADMIN — METOPROLOL TARTRATE 50 MG: 50 TABLET, FILM COATED ORAL at 23:54

## 2020-06-28 RX ADMIN — IPRATROPIUM BROMIDE AND ALBUTEROL SULFATE 3 ML: 2.5; .5 SOLUTION RESPIRATORY (INHALATION) at 12:58

## 2020-06-28 RX ADMIN — DICLOFENAC SODIUM 2 G: 10 GEL TOPICAL at 17:05

## 2020-06-28 RX ADMIN — INSULIN HUMAN 8 UNITS: 100 INJECTION, SOLUTION PARENTERAL at 06:10

## 2020-06-28 RX ADMIN — LEVETIRACETAM 750 MG: 500 INJECTION, SOLUTION INTRAVENOUS at 23:54

## 2020-06-28 RX ADMIN — ATORVASTATIN CALCIUM 40 MG: 20 TABLET, FILM COATED ORAL at 23:54

## 2020-06-28 RX ADMIN — LEVETIRACETAM 750 MG: 500 INJECTION, SOLUTION INTRAVENOUS at 10:27

## 2020-06-28 RX ADMIN — SODIUM CHLORIDE, PRESERVATIVE FREE 10 ML: 5 INJECTION INTRAVENOUS at 23:55

## 2020-06-28 RX ADMIN — DICLOFENAC SODIUM 2 G: 10 GEL TOPICAL at 23:54

## 2020-06-28 RX ADMIN — METOPROLOL TARTRATE 50 MG: 50 TABLET, FILM COATED ORAL at 10:25

## 2020-06-28 RX ADMIN — SODIUM CHLORIDE, PRESERVATIVE FREE 10 ML: 5 INJECTION INTRAVENOUS at 10:28

## 2020-06-28 RX ADMIN — BUSPIRONE HYDROCHLORIDE 30 MG: 15 TABLET ORAL at 18:49

## 2020-06-28 RX ADMIN — INSULIN GLARGINE 22 UNITS: 100 INJECTION, SOLUTION SUBCUTANEOUS at 06:10

## 2020-06-28 RX ADMIN — MULTIPLE VITAMINS W/ MINERALS TAB 1 TABLET: TAB at 10:25

## 2020-06-28 RX ADMIN — INSULIN HUMAN 4 UNITS: 100 INJECTION, SOLUTION PARENTERAL at 01:27

## 2020-06-28 RX ADMIN — BUDESONIDE 0.5 MG: 0.5 INHALANT RESPIRATORY (INHALATION) at 09:45

## 2020-06-28 RX ADMIN — ASPIRIN 81 MG: 81 TABLET, COATED ORAL at 10:25

## 2020-06-28 RX ADMIN — LANSOPRAZOLE 30 MG: KIT at 06:10

## 2020-06-28 RX ADMIN — INSULIN HUMAN 8 UNITS: 100 INJECTION, SOLUTION PARENTERAL at 13:20

## 2020-06-29 PROBLEM — R13.12 OROPHARYNGEAL DYSPHAGIA: Status: ACTIVE | Noted: 2020-06-12

## 2020-06-29 PROBLEM — R13.10 DYSPHAGIA: Status: ACTIVE | Noted: 2020-06-29

## 2020-06-29 LAB
ANION GAP SERPL CALCULATED.3IONS-SCNC: 4.4 MMOL/L (ref 5–15)
BASOPHILS # BLD AUTO: 0.01 10*3/MM3 (ref 0–0.2)
BASOPHILS NFR BLD AUTO: 0.1 % (ref 0–1.5)
BUN BLD-MCNC: 31 MG/DL (ref 8–23)
BUN/CREAT SERPL: 32 (ref 7–25)
CALCIUM SPEC-SCNC: 9.2 MG/DL (ref 8.6–10.5)
CHLORIDE SERPL-SCNC: 100 MMOL/L (ref 98–107)
CO2 SERPL-SCNC: 31.6 MMOL/L (ref 22–29)
CREAT BLD-MCNC: 0.97 MG/DL (ref 0.57–1)
DEPRECATED RDW RBC AUTO: 47.6 FL (ref 37–54)
EOSINOPHIL # BLD AUTO: 0 10*3/MM3 (ref 0–0.4)
EOSINOPHIL NFR BLD AUTO: 0 % (ref 0.3–6.2)
ERYTHROCYTE [DISTWIDTH] IN BLOOD BY AUTOMATED COUNT: 15.3 % (ref 12.3–15.4)
GFR SERPL CREATININE-BSD FRML MDRD: 66 ML/MIN/1.73
GLUCOSE BLD-MCNC: 154 MG/DL (ref 65–99)
GLUCOSE BLDC GLUCOMTR-MCNC: 180 MG/DL (ref 70–130)
GLUCOSE BLDC GLUCOMTR-MCNC: 191 MG/DL (ref 70–130)
GLUCOSE BLDC GLUCOMTR-MCNC: 208 MG/DL (ref 70–130)
GLUCOSE BLDC GLUCOMTR-MCNC: 225 MG/DL (ref 70–130)
HCT VFR BLD AUTO: 28.4 % (ref 34–46.6)
HGB BLD-MCNC: 8.7 G/DL (ref 12–15.9)
IMM GRANULOCYTES # BLD AUTO: 0.03 10*3/MM3 (ref 0–0.05)
IMM GRANULOCYTES NFR BLD AUTO: 0.4 % (ref 0–0.5)
LYMPHOCYTES # BLD AUTO: 1.31 10*3/MM3 (ref 0.7–3.1)
LYMPHOCYTES NFR BLD AUTO: 18.9 % (ref 19.6–45.3)
MCH RBC QN AUTO: 26 PG (ref 26.6–33)
MCHC RBC AUTO-ENTMCNC: 30.6 G/DL (ref 31.5–35.7)
MCV RBC AUTO: 84.8 FL (ref 79–97)
MONOCYTES # BLD AUTO: 0.62 10*3/MM3 (ref 0.1–0.9)
MONOCYTES NFR BLD AUTO: 8.9 % (ref 5–12)
NEUTROPHILS # BLD AUTO: 4.97 10*3/MM3 (ref 1.7–7)
NEUTROPHILS NFR BLD AUTO: 71.7 % (ref 42.7–76)
NRBC BLD AUTO-RTO: 0.1 /100 WBC (ref 0–0.2)
PLATELET # BLD AUTO: 188 10*3/MM3 (ref 140–450)
PMV BLD AUTO: 10.2 FL (ref 6–12)
POTASSIUM BLD-SCNC: 3.7 MMOL/L (ref 3.5–5.2)
RBC # BLD AUTO: 3.35 10*6/MM3 (ref 3.77–5.28)
SODIUM BLD-SCNC: 136 MMOL/L (ref 136–145)
WBC NRBC COR # BLD: 6.94 10*3/MM3 (ref 3.4–10.8)

## 2020-06-29 PROCEDURE — 94799 UNLISTED PULMONARY SVC/PX: CPT

## 2020-06-29 PROCEDURE — 80048 BASIC METABOLIC PNL TOTAL CA: CPT | Performed by: NURSE PRACTITIONER

## 2020-06-29 PROCEDURE — 63710000001 INSULIN GLARGINE PER 5 UNITS: Performed by: NURSE PRACTITIONER

## 2020-06-29 PROCEDURE — 63710000001 INSULIN REGULAR HUMAN PER 5 UNITS: Performed by: NURSE PRACTITIONER

## 2020-06-29 PROCEDURE — 85025 COMPLETE CBC W/AUTO DIFF WBC: CPT | Performed by: INTERNAL MEDICINE

## 2020-06-29 PROCEDURE — 92526 ORAL FUNCTION THERAPY: CPT

## 2020-06-29 PROCEDURE — 25010000002 LEVETRIRACETAM PER 10 MG: Performed by: NURSE PRACTITIONER

## 2020-06-29 PROCEDURE — 99232 SBSQ HOSP IP/OBS MODERATE 35: CPT | Performed by: NURSE PRACTITIONER

## 2020-06-29 PROCEDURE — 82962 GLUCOSE BLOOD TEST: CPT

## 2020-06-29 RX ADMIN — IPRATROPIUM BROMIDE AND ALBUTEROL SULFATE 3 ML: 2.5; .5 SOLUTION RESPIRATORY (INHALATION) at 16:18

## 2020-06-29 RX ADMIN — BUDESONIDE 0.5 MG: 0.5 INHALANT RESPIRATORY (INHALATION) at 19:23

## 2020-06-29 RX ADMIN — SODIUM CHLORIDE, PRESERVATIVE FREE 10 ML: 5 INJECTION INTRAVENOUS at 10:10

## 2020-06-29 RX ADMIN — INSULIN HUMAN 4 UNITS: 100 INJECTION, SOLUTION PARENTERAL at 06:31

## 2020-06-29 RX ADMIN — BUDESONIDE 0.5 MG: 0.5 INHALANT RESPIRATORY (INHALATION) at 08:40

## 2020-06-29 RX ADMIN — INSULIN GLARGINE 22 UNITS: 100 INJECTION, SOLUTION SUBCUTANEOUS at 06:17

## 2020-06-29 RX ADMIN — APIXABAN 2.5 MG: 2.5 TABLET, FILM COATED ORAL at 10:09

## 2020-06-29 RX ADMIN — DICLOFENAC SODIUM 2 G: 10 GEL TOPICAL at 10:09

## 2020-06-29 RX ADMIN — BUSPIRONE HYDROCHLORIDE 30 MG: 15 TABLET ORAL at 18:35

## 2020-06-29 RX ADMIN — INSULIN HUMAN 8 UNITS: 100 INJECTION, SOLUTION PARENTERAL at 18:35

## 2020-06-29 RX ADMIN — ASPIRIN 81 MG: 81 TABLET, COATED ORAL at 10:09

## 2020-06-29 RX ADMIN — LANSOPRAZOLE 30 MG: KIT at 06:24

## 2020-06-29 RX ADMIN — IPRATROPIUM BROMIDE AND ALBUTEROL SULFATE 3 ML: 2.5; .5 SOLUTION RESPIRATORY (INHALATION) at 13:35

## 2020-06-29 RX ADMIN — IPRATROPIUM BROMIDE AND ALBUTEROL SULFATE 3 ML: 2.5; .5 SOLUTION RESPIRATORY (INHALATION) at 19:23

## 2020-06-29 RX ADMIN — INSULIN HUMAN 4 UNITS: 100 INJECTION, SOLUTION PARENTERAL at 12:51

## 2020-06-29 RX ADMIN — IPRATROPIUM BROMIDE AND ALBUTEROL SULFATE 3 ML: 2.5; .5 SOLUTION RESPIRATORY (INHALATION) at 08:40

## 2020-06-29 RX ADMIN — METOPROLOL TARTRATE 50 MG: 50 TABLET, FILM COATED ORAL at 10:09

## 2020-06-29 RX ADMIN — LEVETIRACETAM 750 MG: 500 INJECTION, SOLUTION INTRAVENOUS at 10:09

## 2020-06-29 RX ADMIN — MULTIPLE VITAMINS W/ MINERALS TAB 1 TABLET: TAB at 10:09

## 2020-06-29 RX ADMIN — BUSPIRONE HYDROCHLORIDE 15 MG: 15 TABLET ORAL at 06:24

## 2020-06-30 ENCOUNTER — ANESTHESIA EVENT (OUTPATIENT)
Dept: GASTROENTEROLOGY | Facility: HOSPITAL | Age: 85
End: 2020-06-30

## 2020-06-30 ENCOUNTER — ANESTHESIA (OUTPATIENT)
Dept: GASTROENTEROLOGY | Facility: HOSPITAL | Age: 85
End: 2020-06-30

## 2020-06-30 LAB
ANION GAP SERPL CALCULATED.3IONS-SCNC: 5 MMOL/L (ref 5–15)
BASOPHILS # BLD AUTO: 0.02 10*3/MM3 (ref 0–0.2)
BASOPHILS NFR BLD AUTO: 0.3 % (ref 0–1.5)
BUN SERPL-MCNC: 29 MG/DL (ref 8–23)
BUN/CREAT SERPL: 25.7 (ref 7–25)
CALCIUM SPEC-SCNC: 9.7 MG/DL (ref 8.6–10.5)
CHLORIDE SERPL-SCNC: 104 MMOL/L (ref 98–107)
CO2 SERPL-SCNC: 35 MMOL/L (ref 22–29)
CREAT SERPL-MCNC: 1.13 MG/DL (ref 0.57–1)
DEPRECATED RDW RBC AUTO: 47.6 FL (ref 37–54)
EOSINOPHIL # BLD AUTO: 0 10*3/MM3 (ref 0–0.4)
EOSINOPHIL NFR BLD AUTO: 0 % (ref 0.3–6.2)
ERYTHROCYTE [DISTWIDTH] IN BLOOD BY AUTOMATED COUNT: 15.8 % (ref 12.3–15.4)
GFR SERPL CREATININE-BSD FRML MDRD: 56 ML/MIN/1.73
GLUCOSE BLDC GLUCOMTR-MCNC: 124 MG/DL (ref 70–130)
GLUCOSE BLDC GLUCOMTR-MCNC: 129 MG/DL (ref 70–130)
GLUCOSE BLDC GLUCOMTR-MCNC: 136 MG/DL (ref 70–130)
GLUCOSE BLDC GLUCOMTR-MCNC: 97 MG/DL (ref 70–130)
GLUCOSE SERPL-MCNC: 95 MG/DL (ref 65–99)
HCT VFR BLD AUTO: 30.4 % (ref 34–46.6)
HGB BLD-MCNC: 9.5 G/DL (ref 12–15.9)
IMM GRANULOCYTES # BLD AUTO: 0.04 10*3/MM3 (ref 0–0.05)
IMM GRANULOCYTES NFR BLD AUTO: 0.5 % (ref 0–0.5)
LYMPHOCYTES # BLD AUTO: 1.46 10*3/MM3 (ref 0.7–3.1)
LYMPHOCYTES NFR BLD AUTO: 19.8 % (ref 19.6–45.3)
MCH RBC QN AUTO: 26.3 PG (ref 26.6–33)
MCHC RBC AUTO-ENTMCNC: 31.3 G/DL (ref 31.5–35.7)
MCV RBC AUTO: 84.2 FL (ref 79–97)
MONOCYTES # BLD AUTO: 0.87 10*3/MM3 (ref 0.1–0.9)
MONOCYTES NFR BLD AUTO: 11.8 % (ref 5–12)
NEUTROPHILS NFR BLD AUTO: 5 10*3/MM3 (ref 1.7–7)
NEUTROPHILS NFR BLD AUTO: 67.6 % (ref 42.7–76)
NRBC BLD AUTO-RTO: 0.1 /100 WBC (ref 0–0.2)
PLATELET # BLD AUTO: 183 10*3/MM3 (ref 140–450)
PMV BLD AUTO: 11.1 FL (ref 6–12)
POTASSIUM SERPL-SCNC: 3.8 MMOL/L (ref 3.5–5.2)
RBC # BLD AUTO: 3.61 10*6/MM3 (ref 3.77–5.28)
SODIUM SERPL-SCNC: 144 MMOL/L (ref 136–145)
WBC # BLD AUTO: 7.39 10*3/MM3 (ref 3.4–10.8)

## 2020-06-30 PROCEDURE — 25010000003 CEFAZOLIN IN DEXTROSE 2-4 GM/100ML-% SOLUTION: Performed by: SURGERY

## 2020-06-30 PROCEDURE — 25010000002 PROPOFOL 10 MG/ML EMULSION: Performed by: NURSE ANESTHETIST, CERTIFIED REGISTERED

## 2020-06-30 PROCEDURE — 94799 UNLISTED PULMONARY SVC/PX: CPT

## 2020-06-30 PROCEDURE — 85025 COMPLETE CBC W/AUTO DIFF WBC: CPT | Performed by: INTERNAL MEDICINE

## 2020-06-30 PROCEDURE — 3E0G76Z INTRODUCTION OF NUTRITIONAL SUBSTANCE INTO UPPER GI, VIA NATURAL OR ARTIFICIAL OPENING: ICD-10-PCS | Performed by: SURGERY

## 2020-06-30 PROCEDURE — 43246 EGD PLACE GASTROSTOMY TUBE: CPT | Performed by: SURGERY

## 2020-06-30 PROCEDURE — 25010000002 LEVETRIRACETAM PER 10 MG: Performed by: NURSE PRACTITIONER

## 2020-06-30 PROCEDURE — 25010000002 LEVETRIRACETAM PER 10 MG: Performed by: SURGERY

## 2020-06-30 PROCEDURE — 97110 THERAPEUTIC EXERCISES: CPT

## 2020-06-30 PROCEDURE — 82962 GLUCOSE BLOOD TEST: CPT

## 2020-06-30 PROCEDURE — 0DH63UZ INSERTION OF FEEDING DEVICE INTO STOMACH, PERCUTANEOUS APPROACH: ICD-10-PCS | Performed by: SURGERY

## 2020-06-30 PROCEDURE — 99232 SBSQ HOSP IP/OBS MODERATE 35: CPT | Performed by: PSYCHIATRY & NEUROLOGY

## 2020-06-30 PROCEDURE — 80048 BASIC METABOLIC PNL TOTAL CA: CPT | Performed by: INTERNAL MEDICINE

## 2020-06-30 RX ORDER — FAMOTIDINE 10 MG/ML
20 INJECTION, SOLUTION INTRAVENOUS ONCE
Status: CANCELLED | OUTPATIENT
Start: 2020-06-30 | End: 2020-06-30

## 2020-06-30 RX ORDER — LIDOCAINE HYDROCHLORIDE 10 MG/ML
0.5 INJECTION, SOLUTION EPIDURAL; INFILTRATION; INTRACAUDAL; PERINEURAL ONCE AS NEEDED
Status: CANCELLED | OUTPATIENT
Start: 2020-06-30

## 2020-06-30 RX ORDER — PROPOFOL 10 MG/ML
VIAL (ML) INTRAVENOUS CONTINUOUS PRN
Status: DISCONTINUED | OUTPATIENT
Start: 2020-06-30 | End: 2020-06-30 | Stop reason: SURG

## 2020-06-30 RX ORDER — SODIUM CHLORIDE, SODIUM LACTATE, POTASSIUM CHLORIDE, CALCIUM CHLORIDE 600; 310; 30; 20 MG/100ML; MG/100ML; MG/100ML; MG/100ML
INJECTION, SOLUTION INTRAVENOUS CONTINUOUS PRN
Status: DISCONTINUED | OUTPATIENT
Start: 2020-06-30 | End: 2020-06-30 | Stop reason: SURG

## 2020-06-30 RX ORDER — SODIUM CHLORIDE 0.9 % (FLUSH) 0.9 %
3 SYRINGE (ML) INJECTION EVERY 12 HOURS SCHEDULED
Status: CANCELLED | OUTPATIENT
Start: 2020-06-30

## 2020-06-30 RX ORDER — PROMETHAZINE HYDROCHLORIDE 25 MG/ML
12.5 INJECTION, SOLUTION INTRAMUSCULAR; INTRAVENOUS ONCE AS NEEDED
Status: DISCONTINUED | OUTPATIENT
Start: 2020-06-30 | End: 2020-06-30

## 2020-06-30 RX ORDER — SODIUM CHLORIDE 0.9 % (FLUSH) 0.9 %
10 SYRINGE (ML) INJECTION AS NEEDED
Status: DISCONTINUED | OUTPATIENT
Start: 2020-06-30 | End: 2020-06-30

## 2020-06-30 RX ORDER — GLYCOPYRROLATE 0.2 MG/ML
INJECTION INTRAMUSCULAR; INTRAVENOUS AS NEEDED
Status: DISCONTINUED | OUTPATIENT
Start: 2020-06-30 | End: 2020-06-30 | Stop reason: SURG

## 2020-06-30 RX ORDER — SODIUM CHLORIDE 0.9 % (FLUSH) 0.9 %
3-10 SYRINGE (ML) INJECTION AS NEEDED
Status: CANCELLED | OUTPATIENT
Start: 2020-06-30

## 2020-06-30 RX ORDER — PROMETHAZINE HYDROCHLORIDE 25 MG/1
25 TABLET ORAL ONCE AS NEEDED
Status: DISCONTINUED | OUTPATIENT
Start: 2020-06-30 | End: 2020-06-30

## 2020-06-30 RX ORDER — PROMETHAZINE HYDROCHLORIDE 25 MG/1
25 SUPPOSITORY RECTAL ONCE AS NEEDED
Status: DISCONTINUED | OUTPATIENT
Start: 2020-06-30 | End: 2020-06-30

## 2020-06-30 RX ORDER — SODIUM CHLORIDE, SODIUM LACTATE, POTASSIUM CHLORIDE, CALCIUM CHLORIDE 600; 310; 30; 20 MG/100ML; MG/100ML; MG/100ML; MG/100ML
9 INJECTION, SOLUTION INTRAVENOUS CONTINUOUS
Status: CANCELLED | OUTPATIENT
Start: 2020-06-30

## 2020-06-30 RX ORDER — FENTANYL CITRATE 50 UG/ML
50 INJECTION, SOLUTION INTRAMUSCULAR; INTRAVENOUS
Status: CANCELLED | OUTPATIENT
Start: 2020-06-30

## 2020-06-30 RX ORDER — PROPOFOL 10 MG/ML
VIAL (ML) INTRAVENOUS AS NEEDED
Status: DISCONTINUED | OUTPATIENT
Start: 2020-06-30 | End: 2020-06-30 | Stop reason: SURG

## 2020-06-30 RX ORDER — CEFAZOLIN SODIUM 2 G/100ML
2 INJECTION, SOLUTION INTRAVENOUS ONCE
Status: COMPLETED | OUTPATIENT
Start: 2020-06-30 | End: 2020-06-30

## 2020-06-30 RX ORDER — MIDAZOLAM HYDROCHLORIDE 1 MG/ML
1 INJECTION INTRAMUSCULAR; INTRAVENOUS
Status: CANCELLED | OUTPATIENT
Start: 2020-06-30

## 2020-06-30 RX ORDER — LIDOCAINE HYDROCHLORIDE 20 MG/ML
INJECTION, SOLUTION INFILTRATION; PERINEURAL AS NEEDED
Status: DISCONTINUED | OUTPATIENT
Start: 2020-06-30 | End: 2020-06-30 | Stop reason: SURG

## 2020-06-30 RX ORDER — SODIUM CHLORIDE 9 MG/ML
1000 INJECTION, SOLUTION INTRAVENOUS CONTINUOUS
Status: ACTIVE | OUTPATIENT
Start: 2020-06-30 | End: 2020-07-02

## 2020-06-30 RX ADMIN — SODIUM CHLORIDE, PRESERVATIVE FREE 10 ML: 5 INJECTION INTRAVENOUS at 09:04

## 2020-06-30 RX ADMIN — BUDESONIDE 0.5 MG: 0.5 INHALANT RESPIRATORY (INHALATION) at 07:40

## 2020-06-30 RX ADMIN — IPRATROPIUM BROMIDE AND ALBUTEROL SULFATE 3 ML: 2.5; .5 SOLUTION RESPIRATORY (INHALATION) at 19:10

## 2020-06-30 RX ADMIN — BUDESONIDE 0.5 MG: 0.5 INHALANT RESPIRATORY (INHALATION) at 19:10

## 2020-06-30 RX ADMIN — METOPROLOL TARTRATE 50 MG: 50 TABLET, FILM COATED ORAL at 21:37

## 2020-06-30 RX ADMIN — SODIUM CHLORIDE 1000 ML: 9 INJECTION, SOLUTION INTRAVENOUS at 11:08

## 2020-06-30 RX ADMIN — DICLOFENAC SODIUM 2 G: 10 GEL TOPICAL at 09:05

## 2020-06-30 RX ADMIN — ASPIRIN 81 MG: 81 TABLET, COATED ORAL at 17:21

## 2020-06-30 RX ADMIN — ACETAMINOPHEN 650 MG: 325 TABLET, FILM COATED ORAL at 21:37

## 2020-06-30 RX ADMIN — DICLOFENAC SODIUM 2 G: 10 GEL TOPICAL at 16:00

## 2020-06-30 RX ADMIN — DOXEPIN HYDROCHLORIDE 10 MG: 10 CAPSULE ORAL at 21:37

## 2020-06-30 RX ADMIN — CEFAZOLIN SODIUM 2 G: 2 INJECTION, SOLUTION INTRAVENOUS at 11:18

## 2020-06-30 RX ADMIN — DICLOFENAC SODIUM 2 G: 10 GEL TOPICAL at 21:38

## 2020-06-30 RX ADMIN — SODIUM CHLORIDE, PRESERVATIVE FREE 10 ML: 5 INJECTION INTRAVENOUS at 00:12

## 2020-06-30 RX ADMIN — IPRATROPIUM BROMIDE AND ALBUTEROL SULFATE 3 ML: 2.5; .5 SOLUTION RESPIRATORY (INHALATION) at 16:10

## 2020-06-30 RX ADMIN — ATORVASTATIN CALCIUM 40 MG: 20 TABLET, FILM COATED ORAL at 21:37

## 2020-06-30 RX ADMIN — LEVETIRACETAM 750 MG: 500 INJECTION, SOLUTION INTRAVENOUS at 21:38

## 2020-06-30 RX ADMIN — LIDOCAINE HYDROCHLORIDE 60 MG: 20 INJECTION, SOLUTION INFILTRATION; PERINEURAL at 11:17

## 2020-06-30 RX ADMIN — IPRATROPIUM BROMIDE AND ALBUTEROL SULFATE 3 ML: 2.5; .5 SOLUTION RESPIRATORY (INHALATION) at 07:40

## 2020-06-30 RX ADMIN — DOXEPIN HYDROCHLORIDE 10 MG: 10 CAPSULE ORAL at 00:12

## 2020-06-30 RX ADMIN — METOPROLOL TARTRATE 50 MG: 50 TABLET, FILM COATED ORAL at 09:04

## 2020-06-30 RX ADMIN — GLYCOPYRROLATE 0.1 MCG: 0.2 INJECTION INTRAMUSCULAR; INTRAVENOUS at 11:18

## 2020-06-30 RX ADMIN — PROPOFOL 50 MG: 10 INJECTION, EMULSION INTRAVENOUS at 11:20

## 2020-06-30 RX ADMIN — BUSPIRONE HYDROCHLORIDE 30 MG: 15 TABLET ORAL at 17:21

## 2020-06-30 RX ADMIN — METOPROLOL TARTRATE 50 MG: 50 TABLET, FILM COATED ORAL at 00:12

## 2020-06-30 RX ADMIN — IPRATROPIUM BROMIDE AND ALBUTEROL SULFATE 3 ML: 2.5; .5 SOLUTION RESPIRATORY (INHALATION) at 13:14

## 2020-06-30 RX ADMIN — PROPOFOL 40 MG: 10 INJECTION, EMULSION INTRAVENOUS at 11:19

## 2020-06-30 RX ADMIN — PROPOFOL 50 MCG/KG/MIN: 10 INJECTION, EMULSION INTRAVENOUS at 11:20

## 2020-06-30 RX ADMIN — SODIUM CHLORIDE, PRESERVATIVE FREE 10 ML: 5 INJECTION INTRAVENOUS at 21:38

## 2020-06-30 RX ADMIN — ATORVASTATIN CALCIUM 40 MG: 20 TABLET, FILM COATED ORAL at 00:12

## 2020-06-30 RX ADMIN — SODIUM CHLORIDE, POTASSIUM CHLORIDE, SODIUM LACTATE AND CALCIUM CHLORIDE: 600; 310; 30; 20 INJECTION, SOLUTION INTRAVENOUS at 11:18

## 2020-06-30 RX ADMIN — ACETAMINOPHEN 650 MG: 325 TABLET, FILM COATED ORAL at 00:12

## 2020-06-30 RX ADMIN — LEVETIRACETAM 750 MG: 500 INJECTION, SOLUTION INTRAVENOUS at 00:13

## 2020-06-30 RX ADMIN — LEVETIRACETAM 750 MG: 500 INJECTION, SOLUTION INTRAVENOUS at 09:04

## 2020-06-30 NOTE — ANESTHESIA POSTPROCEDURE EVALUATION
Patient: rEika Irvin    Procedure Summary     Date:  06/30/20 Room / Location:   [facility]    Anesthesia Start:  1115 Anesthesia Stop:  1134    Procedure:  PERCUTANEOUS ENDOSCOPIC GASTROSTOMY TUBE INSERTION (N/A Esophagus) Diagnosis:       Oropharyngeal dysphagia      (Oropharyngeal dysphagia [R13.12])    Surgeon:  Jared Ibarra MD Provider:  Tran De León MD    Anesthesia Type:  MAC ASA Status:  3          Anesthesia Type: MAC    Vitals  Vitals Value Taken Time   /73 6/30/2020 11:56 AM   Temp 36.7 °C (98.1 °F) 6/30/2020 11:36 AM   Pulse 81 6/30/2020 11:56 AM   Resp 16 6/30/2020 11:56 AM   SpO2 90 % 6/30/2020 11:56 AM           Post Anesthesia Care and Evaluation    Patient location during evaluation: PACU  Patient participation: complete - patient participated  Level of consciousness: awake and alert  Pain management: adequate  Airway patency: patent  Anesthetic complications: No anesthetic complications  PONV Status: none  Cardiovascular status: acceptable  Respiratory status: acceptable  Hydration status: acceptable

## 2020-06-30 NOTE — ANESTHESIA PREPROCEDURE EVALUATION
Anesthesia Evaluation     Patient summary reviewed and Nursing notes reviewed   NPO Solid Status: > 8 hours  NPO Liquid Status: > 8 hours           Airway   Mallampati: III  TM distance: <3 FB  Neck ROM: full  No difficulty expected  Dental - normal exam   (+) edentulous    Pulmonary - normal exam    breath sounds clear to auscultation  (+) COPD, sleep apnea,   Cardiovascular     Rhythm: irregular  Rate: normal    (+) hypertension, dysrhythmias Atrial Fib, DVT, hyperlipidemia,       Neuro/Psych  (+) CVA residual symptoms, psychiatric history Anxiety and Depression,       ROS Comment: meningioma  GI/Hepatic/Renal/Endo    (+) obesity, morbid obesity,  renal disease CRI, diabetes mellitus,     Musculoskeletal     Abdominal   (+) obese,    Substance History      OB/GYN          Other   arthritis,    history of cancer                  Anesthesia Plan    ASA 3     MAC     intravenous induction     Anesthetic plan, all risks, benefits, and alternatives have been provided, discussed and informed consent has been obtained with: patient.

## 2020-07-01 ENCOUNTER — TELEPHONE (OUTPATIENT)
Dept: CARDIOLOGY | Facility: CLINIC | Age: 85
End: 2020-07-01

## 2020-07-01 LAB
ANION GAP SERPL CALCULATED.3IONS-SCNC: 9 MMOL/L (ref 5–15)
BASOPHILS # BLD AUTO: 0.02 10*3/MM3 (ref 0–0.2)
BASOPHILS NFR BLD AUTO: 0.2 % (ref 0–1.5)
BUN SERPL-MCNC: 28 MG/DL (ref 8–23)
BUN/CREAT SERPL: 26.9 (ref 7–25)
CALCIUM SPEC-SCNC: 9 MG/DL (ref 8.6–10.5)
CHLORIDE SERPL-SCNC: 105 MMOL/L (ref 98–107)
CO2 SERPL-SCNC: 32 MMOL/L (ref 22–29)
CREAT SERPL-MCNC: 1.04 MG/DL (ref 0.57–1)
DEPRECATED RDW RBC AUTO: 48.8 FL (ref 37–54)
EOSINOPHIL # BLD AUTO: 0 10*3/MM3 (ref 0–0.4)
EOSINOPHIL NFR BLD AUTO: 0 % (ref 0.3–6.2)
ERYTHROCYTE [DISTWIDTH] IN BLOOD BY AUTOMATED COUNT: 15.7 % (ref 12.3–15.4)
GFR SERPL CREATININE-BSD FRML MDRD: 61 ML/MIN/1.73
GLUCOSE BLDC GLUCOMTR-MCNC: 185 MG/DL (ref 70–130)
GLUCOSE BLDC GLUCOMTR-MCNC: 195 MG/DL (ref 70–130)
GLUCOSE BLDC GLUCOMTR-MCNC: 238 MG/DL (ref 70–130)
GLUCOSE BLDC GLUCOMTR-MCNC: 301 MG/DL (ref 70–130)
GLUCOSE BLDC GLUCOMTR-MCNC: 314 MG/DL (ref 70–130)
GLUCOSE SERPL-MCNC: 195 MG/DL (ref 65–99)
HCT VFR BLD AUTO: 29 % (ref 34–46.6)
HGB BLD-MCNC: 9.1 G/DL (ref 12–15.9)
IMM GRANULOCYTES # BLD AUTO: 0.04 10*3/MM3 (ref 0–0.05)
IMM GRANULOCYTES NFR BLD AUTO: 0.3 % (ref 0–0.5)
LYMPHOCYTES # BLD AUTO: 0.94 10*3/MM3 (ref 0.7–3.1)
LYMPHOCYTES NFR BLD AUTO: 7.8 % (ref 19.6–45.3)
MCH RBC QN AUTO: 26.8 PG (ref 26.6–33)
MCHC RBC AUTO-ENTMCNC: 31.4 G/DL (ref 31.5–35.7)
MCV RBC AUTO: 85.3 FL (ref 79–97)
MONOCYTES # BLD AUTO: 0.65 10*3/MM3 (ref 0.1–0.9)
MONOCYTES NFR BLD AUTO: 5.4 % (ref 5–12)
NEUTROPHILS NFR BLD AUTO: 10.45 10*3/MM3 (ref 1.7–7)
NEUTROPHILS NFR BLD AUTO: 86.3 % (ref 42.7–76)
NRBC BLD AUTO-RTO: 0.1 /100 WBC (ref 0–0.2)
PLATELET # BLD AUTO: 194 10*3/MM3 (ref 140–450)
PMV BLD AUTO: 10.8 FL (ref 6–12)
POTASSIUM SERPL-SCNC: 4 MMOL/L (ref 3.5–5.2)
RBC # BLD AUTO: 3.4 10*6/MM3 (ref 3.77–5.28)
SODIUM SERPL-SCNC: 146 MMOL/L (ref 136–145)
WBC # BLD AUTO: 12.1 10*3/MM3 (ref 3.4–10.8)

## 2020-07-01 PROCEDURE — 25010000002 LEVETRIRACETAM PER 10 MG: Performed by: SURGERY

## 2020-07-01 PROCEDURE — 94799 UNLISTED PULMONARY SVC/PX: CPT

## 2020-07-01 PROCEDURE — 97110 THERAPEUTIC EXERCISES: CPT | Performed by: OCCUPATIONAL THERAPIST

## 2020-07-01 PROCEDURE — 63710000001 INSULIN REGULAR HUMAN PER 5 UNITS: Performed by: NURSE PRACTITIONER

## 2020-07-01 PROCEDURE — 97530 THERAPEUTIC ACTIVITIES: CPT

## 2020-07-01 PROCEDURE — 63710000001 INSULIN GLARGINE PER 5 UNITS: Performed by: SURGERY

## 2020-07-01 PROCEDURE — 99232 SBSQ HOSP IP/OBS MODERATE 35: CPT | Performed by: INTERNAL MEDICINE

## 2020-07-01 PROCEDURE — 99233 SBSQ HOSP IP/OBS HIGH 50: CPT | Performed by: NURSE PRACTITIONER

## 2020-07-01 PROCEDURE — 99231 SBSQ HOSP IP/OBS SF/LOW 25: CPT | Performed by: SURGERY

## 2020-07-01 PROCEDURE — 80048 BASIC METABOLIC PNL TOTAL CA: CPT | Performed by: INTERNAL MEDICINE

## 2020-07-01 PROCEDURE — 82962 GLUCOSE BLOOD TEST: CPT

## 2020-07-01 PROCEDURE — 97116 GAIT TRAINING THERAPY: CPT

## 2020-07-01 PROCEDURE — 85025 COMPLETE CBC W/AUTO DIFF WBC: CPT | Performed by: SURGERY

## 2020-07-01 RX ORDER — ATORVASTATIN CALCIUM 20 MG/1
20 TABLET, FILM COATED ORAL NIGHTLY
Status: DISCONTINUED | OUTPATIENT
Start: 2020-07-01 | End: 2020-07-03 | Stop reason: HOSPADM

## 2020-07-01 RX ADMIN — IPRATROPIUM BROMIDE AND ALBUTEROL SULFATE 3 ML: 2.5; .5 SOLUTION RESPIRATORY (INHALATION) at 12:47

## 2020-07-01 RX ADMIN — ASPIRIN 81 MG: 81 TABLET, COATED ORAL at 09:57

## 2020-07-01 RX ADMIN — IPRATROPIUM BROMIDE AND ALBUTEROL SULFATE 3 ML: 2.5; .5 SOLUTION RESPIRATORY (INHALATION) at 08:28

## 2020-07-01 RX ADMIN — SODIUM CHLORIDE, PRESERVATIVE FREE 10 ML: 5 INJECTION INTRAVENOUS at 21:20

## 2020-07-01 RX ADMIN — APIXABAN 2.5 MG: 2.5 TABLET, FILM COATED ORAL at 21:20

## 2020-07-01 RX ADMIN — ATORVASTATIN CALCIUM 20 MG: 20 TABLET, FILM COATED ORAL at 21:32

## 2020-07-01 RX ADMIN — DICLOFENAC SODIUM 2 G: 10 GEL TOPICAL at 09:57

## 2020-07-01 RX ADMIN — IPRATROPIUM BROMIDE AND ALBUTEROL SULFATE 3 ML: 2.5; .5 SOLUTION RESPIRATORY (INHALATION) at 16:45

## 2020-07-01 RX ADMIN — DICLOFENAC SODIUM 2 G: 10 GEL TOPICAL at 21:24

## 2020-07-01 RX ADMIN — BUDESONIDE 0.5 MG: 0.5 INHALANT RESPIRATORY (INHALATION) at 20:27

## 2020-07-01 RX ADMIN — INSULIN HUMAN 3 UNITS: 100 INJECTION, SOLUTION PARENTERAL at 13:21

## 2020-07-01 RX ADMIN — METOPROLOL TARTRATE 50 MG: 50 TABLET, FILM COATED ORAL at 21:20

## 2020-07-01 RX ADMIN — LANSOPRAZOLE 30 MG: KIT at 06:08

## 2020-07-01 RX ADMIN — DOXEPIN HYDROCHLORIDE 10 MG: 10 CAPSULE ORAL at 21:20

## 2020-07-01 RX ADMIN — MULTIPLE VITAMINS W/ MINERALS TAB 1 TABLET: TAB at 09:57

## 2020-07-01 RX ADMIN — DICLOFENAC SODIUM 2 G: 10 GEL TOPICAL at 18:31

## 2020-07-01 RX ADMIN — METOPROLOL TARTRATE 50 MG: 50 TABLET, FILM COATED ORAL at 12:18

## 2020-07-01 RX ADMIN — LEVETIRACETAM 750 MG: 500 INJECTION, SOLUTION INTRAVENOUS at 09:56

## 2020-07-01 RX ADMIN — LEVETIRACETAM 750 MG: 500 INJECTION, SOLUTION INTRAVENOUS at 21:21

## 2020-07-01 RX ADMIN — INSULIN HUMAN 2 UNITS: 100 INJECTION, SOLUTION PARENTERAL at 06:07

## 2020-07-01 RX ADMIN — INSULIN HUMAN 2 UNITS: 100 INJECTION, SOLUTION PARENTERAL at 01:20

## 2020-07-01 RX ADMIN — SODIUM CHLORIDE, PRESERVATIVE FREE 10 ML: 5 INJECTION INTRAVENOUS at 09:57

## 2020-07-01 RX ADMIN — INSULIN HUMAN 5 UNITS: 100 INJECTION, SOLUTION PARENTERAL at 19:10

## 2020-07-01 RX ADMIN — BUSPIRONE HYDROCHLORIDE 30 MG: 15 TABLET ORAL at 18:31

## 2020-07-01 RX ADMIN — IPRATROPIUM BROMIDE AND ALBUTEROL SULFATE 3 ML: 2.5; .5 SOLUTION RESPIRATORY (INHALATION) at 20:26

## 2020-07-01 RX ADMIN — BUSPIRONE HYDROCHLORIDE 15 MG: 15 TABLET ORAL at 06:08

## 2020-07-01 RX ADMIN — BUDESONIDE 0.5 MG: 0.5 INHALANT RESPIRATORY (INHALATION) at 08:28

## 2020-07-01 RX ADMIN — INSULIN GLARGINE 22 UNITS: 100 INJECTION, SOLUTION SUBCUTANEOUS at 06:07

## 2020-07-01 RX ADMIN — APIXABAN 2.5 MG: 2.5 TABLET, FILM COATED ORAL at 09:57

## 2020-07-01 NOTE — TELEPHONE ENCOUNTER
Dr Percy Paris with [facility] is wanting Clarification on medication for brigida Wrenison, Requesting a return call    [phone]

## 2020-07-01 NOTE — NURSING NOTE
Spoke with Dr. Gaming, re-consulted for a-fib with increased rate (see flowsheet) occurring s/p g-tube placement and not responsive to metoprolol given earlier in the day. MD ordered restart of Cardizem drip starting at 10 mg/hr titrated to maintain  making use of currently existing order. Metoprolol administered per order with sustained reduction in HR to 80's to 90's. Cardizem drip held per RN as parameters were met without administration.

## 2020-07-01 NOTE — PROGRESS NOTES
DOS: 2020  NAME: Erika Irvin   : 1935  PCP: Bruce Maldonado MD    Chief Complaint   Patient presents with   • Irregular Heart Beat        Stroke    Subjective: Pt seen in follow up, however the problem is new to me.  Sitting up in her recliner getting cleaned up with the aid.  She states she feels fine.  Denies any new weakness, numbness, speech or visual disturbances, or headaches.    Objective:  Vital signs:      Vitals:    20 1056 20 1218 20 1247 20 1313   BP:  120/77  120/68   BP Location:    Right arm   Patient Position:    Sitting   Pulse: 91 96 88 75   Resp:   20 21   Temp:    99.1 °F (37.3 °C)   TempSrc:    Oral   SpO2: 95%  97% 93%   Weight:       Height:           Current Facility-Administered Medications:   •  acetaminophen (TYLENOL) tablet 650 mg, 650 mg, Oral, Q6H PRN, Jared Ibarra MD, 650 mg at 20 2137  •  apixaban (ELIQUIS) tablet 2.5 mg, 2.5 mg, Oral, Q12H, Jared Ibarra MD, 2.5 mg at 20 0957  •  aspirin EC tablet 81 mg, 81 mg, Oral, Daily, Jared Ibarra MD, 81 mg at 20 0957  •  atorvastatin (LIPITOR) tablet 20 mg, 20 mg, Oral, Nightly, Norma Du, APRN  •  budesonide (PULMICORT) nebulizer solution 0.5 mg, 0.5 mg, Nebulization, BID - RT, Jared Ibarra MD, 0.5 mg at 20 0828  •  busPIRone (BUSPAR) tablet 15 mg, 15 mg, Oral, QAM, Jared Ibarra MD, 15 mg at 20 0608  •  busPIRone (BUSPAR) tablet 30 mg, 30 mg, Oral, Q PM, Jared Ibarra MD, 30 mg at 20 1721  •  dextrose (D50W) 25 g/ 50mL Intravenous Solution 25 g, 25 g, Intravenous, Q15 Min PRN, Jared Ibarra MD  •  dextrose (D50W) 25 g/ 50mL Intravenous Solution 25 g, 25 g, Intravenous, Q15 Min PRN, Jared Ibarra MD  •  dextrose (D50W) 25 g/ 50mL Intravenous Solution 25 g, 25 g, Intravenous, Q15 Min PRN, Jared Ibarra MD  •  dextrose (GLUTOSE) oral gel 15 g, 15 g, Oral, Q15 Min PRN, Jared Ibarra MD  •  dextrose (GLUTOSE) oral gel 15  g, 15 g, Oral, Q15 Min PRN, Jared Ibarra MD  •  dextrose (GLUTOSE) oral gel 15 g, 15 g, Oral, Q15 Min PRN, Jared Ibarra MD  •  diclofenac (VOLTAREN) 1 % gel 2 g, 2 g, Topical, TID, Jared Ibarra MD, 2 g at 07/01/20 0957  •  doxepin (SINEquan) capsule 10 mg, 10 mg, Oral, Nightly, Jared Ibarra MD, 10 mg at 06/30/20 2137  •  glucagon (human recombinant) (GLUCAGEN DIAGNOSTIC) injection 1 mg, 1 mg, Subcutaneous, Q15 Min PRN, Jared Ibarra MD  •  glucagon (human recombinant) (GLUCAGEN DIAGNOSTIC) injection 1 mg, 1 mg, Subcutaneous, Q15 Min PRN, Jared Ibarra MD  •  glucagon (human recombinant) (GLUCAGEN DIAGNOSTIC) injection 1 mg, 1 mg, Subcutaneous, Q15 Min PRN, Jared Ibarra MD  •  Hold medication, 1 each, Does not apply, Continuous PRN, Jared Ibarra MD  •  Hold medication, 1 each, Does not apply, Continuous PRN, Jared Ibarra MD  •  HYDROcodone-acetaminophen (NORCO) 5-325 MG per tablet 1 tablet, 1 tablet, Oral, Q8H PRN, Jared Ibarra MD, 1 tablet at 06/15/20 1824  •  insulin glargine (LANTUS) injection 22 Units, 22 Units, Subcutaneous, QAM, Jared Ibarra MD, 22 Units at 07/01/20 0607  •  insulin regular (humuLIN R,novoLIN R) injection 0-7 Units, 0-7 Units, Subcutaneous, Q6H, Isak Montano APRN, 3 Units at 07/01/20 1321  •  ipratropium-albuterol (DUO-NEB) nebulizer solution 3 mL, 3 mL, Nebulization, 4x Daily - RT, Jared Ibarra MD, 3 mL at 07/01/20 1247  •  lansoprazole (FIRST) oral suspension 30 mg, 30 mg, Nasogastric, QAM, Jared Ibarra MD, 30 mg at 07/01/20 0608  •  levETIRAcetam (KEPPRA) 750 mg in sodium chloride 0.9 % 100 mL IVPB, 750 mg, Intravenous, Q12H, Jared Ibarra MD, 750 mg at 07/01/20 0956  •  metoprolol tartrate (LOPRESSOR) tablet 50 mg, 50 mg, Oral, Q12H, Jared Ibarra MD, 50 mg at 07/01/20 1218  •  multivitamin with minerals 1 tablet, 1 tablet, Oral, Daily, Jared Ibarra MD, 1 tablet at 07/01/20 0957  •  nitroglycerin (NITROSTAT) SL  tablet 0.4 mg, 0.4 mg, Sublingual, Q5 Min PRN, Jared Ibarra MD  •  ondansetron (ZOFRAN) injection 4 mg, 4 mg, Intravenous, Q6H PRN, Jared Ibarra MD  •  potassium & sodium phosphates (PHOS-NAK) 280-160-250 MG packet - for Phosphorus less than 1.25 mg/dL, 2 packet, Oral, Q6H PRN **OR** potassium & sodium phosphates (PHOS-NAK) 280-160-250 MG packet - for Phosphorus 1.25 - 2.5 mg/dL, 2 packet, Oral, Q6H PRN, Jared Ibarra MD, 2 packet at 06/21/20 1333  •  sodium chloride 0.9 % flush 10 mL, 10 mL, Intravenous, Q12H, Jared Ibarra MD, 10 mL at 07/01/20 0957  •  sodium chloride 0.9 % flush 10 mL, 10 mL, Intravenous, PRN, Jared Ibarra MD, 10 mL at 06/14/20 0304  •  sodium chloride 0.9 % infusion 1,000 mL, 1,000 mL, Intravenous, Continuous, Jared Ibarra MD, Last Rate: 25 mL/hr at 06/30/20 1108, 1,000 mL at 06/30/20 1108    PRN meds  •  acetaminophen  •  dextrose  •  dextrose  •  dextrose  •  dextrose  •  dextrose  •  dextrose  •  glucagon (human recombinant)  •  glucagon (human recombinant)  •  glucagon (human recombinant)  •  hold  •  hold  •  HYDROcodone-acetaminophen  •  nitroglycerin  •  ondansetron  •  potassium & sodium phosphates **OR** potassium & sodium phosphates  •  sodium chloride    No current facility-administered medications on file prior to encounter.      Current Outpatient Medications on File Prior to Encounter   Medication Sig   • aspirin 81 MG tablet Take 81 mg by mouth Daily.   • busPIRone (BUSPAR) 15 MG tablet Take 1 tablet by mouth Every Morning.   • busPIRone (BUSPAR) 15 MG tablet Take 30 mg by mouth Every Evening.   • cyanocobalamin (VITAMIN B-12) 1000 MCG tablet Take 1,000 mcg by mouth Daily.   • dexlansoprazole (Dexilant) 30 MG capsule Take 30 mg by mouth Daily.   • diclofenac (VOLTAREN) 1 % gel gel Apply 2 g topically to the appropriate area as directed 3 (Three) Times a Day.   • doxepin (SINEquan) 50 MG capsule Take 100 mg by mouth every night at bedtime.   • epoetin  joe (EPOGEN,PROCRIT) 70285 UNIT/ML injection Inject 5,000 Units under the skin into the appropriate area as directed See Admin Instructions. Patient's daughter is unsure how often she was receiving.   • furosemide (LASIX) 40 MG tablet Take 1 tablet by mouth daily.   • HYDROcodone-acetaminophen (NORCO) 5-325 MG per tablet Take 1 tablet by mouth Every 8 (Eight) Hours As Needed. For pain   • insulin aspart (NovoLOG) 100 UNIT/ML injection Inject 8 Units under the skin Medrol Dose Pack scheduling ONLY.   • ipratropium-albuterol (DUONEB) 0.5-2.5 mg/mL nebulizer Inhale 3 mL As Needed.   • LANTUS 100 UNIT/ML injection Inject 22 Units under the skin into the appropriate area as directed Daily.   • levETIRAcetam (KEPPRA) 500 MG tablet Take 1 tablet by mouth 2 (two) times a day.   • metoclopramide (REGLAN) 10 MG tablet Take 1 tablet by mouth 2 (two) times a day. Before a meal   • Multiple Vitamins-Minerals (MULTIVITAL PO) Take 1 tablet by mouth daily.   • pravastatin (PRAVACHOL) 40 MG tablet Take 1 tablet by mouth nightly.   • temazepam (RESTORIL) 15 MG capsule Take 15 mg by mouth At Night As Needed. At bedtime       General appearance: Elderly pleasant female, NAD, alert and cooperative, well groomed, poor dentition  HEENT: Normocephalic, atraumatic, PERRL, no masses or tenderness  COR: afib on bedside monitor  Resp: Even and midly labored  Extremities: No obvious edema  Skin: warm, dry    Neurological:   MS: oriented to self, place, year, could not name month, recent/remote memory mildly impaired, normal attention/concentration, language intact, no neglect, normal fund of knowledge  CN: visual acuity grossly normal, visual fields full, PERRL, EOMI, facial sensation equal, no facial droop, hearing symmetric, palate elevates symmetrically, shoulder shrug equal, tongue midline  Motor: 5/5 in upper, 3/5 lower against resistance normal tone  Reflexes: 1+ in all 4 ext.  Sensory: light touch sensation intact in all 4  ext.  Coordination: Normal finger to nose test  Gait and station: CHIDI BLE weakness needs 2 person assist  Rapid alternating movements: normal finger to thumb tap    Laboratory results:  Lab Results   Component Value Date    TSH 1.000 06/13/2020     Lab Results   Component Value Date    HGBA1C 5.70 (H) 06/13/2020     Lab Results   Component Value Date    OZJGNKRH64 >2,000 (H) 06/17/2020     Lab Results   Component Value Date    CHOL 110 06/14/2020    CHLPL 125 06/08/2020    CHLPL 142 12/17/2019    CHLPL 139 01/25/2019     Lab Results   Component Value Date    TRIG 57 06/14/2020    TRIG 49 06/08/2020    TRIG 75 12/17/2019     Lab Results   Component Value Date    HDL 63 (H) 06/14/2020    HDL 70 06/08/2020    HDL 64 12/17/2019     Lab Results   Component Value Date    LDL 36 06/14/2020    LDL 45 06/08/2020    LDL 63 12/17/2019     Lab Results   Component Value Date    WBC 12.10 (H) 07/01/2020    HGB 9.1 (L) 07/01/2020    HCT 29.0 (L) 07/01/2020    MCV 85.3 07/01/2020     07/01/2020     Lab Results   Component Value Date    GLUCOSE 195 (H) 07/01/2020    BUN 28 (H) 07/01/2020    CREATININE 1.04 (H) 07/01/2020    EGFRIFAFRI 61 07/01/2020    BCR 26.9 (H) 07/01/2020    K 4.0 07/01/2020    CO2 32.0 (H) 07/01/2020    CALCIUM 9.0 07/01/2020    ALBUMIN 3.10 (L) 06/27/2020    LABIL2 0.9 (L) 06/08/2020    AST 20 06/24/2020    ALT 27 06/24/2020     Lab Results   Component Value Date    PTT 52.7 (H) 06/18/2020     Lab Results   Component Value Date    INR 1.10 06/13/2020    INR 1.09 06/12/2020    INR 1.0 09/07/2018    PROTIME 13.9 06/13/2020    PROTIME 13.8 06/12/2020    PROTIME 11.4 09/07/2018     Brief Urine Lab Results  (Last result in the past 365 days)      Color   Clarity   Blood   Leuk Est   Nitrite   Protein   CREAT   Urine HCG        06/14/20 1420 Yellow Clear Trace Negative Negative Trace               Review and interpretation of imaging:  Xr Chest 1 View    Result Date: 6/25/2020  FINDINGS AND IMPRESSION:  Orogastric tube the course in the stomach and the tip is collimated out of the field-of-view.  The lungs are hypoinflated. There is pulmonary vascular congestion with superimposed interstitial thickening and pulmonary opacification, most notably within the bilateral mid and lower lung fields, likely representing pulmonary edema, as before.. No pneumothorax is seen. Previously seen right pleural effusion is not definitely visualized. Please note a lateral radiograph would provide a more accurate assessment of pleural effusions. The heart is enlarged and grossly unchanged in size since 06/13/2020.  This report was finalized on 6/25/2020 10:24 AM by Dr. Arden Uribe M.D.      Us Thoracentesis    Result Date: 6/24/2020  1. Technically successful ultrasound guided thoracentesis.  This report was finalized on 6/24/2020 6:09 AM by Dr. Alan Kothari M.D.      Results for orders placed during the hospital encounter of 06/12/20   Adult Transthoracic Echo Complete W/ Cont if Necessary Per Protocol    Narrative · Left ventricular wall thickness is consistent with mild-to-moderate   concentric hypertrophy.  · Estimated EF = 50%.  · Left ventricular systolic function is low normal.  · Left ventricular diastolic dysfunction (grade II) consistent with   pseudonormalization.  · Right ventricular cavity is moderate-to-severely dilated.  · Mildly reduced right ventricular systolic function noted.  · Left atrial cavity size is mild-to-moderately dilated.  · Mild mitral valve regurgitation is present  · Moderate tricuspid valve regurgitation is present.     LV size normal  LV systolic function mildly decreased globally, EF 50% in the setting of   A. fib RVR  RV sizes moderate to severely increased with mildly reduced to moderate   reduced RV function  Severe pulmonary hypertension by instantaneous gradient assessment   noninvasively, RVSP estimated 55-60 with right atrial pressure of 15 with   evidence of RV pressure volume  overload  Septal motion consistent with RV pressure volume overload  Mild to moderately increased left atrial size, moderately increased right   atrial size  Moderate TR  Mild MR  No valvular stenoses seen  At least grade 2 diastolic dysfunction by criteria  No masses or effusion seen         Impression/Assessment:  This is a 84-year-old female with past medical history of anxiety, breast cancer, CKD, diabetes, hyperlipidemia, hypertension, YARY who presented to the hospital in 6/12/2020 with complaints of unilateral facial droop and slurred speech.  Facial drooping was noted to have started the day prior to admission, she was not a TPA candidate due to time criteria as well as her exam was unrevealing for a large vessel occlusion.  Initial CT head revealed a chronic left frontal area of encephalomalacia and evidence of old craniotomy.  She was also found to be in new onset atrial fibrillation with RVR.  MRI brain on 6/13 revealed no evidence of acute infarct.  She then had a repeat CT on 6/14 that was unchanged from prior CT head on admission.  Unable to obtain CTA due to poor kidney function therefore carotid duplex was obtained and revealed mild R ICA stenosis and left ICA plaque without significant stenosis.  2D echo revealed a mild to moderately dilated LA cavity size, LV function normal, EF 50%, no mention of PFO however saline test was not performed.  There was some initial concern by Dr. Hernández that the patient was suffering partial seizures given her persistent altered mental status, EEG obtained and revealed evidence of moderate diffuse slowing consistent with moderate diffuse encephalopathy. Keppra was initiated.  She was initially initiated on a heparin drip for her atrial fibrillation and then transitioned to Eliquis 2.5 mg twice daily on 6/18.  This was stopped on 6/29 for plans for PEG placement.      She is now s/p PEG placement and was started back on her Eliquis today.  She has remained  neurologically stable with continued improvement of her mental status along with resolution of her aphasia.  No new signs of seizure activity or stroke.    Diagnosis: Aphasia, metabolic encephalopathy suspicious for MRI occult stroke versus partial seizures, new onset atrial fibrillation    Plan:  Today on exam she was stable neurologically and actually improved as far as her mental status and cognition, she seemed to be having labored breathing and needed frequent adjustment of her NC. She is stating that she is claustrophobic and would need medication to complete her MRI.  I do not feel comfortable giving her any type of sedative at this time given her respiratory status and there is a low yield right now for a repeat MRI brain.  Can consider at a later date outpatient if she were to have neurological changes and is medically stable.  For now continue Keppra 750 mg twice daily p.o. Unless kidney function worsens can consider switching to Briviact given her new onset afib would try to avoid Vimpat.  She was started back on her Eliquis 2.5 mg twice daily today, no need for aspirin from our standpoint will defer to cardiology.  Lipitor 20mg, LDL 36  Neurochecks per stroke protocol  Normalize BP goal <130/80  Stroke Education  BRIAN/SCDs  PT/OT/ST  F/U in 3 months for stroke follow up.  Will arrange follow up with Dr. Rhina Gillis in our office for seizure evaluation.  Therapies as written. CCP for discharge planning. Call RRT for any acute neurological changes. We will sign off, will see again per request.    Case discussed with patient, RN, and Dr. Borrego, and he agrees with plan above.   JERROD Murcia

## 2020-07-01 NOTE — PLAN OF CARE
Problem: Patient Care Overview  Goal: Plan of Care Review  Flowsheets (Taken 7/1/2020 1311)  Progress: improving  Plan of Care Reviewed With: patient  Outcome Summary: pt completed B UE AROM/AAROM to incr strength in UEs. pt had already bathed w. nsg aide A. pt states she is tired, so isn't up for  a longer session. cont OT to incr ADL, strength, balance, and tsf.

## 2020-07-01 NOTE — TELEPHONE ENCOUNTER
I addressed this with Dr. Greer.   [place], the pharmacist, informed that we would like the pt to remain on 2.5mg BID for now. Lilly

## 2020-07-01 NOTE — PLAN OF CARE
Problem: Patient Care Overview  Goal: Plan of Care Review  Outcome: Ongoing (interventions implemented as appropriate)  Flowsheets (Taken 7/1/2020 113)  Progress: improving  Plan of Care Reviewed With: patient  Outcome Summary: Pt tolerated treatment with no complaints. Pt is ModAX2 with bed mobility with verbal cueing. Pt able to sit on EOB with CGA/Gm. Pt is Gm with STS transfers. Pt is slowly improving with ambulation distance. Pt ambulated 15ft with MinAX2, with rwx. Pt required cues to increase step length and correct forward flexed posture. Pt's ambulation distance is limited 2/2 to decreased activity tolerance. Will continue to progress pt as tolerated.    ..Patient wearing a face mask during this therapy encounter. Therapist used appropriate personal protective equipment including mask and gloves. Appropriate PPE was worn during the entire therapy session. Hand hygiene was completed before and after therapy session. Patient is not in enhanced droplet precautions.

## 2020-07-01 NOTE — PROGRESS NOTES
IMPRESSION & PLAN:  PEG in good position with no apparent problems, tolerating tube feeds.  Loosened bumper to avoid skin necrosis.  We will see again if needed.    CC: Follow-up PEG    HPI: Underwent PEG placement yesterday, currently receiving tube feeds without apparent problems    PE:    VS: Afebrile vital signs stable  Abdomen: Soft and seemingly nontender, PEG in good position, no bleeding, bumper loosened slightly to avoid skin necrosis

## 2020-07-01 NOTE — THERAPY TREATMENT NOTE
Patient Name: Erika Irvin  : 1935    MRN: [id number]                              Today's Date: 2020       Admit Date: 2020    Visit Dx:     ICD-10-CM ICD-9-CM   1. New onset atrial fibrillation (CMS/HCC) I48.91 427.31   2. Shortness of breath R06.02 786.05   3. Chronic respiratory failure (CMS/HCC) J96.10 518.83   4. CHF (congestive heart failure) (CMS/HCC) I50.9 428.0   5. Acute and chronic respiratory failure (acute-on-chronic) (CMS/HCC) J96.20 518.84   6. 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)   • Ductal carcinoma in situ (DCIS) of left breast   • Iron deficiency anemia   • At risk for bone density loss   • Cerebral meningioma (CMS/[facility])   • Aromatase inhibitor use   • New onset atrial fibrillation (CMS/HCC)   • Elevated troponin   • Elevated LFTs   • CKD (chronic kidney disease) stage 3, GFR 30-59 ml/min (CMS/HCC)   • Morbid obesity with BMI of 40.0-44.9, adult (CMS/HCC)   • Type 2 diabetes mellitus with stage 3 chronic kidney disease, with long-term current use of insulin (CMS/HCC)   • Hypertension   • Hyperlipidemia   • COPD (chronic obstructive pulmonary disease) (CMS/[facility])   • Acute kidney injury (CMS/HCC)   • Metabolic encephalopathy   • Acute respiratory failure with hypercapnia (CMS/HCC)   • Hypokalemia   • Expressive aphasia   • Pain of right middle finger   • Chronic deep vein thrombosis (DVT) of left upper extremity (CMS/HCC)   • Dysphagia   • Oropharyngeal dysphagia     Past Medical History:   Diagnosis Date   • Anemia     Iron deficiency anemia    • Anxiety    • Arthritis    • Breast cancer (CMS/HCC) 2014    right    • Breast cancer (CMS/HCC) 2006    Left   • Chronic kidney disease     Anemia of chronic renal insufficency, stage 3   • COPD (chronic obstructive pulmonary disease)  (CMS/[facility])    • Depression    • Diabetes mellitus (CMS/HCC)    • Hyperlipidemia    • Hypertension    • Irregular heartbeat     noted by RN on 6/12/2020   • YARY (obstructive sleep apnea)    • Poor circulation    • Stroke (CMS/[facility])     CVA in 1990.     Past Surgical History:   Procedure Laterality Date   • BREAST LUMPECTOMY  08/2014    Right-sided invasive ductal carcinoma,papillary type   • BREAST LUMPECTOMY Left 2006   • BREAST SURGERY Right 09/2014    enlargement of lumpectomy site   • CHOLECYSTECTOMY     • CRANIOTOMY Left 08/2013    with removal meningioma   • HYSTERECTOMY     • PEG TUBE INSERTION N/A 6/30/2020    Procedure: PERCUTANEOUS ENDOSCOPIC GASTROSTOMY TUBE INSERTION;  Surgeon: Jared Ibarra MD;  Location: [facility] ENDOSCOPY;  Service: General;  Laterality: N/A;  dysphagia   • TUBAL ABDOMINAL LIGATION       General Information     Row Name 07/01/20 1136          PT Evaluation Time/Intention    Document Type  therapy note (daily note)  -     Mode of Treatment  physical therapy;individual therapy  -     Row Name 07/01/20 1136          General Information    Existing Precautions/Restrictions  fall  -     Row Name 07/01/20 1136          Cognitive Assessment/Intervention- PT/OT    Orientation Status (Cognition)  oriented to;person;place  -     Row Name 07/01/20 1136          Safety Issues, Functional Mobility    Safety Issues Affecting Function (Mobility)  insight into deficits/self awareness  -     Impairments Affecting Function (Mobility)  balance;endurance/activity tolerance;strength  -       User Key  (r) = Recorded By, (t) = Taken By, (c) = Cosigned By    Initials Name Provider Type     Janet Vaughn PTA Physical Therapy Assistant        Mobility     Row Name 07/01/20 1137          Bed Mobility Assessment/Treatment    Supine-Sit [place] (Bed Mobility)  moderate assist (50% patient effort);2 person assist;verbal cues  -     Sit-Supine [place] (Bed Mobility)  not tested  -      Assistive Device (Bed Mobility)  bed rails;head of bed elevated  -     Row Name 07/01/20 1137          Transfer Assessment/Treatment    Comment (Transfers)  Vc's for hand placement  -     Row Name 07/01/20 1137          Sit-Stand Transfer    Sit-Stand [place] (Transfers)  minimum assist (75% patient effort)  -     Assistive Device (Sit-Stand Transfers)  walker, front-wheeled  -     Row Name 07/01/20 1137          Gait/Stairs Assessment/Training    [place] Level (Gait)  minimum assist (75% patient effort);2 person assist  -     Assistive Device (Gait)  walker, front-wheeled  -     Distance in Feet (Gait)  15  -EH     Deviations/Abnormal Patterns (Gait)  base of support, wide;stride length decreased;helga decreased;gait speed decreased  -     Bilateral Gait Deviations  forward flexed posture;heel strike decreased;weight shift ability decreased  -     Comment (Gait/Stairs)  vc's for upright posture and to increase stride length  -       User Key  (r) = Recorded By, (t) = Taken By, (c) = Cosigned By    Initials Name Provider Type     Janet Vaughn PTA Physical Therapy Assistant        Obj/Interventions     Row Name 07/01/20 1138          Static Sitting Balance    Level of [place] (Unsupported Sitting, Static Balance)  contact guard assist;minimal assist, 75% patient effort  -     Sitting Position (Unsupported Sitting, Static Balance)  sitting on edge of bed  -     Time Able to Maintain Position (Unsupported Sitting, Static Balance)  2 to 3 minutes  -     Comment (Unsupported Sitting, Static Balance)  pt intially CGA with seated balance but then started to lean posteriorly, required Gm to get pt back to midline.   -       User Key  (r) = Recorded By, (t) = Taken By, (c) = Cosigned By    Initials Name Provider Type    Janet Holder PTA Physical Therapy Assistant        Goals/Plan    No documentation.       Clinical Impression     Row Name 07/01/20 1139          Plan of  Care Review    Plan of Care Reviewed With  patient  -     Progress  improving  -     Outcome Summary  Pt tolerated treatment with no complaints. Pt is ModAX2 with bed mobility with verbal cueing. Pt able to sit on EOB with CGA/Gm. Pt is Gm with STS transfers. Pt is slowly improving with ambulation distance. Pt ambulated 15ft with MinAX2, with rwx. Pt required cues to increase step length and correct forward flexed posture. Pt's ambulation distance is limited 2/2 to decreased activity tolerance. Will continue to progress pt as tolerated.   -     Row Name 07/01/20 1139          Positioning and Restraints    Pre-Treatment Position  in bed  -EH     Post Treatment Position  chair  -     In Chair  reclined;call light within reach;encouraged to call for assist;exit alarm on  -       User Key  (r) = Recorded By, (t) = Taken By, (c) = Cosigned By    Initials Name Provider Type     Janet Vaughn PTA Physical Therapy Assistant        Outcome Measures     Row Name 07/01/20 1141          How much help from another person do you currently need...    Turning from your back to your side while in flat bed without using bedrails?  2  -EH     Moving from lying on back to sitting on the side of a flat bed without bedrails?  2  -EH     Moving to and from a bed to a chair (including a wheelchair)?  3  -EH     Standing up from a chair using your arms (e.g., wheelchair, bedside chair)?  3  -EH     Climbing 3-5 steps with a railing?  1  -EH     To walk in hospital room?  2  -EH     AM-PAC 6 Clicks Score (PT)  13  -     Row Name 07/01/20 1141          Modified [place] Scale    Modified [place] Scale  4 - Moderately severe disability.  Unable to walk without assistance, and unable to attend to own bodily needs without assistance.  -       User Key  (r) = Recorded By, (t) = Taken By, (c) = Cosigned By    Initials Name Provider Type    Janet Holder PTA Physical Therapy Assistant        Physical Therapy Education                  Title: PT OT SLP Therapies (Done)     Topic: Physical Therapy (Done)     Point: Mobility training (Done)     Description:   Instruct learner(s) on safety and technique for assisting patient out of bed, chair or wheelchair.  Instruct in the proper use of assistive devices, such as walker, crutches, cane or brace.              Patient Friendly Description:   It's important to get you on your feet again, but we need to do so in a way that is safe for you. Falling has serious consequences, and your personal safety is the most important thing of all.        When it's time to get out of bed, one of us or a family member will sit next to you on the bed to give you support.     If your doctor or nurse tells you to use a walker, crutches, a cane, or a brace, be sure you use it every time you get out of bed, even if you think you don't need it.    Learning Progress Summary           Patient Acceptance, E,D, VU,NR by  at 7/1/2020 1142    Acceptance, E,D, VU,NR by  at 6/30/2020 1701    Acceptance, E,TB, VU,NR by  at 6/28/2020 1626    Acceptance, E,D, VU,NR by  at 6/26/2020 1217    Acceptance, E,D, NR by  at 6/24/2020 1230    Acceptance, E,D, NR by  at 6/22/2020 1528    Acceptance, E,D, NR by MS at 6/20/2020 1521    Acceptance, E,D, NR by  at 6/18/2020 1226    Acceptance, E,TB, VU,RT by [facility] at 6/17/2020 1723    Acceptance, E,D, VU,NR by  at 6/17/2020 1114    Acceptance, E,TB, VU by [facility] at 6/16/2020 1834    Acceptance, E,D, NR by MS at 6/16/2020 1735    Acceptance, E,D, NR by  at 6/15/2020 1100   Family Acceptance, E,TB, VU,RT by [facility] at 6/17/2020 1723                   Point: Home exercise program (Done)     Description:   Instruct learner(s) on appropriate technique for monitoring, assisting and/or progressing patient with therapeutic exercises and activities.              Learning Progress Summary           Patient Acceptance, E,D, VU,NR by  at 7/1/2020 1142    Acceptance, MIAH TREJO VU, NR by  at  6/30/2020 1701    Acceptance, E,TB, VU,NR by  at 6/28/2020 1626    Acceptance, E,D, VU,NR by  at 6/26/2020 1217    Acceptance, E,D, NR by  at 6/24/2020 1230    Acceptance, E,D, NR by  at 6/22/2020 1528    Acceptance, E,D, NR by MS at 6/20/2020 1521    Acceptance, E,D, NR by  at 6/18/2020 1226    Acceptance, E,TB, VU,RT by MS at 6/17/2020 1723    Acceptance, E,D, VU,NR by  at 6/17/2020 1114    Acceptance, E,TB, VU by MS1 at 6/16/2020 1834    Acceptance, E,D, NR by MS at 6/16/2020 1735    Acceptance, E,D, NR by  at 6/15/2020 1100   Family Acceptance, E,TB, VU,RT by MS1 at 6/17/2020 1723                   Point: Body mechanics (Done)     Description:   Instruct learner(s) on proper positioning and spine alignment for patient and/or caregiver during mobility tasks and/or exercises.              Learning Progress Summary           Patient Acceptance, E,D, VU,NR by  at 7/1/2020 1142    Acceptance, E,D, VU,NR by  at 6/30/2020 1701    Acceptance, E,TB, VU,NR by  at 6/28/2020 1626    Acceptance, E,D, VU,NR by  at 6/26/2020 1217    Acceptance, E,D, NR by  at 6/24/2020 1230    Acceptance, E,D, NR by  at 6/22/2020 1528    Acceptance, E,D, NR by MS at 6/20/2020 1521    Acceptance, E,D, NR by  at 6/18/2020 1226    Acceptance, E,TB, VU,RT by MS at 6/17/2020 1723    Acceptance, E,D, VU,NR by  at 6/17/2020 1114    Acceptance, E,TB, VU by MS at 6/16/2020 1834    Acceptance, E,D, NR by MS at 6/16/2020 1735    Acceptance, E,D, NR by  at 6/15/2020 1100   Family Acceptance, E,TB, VU,RT by MS1 at 6/17/2020 1723                   Point: Precautions (Done)     Description:   Instruct learner(s) on prescribed precautions during mobility and gait tasks              Learning Progress Summary           Patient Acceptance, E,D, VU,NR by  at 7/1/2020 1142    Acceptance, E,D, VU,NR by  at 6/30/2020 1701    Acceptance, E,D, VU,NR by  at 6/26/2020 1217    Acceptance, E,D, NR by  at 6/24/2020 1230     Acceptance, E,D, NR by  at 6/22/2020 1528    Acceptance, E,D, NR by MS at 6/20/2020 1521    Acceptance, E,D, NR by  at 6/18/2020 1226    Acceptance, E,TB, VU,RT by MS1 at 6/17/2020 1723    Acceptance, E,D, VU,NR by  at 6/17/2020 1114    Acceptance, E,TB, VU by MS1 at 6/16/2020 1834    Acceptance, E,D, NR by MS at 6/16/2020 1735    Acceptance, E,D, NR by  at 6/15/2020 1100   Family Acceptance, E,TB, VU,RT by MS1 at 6/17/2020 1723                               User Key     Initials Effective Dates Name Provider Type Discipline     04/03/18 -  Shauna Hancock, PT Physical Therapist PT     04/03/18 -  Teresa Lazaro, PT Physical Therapist PT    MS 04/03/18 -  Jamaal Cuellar, PT Physical Therapist PT     03/07/18 -  Janis Velazquez PTA Physical Therapy Assistant PT     08/19/18 -  Janet Vaughn PTA Physical Therapy Assistant PT    [facility] 11/15/19 -  Broderick Dominguez, RN Registered Nurse Nurse              PT Recommendation and Plan     Plan of Care Reviewed With: patient  Progress: improving  Outcome Summary: Pt tolerated treatment with no complaints. Pt is ModAX2 with bed mobility with verbal cueing. Pt able to sit on EOB with CGA/Gm. Pt is Gm with STS transfers. Pt is slowly improving with ambulation distance. Pt ambulated 15ft with MinAX2, with rwx. Pt required cues to increase step length and correct forward flexed posture. Pt's ambulation distance is limited 2/2 to decreased activity tolerance. Will continue to progress pt as tolerated.      Time Calculation:   PT Charges     Row Name 07/01/20 1127             Time Calculation    Start Time  0929  -      Stop Time  0953  -      Time Calculation (min)  24 min  -      PT Received On  07/01/20  -      PT - Next Appointment  07/02/20  -         Time Calculation- PT    Total Timed Code Minutes- PT  24 minute(s)  -        User Key  (r) = Recorded By, (t) = Taken By, (c) = Cosigned By    Initials Name Provider Type     Janet Vaughn, ROLANDO  Physical Therapy Assistant        Therapy Charges for Today     Code Description Service Date Service Provider Modifiers Qty    69014995452  PT THER PROC EA 15 MIN 6/30/2020 Janet Vaughn, ROLANDO GP 1    71250686766 HC PT THER SUPP EA 15 MIN 6/30/2020 Janet Vaughn PTA GP 1    [id number]  GAIT TRAINING EA 15 MIN 7/1/2020 Janet Vaughn PTA GP 1    [id number]  PT THERAPEUTIC ACT EA 15 MIN 7/1/2020 Janet Vaughn PTA GP 1    [id number]  PT THER SUPP EA 15 MIN 7/1/2020 Janet Vaughn PTA GP 2          PT G-Codes  Outcome Measure Options: AM-PAC 6 Clicks Basic Mobility (PT)  AM-PAC 6 Clicks Score (PT): 13  AM-PAC 6 Clicks Score (OT): 11  Modified [place] Scale: 4 - Moderately severe disability.  Unable to walk without assistance, and unable to attend to own bodily needs without assistance.    Janet Vaughn PTA  7/1/2020

## 2020-07-01 NOTE — PROGRESS NOTES
[facility]        LOS:  LOS: 19 days   Patient Name: Erika Irvin  Age/Sex: 84 y.o. female  : 1935  MRN: [id number]    Day of Service: 20   Length of Stay: 19  Encounter Provider: JERROD Wesley  Place of Service: [facility] CARDIOLOGY  Patient Care Team:  Bruce Maldonado MD as PCP - General (Internal Medicine)  Code, Hosea GILMAN II, MD as Consulting Physician (Hematology and Oncology)  Kate Lopez MD as Referring Physician (Internal Medicine)    Subjective:     Chief Complaint: f/u afib    Subjective: Patient sitting up in chair, she is s/p PEG placement yesterday.  She is confused at her baseline.  She has had no acute events overnight.  She denies chest pain or shortness of air, getting her hair washed with nursing staff on encounter, persistent Afib on telemetry, rate controlled- rates 90s, no acute CV complaints per nursing staff.    Current Medications:   Scheduled Meds:    apixaban 2.5 mg Oral Q12H   aspirin 81 mg Oral Daily   atorvastatin 20 mg Oral Nightly   budesonide 0.5 mg Nebulization BID - RT   busPIRone 15 mg Oral QAM   busPIRone 30 mg Oral Q PM   diclofenac 2 g Topical TID   doxepin 10 mg Oral Nightly   insulin glargine 22 Units Subcutaneous QAM   insulin regular 0-7 Units Subcutaneous Q6H   ipratropium-albuterol 3 mL Nebulization 4x Daily - RT   lansoprazole 30 mg Nasogastric QAM   levETIRAcetam 750 mg Intravenous Q12H   metoprolol tartrate 50 mg Oral Q12H   multivitamin with minerals 1 tablet Oral Daily   sodium chloride 10 mL Intravenous Q12H     Continuous Infusions:    hold 1 each    hold 1 each    sodium chloride 1,000 mL Last Rate: 1,000 mL (20 1108)       Allergies:  Allergies   Allergen Reactions   • Sulfa Antibiotics Unknown - Low Severity     GI upset   • Penicillins Itching and Rash       Review of Systems   Constitution: Negative.   Cardiovascular: Negative.    Respiratory: Negative.     Gastrointestinal: Negative.    Genitourinary: Negative.      - x14 point review of systems except as mentioned above    Objective:     Temp:  [97.7 °F (36.5 °C)-99.4 °F (37.4 °C)] 99 °F (37.2 °C)  Heart Rate:  [] 110  Resp:  [16-29] 16  BP: (100-143)/(56-97) 130/66     Intake/Output Summary (Last 24 hours) at 7/1/2020 1054  Last data filed at 7/1/2020 0817  Gross per 24 hour   Intake 747 ml   Output 800 ml   Net -53 ml     Body mass index is 47.91 kg/m².      06/26/20  0619 06/27/20  0411 06/28/20  0600   Weight: 113 kg (249 lb 11.2 oz) 113 kg (249 lb) 111 kg (245 lb 4.8 oz)         Physical Exam:  General Appearance:    cooperative, in no acute distress, confused               Neck:   supple,  no JVD   Lungs:     Room air, dim bilat bases, no distress    Heart:    irregular rhythm and normal rate, normal S1 and S2,1/6 murmur   Abdomen:     Normal bowel sounds, soft, PEG in place   Extremities:   Moves all extremities well, no edema, no cyanosis, no             redness   Pulses:   Pulses palpable and equal bilaterally   Skin:   No bleeding, bruising or rash   Neurologic:   Awake, alert, oriented x3         Lab Review:   Results from last 7 days   Lab Units 07/01/20  0743 06/30/20  0514   SODIUM mmol/L 146* 144   POTASSIUM mmol/L 4.0 3.8   CHLORIDE mmol/L 105 104   CO2 mmol/L 32.0* 35.0*   BUN mg/dL 28* 29*   CREATININE mg/dL 1.04* 1.13*   GLUCOSE mg/dL 195* 95   CALCIUM mg/dL 9.0 9.7         Results from last 7 days   Lab Units 07/01/20  0743 06/30/20  0514   WBC 10*3/mm3 12.10* 7.39   HEMOGLOBIN g/dL 9.1* 9.5*   HEMATOCRIT % 29.0* 30.4*   PLATELETS 10*3/mm3 194 183         Results from last 7 days   Lab Units 06/27/20  0924 06/26/20  0533   MAGNESIUM mg/dL 2.2 2.2           Invalid input(s): LDLCALC            Recent Radiology:  Imaging Results (Most Recent)     Procedure Component Value Units Date/Time    XR Chest 1 View [[id number]] Collected:  06/25/20 1024     Updated:  06/25/20 1027    Narrative:        Portable chest radiograph     HISTORY:Follow-up effusions     TECHNIQUE: Single AP portable radiograph of the chest     COMPARISON:Chest radiograph 06/13/2020       Impression:       FINDINGS AND IMPRESSION:  Orogastric tube the course in the stomach and the tip is collimated out  of the field-of-view.     The lungs are hypoinflated. There is pulmonary vascular congestion with  superimposed interstitial thickening and pulmonary opacification, most  notably within the bilateral mid and lower lung fields, likely  representing pulmonary edema, as before.. No pneumothorax is seen.  Previously seen right pleural effusion is not definitely visualized.  Please note a lateral radiograph would provide a more accurate  assessment of pleural effusions. The heart is enlarged and grossly  unchanged in size since 06/13/2020.     This report was finalized on 6/25/2020 10:24 AM by Dr. Arden Uribe M.D.       US Thoracentesis [[id number]] Collected:  06/24/20 1445    Specimen:  Body Fluid Updated:  06/24/20 1449    Narrative:       US THORACENTESIS-     Clinical: Left-sided thoracentesis requested      imaging of the left lung base demonstrates only a trace amount of  pleural fluid, an insufficient amount for ultrasound-guided  paracentesis.     This report was finalized on 6/24/2020 2:46 PM by Dr. Alan Kothari M.D.       US Thoracentesis [[id number]] Collected:  06/24/20 0608    Specimen:  Body Fluid Updated:  06/24/20 0612    Narrative:       US THORACENTESIS-     HISTORY: pleural effusion.     PROCEDURE: Ultrasound guided right thoracentesis     Procedure: Informed consent was obtained. Preliminary sonography  performed. Pleural effusion demonstrated. The overlying skin was prepped  in the usual sterile fashion. Local anesthesia was achieved with 1%  lidocaine. A small nick was made in the skin with a scalpel. Through the  nick was inserted a needle catheter device under sonographic guidance.  The needle was removed and  the catheter remained and was connected to  suction. 450 cc of burgundy colored fluid was withdrawn. The patient  tolerated the procedure without evidence for complication and left the  procedure room in stable condition.       Impression:       1. Technically successful ultrasound guided thoracentesis.     This report was finalized on 6/24/2020 6:09 AM by Dr. Alan Kothari M.D.       XR Chest 1 View [[id number]] Collected:  06/22/20 0808     Updated:  06/22/20 0815    Narrative:       Portable chest radiograph     HISTORY:Pleural effusion     TECHNIQUE: Single AP portable radiograph of the chest     COMPARISON:Multiple chest radiographs dating back to 06/13/2020       Impression:       FINDINGS AND IMPRESSION:  Orogastric tube courses in the stomach and the tip is not well  visualized due to overexposure.     There is pulmonary vascular congestion, as before. Small-to-moderate  bilateral pleural effusions are present, as before. There is bibasilar  pulmonary opacification, right greater than left, which appears to have  decreased, particularly on the right, since 06/20/2020. No pneumothorax  is seen. The heart is moderately enlarged, as before.     This report was finalized on 6/22/2020 8:12 AM by Dr. Arden Uribe M.D.       XR Chest 1 View [[id number]] Collected:  06/20/20 0631     Updated:  06/20/20 0636    Narrative:       XR CHEST 1 VW-     HISTORY: Female who is 84 years-old,  poor oxygenation     TECHNIQUE: Frontal view of the chest     COMPARISON: 06/16/2020     FINDINGS: NG tube, partly included, appear stable. The heart is  enlarged. Aorta is tortuous. Pulmonary vasculature is unremarkable.  Increased opacities at the bases, right more than left, suggesting  increased atelectasis/infiltrate/effusion. No pneumothorax. No acute  osseous process.       Impression:       Increased opacity at the bases, continued follow-up  recommended.     This report was finalized on 6/20/2020 6:33 AM by Dr. Ezio PAUL  DOUGLAS Conteh.       XR Chest 1 View [[id number]] Collected:  06/16/20 1537     Updated:  06/16/20 1544    Narrative:       XR CHEST 1 VW-     HISTORY: Female who is 84 years-old,  decreased oxygenation     TECHNIQUE: Frontal view of the chest     COMPARISON: 06/14/2020     FINDINGS: NG tube extends to the proximal jejunum. The heart is  enlarged. Pulmonary vasculature is congested. Partially improved  aeration at the bases with residual likely atelectasis at the bases,  with suggestion of minimal right pleural effusion. No pneumothorax. No  acute osseous process.       Impression:       Small likely atelectasis at the right bases, minimal right  pleural effusion. Cardiomegaly and pulmonary vascular congestion.     This report was finalized on 6/16/2020 3:41 PM by Dr. Ezio Conteh M.D.       XR Chest 1 View [[id number]] Collected:  06/14/20 1257     Updated:  06/14/20 1301    Narrative:       PORTABLE CHEST X-RAY     HISTORY: Shortness of breath. Hypertension and diabetes.     TECHNIQUE: Portable frontal chest x-ray is correlated with chest x-ray  from yesterday at around noon.     FINDINGS: Cardiomegaly with pulmonary vascular engorgement are again  observed. There appears to be mild pulmonary edema, slightly improved.  There also appear to be small pleural effusions.       Impression:       Early megaly with mild pulmonary edema that appears slightly  improved in comparison with the x-ray performed yesterday. There is no  new abnormality.     This report was finalized on 6/14/2020 12:58 PM by Dr. Callum Pierre M.D.       CT Head Without Contrast [[id number]] Collected:  06/14/20 1252     Updated:  06/14/20 1259    Narrative:       CT SCAN OF THE BRAIN WITHOUT CONTRAST     HISTORY: Atrial fibrillation. Increased lethargy. Previous surgery for  meningioma.     The CT scan was performed as an emergency procedure through the brain  without contrast there is moderate diffuse atrophy and chronic small  vessel  ischemic change similar to previous studies. Again noted is a  left frontal craniotomy with some post surgical encephalomalacia high in  the left frontal lobe as also noted on yesterday's MRI scan and  yesterday's CT scan. There is no evidence of acute intracranial  hemorrhage or mass effect and the visualized sinuses are clear.                 Radiation dose reduction techniques were utilized, including automated  exposure control and exposure modulation based on body size.     This report was finalized on 6/14/2020 12:55 PM by Dr. César Fall M.D.       MRI Brain Without Contrast [[id number]] Collected:  06/13/20 2146     Updated:  06/13/20 2155    Narrative:       BRAIN MRI WITHOUT CONTRAST     HISTORY: Stroke; I48.91-Unspecified atrial fibrillation;  R06.02-Shortness of breath     COMPARISON: 08/16/2017.     FINDINGS:  Multiplanar images of the head were obtained without  gadolinium. No areas of restricted diffusion are seen to suggest acute  infarct. There is diffuse atrophy. There is periventricular and deep  white matter microangiopathic change. There is no midline shift or mass  effect. Area of encephalomalacia is seen within the left frontal lobe  near the vertex. The intracranial flow voids appear intact. There is  some decreased signal intensity seen on susceptibility weighted imaging  in the area of encephalomalacia within the left frontal lobe, likely  reflecting some hemosiderin deposition. There is mucosal thickening  noted within the ethmoid sinuses.       Impression:       1. No areas of restricted diffusion seen to suggest acute infarct.    2. Chronic area of encephalomalacia within the left frontal lobe     This report was finalized on 6/13/2020 9:52 PM by Dr. Jadyn Ferrer M.D.       CT Head Without Contrast [[id number]] Collected:  06/13/20 1259     Updated:  06/13/20 1305    Narrative:       CT SCAN OF THE BRAIN WITHOUT CONTRAST     HISTORY: Confusion. Previous breast cancer. Previous  surgical removal of  left frontal meningioma.     The CT scan was performed through the brain without contrast. There is  mild diffuse atrophy and chronic small vessel ischemic change similar to  the study of 03/24/2018. Again noted is previous left frontal craniotomy  with some slight encephalomalacia high in the left frontal lobe that is  unchanged. There is no evidence of acute intracranial hemorrhage or mass  effect and the visualized sinuses are clear.                       Radiation dose reduction techniques were utilized, including automated  exposure control and exposure modulation based on body size.     This report was finalized on 6/13/2020 1:02 PM by Dr. César Fall M.D.       XR Chest 1 View [[id number]] Collected:  06/13/20 1222     Updated:  06/13/20 1227    Narrative:       PORTABLE CHEST X-RAY     history: Shortness of breath.     TECHNIQUE: Portable chest x-rays correlated with a chest x-ray performed  yesterday afternoon.     FINDINGS: There is cardiomegaly with pulmonary vascular engorgement and  pulmonary edema which appears similar to that seen yesterday. No  infiltrate or effusion is evident. There is no pneumothorax.       Impression:       No change.     This report was finalized on 6/13/2020 12:24 PM by Dr. Callum Pierre M.D.       XR Chest 1 View [[id number]] Collected:  06/12/20 1739     Updated:  06/12/20 1753    Narrative:       ONE VIEW PORTABLE CHEST     HISTORY: Shortness of breath. Pleural effusion.     FINDINGS: There is moderate cardiomegaly unchanged from 03/24/2018.  There is mild vascular congestion suspicious for borderline changes of  congestive heart failure. There may be tiny bilateral pleural effusions  not well seen in the portable upright view.     This report was finalized on 6/12/2020 5:50 PM by Dr. César Fall M.D.             ECHOCARDIOGRAM:    Results for orders placed during the hospital encounter of 06/12/20   Adult Transthoracic Echo Complete W/ Cont if  Necessary Per Protocol    Narrative · Left ventricular wall thickness is consistent with mild-to-moderate   concentric hypertrophy.  · Estimated EF = 50%.  · Left ventricular systolic function is low normal.  · Left ventricular diastolic dysfunction (grade II) consistent with   pseudonormalization.  · Right ventricular cavity is moderate-to-severely dilated.  · Mildly reduced right ventricular systolic function noted.  · Left atrial cavity size is mild-to-moderately dilated.  · Mild mitral valve regurgitation is present  · Moderate tricuspid valve regurgitation is present.     LV size normal  LV systolic function mildly decreased globally, EF 50% in the setting of   A. fib RVR  RV sizes moderate to severely increased with mildly reduced to moderate   reduced RV function  Severe pulmonary hypertension by instantaneous gradient assessment   noninvasively, RVSP estimated 55-60 with right atrial pressure of 15 with   evidence of RV pressure volume overload  Septal motion consistent with RV pressure volume overload  Mild to moderately increased left atrial size, moderately increased right   atrial size  Moderate TR  Mild MR  No valvular stenoses seen  At least grade 2 diastolic dysfunction by criteria  No masses or effusion seen           I reviewed the patient's new clinical results.    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)    Elevated troponin    Elevated LFTs    CKD (chronic kidney disease) stage 3, GFR 30-59 ml/min (CMS/HCC)    Morbid obesity with BMI of 40.0-44.9, adult (CMS/HCC)    Type 2 diabetes mellitus with stage 3 chronic kidney disease, with long-term current use of insulin (CMS/HCC)    Hypertension    Hyperlipidemia    COPD (chronic obstructive pulmonary disease) (CMS/HCC)    Acute kidney injury (CMS/HCC)    Metabolic encephalopathy     Acute respiratory failure with hypercapnia (CMS/HCC)    Hypokalemia    Expressive aphasia    Pain of right middle finger    Chronic deep vein thrombosis (DVT) of left upper extremity (CMS/HCC)    Dysphagia    Oropharyngeal dysphagia    1. New onset A. fib RVR  - rate controlled on BB per tube  - bradycardia resolve off diltiazem  - CHADS Vasc at least 3, on apixiban 2.5mg BID for anticoagulation, will consider increasing to 5 twice daily with creatinine of 1.1     2. Acute on chronic diastolic congestive heart failure  - EF 50%, grade 2 diastolic dysfunction  - received diuresis 6/14, evidence of contraction alkalosis --> received diamox  - appears compensated  - nephrology has signed off   -Continue present dose of diuretics     3. Elevated troponin  - 0.08, 0.07, 0.06  - Likely secondary to high filling pressures, no regional dysfunction seen on 2D echo  -Conservative management for now continue aspirin, troponin downtrending  -No interventional approach planned presently  Clinically stable     4. Acute on chronic hypercapnic respiratory failure  - pulmonary managing  - Bipap   - s/p thoracentesis 6/23   - planned for left thoracentesis today 6/24-- could not do d/t not enough fluid     5. Obstructive sleep apnea CPAP as tolerated     6. Essential hypertension      7. Hyperlipidemia     8. Underlying dementia     9. HAYDEE on CKD  - resolved     10. Anemia of chronic disease   - required transfusions as outpt     11. Pulmonary HTN     12. Hypokalemia (new problem 6/26)  - replaced    Plan:   Patient's rates are acceptable, she remains on metoprolol tartrate 50mg PO BID  Continue apixiban for a/c at 2.5mg PO BID at this time  Potassium within range today  S/p PEG placement yesterday  Volume status stable at this time  Follow-up with Dr. Greer on 7/14 at 10:15 am.    We will follow peripherally from here.  Please call with any questions or concerns  Jamaal Greer MD, PhD            Brittani James, JERROD  07/01/20  10:54

## 2020-07-01 NOTE — CONSULTS
Adult Nutrition  Assessment/PES    Patient Name:  Erika Irvin  YOB: 1935  MRN: [id number]  Admit Date:  6/12/2020    Assessment Date:  7/1/2020    Comments:  Consult: s/p PEG placement 6/30. TF restarted on Diabetisource, currently tolerating at 40ml/hr, goal rate 55ml/hr with 50ml water flush q3h. Request for bolus feeds prior to d/c. TF changed to bolus rate on Diabetisource @ 250ml (1 carton) 5x daily with 150ml water flush with each feeding (5x). To provide 1500 kcal, 73.5g Pro, and 1770ml. Will monitor closely for tolerance.     Reason for Assessment     Row Name 07/01/20 1131          Reason for Assessment    Reason For Assessment  follow-up protocol;TF/PN         Nutrition/Diet History     Row Name 07/01/20 1131          Nutrition/Diet History    Typical Food/Fluid Intake  s/p PEG placement 6/30. TF restarted on Diabetisource, currently tolerating at 40ml/hr, goal rate 55ml/hr with 50ml water flush q3h. Request for bolus feeds prior to d/c.      Factors Affecting Nutritional Intake  difficulty/impaired swallowing;impaired cognitive status/motor control           Labs/Tests/Procedures/Meds     Row Name 07/01/20 1132          Labs/Procedures/Meds    Lab Results Reviewed  reviewed, pertinent     Lab Results Comments  Glu 195, Na 146, CO2 32, Cr 1.04, BUN 28, WBC 12.1, Hgb 9.1/Hct 29        Diagnostic Tests/Procedures    Diagnostic Test/Procedure Reviewed  reviewed, pertinent     Diagnostic Test/Procedures Comments  PEG placement 6/30        Medications    Pertinent Medications Reviewed  reviewed, pertinent     Pertinent Medications Comments  lantus, humulin, keppra, MVI, 1000ml IVF         Physical Findings     Row Name 07/01/20 1138          Physical Findings    Overall Physical Appearance  obese     Gastrointestinal  feeding tube     Tubes  PEG     Oral/Mouth Cavity  poor dentition     Skin  other (see comments) B=16           Nutrition Prescription Ordered     Row Name 07/01/20 1134           Nutrition Prescription PO    Current PO Diet  NPO        Nutrition Prescription EN    Enteral Route  PEG     Product  Diabetisource AC (Glucerna 1.2)     TF Delivery Method  Continuous     Continuous TF Goal Rate (mL/hr)  55 mL/hr     Continuous TF Current Rate (mL/hr)  40 mL/hr     Water flush (mL)   50 mL     Water Flush Frequency  Every 3 hours         Evaluation of Received Nutrient/Fluid Intake     Row Name 07/01/20 1140          Calories Evaluation    Enteral Calories (kcal)  1152     % of Kcal Needs  77        Protein Evaluation    Enteral Protein (gm)  57.6     % of Protein Needs  85        Intake Assessment    Energy/Calorie Requirement Assessment  not meeting needs     Protein Requirement Assessment  not meeting needs     Fluid Requirement Assessment  not meeting needs     Tolerance  tolerating        Fluid Intake Evaluation    Enteral (Free Water) Fluid (mL)  783     Free Water Flush Fluid (mL)  400     Total Free Water Intake (mL)  1183 mL     IV Fluid (mL)  1000         Problem/Interventions:    Intervention Goal     Row Name 07/01/20 1142          Intervention Goal    General  Maintain nutrition;Nutrition support treatment;Improved nutrition related lab(s);Reduce/improve symptoms;Meet nutritional needs for age/condition;Disease management/therapy     TF/PN  Adjust TF/PN;Tolerate TF at goal;Deliver estimated need (%)     Deliver % of Estimated Need  100 %     Weight  Appropriate weight loss         Nutrition Intervention     Row Name 07/01/20 1143          Nutrition Intervention    RD/Tech Action  Care plan reviewd;Follow Tx progress;Recommend/ordered     Recommended/Ordered  EN         Nutrition Prescription     Row Name 07/01/20 1143          Nutrition Prescription EN    Enteral Route  PEG     Product  Diabetisource     TF Delivery Method  Bolus     TF Bolus Goal Volume (mL)  250 mL     TF Bolus Starting Volume (mL)  250 mL     TF Bolus Frequency  5 times a day     TF Bolus Cycle Over  [place]      Water flush (mL)   150 mL     Water Flush Frequency  Every feeding     New EN Prescription Ordered?  Yes         Education/Evaluation     Row Name 07/01/20 1143          Education    Education  Will Instruct as appropriate        Monitor/Evaluation    Monitor  Per protocol;Pertinent labs;TF delivery/tolerance;Weight;Skin status;Symptoms     Education Follow-up  Reinforce PRN           Electronically signed by:  Brittani Santos MS,RD,LD  07/01/20 11:44

## 2020-07-01 NOTE — TELEPHONE ENCOUNTER
7/1/20--Dr Madison Paris from the [facility] needs to speak with you to clarify a medication dosage for Ms. Allisons Eliquis. Please call him at . Thank you.

## 2020-07-01 NOTE — PROGRESS NOTES
Name: Erika Irvin ADMIT: 2020   : 1935  PCP: Bruce Maldonado MD    MRN: [id number] LOS: 19 days   AGE/SEX: 84 y.o. female  ROOM: [facility]     Subjective   Subjective   Patient appears comfortable & in no apparent distress.  Difficult to ascertain ROS 2/2 dysphonia / dementia.  Seems to improve with each new day.  Follows simple commands.     Review of Systems   Reason unable to perform ROS: limited ROS 2/2 ? dementia & dysphonia.   Respiratory: Negative for shortness of breath (chronic).    Cardiovascular: Negative for chest pain.        Objective   Objective   Vital Signs  Temp:  [98.8 °F (37.1 °C)-99.4 °F (37.4 °C)] 99.1 °F (37.3 °C)  Heart Rate:  [] 75  Resp:  [16-29] 21  BP: (114-143)/(66-97) 120/68  SpO2:  [88 %-97 %] 93 %  on  Flow (L/min):  [1] 1;   Device (Oxygen Therapy): nasal cannula  Body mass index is 47.91 kg/m².     Physical Exam   Constitutional: No distress.   Morbid obesity & generally weak & non-toxic appearance   HENT:   Head: Normocephalic and atraumatic.   Eyes: Pupils are equal, round, and reactive to light.   Neck: Normal range of motion. Neck supple.   Cardiovascular: Normal rate.   Murmur heard.  Pulmonary/Chest: Effort normal and breath sounds normal. No respiratory distress.   Diminished on expiration   Abdominal: Soft. Bowel sounds are normal. She exhibits no distension.   Musculoskeletal: She exhibits edema (trace BLE). She exhibits no deformity.   Neurological: She is alert.   Oriented to self & CHIDI additional orientation 2/2 dysphonia; follows simple commands.   Skin: Skin is warm and dry. She is not diaphoretic.   Hyperpigmentation on right middle phalange distal tip   Psychiatric: She has a normal mood and affect. Her behavior is normal.       Results Review:       I reviewed the patient's new clinical results.  Results from last 7 days   Lab Units 20  0743 20  0514 20  0519 20  0518   WBC 10*3/mm3 12.10* 7.39 6.94 7.93      HEMOGLOBIN g/dL 9.1* 9.5* 8.7* 9.6*   PLATELETS 10*3/mm3 194 183 188 204     Results from last 7 days   Lab Units 07/01/20  0743 06/30/20  0514 06/29/20  0519 06/28/20  0518   SODIUM mmol/L 146* 144 136 141   POTASSIUM mmol/L 4.0 3.8 3.7 3.9   CHLORIDE mmol/L 105 104 100 102   CO2 mmol/L 32.0* 35.0* 31.6* 30.1*   BUN mg/dL 28* 29* 31* 34*   CREATININE mg/dL 1.04* 1.13* 0.97 1.07*   GLUCOSE mg/dL 195* 95 154* 221*   Estimated Creatinine Clearance: 46.4 mL/min (A) (by C-G formula based on SCr of 1.04 mg/dL (H)).  Results from last 7 days   Lab Units 06/27/20  0924 06/26/20  0533 06/25/20  0539   ALBUMIN g/dL 3.10* 3.20* 3.10*     Results from last 7 days   Lab Units 07/01/20  0743 06/30/20  0514 06/29/20  0519 06/28/20  0518 06/27/20  0924  06/26/20  0533 06/25/20  0539   CALCIUM mg/dL 9.0 9.7 9.2 9.3 8.7   < > 8.9 8.9   ALBUMIN g/dL  --   --   --   --  3.10*  --  3.20* 3.10*   MAGNESIUM mg/dL  --   --   --   --  2.2  --  2.2 2.3   PHOSPHORUS mg/dL  --   --   --   --  2.7  --  2.3* 3.2    < > = values in this interval not displayed.       Glucose   Date/Time Value Ref Range Status   07/01/2020 1311 238 (H) 70 - 130 mg/dL Final   07/01/2020 0559 185 (H) 70 - 130 mg/dL Final   07/01/2020 0112 195 (H) 70 - 130 mg/dL Final   06/30/2020 1819 129 70 - 130 mg/dL Final   06/30/2020 1250 124 70 - 130 mg/dL Final   06/30/2020 0558 97 70 - 130 mg/dL Final   06/30/2020 0018 136 (H) 70 - 130 mg/dL Final       Duplex Venous Upper Extremity - Right CAR  Addendum: · Normal right upper extremity venous duplex scan.  Narrative: · Normal right upper extremity venous duplex scan.  · Acute/chronic left upper extremity deep vein thrombosis noted in the   internal jugular and subclavian.     XR Chest 1 View  Narrative: Portable chest radiograph     HISTORY:Follow-up effusions     TECHNIQUE: Single AP portable radiograph of the chest     COMPARISON:Chest radiograph 06/13/2020     Impression: FINDINGS AND IMPRESSION:  Orogastric tube the  course in the stomach and the tip is collimated out  of the field-of-view.     The lungs are hypoinflated. There is pulmonary vascular congestion with  superimposed interstitial thickening and pulmonary opacification, most  notably within the bilateral mid and lower lung fields, likely  representing pulmonary edema, as before.. No pneumothorax is seen.  Previously seen right pleural effusion is not definitely visualized.  Please note a lateral radiograph would provide a more accurate  assessment of pleural effusions. The heart is enlarged and grossly  unchanged in size since 06/13/2020.     This report was finalized on 6/25/2020 10:24 AM by Dr. Arden Uribe M.D.           apixaban 2.5 mg Oral Q12H   aspirin 81 mg Oral Daily   atorvastatin 20 mg Oral Nightly   budesonide 0.5 mg Nebulization BID - RT   busPIRone 15 mg Oral QAM   busPIRone 30 mg Oral Q PM   diclofenac 2 g Topical TID   doxepin 10 mg Oral Nightly   insulin glargine 22 Units Subcutaneous QAM   insulin regular 0-7 Units Subcutaneous Q6H   ipratropium-albuterol 3 mL Nebulization 4x Daily - RT   lansoprazole 30 mg Nasogastric QAM   levETIRAcetam 750 mg Intravenous Q12H   metoprolol tartrate 50 mg Oral Q12H   multivitamin with minerals 1 tablet Oral Daily   sodium chloride 10 mL Intravenous Q12H       hold 1 each    hold 1 each    sodium chloride 1,000 mL Last Rate: 1,000 mL (06/30/20 1108)   NPO Diet       Assessment/Plan     Active Hospital Problems    Diagnosis  POA   • **New onset atrial fibrillation (CMS/HCC) [I48.91]  Yes   • Dysphagia [R13.10]  Yes   • Chronic deep vein thrombosis (DVT) of left upper extremity (CMS/HCC) [I82.722]  Clinically Undetermined   • Pain of right middle finger [M79.644]  Unknown   • Expressive aphasia [R47.01]  Yes   • Hypokalemia [E87.6]  Unknown   • Metabolic encephalopathy [G93.41]  No   • Acute respiratory failure with hypercapnia (CMS/HCC) [J96.02]  Yes   • Acute kidney injury (CMS/HCC) [N17.9]  Yes   • Elevated  troponin [R79.89]  Yes   • Elevated LFTs [R79.89]  Yes   • CKD (chronic kidney disease) stage 3, GFR 30-59 ml/min (CMS/[facility]) [N18.3]  Yes   • Morbid obesity with BMI of 40.0-44.9, adult (CMS/[facility]) [E66.01, Z68.41]  Not Applicable   • Type 2 diabetes mellitus with stage 3 chronic kidney disease, with long-term current use of insulin (CMS/HCC) [E11.22, N18.3, Z79.4]  Not Applicable   • Hypertension [I10]  Yes   • Hyperlipidemia [E78.5]  Yes   • COPD (chronic obstructive pulmonary disease) (CMS/[facility]) [J44.9]  Yes   • Oropharyngeal dysphagia [R13.12]  Unknown   • Malignant neoplasm of upper-inner quadrant of right female breast (CMS/[facility]) [C50.211]  Yes   • Anemia of chronic renal failure, stage 3 (moderate) (CMS/[facility]) [N18.3, D63.1]  Yes      Resolved Hospital Problems   No resolved problems to display.       84 y.o. female admitted with New onset atrial fibrillation (CMS/[facility]).    · Acute on top of chronic hypoxemic/hypercapnic respiratory failure.  Pulmonary recommend Trilogy ventilator for outpatient use -- CCP assisting.  Oxygen via NC--avoid hyperoxia in the setting of hypercarbia. Duo nebs and Pulmicort mini nebs and continue oxygen.    · Acute on top of chronic diastolic congestive heart failure/atrial fibrillation/history of hypertension.  Cardiology signed off on 6/29/20 & ok to DC from their standpoint.  Trace to +1 BLE pitting edema.  No evidence of angina.  Atrial fibrillation rate & Blood pressure controlled. Currently on Eliquis/aspirin/metoprolol--AC resumed s/p PEG.  Continue to hold diltiazem per cards.   · Metabolic encephalopathy.  s/p left-sided craniotomy for cerebral meningioma 2017.  More alert now.  Most likely d/t hypoxemia and hypercapnia vs new onset afib ? Embolic source.  Doxepin at night. Improving orientation yet limited knowledge of patient's full cognitive awareness 2/2 dysphonia.  MRI brain 6/13/20 no areas of restricted diffusion to suggest acute infarct.  Patient non-focal on exam yet  noted dysphonia / dysphagia.  Neuro rec continue Keppra 750 mg PO BID & follow-up with Dr. Gillis in office for seizure evaluation.  BP goal <130/80.  Seizure free.  EEG 6/16/20 moderate diffuse encephalopathy.  Neuro signed off & rec f/u MRI brain OP given patient's anxiety associated with MRI & avoid sedatives given recent acute on chronic resp failure.   · Acute on top of stage III chronic renal failure / Hypokalemia.  Creatinine improving. Volume status: weight decreasing yet increased since admission. Nephrology signed off. Diuresis PRN per nephrology.  · Diabetes mellitus type 2 / Dysphagia.   A1c 5.7.  Patient on sliding scale insulin reduced from high to low dosing given current glucose readings averaging around .  Lantus 22 units daily per home regimen.   S/p PEG on 6/30/20, appreciate GS.  Diabetisource AC. Nutrition following for request for recommendation of bolus feeding prior discharge--monitor for bolus feeding tolerance.       · apixaban adequate for DVT prophylaxis  · CPR  · Discussed with Dr. Lindsey.  · Anticipate discharge hopeful tomorrow / CCP following -- daughter agrees with [facility] only at discharge vs home with HH.      *Spoke with daughter--Ms. Toscano--on 6/30/20 & agrees with rehab at discharge; however, only wants [facility] yet patient seems more appropriate for SNF given her low tolerance for PT / OT.    JERROD Clay  [facility]  07/01/20  14:46

## 2020-07-01 NOTE — THERAPY TREATMENT NOTE
Acute Care - Occupational Therapy Treatment Note  [facility]     Patient Name: Erika Irvin  : 1935  MRN: [id number]  Today's Date: 2020     Date of Referral to OT: 20       Admit Date: 2020       ICD-10-CM ICD-9-CM   1. New onset atrial fibrillation (CMS/HCC) I48.91 427.31   2. Shortness of breath R06.02 786.05   3. Chronic respiratory failure (CMS/HCC) J96.10 518.83   4. CHF (congestive heart failure) (CMS/HCC) I50.9 428.0   5. Acute and chronic respiratory failure (acute-on-chronic) (CMS/HCC) J96.20 518.84   6. 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)   • Ductal carcinoma in situ (DCIS) of left breast   • Iron deficiency anemia   • At risk for bone density loss   • Cerebral meningioma (CMS/HCC)   • Aromatase inhibitor use   • New onset atrial fibrillation (CMS/HCC)   • Elevated troponin   • Elevated LFTs   • CKD (chronic kidney disease) stage 3, GFR 30-59 ml/min (CMS/HCC)   • Morbid obesity with BMI of 40.0-44.9, adult (CMS/HCC)   • Type 2 diabetes mellitus with stage 3 chronic kidney disease, with long-term current use of insulin (CMS/HCC)   • Hypertension   • Hyperlipidemia   • COPD (chronic obstructive pulmonary disease) (CMS/HCC)   • Acute kidney injury (CMS/HCC)   • Metabolic encephalopathy   • Acute respiratory failure with hypercapnia (CMS/HCC)   • Hypokalemia   • Expressive aphasia   • Pain of right middle finger   • Chronic deep vein thrombosis (DVT) of left upper extremity (CMS/HCC)   • Dysphagia   • Oropharyngeal dysphagia     Past Medical History:   Diagnosis Date   • Anemia     Iron deficiency anemia    • Anxiety    • Arthritis    • Breast cancer (CMS/HCC) 2014    right    • Breast cancer (CMS/HCC) 2006    Left   • Chronic kidney disease     Anemia of chronic renal insufficency,  stage 3   • COPD (chronic obstructive pulmonary disease) (CMS/[facility])    • Depression    • Diabetes mellitus (CMS/[facility])    • Hyperlipidemia    • Hypertension    • Irregular heartbeat     noted by RN on 6/12/2020   • YARY (obstructive sleep apnea)    • Poor circulation    • Stroke (CMS/[facility])     CVA in 1990.     Past Surgical History:   Procedure Laterality Date   • BREAST LUMPECTOMY  08/2014    Right-sided invasive ductal carcinoma,papillary type   • BREAST LUMPECTOMY Left 2006   • BREAST SURGERY Right 09/2014    enlargement of lumpectomy site   • CHOLECYSTECTOMY     • CRANIOTOMY Left 08/2013    with removal meningioma   • HYSTERECTOMY     • PEG TUBE INSERTION N/A 6/30/2020    Procedure: PERCUTANEOUS ENDOSCOPIC GASTROSTOMY TUBE INSERTION;  Surgeon: Jared Ibarar MD;  Location: [facility] ENDOSCOPY;  Service: General;  Laterality: N/A;  dysphagia   • TUBAL ABDOMINAL LIGATION         Therapy Treatment    Rehabilitation Treatment Summary     Row Name 07/01/20 1311             Treatment Time/Intention    Discipline  occupational therapist  -      Document Type  therapy note (daily note)  -      Subjective Information  no complaints  -      Mode of Treatment  individual therapy;occupational therapy  -      Patient/Family Observations  pt sitting reclined in chair  -KP2      Patient Effort  good  -KP2      Existing Precautions/Restrictions  fall  -KP2      Recorded by [KP] Lashaun Elliott, OTR 07/01/20 1312  [KP2] Lashaun Elliott OTR 07/01/20 1314      Row Name 07/01/20 1311             Cognitive Assessment/Intervention- PT/OT    Orientation Status (Cognition)  oriented to;person;place  -KP      Follows Commands (Cognition)  follows one step commands;over 90% accuracy;verbal cues/prompting required  -      Safety Deficit (Cognitive)  mild deficit  -KP      Recorded by [] Lashaun Elliott, OTR 07/01/20 1314      Row Name 07/01/20 1311             Bed Mobility Assessment/Treatment    Comment (Bed  Mobility)  UIC  -KP      Recorded by [KP] Lashaun Elliott, OTR 07/01/20 1314      Row Name 07/01/20 1311             Bathing Assessment/Intervention    Comment (Bathing)  pt just completed bathing w. nsg aide  -KP      Recorded by [KP] Lashaun Elliott OTR 07/01/20 1314      Row Name 07/01/20 1311             Motor Skills Assessment/Interventions    Additional Documentation  Therapeutic Exercise (Group);Therapeutic Exercise Interventions (Group)  -KP      Recorded by [KP] Lashaun Elliott OTR 07/01/20 1314      Row Name 07/01/20 1311             Therapeutic Exercise    Upper Extremity (Therapeutic Exercise)  bicep curl, left;bicep curl, right  -KP      Upper Extremity Range of Motion (Therapeutic Exercise)  shoulder flexion/extension, left;shoulder flexion/extension, right;wrist flexion/extension, left;wrist flexion/extension, right;forearm supination/pronation, left;forearm supination/pronation, right  -KP      Exercise Type (Therapeutic Exercise)  AROM (active range of motion)  -KP      Position (Therapeutic Exercise)  seated  -KP      Sets/Reps (Therapeutic Exercise)  10x2  -KP      Expected Outcome (Therapeutic Exercise)  improve performance, BADLs;improve performance, transfer skills;improve functional tolerance, self-care activity  -KP      Recorded by [KP] Lashaun Elliott, OTR 07/01/20 1314      Row Name 07/01/20 1311             Static Sitting Balance    Level of [place] (Unsupported Sitting, Static Balance)  supervision  -KP      Sitting Position (Unsupported Sitting, Static Balance)  sitting in chair  -KP      Time Able to Maintain Position (Unsupported Sitting, Static Balance)  more than 5 minutes  -KP      Recorded by [KP] Lashaun Elliott OTR 07/01/20 1314      Row Name 07/01/20 1311             Positioning and Restraints    Pre-Treatment Position  sitting in chair/recliner  -KP      Post Treatment Position  chair  -KP      In Chair  reclined;call light within  reach;encouraged to call for assist;exit alarm on  -KP      Recorded by [KP] Lashanu Elliott, OTR 07/01/20 1314      Row Name 07/01/20 1311             Pain Scale: Numbers Pre/Post-Treatment    Pain Scale: Numbers, Pretreatment  0/10 - no pain  -KP      Pain Scale: Numbers, Post-Treatment  0/10 - no pain  -KP      Recorded by [KP] Lashaun Elliott, OTR 07/01/20 1314      Row Name                Wound 06/28/20 2157 Right other (see notes) MASD (Moisture associated skin damage)    Wound - Properties Group Date first assessed: 06/28/20 [TF] Time first assessed: 2157 [TF] Side: Right [TF] Location: other (see notes) [TF], buttock  Primary Wound Type: MASD [TF] Recorded by:  [TF] Nehal Naranjo RN 06/28/20 2159    Row Name 07/01/20 1311             Plan of Care Review    Plan of Care Reviewed With  patient  -KP      Progress  improving  -KP      Outcome Summary  pt completed B UE AROM/AAROM to incr strength in UEs. pt had already bathed w. nsg aide A. pt states she is tired, so isn't up for  a longer session. cont OT to incr ADL, strength, balance, and tsf.  -KP      Recorded by [KP] Lashaun Elilott, OTR 07/01/20 1314      Row Name 07/01/20 1311             Outcome Summary/Treatment Plan (OT)    Daily Summary of Progress (OT)  progress toward functional goals is gradual  -KP      Recorded by [KP] Lashaun Elliott, OTR 07/01/20 1314        User Key  (r) = Recorded By, (t) = Taken By, (c) = Cosigned By    Initials Name Effective Dates Discipline    KP Lashaun Elliott, OTR 06/08/18 -  OT    TF Nehal Naranjo RN 06/16/16 -  Nurse        Wound 06/28/20 2157 Right other (see notes) MASD (Moisture associated skin damage) (Active)   Dressing Appearance other (see comments) 7/1/2020  9:57 AM   Closure CHIDI 6/30/2020  9:37 PM   Base dressing in place, unable to visualize 6/30/2020  9:37 PM   Drainage Amount none 6/30/2020  9:37 PM   Dressing Care, Wound silicone;low-adherent;border  dressing 6/30/2020  9:37 PM       Occupational Therapy Education                 Title: PT OT SLP Therapies (Done)     Topic: Occupational Therapy (Done)     Point: ADL training (Done)     Description:   Instruct learner(s) on proper safety adaptation and remediation techniques during self care or transfers.   Instruct in proper use of assistive devices.              Learning Progress Summary           Patient Acceptance, E,TB, VU,RT by MS at 6/17/2020 1723    Acceptance, E, DU by  at 6/17/2020 1517    Comment:  Pt educated in POC and explaination of purpose of therapy/ interventions.    Acceptance, E,TB, VU by MS at 6/16/2020 1834    Eager, E, NR by  at 6/15/2020 1626    Comment:  OT discussed POC and goals   Family Acceptance, E,TB, VU,RT by MS at 6/17/2020 1723                   Point: Precautions (Done)     Description:   Instruct learner(s) on prescribed precautions during self-care and functional transfers.              Learning Progress Summary           Patient Acceptance, E,TB, VU,RT by MS at 6/17/2020 1723    Acceptance, E, DU by  at 6/17/2020 1517    Comment:  Pt educated in POC and explaination of purpose of therapy/ interventions.    Acceptance, E,TB, VU by MS at 6/16/2020 1834    Eager, E, NR by  at 6/15/2020 1626    Comment:  OT discussed POC and goals   Family Acceptance, E,TB, VU,RT by MS at 6/17/2020 1723                               User Key     Initials Effective Dates Name Provider Type Discipline     03/02/20 -  Anne Marie Conteh OTR Occupational Therapist OT    MS 11/15/19 -  Broderick Dominguez, RN Registered Nurse Nurse     04/02/20 -  Janis Walters OT Occupational Therapist OT                OT Recommendation and Plan  Outcome Summary/Treatment Plan (OT)  Daily Summary of Progress (OT): progress toward functional goals is gradual  Daily Summary of Progress (OT): progress toward functional goals is gradual  Plan of Care Review  Plan of Care Reviewed With: patient  Plan of Care  Reviewed With: patient  Outcome Summary: pt completed B UE AROM/AAROM to incr strength in UEs. pt had already bathed w. nsg ever A. pt states she is tired, so isn't up for  a longer session. cont OT to incr ADL, strength, balance, and tsf.  Outcome Measures     Row Name 07/01/20 1315             How much help from another is currently needed...    Putting on and taking off regular lower body clothing?  1  -KP      Bathing (including washing, rinsing, and drying)  2  -KP      Toileting (which includes using toilet bed pan or urinal)  1  -KP      Putting on and taking off regular upper body clothing  2  -KP      Taking care of personal grooming (such as brushing teeth)  3  -KP      Eating meals  2  -KP      AM-PAC 6 Clicks Score (OT)  11  -         Functional Assessment    Outcome Measure Options  AM-PAC 6 Clicks Daily Activity (OT)  -        User Key  (r) = Recorded By, (t) = Taken By, (c) = Cosigned By    Initials Name Provider Type    Lashaun Franklin OTR Occupational Therapist           Time Calculation:   Time Calculation- OT     Row Name 07/01/20 1315             Time Calculation- OT    OT Start Time  1037  -      OT Stop Time  1048  -      OT Time Calculation (min)  11 min  -      OT Received On  07/01/20  -      OT - Next Appointment  07/02/20  -        User Key  (r) = Recorded By, (t) = Taken By, (c) = Cosigned By    Initials Name Provider Type    Lashaun Franklin OTR Occupational Therapist        Therapy Charges for Today     Code Description Service Date Service Provider Modifiers Qty    [id number]  OT THER PROC EA 15 MIN 7/1/2020 Lashaun Elliott OTR GO 1               MALINDA Mart  7/1/2020

## 2020-07-02 LAB
ANION GAP SERPL CALCULATED.3IONS-SCNC: 6.4 MMOL/L (ref 5–15)
BASOPHILS # BLD AUTO: 0.01 10*3/MM3 (ref 0–0.2)
BASOPHILS NFR BLD AUTO: 0.1 % (ref 0–1.5)
BUN SERPL-MCNC: 24 MG/DL (ref 8–23)
BUN/CREAT SERPL: 27 (ref 7–25)
CALCIUM SPEC-SCNC: 9.4 MG/DL (ref 8.6–10.5)
CHLORIDE SERPL-SCNC: 105 MMOL/L (ref 98–107)
CO2 SERPL-SCNC: 31.6 MMOL/L (ref 22–29)
CREAT SERPL-MCNC: 0.89 MG/DL (ref 0.57–1)
DEPRECATED RDW RBC AUTO: 45.7 FL (ref 37–54)
EOSINOPHIL # BLD AUTO: 0 10*3/MM3 (ref 0–0.4)
EOSINOPHIL NFR BLD AUTO: 0 % (ref 0.3–6.2)
ERYTHROCYTE [DISTWIDTH] IN BLOOD BY AUTOMATED COUNT: 15.5 % (ref 12.3–15.4)
GFR SERPL CREATININE-BSD FRML MDRD: 73 ML/MIN/1.73
GLUCOSE BLDC GLUCOMTR-MCNC: 115 MG/DL (ref 70–130)
GLUCOSE BLDC GLUCOMTR-MCNC: 186 MG/DL (ref 70–130)
GLUCOSE BLDC GLUCOMTR-MCNC: 245 MG/DL (ref 70–130)
GLUCOSE BLDC GLUCOMTR-MCNC: 249 MG/DL (ref 70–130)
GLUCOSE BLDC GLUCOMTR-MCNC: 296 MG/DL (ref 70–130)
GLUCOSE BLDC GLUCOMTR-MCNC: 301 MG/DL (ref 70–130)
GLUCOSE SERPL-MCNC: 108 MG/DL (ref 65–99)
HCT VFR BLD AUTO: 27.5 % (ref 34–46.6)
HGB BLD-MCNC: 8.9 G/DL (ref 12–15.9)
IMM GRANULOCYTES # BLD AUTO: 0.06 10*3/MM3 (ref 0–0.05)
IMM GRANULOCYTES NFR BLD AUTO: 0.6 % (ref 0–0.5)
LYMPHOCYTES # BLD AUTO: 0.92 10*3/MM3 (ref 0.7–3.1)
LYMPHOCYTES NFR BLD AUTO: 9.9 % (ref 19.6–45.3)
MCH RBC QN AUTO: 26.6 PG (ref 26.6–33)
MCHC RBC AUTO-ENTMCNC: 32.4 G/DL (ref 31.5–35.7)
MCV RBC AUTO: 82.3 FL (ref 79–97)
MONOCYTES # BLD AUTO: 0.87 10*3/MM3 (ref 0.1–0.9)
MONOCYTES NFR BLD AUTO: 9.4 % (ref 5–12)
NEUTROPHILS NFR BLD AUTO: 7.4 10*3/MM3 (ref 1.7–7)
NEUTROPHILS NFR BLD AUTO: 80 % (ref 42.7–76)
NRBC BLD AUTO-RTO: 0 /100 WBC (ref 0–0.2)
PLATELET # BLD AUTO: 197 10*3/MM3 (ref 140–450)
PMV BLD AUTO: 10.9 FL (ref 6–12)
POTASSIUM SERPL-SCNC: 3.7 MMOL/L (ref 3.5–5.2)
RBC # BLD AUTO: 3.34 10*6/MM3 (ref 3.77–5.28)
SODIUM SERPL-SCNC: 143 MMOL/L (ref 136–145)
WBC # BLD AUTO: 9.26 10*3/MM3 (ref 3.4–10.8)

## 2020-07-02 PROCEDURE — 25010000002 LEVETRIRACETAM PER 10 MG: Performed by: SURGERY

## 2020-07-02 PROCEDURE — 82962 GLUCOSE BLOOD TEST: CPT

## 2020-07-02 PROCEDURE — 94799 UNLISTED PULMONARY SVC/PX: CPT

## 2020-07-02 PROCEDURE — 63710000001 INSULIN REGULAR HUMAN PER 5 UNITS: Performed by: NURSE PRACTITIONER

## 2020-07-02 PROCEDURE — 97110 THERAPEUTIC EXERCISES: CPT

## 2020-07-02 PROCEDURE — 97110 THERAPEUTIC EXERCISES: CPT | Performed by: OCCUPATIONAL THERAPIST

## 2020-07-02 PROCEDURE — 63710000001 INSULIN GLARGINE PER 5 UNITS: Performed by: SURGERY

## 2020-07-02 PROCEDURE — 80048 BASIC METABOLIC PNL TOTAL CA: CPT | Performed by: INTERNAL MEDICINE

## 2020-07-02 PROCEDURE — 97535 SELF CARE MNGMENT TRAINING: CPT | Performed by: OCCUPATIONAL THERAPIST

## 2020-07-02 PROCEDURE — 85025 COMPLETE CBC W/AUTO DIFF WBC: CPT | Performed by: SURGERY

## 2020-07-02 RX ORDER — FUROSEMIDE 40 MG/1
40 TABLET ORAL DAILY
Status: DISCONTINUED | OUTPATIENT
Start: 2020-07-02 | End: 2020-07-03 | Stop reason: HOSPADM

## 2020-07-02 RX ORDER — LEVETIRACETAM 100 MG/ML
750 SOLUTION ORAL EVERY 12 HOURS SCHEDULED
Status: DISCONTINUED | OUTPATIENT
Start: 2020-07-02 | End: 2020-07-03 | Stop reason: HOSPADM

## 2020-07-02 RX ADMIN — APIXABAN 2.5 MG: 2.5 TABLET, FILM COATED ORAL at 08:31

## 2020-07-02 RX ADMIN — INSULIN HUMAN 4 UNITS: 100 INJECTION, SOLUTION PARENTERAL at 18:24

## 2020-07-02 RX ADMIN — FUROSEMIDE 40 MG: 40 TABLET ORAL at 15:18

## 2020-07-02 RX ADMIN — SODIUM CHLORIDE, PRESERVATIVE FREE 10 ML: 5 INJECTION INTRAVENOUS at 21:15

## 2020-07-02 RX ADMIN — ASPIRIN 81 MG: 81 TABLET, COATED ORAL at 08:31

## 2020-07-02 RX ADMIN — BUDESONIDE 0.5 MG: 0.5 INHALANT RESPIRATORY (INHALATION) at 07:16

## 2020-07-02 RX ADMIN — IPRATROPIUM BROMIDE AND ALBUTEROL SULFATE 3 ML: 2.5; .5 SOLUTION RESPIRATORY (INHALATION) at 12:16

## 2020-07-02 RX ADMIN — BUSPIRONE HYDROCHLORIDE 30 MG: 15 TABLET ORAL at 18:24

## 2020-07-02 RX ADMIN — INSULIN HUMAN 3 UNITS: 100 INJECTION, SOLUTION PARENTERAL at 00:29

## 2020-07-02 RX ADMIN — IPRATROPIUM BROMIDE AND ALBUTEROL SULFATE 3 ML: 2.5; .5 SOLUTION RESPIRATORY (INHALATION) at 07:16

## 2020-07-02 RX ADMIN — HYDROCODONE BITARTRATE AND ACETAMINOPHEN 1 TABLET: 5; 325 TABLET ORAL at 10:34

## 2020-07-02 RX ADMIN — DICLOFENAC SODIUM 2 G: 10 GEL TOPICAL at 21:16

## 2020-07-02 RX ADMIN — ATORVASTATIN CALCIUM 20 MG: 20 TABLET, FILM COATED ORAL at 21:14

## 2020-07-02 RX ADMIN — DICLOFENAC SODIUM 2 G: 10 GEL TOPICAL at 08:32

## 2020-07-02 RX ADMIN — IPRATROPIUM BROMIDE AND ALBUTEROL SULFATE 3 ML: 2.5; .5 SOLUTION RESPIRATORY (INHALATION) at 19:37

## 2020-07-02 RX ADMIN — MULTIPLE VITAMINS W/ MINERALS TAB 1 TABLET: TAB at 08:31

## 2020-07-02 RX ADMIN — LEVETIRACETAM 750 MG: 100 SOLUTION ORAL at 21:15

## 2020-07-02 RX ADMIN — DICLOFENAC SODIUM 2 G: 10 GEL TOPICAL at 15:16

## 2020-07-02 RX ADMIN — LANSOPRAZOLE 30 MG: KIT at 06:03

## 2020-07-02 RX ADMIN — SODIUM CHLORIDE, PRESERVATIVE FREE 10 ML: 5 INJECTION INTRAVENOUS at 08:32

## 2020-07-02 RX ADMIN — BUSPIRONE HYDROCHLORIDE 15 MG: 15 TABLET ORAL at 06:02

## 2020-07-02 RX ADMIN — DOXEPIN HYDROCHLORIDE 10 MG: 10 CAPSULE ORAL at 21:13

## 2020-07-02 RX ADMIN — METOPROLOL TARTRATE 50 MG: 50 TABLET, FILM COATED ORAL at 21:14

## 2020-07-02 RX ADMIN — IPRATROPIUM BROMIDE AND ALBUTEROL SULFATE 3 ML: 2.5; .5 SOLUTION RESPIRATORY (INHALATION) at 16:08

## 2020-07-02 RX ADMIN — APIXABAN 2.5 MG: 2.5 TABLET, FILM COATED ORAL at 21:15

## 2020-07-02 RX ADMIN — METOPROLOL TARTRATE 50 MG: 50 TABLET, FILM COATED ORAL at 08:31

## 2020-07-02 RX ADMIN — BUDESONIDE 0.5 MG: 0.5 INHALANT RESPIRATORY (INHALATION) at 19:37

## 2020-07-02 RX ADMIN — LEVETIRACETAM 750 MG: 500 INJECTION, SOLUTION INTRAVENOUS at 08:32

## 2020-07-02 RX ADMIN — INSULIN GLARGINE 22 UNITS: 100 INJECTION, SOLUTION SUBCUTANEOUS at 06:02

## 2020-07-02 RX ADMIN — INSULIN HUMAN 5 UNITS: 100 INJECTION, SOLUTION PARENTERAL at 15:18

## 2020-07-02 NOTE — PROGRESS NOTES
Continued Stay Note  [facility]     Patient Name: Erika Irvin  MRN: [id number]  Today's Date: 7/2/2020    Admit Date: 6/12/2020    Discharge Plan     Row Name 07/02/20 0936       Plan    Plan  Home with daughter, requesting [facility], Trilogy from [place]    Plan Comments  Numerous conversation with Dorcas Mcknight daughter about care needs of her mother.  Dorcas states she and several family members are going to be assisting with patient care needs.  Dorcas adamantly refuses any skilled nursing facilities.  Dorcas requested another referral to [facility], patient declined for rehab in the past, [facility] is following and will discuss with Dr. Maria.  Ayla with [facility] here today with RT to set up trilogy for home use, RT will be here between 9:30 and 10AM to set it up and to provide some instruction to Dorcas.  Patient nurse Nehal to contact Dorcas so she can be here today for instruction on patient's care needs. Patient may be ready for discharge in 1-2 days. Initially patient set up with [facility], but now family-requesting [facility], left message for Brenna.          Discharge Codes    No documentation.             Marina Chan RN

## 2020-07-02 NOTE — PLAN OF CARE
Problem: Fall Risk (Adult)  Goal: Absence of Fall  Outcome: Ongoing (interventions implemented as appropriate)  Flowsheets (Taken 7/2/2020 0618)  Absence of Fall: making progress toward outcome     Problem: Patient Care Overview  Goal: Plan of Care Review  Outcome: Ongoing (interventions implemented as appropriate)  Flowsheets (Taken 7/2/2020 0618)  Progress: improving  Plan of Care Reviewed With: patient  Outcome Summary: Continues to improve, tolerating bolus feeds via PEG tube, vss.

## 2020-07-02 NOTE — THERAPY RE-EVALUATION
Patient Name: Erika Irvin  : 1935    MRN: [id number]                              Today's Date: 2020       Admit Date: 2020    Visit Dx:     ICD-10-CM ICD-9-CM   1. New onset atrial fibrillation (CMS/HCC) I48.91 427.31   2. Shortness of breath R06.02 786.05   3. Chronic respiratory failure (CMS/HCC) J96.10 518.83   4. CHF (congestive heart failure) (CMS/HCC) I50.9 428.0   5. Acute and chronic respiratory failure (acute-on-chronic) (CMS/HCC) J96.20 518.84   6. 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)   • Ductal carcinoma in situ (DCIS) of left breast   • Iron deficiency anemia   • At risk for bone density loss   • Cerebral meningioma (CMS/[facility])   • Aromatase inhibitor use   • New onset atrial fibrillation (CMS/HCC)   • Elevated troponin   • Elevated LFTs   • CKD (chronic kidney disease) stage 3, GFR 30-59 ml/min (CMS/HCC)   • Morbid obesity with BMI of 40.0-44.9, adult (CMS/HCC)   • Type 2 diabetes mellitus with stage 3 chronic kidney disease, with long-term current use of insulin (CMS/HCC)   • Hypertension   • Hyperlipidemia   • COPD (chronic obstructive pulmonary disease) (CMS/[facility])   • Acute kidney injury (CMS/HCC)   • Metabolic encephalopathy   • Acute respiratory failure with hypercapnia (CMS/HCC)   • Hypokalemia   • Expressive aphasia   • Pain of right middle finger   • Chronic deep vein thrombosis (DVT) of left upper extremity (CMS/HCC)   • Dysphagia   • Oropharyngeal dysphagia     Past Medical History:   Diagnosis Date   • Anemia     Iron deficiency anemia    • Anxiety    • Arthritis    • Breast cancer (CMS/HCC) 2014    right    • Breast cancer (CMS/HCC) 2006    Left   • Chronic kidney disease     Anemia of chronic renal insufficency, stage 3   • COPD (chronic obstructive pulmonary disease)  (CMS/HCC)    • Depression    • Diabetes mellitus (CMS/HCC)    • Hyperlipidemia    • Hypertension    • Irregular heartbeat     noted by RN on 6/12/2020   • YARY (obstructive sleep apnea)    • Poor circulation    • Stroke (CMS/[facility])     CVA in 1990.     Past Surgical History:   Procedure Laterality Date   • BREAST LUMPECTOMY  08/2014    Right-sided invasive ductal carcinoma,papillary type   • BREAST LUMPECTOMY Left 2006   • BREAST SURGERY Right 09/2014    enlargement of lumpectomy site   • CHOLECYSTECTOMY     • CRANIOTOMY Left 08/2013    with removal meningioma   • HYSTERECTOMY     • PEG TUBE INSERTION N/A 6/30/2020    Procedure: PERCUTANEOUS ENDOSCOPIC GASTROSTOMY TUBE INSERTION;  Surgeon: Jared Ibarra MD;  Location: [facility] ENDOSCOPY;  Service: General;  Laterality: N/A;  dysphagia   • TUBAL ABDOMINAL LIGATION       General Information     Row Name 07/02/20 1229          PT Evaluation Time/Intention    Document Type  re-evaluation Pt. reports fatigue this AM.  Per family, pt. just recently had a pain pill and is feeling a bit groggy.   -MS     Mode of Treatment  physical therapy;individual therapy  -MS     Row Name 07/02/20 1229          General Information    Patient Profile Reviewed?  yes  -MS     Existing Precautions/Restrictions  (S) fall;oxygen therapy device and L/min Exit alarm   -MS     Row Name 07/02/20 1229          Safety Issues, Functional Mobility    Comment, Safety Issues/Impairments (Mobility)  Gait belt used for safety.   -MS       User Key  (r) = Recorded By, (t) = Taken By, (c) = Cosigned By    Initials Name Provider Type    Jamaal Sapp, PT Physical Therapist        Mobility     Row Name 07/02/20 1230          Bed Mobility Assessment/Treatment    Comment (Bed Mobility)  Pt. up in chair this AM.   -MS     Row Name 07/02/20 1230          Sit-Stand Transfer    Sit-Stand [place] (Transfers)  minimum assist (75% patient effort);2 person assist  -MS     Assistive Device (Sit-Stand  Transfers)  walker, front-wheeled  -MS     Row Name 07/02/20 1230          Gait/Stairs Assessment/Training    [place] Level (Gait)  minimum assist (75% patient effort);2 person assist;1 person to manage equipment  -MS     Assistive Device (Gait)  walker, front-wheeled  -MS     Distance in Feet (Gait)  12 feet  -MS     Pattern (Gait)  step-through  -MS     Deviations/Abnormal Patterns (Gait)  helga decreased;stride length decreased  -MS     Bilateral Gait Deviations  forward flexed posture  -MS     Comment (Gait/Stairs)  Verbal/tactile cues for posture correction and Rwx guidance. Followed pt. with chair for safety as she fatigues quickly.   -MS       User Key  (r) = Recorded By, (t) = Taken By, (c) = Cosigned By    Initials Name Provider Type    MS CuellarJamaal, PT Physical Therapist        Obj/Interventions     Row Name 07/02/20 1301          General ROM    GENERAL ROM COMMENTS  B Shld (Imp. 25%); BLE (WFL's)  -MS     Row Name 07/02/20 1301          MMT (Manual Muscle Testing)    General MMT Comments  B Shld (3-/5); BLE (3/5)   -MS     Row Name 07/02/20 1232          Therapeutic Exercise    Comment (Therapeutic Exercise)  BLE ther. ex. program x 10 reps completed (Ankle Pumps, Hip Flexion, LAQ's)  -MS       User Key  (r) = Recorded By, (t) = Taken By, (c) = Cosigned By    Initials Name Provider Type    MS Cuellar Jamaal WELSH, PT Physical Therapist        Goals/Plan     Row Name 07/02/20 1301          Bed Mobility Goal 1 (PT)    Activity/Assistive Device (Bed Mobility Goal 1, PT)  bed mobility activities, all  -MS     [place] Level/Cues Needed (Bed Mobility Goal 1, PT)  contact guard assist  -MS     Time Frame (Bed Mobility Goal 1, PT)  long term goal (LTG);1 week  -MS     Row Name 07/02/20 1301          Transfer Goal 1 (PT)    Activity/Assistive Device (Transfer Goal 1, PT)  transfers, all;walker, rolling  -MS     [place] Level/Cues Needed (Transfer Goal 1, PT)  contact guard assist  -MS      "Time Frame (Transfer Goal 1, PT)  long term goal (LTG);1 week  -MS     Row Name 07/02/20 1301          Gait Training Goal 1 (PT)    Activity/Assistive Device (Gait Training Goal 1, PT)  gait (walking locomotion);walker, rolling  -MS     [place] Level (Gait Training Goal 1, PT)  contact guard assist  -MS     Distance (Gait Goal 1, PT)  40 feet  -MS     Time Frame (Gait Training Goal 1, PT)  long term goal (LTG);1 week  -MS       User Key  (r) = Recorded By, (t) = Taken By, (c) = Cosigned By    Initials Name Provider Type    Jamaal Sapp, PT Physical Therapist        Clinical Impression     Row Name 07/02/20 1232          Pain Assessment    Additional Documentation  Pain Scale: Numbers Pre/Post-Treatment (Group)  -MS     Row Name 07/02/20 1232          Pain Scale: Numbers Pre/Post-Treatment    Pain Scale: Numbers, Pretreatment  0/10 - no pain  -MS     Pain Scale: Numbers, Post-Treatment  0/10 - no pain  -MS     Pre/Post Treatment Pain Comment  Pt. reports \"no\" to pain on two separate occassions  -MS     Row Name 07/02/20 1232          Positioning and Restraints    Pre-Treatment Position  sitting in chair/recliner  -MS     Post Treatment Position  chair  -MS     In Chair  notified nsg;reclined;sitting;call light within reach;encouraged to call for assist;exit alarm on;with family/caregiver All lines intact.   -MS       User Key  (r) = Recorded By, (t) = Taken By, (c) = Cosigned By    Initials Name Provider Type    Jamaal Sapp, PT Physical Therapist        Outcome Measures     Row Name 07/02/20 1233          How much help from another person do you currently need...    Turning from your back to your side while in flat bed without using bedrails?  2  -MS     Moving from lying on back to sitting on the side of a flat bed without bedrails?  2  -MS     Moving to and from a bed to a chair (including a wheelchair)?  2  -MS     Standing up from a chair using your arms (e.g., wheelchair, bedside chair)?  2 "  -MS     Climbing 3-5 steps with a railing?  2  -MS     To walk in hospital room?  2  -MS     AM-PAC 6 Clicks Score (PT)  12  -MS     Row Name 07/02/20 1233          Functional Assessment    Outcome Measure Options  AM-PAC 6 Clicks Basic Mobility (PT)  -MS       User Key  (r) = Recorded By, (t) = Taken By, (c) = Cosigned By    Initials Name Provider Type    MS Cuellar Jamaal ANITHA, PT Physical Therapist        Physical Therapy Education                 Title: PT OT SLP Therapies (In Progress)     Topic: Physical Therapy (In Progress)     Point: Mobility training (In Progress)     Description:   Instruct learner(s) on safety and technique for assisting patient out of bed, chair or wheelchair.  Instruct in the proper use of assistive devices, such as walker, crutches, cane or brace.              Patient Friendly Description:   It's important to get you on your feet again, but we need to do so in a way that is safe for you. Falling has serious consequences, and your personal safety is the most important thing of all.        When it's time to get out of bed, one of us or a family member will sit next to you on the bed to give you support.     If your doctor or nurse tells you to use a walker, crutches, a cane, or a brace, be sure you use it every time you get out of bed, even if you think you don't need it.    Learning Progress Summary           Patient Acceptance, E,D, NR by MS at 7/2/2020 1233    Acceptance, E,D, VU,NR by  at 7/1/2020 1142    Acceptance, E,D, VU,NR by  at 6/30/2020 1701    Acceptance, E,TB, VU,NR by  at 6/28/2020 1626    Acceptance, E,D, VU,NR by  at 6/26/2020 1217    Acceptance, E,D, NR by  at 6/24/2020 1230    Acceptance, E,D, NR by  at 6/22/2020 1528    Acceptance, E,D, NR by MS at 6/20/2020 1521    Acceptance, E,D, NR by  at 6/18/2020 1226    Acceptance, E,TB, VU,RT by MS at 6/17/2020 1723    Acceptance, E,D, VU,NR by  at 6/17/2020 1114    Acceptance, E,GUERO ALLAN by MS1 at 6/16/2020  1834    Acceptance, E,D, NR by MS at 6/16/2020 1735    Acceptance, E,D, NR by  at 6/15/2020 1100   Family Acceptance, E,TB, VU,RT by MS1 at 6/17/2020 1723                   Point: Home exercise program (In Progress)     Description:   Instruct learner(s) on appropriate technique for monitoring, assisting and/or progressing patient with therapeutic exercises and activities.              Learning Progress Summary           Patient Acceptance, E,D, NR by MS at 7/2/2020 1233    Acceptance, E,D, VU,NR by  at 7/1/2020 1142    Acceptance, E,D, VU,NR by  at 6/30/2020 1701    Acceptance, E,TB, VU,NR by  at 6/28/2020 1626    Acceptance, E,D, VU,NR by  at 6/26/2020 1217    Acceptance, E,D, NR by  at 6/24/2020 1230    Acceptance, E,D, NR by  at 6/22/2020 1528    Acceptance, E,D, NR by MS at 6/20/2020 1521    Acceptance, E,D, NR by  at 6/18/2020 1226    Acceptance, E,TB, VU,RT by [facility] at 6/17/2020 1723    Acceptance, E,D, VU,NR by  at 6/17/2020 1114    Acceptance, E,TB, VU by [facility] at 6/16/2020 1834    Acceptance, E,D, NR by MS at 6/16/2020 1735    Acceptance, E,D, NR by  at 6/15/2020 1100   Family Acceptance, E,TB, VU,RT by [facility] at 6/17/2020 1723                   Point: Body mechanics (In Progress)     Description:   Instruct learner(s) on proper positioning and spine alignment for patient and/or caregiver during mobility tasks and/or exercises.              Learning Progress Summary           Patient Acceptance, E,D, NR by MS at 7/2/2020 1233    Acceptance, E,D, VU,NR by  at 7/1/2020 1142    Acceptance, E,D, VU,NR by  at 6/30/2020 1701    Acceptance, E,TB, VU,NR by  at 6/28/2020 1626    Acceptance, E,D, VU,NR by  at 6/26/2020 1217    Acceptance, E,D, NR by  at 6/24/2020 1230    Acceptance, E,D, NR by  at 6/22/2020 1528    Acceptance, E,D, NR by MS at 6/20/2020 1521    Acceptance, E,D, NR by  at 6/18/2020 1226    Acceptance, E,TB, VU,RT by MS1 at 6/17/2020 1723    Acceptance, E,D, VU,NR by  at  6/17/2020 1114    Acceptance, E,TB, VU by MS1 at 6/16/2020 1834    Acceptance, E,D, NR by MS at 6/16/2020 1735    Acceptance, E,D, NR by  at 6/15/2020 1100   Family Acceptance, E,TB, VU,RT by [facility] [place] [facility] at 6/17/2020 1723                   Point: Precautions (In Progress)     Description:   Instruct learner(s) on prescribed precautions during mobility and gait tasks              Learning Progress Summary           Patient Acceptance, E,D, NR by MS at 7/2/2020 1233    Acceptance, E,D, VU,NR by  at 7/1/2020 1142    Acceptance, E,D, VU,NR by  at 6/30/2020 1701    Acceptance, E,D, VU,NR by  at 6/26/2020 1217    Acceptance, E,D, NR by  at 6/24/2020 1230    Acceptance, E,D, NR by  at 6/22/2020 1528    Acceptance, E,D, NR by MS at 6/20/2020 1521    Acceptance, E,D, NR by  at 6/18/2020 1226    Acceptance, E,TB, VU,RT by [facility] [place] [facility] at 6/17/2020 1723    Acceptance, E,D, VU,NR by  at 6/17/2020 1114    Acceptance, E,TB, VU by [facility] [place] [facility] at 6/16/2020 1834    Acceptance, E,D, NR by MS at 6/16/2020 1735    Acceptance, E,D, NR by  at 6/15/2020 1100   Family Acceptance, E,TB, VU,RT by [facility] [place] [facility] at 6/17/2020 1723                               User Key     Initials Effective Dates Name Provider Type Discipline     04/03/18 -  Shauna Hancock, PT Physical Therapist PT     04/03/18 -  Teresa Lazaro, PT Physical Therapist PT    MS 04/03/18 -  Jamaal Cuellar, PT Physical Therapist PT     03/07/18 -  Janis Velazquez, PTA Physical Therapy Assistant PT     08/19/18 -  Janet Vaughn PTA Physical Therapy Assistant PT    [facility] [place] [facility] 11/15/19 -  Broderick Dominguez, RN Registered Nurse Nurse              PT Recommendation and Plan  Planned Therapy Interventions (PT Eval): balance training, bed mobility training, gait training, patient/family education, postural re-education, transfer training, strengthening  Plan of Care Reviewed With: patient  Outcome Summary: Pt. able to ambulate 12 feet, Min. assist x 2, with use of Rwx this date. Pt. requires Min.  assist x 2 for sit <-> stand transfers.  BLE ther. ex. program x 10 reps completed for general strengthening.  Verbal/tactile cues for posture correction and Rwx guidance. Followed pt. with a chair for safety as she fatigues quickly with upright mobility.     Time Calculation:   PT Charges     Row Name 07/02/20 1300             Time Calculation    Start Time  1145  -MS      Stop Time  1200  -MS      Time Calculation (min)  15 min  -MS      PT Received On  07/02/20  -MS      PT - Next Appointment  07/03/20  -MS      PT Goal Re-Cert Due Date  07/09/20  -MS         Time Calculation- PT    Total Timed Code Minutes- PT  13 minute(s)  -MS        User Key  (r) = Recorded By, (t) = Taken By, (c) = Cosigned By    Initials Name Provider Type    Jamaal Sapp, PT Physical Therapist        Therapy Charges for Today     Code Description Service Date Service Provider Modifiers Qty    96630312321 HC PT THER PROC EA 15 MIN 7/2/2020 Jamaal Cuellar, PT GP 1    69812175410 HC PT THER SUPP EA 15 MIN 7/2/2020 Jamaal Cuellar, PT GP 1          PT G-Codes  Outcome Measure Options: AM-PAC 6 Clicks Basic Mobility (PT)  AM-PAC 6 Clicks Score (PT): 12  AM-PAC 6 Clicks Score (OT): 11  Modified [place] Scale: 4 - Moderately severe disability.  Unable to walk without assistance, and unable to attend to own bodily needs without assistance.    Jamaal Cuellar, PT  7/2/2020

## 2020-07-02 NOTE — DISCHARGE PLACEMENT REQUEST
"Erika Irvin (84 y.o. Female)     Date of Birth Social Security Number Address Home Phone MRN    1935  [address] [id number] [phone] [id number]    Jew Marital Status          Non-Restoration        Admission Date Admission Type Admitting Provider Attending Provider Department, Room/Bed    6/12/20 Emergency Theron Walls MD Baumann, Patrick D, MD [address], P599/1    Discharge Date Discharge Disposition Discharge Destination                       Attending Provider:  Gilles Lindsey MD    Allergies:  Sulfa Antibiotics, Penicillins    Isolation:  None   Infection:  None   Code Status:  CPR    Ht:  152.4 cm (60\")   Wt:  114 kg (250 lb 8 oz)    Admission Cmt:  None   Principal Problem:  New onset atrial fibrillation (CMS/HCC) [I48.91]                 Active Insurance as of 6/12/2020     Primary Coverage     Payor Plan Insurance Group Employer/Plan Group    HUMANA MEDICARE REPLACEMENT HU[employer]NA MEDICARE REPLACEMENT [id number]     Payor Plan Address Payor Plan Phone Number Payor Plan Fax Number Effective Dates    [address] [address] [phone]  1/1/2018 - None Entered    [facility] [id number]       Subscriber Name Subscriber Birth Date Member ID       ERIKA IRVIN 1935 [id number]                 Emergency Contacts      (Rel.) Home Phone Work Phone Mobile Phone    Dorcas Mcknight (Daughter) [phone] -- [phone]    Carlie Bailey (Daughter) [phone] -- --    Luke Garcia (Son) [phone] -- [phone]            {Outbreak/Travel/Exposure Documentation......;  Question Available Choices Patient Response   Outbreak Screen: Do you currently have the following symptoms?        Fever, Cough, Runny nose, Congestion, Diarrhea, Nausea/Vomiting,Shortness of breath, Recent sudden loss of taste or smell, chills, sore throat, Muscle aches, New onset headache, No, Unknown  (!) Shortness of breath (06/12/20 1554) "   Outbreak Screen: In the last 14 days, have you had contact with anyone who is ill, has show any of the symptoms listed above and/or has been diagnosis with the 2019 Novel Coronavirus? This includes any immediate household members but excludes any patients with whom you have been in contact within your normal work duties wearing proper PPE, if you are a healthcare worker.  Yes, No, Unknown              No (06/12/20 1554)   Outbreak Screen: Who was notified?    Free text  (not recorded)   Travel Screen: Have you traveled in the last month? If so, to what country have you traveled? If [place] what state? Yes, No, Unknown  List of all countries  List of all States No (06/12/20 1554)  (not recorded)  (not recorded)   Infection Risk: Do you currently have the following symptoms?  (If cough is selected, the Tuberculosis Screen is performed.) Cough, Fever, Rash, No No (06/12/20 1554)   Tuberculosis Screen: Do you have any of the following Tuberculosis Risks?  · Have you lived or spent time with anyone who had or may have TB?  · Have you lived in or visited any of the following areas for more than one month: [place], [place], [place], [place] or [place], the [place] or [place]?  · Do you have HIV/AIDS?  · Have you lived in or worked in a nursing home, homeless shelter, correctional facility, or substance abuse treatment facility?   · No    If Yes do you have any of the following symptoms? Yes responses display to the right    If Yes, symptoms listed are:  Cough greater than or equal to 3 weeks, Loss of appetite, Unexplained weight loss, Night sweats, Bloody sputum or hemoptysis, Hoarseness, Fever, Fatigue, Chest pain, No (not recorded)  (not recorded)   Exposure Screen: Have you been exposed to any of these contagious diseases in the last month? Measles, Chickenpox, Meningitis, Pertussis, Whooping Cough, No No (06/12/20 1554)

## 2020-07-02 NOTE — PLAN OF CARE
Up to chair all shift. Continent of bowel and bladder this shift. Dressing clean, dry and intact to right buttocks. Tolerating bolus tube feeding. Plan for trilogy to be delivered tomorrow and home or rehab soon.

## 2020-07-02 NOTE — PROGRESS NOTES
Continued Stay Note  [facility]     Patient Name: Erika Irvin  MRN: [id number]  Today's Date: 7/2/2020    Admit Date: 6/12/2020    Discharge Plan     Row Name 07/02/20 1424       Plan    Plan Comments  Inbound call form Brenna. [facility] at [facility] edoes not provide tube feeds. Referral sent to [facility] to provide tube feeds. Notified pharmascist at [facility]/Elinor and she will review referral. Carlie [place] RN    Row Name 07/02/20 1406       Plan    Plan  Home with daughter, [facility] at home, with DME from [facility]    Patient/Family in Agreement with Plan  yes    Plan Comments  Hosptial bed being arranged by [facility]. Order and note obtained. Ayla/[facility] aware. Left VM with Brenna to determine of [facility] will be providing tube feeds at [place]. Attempted to meet wiht daughter at the bedside to update her on the hospital bed but she was not at the bedside. CCP to follow. Carlie [place] RN        Discharge Codes    No documentation.             Carlie [place] RN

## 2020-07-02 NOTE — PROGRESS NOTES
[facility]        LOS:  LOS: 20 days   Patient Name: Erika Irvin  Age/Sex: 84 y.o. female  : 1935  MRN: [id number]    Day of Service: 20   Length of Stay: 20  Encounter Provider: JERROD Wesley  Place of Service: [facility] CARDIOLOGY  Patient Care Team:  Bruce Maldonado MD as PCP - General (Internal Medicine)  Code, Hosea GILMAN II, MD as Consulting Physician (Hematology and Oncology)  Kate Lopez MD as Referring Physician (Internal Medicine)    Subjective:     Chief Complaint: f/u afib, HF    Subjective: No acute events overnight.  Patient remains confused but is alert, she is hemodynamically stable, she is rate controlled afib    Current Medications:   Scheduled Meds:  apixaban 2.5 mg Oral Q12H   aspirin 81 mg Oral Daily   atorvastatin 20 mg Oral Nightly   budesonide 0.5 mg Nebulization BID - RT   busPIRone 15 mg Oral QAM   busPIRone 30 mg Oral Q PM   diclofenac 2 g Topical TID   doxepin 10 mg Oral Nightly   insulin glargine 22 Units Subcutaneous QAM   insulin regular 0-7 Units Subcutaneous Q6H   ipratropium-albuterol 3 mL Nebulization 4x Daily - RT   lansoprazole 30 mg Nasogastric QAM   levETIRAcetam 750 mg Per G Tube Q12H   metoprolol tartrate 50 mg Oral Q12H   multivitamin with minerals 1 tablet Oral Daily   sodium chloride 10 mL Intravenous Q12H     Continuous Infusions:  hold 1 each   hold 1 each       Allergies:  Allergies   Allergen Reactions   • Sulfa Antibiotics Unknown - Low Severity     GI upset   • Penicillins Itching and Rash       Review of Systems   Constitution: Negative.   Cardiovascular: Negative for chest pain, dyspnea on exertion and leg swelling.   Respiratory: Negative for cough and shortness of breath.    Gastrointestinal: Negative.    Genitourinary: Negative.    Neurological: Negative.          Objective:     Temp:  [98 °F (36.7 °C)-99.6 °F (37.6 °C)] 98 °F (36.7 °C)  Heart Rate:  [] 79  Resp:   [16-20] 20  BP: (105-130)/(52-98) 120/98     Intake/Output Summary (Last 24 hours) at 7/2/2020 1400  Last data filed at 7/2/2020 0611  Gross per 24 hour   Intake 1200 ml   Output 560 ml   Net 640 ml     Body mass index is 48.92 kg/m².      06/27/20  0411 06/28/20  0600 07/02/20  0419   Weight: 113 kg (249 lb) 111 kg (245 lb 4.8 oz) 114 kg (250 lb 8 oz)         Physical Exam:  General Appearance:    Alert, cooperative, in no acute distress               Neck:   supple,  no JVD   Lungs:     2L NC, dim bilat bases    Heart:    irregular rhythm and normal rate, normal S1 and S2   Abdomen:     Normal bowel sounds, soft non tender non distended   Extremities:   Moves all extremities well, no edema, no cyanosis, no             redness   Pulses:   Pulses palpable and equal bilaterally   Skin:   No bleeding, bruising or rash   Neurologic:   confused         Lab Review:   Results from last 7 days   Lab Units 07/02/20  0557 07/01/20  0743   SODIUM mmol/L 143 146*   POTASSIUM mmol/L 3.7 4.0   CHLORIDE mmol/L 105 105   CO2 mmol/L 31.6* 32.0*   BUN mg/dL 24* 28*   CREATININE mg/dL 0.89 1.04*   GLUCOSE mg/dL 108* 195*   CALCIUM mg/dL 9.4 9.0         Results from last 7 days   Lab Units 07/02/20  0557 07/01/20  0743   WBC 10*3/mm3 9.26 12.10*   HEMOGLOBIN g/dL 8.9* 9.1*   HEMATOCRIT % 27.5* 29.0*   PLATELETS 10*3/mm3 197 194         Results from last 7 days   Lab Units 06/27/20  0924 06/26/20  0533   MAGNESIUM mg/dL 2.2 2.2           Invalid input(s): LDLCALC            Recent Radiology:  Imaging Results (Most Recent)     Procedure Component Value Units Date/Time    XR Chest 1 View [[id number]] Collected:  06/25/20 1024     Updated:  06/25/20 1027    Narrative:       Portable chest radiograph     HISTORY:Follow-up effusions     TECHNIQUE: Single AP portable radiograph of the chest     COMPARISON:Chest radiograph 06/13/2020       Impression:       FINDINGS AND IMPRESSION:  Orogastric tube the course in the stomach and the tip is  collimated out  of the field-of-view.     The lungs are hypoinflated. There is pulmonary vascular congestion with  superimposed interstitial thickening and pulmonary opacification, most  notably within the bilateral mid and lower lung fields, likely  representing pulmonary edema, as before.. No pneumothorax is seen.  Previously seen right pleural effusion is not definitely visualized.  Please note a lateral radiograph would provide a more accurate  assessment of pleural effusions. The heart is enlarged and grossly  unchanged in size since 06/13/2020.     This report was finalized on 6/25/2020 10:24 AM by Dr. Arden Uribe M.D.       US Thoracentesis [[id number]] Collected:  06/24/20 1445    Specimen:  Body Fluid Updated:  06/24/20 1449    Narrative:       US THORACENTESIS-     Clinical: Left-sided thoracentesis requested      imaging of the left lung base demonstrates only a trace amount of  pleural fluid, an insufficient amount for ultrasound-guided  paracentesis.     This report was finalized on 6/24/2020 2:46 PM by Dr. Alan Kothari M.D.       US Thoracentesis [[id number]] Collected:  06/24/20 0608    Specimen:  Body Fluid Updated:  06/24/20 0612    Narrative:       US THORACENTESIS-     HISTORY: pleural effusion.     PROCEDURE: Ultrasound guided right thoracentesis     Procedure: Informed consent was obtained. Preliminary sonography  performed. Pleural effusion demonstrated. The overlying skin was prepped  in the usual sterile fashion. Local anesthesia was achieved with 1%  lidocaine. A small nick was made in the skin with a scalpel. Through the  nick was inserted a needle catheter device under sonographic guidance.  The needle was removed and the catheter remained and was connected to  suction. 450 cc of burgundy colored fluid was withdrawn. The patient  tolerated the procedure without evidence for complication and left the  procedure room in stable condition.       Impression:       1. Technically  successful ultrasound guided thoracentesis.     This report was finalized on 6/24/2020 6:09 AM by Dr. Alan Kothari M.D.       XR Chest 1 View [[id number]] Collected:  06/22/20 0808     Updated:  06/22/20 0815    Narrative:       Portable chest radiograph     HISTORY:Pleural effusion     TECHNIQUE: Single AP portable radiograph of the chest     COMPARISON:Multiple chest radiographs dating back to 06/13/2020       Impression:       FINDINGS AND IMPRESSION:  Orogastric tube courses in the stomach and the tip is not well  visualized due to overexposure.     There is pulmonary vascular congestion, as before. Small-to-moderate  bilateral pleural effusions are present, as before. There is bibasilar  pulmonary opacification, right greater than left, which appears to have  decreased, particularly on the right, since 06/20/2020. No pneumothorax  is seen. The heart is moderately enlarged, as before.     This report was finalized on 6/22/2020 8:12 AM by Dr. Arden Uribe M.D.       XR Chest 1 View [[id number]] Collected:  06/20/20 0631     Updated:  06/20/20 0636    Narrative:       XR CHEST 1 VW-     HISTORY: Female who is 84 years-old,  poor oxygenation     TECHNIQUE: Frontal view of the chest     COMPARISON: 06/16/2020     FINDINGS: NG tube, partly included, appear stable. The heart is  enlarged. Aorta is tortuous. Pulmonary vasculature is unremarkable.  Increased opacities at the bases, right more than left, suggesting  increased atelectasis/infiltrate/effusion. No pneumothorax. No acute  osseous process.       Impression:       Increased opacity at the bases, continued follow-up  recommended.     This report was finalized on 6/20/2020 6:33 AM by Dr. Ezio Conteh M.D.       XR Chest 1 View [[id number]] Collected:  06/16/20 1537     Updated:  06/16/20 1544    Narrative:       XR CHEST 1 VW-     HISTORY: Female who is 84 years-old,  decreased oxygenation     TECHNIQUE: Frontal view of the chest     COMPARISON:  06/14/2020     FINDINGS: NG tube extends to the proximal jejunum. The heart is  enlarged. Pulmonary vasculature is congested. Partially improved  aeration at the bases with residual likely atelectasis at the bases,  with suggestion of minimal right pleural effusion. No pneumothorax. No  acute osseous process.       Impression:       Small likely atelectasis at the right bases, minimal right  pleural effusion. Cardiomegaly and pulmonary vascular congestion.     This report was finalized on 6/16/2020 3:41 PM by Dr. Ezio Conteh M.D.       XR Chest 1 View [[id number]] Collected:  06/14/20 1257     Updated:  06/14/20 1301    Narrative:       PORTABLE CHEST X-RAY     HISTORY: Shortness of breath. Hypertension and diabetes.     TECHNIQUE: Portable frontal chest x-ray is correlated with chest x-ray  from yesterday at around noon.     FINDINGS: Cardiomegaly with pulmonary vascular engorgement are again  observed. There appears to be mild pulmonary edema, slightly improved.  There also appear to be small pleural effusions.       Impression:       Early megaly with mild pulmonary edema that appears slightly  improved in comparison with the x-ray performed yesterday. There is no  new abnormality.     This report was finalized on 6/14/2020 12:58 PM by Dr. Callum Pierre M.D.       CT Head Without Contrast [[id number]] Collected:  06/14/20 1252     Updated:  06/14/20 1259    Narrative:       CT SCAN OF THE BRAIN WITHOUT CONTRAST     HISTORY: Atrial fibrillation. Increased lethargy. Previous surgery for  meningioma.     The CT scan was performed as an emergency procedure through the brain  without contrast there is moderate diffuse atrophy and chronic small  vessel ischemic change similar to previous studies. Again noted is a  left frontal craniotomy with some post surgical encephalomalacia high in  the left frontal lobe as also noted on yesterday's MRI scan and  yesterday's CT scan. There is no evidence of acute  intracranial  hemorrhage or mass effect and the visualized sinuses are clear.                 Radiation dose reduction techniques were utilized, including automated  exposure control and exposure modulation based on body size.     This report was finalized on 6/14/2020 12:55 PM by Dr. César Fall M.D.       MRI Brain Without Contrast [[id number]] Collected:  06/13/20 2146     Updated:  06/13/20 2155    Narrative:       BRAIN MRI WITHOUT CONTRAST     HISTORY: Stroke; I48.91-Unspecified atrial fibrillation;  R06.02-Shortness of breath     COMPARISON: 08/16/2017.     FINDINGS:  Multiplanar images of the head were obtained without  gadolinium. No areas of restricted diffusion are seen to suggest acute  infarct. There is diffuse atrophy. There is periventricular and deep  white matter microangiopathic change. There is no midline shift or mass  effect. Area of encephalomalacia is seen within the left frontal lobe  near the vertex. The intracranial flow voids appear intact. There is  some decreased signal intensity seen on susceptibility weighted imaging  in the area of encephalomalacia within the left frontal lobe, likely  reflecting some hemosiderin deposition. There is mucosal thickening  noted within the ethmoid sinuses.       Impression:       1. No areas of restricted diffusion seen to suggest acute infarct.    2. Chronic area of encephalomalacia within the left frontal lobe     This report was finalized on 6/13/2020 9:52 PM by Dr. Jadyn Ferrer M.D.       CT Head Without Contrast [[id number]] Collected:  06/13/20 1259     Updated:  06/13/20 1305    Narrative:       CT SCAN OF THE BRAIN WITHOUT CONTRAST     HISTORY: Confusion. Previous breast cancer. Previous surgical removal of  left frontal meningioma.     The CT scan was performed through the brain without contrast. There is  mild diffuse atrophy and chronic small vessel ischemic change similar to  the study of 03/24/2018. Again noted is previous left  frontal craniotomy  with some slight encephalomalacia high in the left frontal lobe that is  unchanged. There is no evidence of acute intracranial hemorrhage or mass  effect and the visualized sinuses are clear.                       Radiation dose reduction techniques were utilized, including automated  exposure control and exposure modulation based on body size.     This report was finalized on 6/13/2020 1:02 PM by Dr. César Fall M.D.       XR Chest 1 View [[id number]] Collected:  06/13/20 1222     Updated:  06/13/20 1227    Narrative:       PORTABLE CHEST X-RAY     history: Shortness of breath.     TECHNIQUE: Portable chest x-rays correlated with a chest x-ray performed  yesterday afternoon.     FINDINGS: There is cardiomegaly with pulmonary vascular engorgement and  pulmonary edema which appears similar to that seen yesterday. No  infiltrate or effusion is evident. There is no pneumothorax.       Impression:       No change.     This report was finalized on 6/13/2020 12:24 PM by Dr. Callum Pierre M.D.       XR Chest 1 View [[id number]] Collected:  06/12/20 1739     Updated:  06/12/20 1753    Narrative:       ONE VIEW PORTABLE CHEST     HISTORY: Shortness of breath. Pleural effusion.     FINDINGS: There is moderate cardiomegaly unchanged from 03/24/2018.  There is mild vascular congestion suspicious for borderline changes of  congestive heart failure. There may be tiny bilateral pleural effusions  not well seen in the portable upright view.     This report was finalized on 6/12/2020 5:50 PM by Dr. César Fall M.D.             ECHOCARDIOGRAM:    Results for orders placed during the hospital encounter of 06/12/20   Adult Transthoracic Echo Complete W/ Cont if Necessary Per Protocol    Narrative · Left ventricular wall thickness is consistent with mild-to-moderate   concentric hypertrophy.  · Estimated EF = 50%.  · Left ventricular systolic function is low normal.  · Left ventricular diastolic dysfunction  (grade II) consistent with   pseudonormalization.  · Right ventricular cavity is moderate-to-severely dilated.  · Mildly reduced right ventricular systolic function noted.  · Left atrial cavity size is mild-to-moderately dilated.  · Mild mitral valve regurgitation is present  · Moderate tricuspid valve regurgitation is present.     LV size normal  LV systolic function mildly decreased globally, EF 50% in the setting of   A. fib RVR  RV sizes moderate to severely increased with mildly reduced to moderate   reduced RV function  Severe pulmonary hypertension by instantaneous gradient assessment   noninvasively, RVSP estimated 55-60 with right atrial pressure of 15 with   evidence of RV pressure volume overload  Septal motion consistent with RV pressure volume overload  Mild to moderately increased left atrial size, moderately increased right   atrial size  Moderate TR  Mild MR  No valvular stenoses seen  At least grade 2 diastolic dysfunction by criteria  No masses or effusion seen           I reviewed the patient's new clinical results.    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)    Elevated troponin    Elevated LFTs    CKD (chronic kidney disease) stage 3, GFR 30-59 ml/min (CMS/HCC)    Morbid obesity with BMI of 40.0-44.9, adult (CMS/HCC)    Type 2 diabetes mellitus with stage 3 chronic kidney disease, with long-term current use of insulin (CMS/HCC)    Hypertension    Hyperlipidemia    COPD (chronic obstructive pulmonary disease) (CMS/HCC)    Acute kidney injury (CMS/HCC)    Metabolic encephalopathy    Acute respiratory failure with hypercapnia (CMS/HCC)    Hypokalemia    Expressive aphasia    Pain of right middle finger    Chronic deep vein thrombosis (DVT) of left upper extremity (CMS/HCC)    Dysphagia    Oropharyngeal dysphagia    1. New onset A.  fib RVR  - rate controlled on BB per tube  - bradycardia resolve off diltiazem  - CHADS Vasc at least 3, on apixiban 2.5mg BID for anticoagulation, will consider increasing to 5 twice daily with creatinine of 1.1     2. Acute on chronic diastolic congestive heart failure  - EF 50%, grade 2 diastolic dysfunction  - received diuresis 6/14, evidence of contraction alkalosis --> received diamox  - appears compensated  - nephrology has signed off   -Continue present dose of diuretics     3. Elevated troponin  - 0.08, 0.07, 0.06  - Likely secondary to high filling pressures, no regional dysfunction seen on 2D echo  -Conservative management for now continue aspirin, troponin downtrending  -No interventional approach planned presently  Clinically stable     4. Acute on chronic hypercapnic respiratory failure  - pulmonary managing  - Bipap   - s/p thoracentesis 6/23   - planned for left thoracentesis today 6/24-- could not do d/t not enough fluid     5. Obstructive sleep apnea CPAP as tolerated     6. Essential hypertension      7. Hyperlipidemia     8. Underlying dementia     9. HAYDEE on CKD  - resolved     10. Anemia of chronic disease   - required transfusions as outpt     11. Pulmonary HTN     12. Hypokalemia (new problem 6/26)  - replaced    Plan:   Continue rate control with metop 50mg PO BID  Continue apixiban 2.5mg PO BID for a/c  Volume status stable    Patient has f/u with Dr. Greer on 7/14 at 10:15, we will sign off and see as needed.  Please call with questions.        Brittani James, JERROD  07/02/20  14:00

## 2020-07-02 NOTE — PROGRESS NOTES
"                                              LOS: 20 days   Patient Care Team:  Bruce Maldonado MD as PCP - General (Internal Medicine)  Code, Hosea GILMAN II, MD as Consulting Physician (Hematology and Oncology)  Kate Lopez MD as Referring Physician (Internal Medicine)    Chief Complaint:  F/up respiratory failure, COPD, OHS, CHF    Subjective   Interval History  on 2 L O2.  Denies cough or shortness of breath.  She used the new trilogy ventilator last night and tolerated well.    Ventilator/Non-Invasive Ventilation Settings (From admission, onward)     Start     Ordered    06/26/20 1554  NIPPV (CPAP or BIPAP)  Until Discontinued     Comments:  Use BIPAP for sleep & if > 1L required to keep SPO2 >88%   Question Answer Comment   Indication: Acute Respiratory Failure    Type: BIPAP    NIPPV Mask Interface: Full Face Mask    IPAP 20    EPAP 8    Breath Rate 16    Titrate for SPO2 90% - 95%        06/26/20 1553                      Physical Exam:     Vital Signs  Temp:  [98 °F (36.7 °C)-99.6 °F (37.6 °C)] 98 °F (36.7 °C)  Heart Rate:  [] 79  Resp:  [16-20] 20  BP: (105-130)/(52-98) 120/98    Intake/Output Summary (Last 24 hours) at 7/2/2020 1501  Last data filed at 7/2/2020 0611  Gross per 24 hour   Intake 1200 ml   Output 560 ml   Net 640 ml     Flowsheet Rows      First Filed Value   Admission Height  152.4 cm (60\") Documented at 06/12/2020 1553   Admission Weight  101 kg (222 lb 4.8 oz) Documented at 06/12/2020 1553        General Appearance:   Alert, cooperative, in no acute distress   ENMT:  Mallampati score 3, moist mucous membrane   Eyes:  Pupils equal and reactive to light. EOMI   Neck:   Trachea midline. No thyromegaly.   Lungs:     Mild crackles on anterior auscultation.  No wheezing.    Heart:   Regular rhythm and normal rate, normal S1 and S2, no         murmur   Skin:   No rash   Abdomen:    Obese. Soft. No tenderness. No HSM.  PEG tube noted   Neuro:  Conscious, alert, oriented x1. " Strength 5/5 in upper and lower  ext   Extremities:  No cyanosis, clubbing but +ve edema.  Warm extremities and well-perfused         Results Review:        Results from last 7 days   Lab Units 07/02/20  0557 07/01/20  0743 06/30/20  0514   SODIUM mmol/L 143 146* 144   POTASSIUM mmol/L 3.7 4.0 3.8   CHLORIDE mmol/L 105 105 104   CO2 mmol/L 31.6* 32.0* 35.0*   BUN mg/dL 24* 28* 29*   CREATININE mg/dL 0.89 1.04* 1.13*   GLUCOSE mg/dL 108* 195* 95   CALCIUM mg/dL 9.4 9.0 9.7         Results from last 7 days   Lab Units 07/02/20  0557 07/01/20  0743 06/30/20  0514   WBC 10*3/mm3 9.26 12.10* 7.39   HEMOGLOBIN g/dL 8.9* 9.1* 9.5*   HEMATOCRIT % 27.5* 29.0* 30.4*   PLATELETS 10*3/mm3 197 194 183               I reviewed the patient's new clinical results.        Medication Review:     apixaban 2.5 mg Oral Q12H   aspirin 81 mg Oral Daily   atorvastatin 20 mg Oral Nightly   budesonide 0.5 mg Nebulization BID - RT   busPIRone 15 mg Oral QAM   busPIRone 30 mg Oral Q PM   diclofenac 2 g Topical TID   doxepin 10 mg Oral Nightly   insulin glargine 22 Units Subcutaneous QAM   insulin regular 0-7 Units Subcutaneous Q6H   ipratropium-albuterol 3 mL Nebulization 4x Daily - RT   lansoprazole 30 mg Nasogastric QAM   levETIRAcetam 750 mg Per G Tube Q12H   metoprolol tartrate 50 mg Oral Q12H   multivitamin with minerals 1 tablet Oral Daily   sodium chloride 10 mL Intravenous Q12H         hold 1 each   hold 1 each       Diagnostic imaging:  I personally and independently reviewed the following images:  CXR 6/25/20:  Bilateral pleural effusion.       Assessment   1. Acute on chronic hypoxemic and hypercapnic respiratory failure   2. Bilateral pleural effusions, likely 2ary to CHF and CKD, s/p Rt thora 6/23/20 --> Transudate  3. Acute on chronic diastolic CHF  4. HAYDEE on CKD III, resolved  5. Pulmonary hypertension WHO group 2 and 3 disease  6. YARY  7. OHS  8. COPD ?, no exacerbation (2nd had smoking)  9. Morbid obesity, BMI 48  10. Thoracic  kyphosis contributes to restrictive lung physiology      Plan     · Duoneb + Symbicort for COPD  · Trilogy pulmonary at night and when asleep  · Start Lasix 40 mg p.o. daily due to worsening hypoxemia and volume overload    .               Gene Scott MD  07/02/20  15:01          This note was dictated utilizing [employer] dictation

## 2020-07-02 NOTE — PLAN OF CARE
Problem: Patient Care Overview  Goal: Plan of Care Review  Flowsheets (Taken 7/2/2020 8694)  Plan of Care Reviewed With: patient  Outcome Summary: Pt. able to ambulate 12 feet, Min. assist x 2, with use of Rwx this date. Pt. requires Min. assist x 2 for sit <-> stand transfers.  BLE ther. ex. program x 10 reps completed for general strengthening.  Verbal/tactile cues for posture correction and Rwx guidance. Followed pt. with a chair for safety as she fatigues quickly with upright mobility.   Patient was wearing a face mask during this therapy encounter. Therapist used appropriate personal protective equipment including mask and gloves.  Mask used was standard procedure mask. Appropriate PPE was worn during the entire therapy session. Hand hygiene was completed before and after therapy session. Patient is not in enhanced droplet precautions.

## 2020-07-02 NOTE — PROGRESS NOTES
Name: Erika Irvin ADMIT: 2020   : 1935  PCP: Bruce Maldonado MD    MRN: [id number] LOS: 20 days   AGE/SEX: 84 y.o. female  ROOM: [facility]/1     Subjective   Subjective   Sitting up in chair. Daughter at bedside. RT just instructed on home trilogy use. Patient denies any pain, dyspnea, cough, fever or chills. No nausea or vomiting. States she is hungry. Daughter intends to take her home with home health. Learning about her tube feeds and breathing machine. Wants inpatient rehab if able. Otherwise does not want acute.     Objective   Objective   Vital Signs  Temp:  [98 °F (36.7 °C)-99.6 °F (37.6 °C)] 98 °F (36.7 °C)  Heart Rate:  [] 110  Resp:  [16-21] 18  BP: (105-130)/(52-98) 120/98  SpO2:  [93 %-100 %] 94 %  on  Flow (L/min):  [1] 1;   Device (Oxygen Therapy): NPPV/NIV  Body mass index is 48.92 kg/m².     Physical Exam   Constitutional: She is oriented to person, place, and time. She appears well-developed. No distress.   Chronically ill appearing, but no distress. Pleasant. Alert and talkative.   HENT:   Head: Normocephalic and atraumatic.   Nose: Nose normal.   Mouth/Throat: Oropharynx is clear and moist.   Eyes: Conjunctivae are normal. Right eye exhibits no discharge. Left eye exhibits no discharge.   Neck: Normal range of motion. Neck supple.   Cardiovascular: Normal rate, normal heart sounds and intact distal pulses. An irregularly irregular rhythm present.   Pulmonary/Chest: Effort normal and breath sounds normal. No respiratory distress.   Diminished, but clear   Abdominal: Soft. Bowel sounds are normal. She exhibits no distension. There is no tenderness.   PEG tube in place.   Musculoskeletal: Normal range of motion. She exhibits no edema or tenderness.   Neurological: She is alert and oriented to person, place, and time. She exhibits normal muscle tone. Coordination normal.   Skin: Skin is warm and dry. She is not diaphoretic. No erythema.   Psychiatric: She has a normal mood and  affect. Her behavior is normal.   Nursing note and vitals reviewed.    Results Review:       I reviewed the patient's new clinical results.  Results from last 7 days   Lab Units 07/02/20  0557 07/01/20  0743 06/30/20  0514 06/29/20  0519   WBC 10*3/mm3 9.26 12.10* 7.39 6.94   HEMOGLOBIN g/dL 8.9* 9.1* 9.5* 8.7*   PLATELETS 10*3/mm3 197 194 183 188     Results from last 7 days   Lab Units 07/02/20  0557 07/01/20  0743 06/30/20  0514 06/29/20  0519   SODIUM mmol/L 143 146* 144 136   POTASSIUM mmol/L 3.7 4.0 3.8 3.7   CHLORIDE mmol/L 105 105 104 100   CO2 mmol/L 31.6* 32.0* 35.0* 31.6*   BUN mg/dL 24* 28* 29* 31*   CREATININE mg/dL 0.89 1.04* 1.13* 0.97   GLUCOSE mg/dL 108* 195* 95 154*   Estimated Creatinine Clearance: 55.1 mL/min (by C-G formula based on SCr of 0.89 mg/dL).  Results from last 7 days   Lab Units 06/27/20  0924 06/26/20  0533   ALBUMIN g/dL 3.10* 3.20*     Results from last 7 days   Lab Units 07/02/20  0557 07/01/20  0743 06/30/20  0514 06/29/20  0519  06/27/20  0924  06/26/20  0533   CALCIUM mg/dL 9.4 9.0 9.7 9.2   < > 8.7   < > 8.9   ALBUMIN g/dL  --   --   --   --   --  3.10*  --  3.20*   MAGNESIUM mg/dL  --   --   --   --   --  2.2  --  2.2   PHOSPHORUS mg/dL  --   --   --   --   --  2.7  --  2.3*    < > = values in this interval not displayed.       Glucose   Date/Time Value Ref Range Status   07/02/2020 0735 186 (H) 70 - 130 mg/dL Final   07/02/2020 0556 115 70 - 130 mg/dL Final   07/02/2020 0006 245 (H) 70 - 130 mg/dL Final   07/01/2020 2100 314 (H) 70 - 130 mg/dL Final   07/01/2020 1841 301 (H) 70 - 130 mg/dL Final   07/01/2020 1311 238 (H) 70 - 130 mg/dL Final   07/01/2020 0559 185 (H) 70 - 130 mg/dL Final         apixaban 2.5 mg Oral Q12H   aspirin 81 mg Oral Daily   atorvastatin 20 mg Oral Nightly   budesonide 0.5 mg Nebulization BID - RT   busPIRone 15 mg Oral QAM   busPIRone 30 mg Oral Q PM   diclofenac 2 g Topical TID   doxepin 10 mg Oral Nightly   insulin glargine 22 Units Subcutaneous  QAM   insulin regular 0-7 Units Subcutaneous Q6H   ipratropium-albuterol 3 mL Nebulization 4x Daily - RT   lansoprazole 30 mg Nasogastric QAM   levETIRAcetam 750 mg Per G Tube Q12H   metoprolol tartrate 50 mg Oral Q12H   multivitamin with minerals 1 tablet Oral Daily   sodium chloride 10 mL Intravenous Q12H       hold 1 each   hold 1 each   NPO Diet       Assessment/Plan     Active Hospital Problems    Diagnosis  POA   • **New onset atrial fibrillation (CMS/HCC) [I48.91]  Yes   • Dysphagia [R13.10]  Yes   • Chronic deep vein thrombosis (DVT) of left upper extremity (CMS/[facility]) [I82.722]  Clinically Undetermined   • Pain of right middle finger [M79.644]  Unknown   • Expressive aphasia [R47.01]  Yes   • Hypokalemia [E87.6]  Unknown   • Metabolic encephalopathy [G93.41]  No   • Acute respiratory failure with hypercapnia (CMS/[facility]) [J96.02]  Yes   • Acute kidney injury (CMS/[facility]) [N17.9]  Yes   • Elevated troponin [R79.89]  Yes   • Elevated LFTs [R79.89]  Yes   • CKD (chronic kidney disease) stage 3, GFR 30-59 ml/min (CMS/[facility]) [N18.3]  Yes   • Morbid obesity with BMI of 40.0-44.9, adult (CMS/[facility]) [E66.01, Z68.41]  Not Applicable   • Type 2 diabetes mellitus with stage 3 chronic kidney disease, with long-term current use of insulin (CMS/[facility]) [E11.22, N18.3, Z79.4]  Not Applicable   • Hypertension [I10]  Yes   • Hyperlipidemia [E78.5]  Yes   • COPD (chronic obstructive pulmonary disease) (CMS/[facility]) [J44.9]  Yes   • Oropharyngeal dysphagia [R13.12]  Unknown   • Malignant neoplasm of upper-inner quadrant of right female breast (CMS/[facility]) [C50.211]  Yes   • Anemia of chronic renal failure, stage 3 (moderate) (CMS/[facility]) [N18.3, D63.1]  Yes      Resolved Hospital Problems   No resolved problems to display.     84 y.o. female admitted with New onset atrial fibrillation (CMS/HCC).     · Acute on top of chronic hypoxemic/hypercapnic respiratory failure.  Pulmonary recommend Trilogy ventilator for outpatient use -- CCP assisting and this  is now at bedside. Oxygen via NC--avoid hyperoxia in the setting of hypercarbia. Duo nebs and Pulmicort mini nebs and continue oxygen.  Appears stable and pulmonology signed off.  · Acute on top of chronic diastolic congestive heart failure/atrial fibrillation/history of hypertension.  Cardiology signed off & ok to DC from their standpoint. No evidence of angina.  Atrial fibrillation rate & blood pressure controlled. Currently on Eliquis/aspirin/metoprolol--AC resumed s/p PEG. F/U on 7/14 with Dr. Greer.   · Metabolic encephalopathy. S/p left-sided craniotomy for cerebral meningioma 2017.  Mental status improved. Most likely d/t hypoxemia and hypercapnia vs new onset afib ? Embolic source. Doxepin at night. MRI brain 6/13/20 no areas of restricted diffusion to suggest acute infarct.  Patient non-focal on exam yet noted dysphonia / dysphagia. Neuro rec continue Keppra 750 mg PO BID & follow-up with Dr. Gillis in office for seizure evaluation. BP goal <130/80.  Seizure free. EEG 6/16/20 moderate diffuse encephalopathy. Neuro signed off & rec f/u MRI brain OP given patient's anxiety associated with MRI & avoid sedatives given recent acute on chronic resp failure.   · Acute on top of stage III chronic renal failure / Hypokalemia. Secondary to decreased CO and afib and hypoxia. Creatinine normalized. Nephrology signed off 6/28. Volume status stable.  · Diabetes mellitus type 2 / Dysphagia.   A1c 5.7.  Patient on sliding scale insulin reduced from high to low dosing given current glucose readings averaging around .  Lantus 22 units daily per home regimen.  S/p PEG on 6/30/20, appreciate GS.  Diabetisource AC. Nutrition following for request for recommendation of bolus feeding prior discharge--monitor for bolus feeding tolerance.       · Apixaban adequate for DVT prophylaxis  · CPR  · Discussed with patient, nursing staff, daughter at bedside and Dr. Lindsey.  · Anticipate discharge hopeful tomorrow / CCP following  -- Trilogy at bedside. Daughter working on obtaining hospital bed for home use. Inpatient rehab acceptance unlikely but still unknown. Daughter refusing subacute. Will plan home with HH tomorrow once all equipment set-up/availble. Daughter to stay today to see how she does with therapies and assist with tube feeds to ensure safe disposition.      JERROD Cuevas  [facility]  07/02/20  11:06

## 2020-07-02 NOTE — PLAN OF CARE
Problem: Patient Care Overview  Goal: Plan of Care Review  Flowsheets  Taken 7/2/2020 1306  Progress: improving  Outcome Summary: pt completed bed mobility w. Mod A sup to sit . stood w. min A to min x2. walked into BR w. min A and walker w. VCs. Tsf to toilet min x2 w. grab bar. Completed toileting skills dep. Grooming skills SBA. Pt walked to chair then completed B UE Ex AROM/AAROM. pt cont to benefit from OT to incr ADLs strength, balnace, and tsf. pt plan is home w. family and HH. pt has shower seated, WC, walker at home.  Taken 7/2/2020 1310  Plan of Care Reviewed With: patient

## 2020-07-02 NOTE — THERAPY TREATMENT NOTE
Acute Care - Occupational Therapy Treatment Note  [facility]     Patient Name: Erika Irvin  : 1935  MRN: [id number]  Today's Date: 2020     Date of Referral to OT: 20       Admit Date: 2020       ICD-10-CM ICD-9-CM   1. New onset atrial fibrillation (CMS/HCC) I48.91 427.31   2. Shortness of breath R06.02 786.05   3. Chronic respiratory failure (CMS/HCC) J96.10 518.83   4. CHF (congestive heart failure) (CMS/HCC) I50.9 428.0   5. Acute and chronic respiratory failure (acute-on-chronic) (CMS/HCC) J96.20 518.84   6. 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)   • Ductal carcinoma in situ (DCIS) of left breast   • Iron deficiency anemia   • At risk for bone density loss   • Cerebral meningioma (CMS/HCC)   • Aromatase inhibitor use   • New onset atrial fibrillation (CMS/HCC)   • Elevated troponin   • Elevated LFTs   • CKD (chronic kidney disease) stage 3, GFR 30-59 ml/min (CMS/HCC)   • Morbid obesity with BMI of 40.0-44.9, adult (CMS/HCC)   • Type 2 diabetes mellitus with stage 3 chronic kidney disease, with long-term current use of insulin (CMS/HCC)   • Hypertension   • Hyperlipidemia   • COPD (chronic obstructive pulmonary disease) (CMS/HCC)   • Acute kidney injury (CMS/HCC)   • Metabolic encephalopathy   • Acute respiratory failure with hypercapnia (CMS/HCC)   • Hypokalemia   • Expressive aphasia   • Pain of right middle finger   • Chronic deep vein thrombosis (DVT) of left upper extremity (CMS/HCC)   • Dysphagia   • Oropharyngeal dysphagia     Past Medical History:   Diagnosis Date   • Anemia     Iron deficiency anemia    • Anxiety    • Arthritis    • Breast cancer (CMS/HCC) 2014    right    • Breast cancer (CMS/HCC) 2006    Left   • Chronic kidney disease     Anemia of chronic renal insufficency,  stage 3   • COPD (chronic obstructive pulmonary disease) (CMS/[facility])    • Depression    • Diabetes mellitus (CMS/[facility])    • Hyperlipidemia    • Hypertension    • Irregular heartbeat     noted by RN on 6/12/2020   • YARY (obstructive sleep apnea)    • Poor circulation    • Stroke (CMS/[facility])     CVA in 1990.     Past Surgical History:   Procedure Laterality Date   • BREAST LUMPECTOMY  08/2014    Right-sided invasive ductal carcinoma,papillary type   • BREAST LUMPECTOMY Left 2006   • BREAST SURGERY Right 09/2014    enlargement of lumpectomy site   • CHOLECYSTECTOMY     • CRANIOTOMY Left 08/2013    with removal meningioma   • HYSTERECTOMY     • PEG TUBE INSERTION N/A 6/30/2020    Procedure: PERCUTANEOUS ENDOSCOPIC GASTROSTOMY TUBE INSERTION;  Surgeon: Jared Ibarra MD;  Location: [facility];  Service: General;  Laterality: N/A;  dysphagia   • TUBAL ABDOMINAL LIGATION         Therapy Treatment    Rehabilitation Treatment Summary     Row Name 07/02/20 1306             Treatment Time/Intention    Discipline  occupational therapist  -      Document Type  therapy note (daily note)  -      Subjective Information  no complaints  -      Mode of Treatment  individual therapy;occupational therapy  -      Patient/Family Observations  pt supine in bed. dght in room  -      Patient Effort  good  -KP      Existing Precautions/Restrictions  fall;oxygen therapy device and L/min  -KP      Recorded by [] Lashaun Elliott, OTR 07/02/20 1310      Row Name 07/02/20 1306             Cognitive Assessment/Intervention- PT/OT    Orientation Status (Cognition)  oriented to;person;place  -KP      Follows Commands (Cognition)  follows one step commands;over 90% accuracy;verbal cues/prompting required  -      Safety Deficit (Cognitive)  mild deficit  -KP      Recorded by [] Lashaun Elliott, OTR 07/02/20 1310      Row Name 07/02/20 1306             Bed Mobility Assessment/Treatment    Supine-Sit [place] (Bed  Mobility)  set up;verbal cues;moderate assist (50% patient effort)  -      Sit-Supine [place] (Bed Mobility)  not tested  -KP      Recorded by [] Lashaun Elliott OTR 07/02/20 1310      Row Name 07/02/20 1306             Functional Mobility    Functional Mobility- Ind. Level  set up required;verbal cues required;minimum assist (75% patient effort)  -KP      Functional Mobility- Device  rolling walker  -KP      Functional Mobility- Comment  in room, to BR, to chair  -KP      Recorded by [] Lashaun Elliott OTR 07/02/20 1310      Row Name 07/02/20 1306             Transfer Assessment/Treatment    Transfer Assessment/Treatment  sit-stand transfer;stand-sit transfer;toilet transfer  -KP      Recorded by [] Lashaun Elliott OTR 07/02/20 1310      Row Name 07/02/20 1306             Sit-Stand Transfer    Sit-Stand [place] (Transfers)  set up;verbal cues;minimum assist (75% patient effort)  -      Assistive Device (Sit-Stand Transfers)  walker, front-wheeled  -KP      Recorded by [] Lashaun Elliott OTR 07/02/20 1310      Row Name 07/02/20 1306             Stand-Sit Transfer    Stand-Sit [place] (Transfers)  set up;verbal cues;minimum assist (75% patient effort)  -      Assistive Device (Stand-Sit Transfers)  walker, front-wheeled  -KP      Recorded by [] Lashaun Elliott OTR 07/02/20 1310      Row Name 07/02/20 1306             Toilet Transfer    Type (Toilet Transfer)  sit-stand;stand-sit;stand pivot/stand step  -      [place] Level (Toilet Transfer)  set up;verbal cues;minimum assist (75% patient effort);2 person assist  -      Assistive Device (Toilet Transfer)  grab bars/safety frame;walker, front-wheeled  -KP      Recorded by [] Lashaun Elliott OTR 07/02/20 1310      Row Name 07/02/20 1306             ADL Assessment/Intervention    BADL Assessment/Intervention  toileting;grooming  -KP      Recorded by [] Lashaun Elliott, OTR  07/02/20 1310      Row Name 07/02/20 1306             Bathing Assessment/Intervention    Comment (Bathing)  pt already completed bathing  -KP      Recorded by [KP] Lashaun Elliott OTR 07/02/20 1310      Row Name 07/02/20 1306             Grooming Assessment/Training    [place] Level (Grooming)  grooming skills;wash face, hands;set up;supervision  -KP      Grooming Position  supported sitting  -KP      Recorded by [KP] Lashaun Elliott, OTR 07/02/20 1310      Row Name 07/02/20 1306             Toileting Assessment/Training    [place] Level (Toileting)  toileting skills;change pad/brief;set up;verbal cues;dependent (less than 25% patient effort)  -      Assistive Devices (Toileting)  grab bar/safety frame  -KP      Toileting Position  supported standing;supported sitting  -KP      Recorded by [KP] Lashaun Elliott OTR 07/02/20 1310      Row Name 07/02/20 1306             Therapeutic Exercise    Upper Extremity (Therapeutic Exercise)  bicep curl, left;bicep curl, right  -KP      Upper Extremity Range of Motion (Therapeutic Exercise)  shoulder flexion/extension, left;shoulder flexion/extension, right  -KP      Exercise Type (Therapeutic Exercise)  AROM (active range of motion);AAROM (active assistive range of motion)  -KP      Position (Therapeutic Exercise)  seated  -KP      Sets/Reps (Therapeutic Exercise)  10x2  -KP      Expected Outcome (Therapeutic Exercise)  improve performance, transfer skills;improve functional tolerance, self-care activity;improve performance, BADLs  -KP      Recorded by [KP] Lashaun Elliott OTR 07/02/20 1310      Row Name 07/02/20 1306             Static Sitting Balance    Level of [place] (Unsupported Sitting, Static Balance)  supervision  -KP      Sitting Position (Unsupported Sitting, Static Balance)  sitting in chair;sitting on edge of bed  -      Time Able to Maintain Position (Unsupported Sitting, Static Balance)  more than 5 minutes  -       Recorded by [KP] Lashaun Elliott, OTR 07/02/20 1310      Row Name 07/02/20 1306             Static Standing Balance    Level of [place] (Supported Standing, Static Balance)  minimal assist, 75% patient effort  -KP      Time Able to Maintain Position (Supported Standing, Static Balance)  3 to 4 minutes  -KP      Assistive Device Utilized (Supported Standing, Static Balance)  walker, rolling  -KP      Recorded by [KP] Lashaun Elliott, OTR 07/02/20 1310      Row Name 07/02/20 1306             Functional Endurance Training    Comment, Functional Endurance  fair +  -KP      Recorded by [KP] Lashaun Elliott OTR 07/02/20 1310      Row Name 07/02/20 1306             Positioning and Restraints    Pre-Treatment Position  in bed  -KP      Post Treatment Position  chair  -KP      In Chair  reclined;call light within reach;encouraged to call for assist;exit alarm on;with family/caregiver  -KP      Recorded by [KP] Lashaun Elliott OTR 07/02/20 1310      Row Name 07/02/20 1306             Pain Scale: Numbers Pre/Post-Treatment    Pain Scale: Numbers, Pretreatment  0/10 - no pain  -KP      Pain Scale: Numbers, Post-Treatment  0/10 - no pain  -KP      Recorded by [KP] Lashaun Elliott, OTR 07/02/20 1310      Row Name                Wound 06/28/20 2157 Right other (see notes) MASD (Moisture associated skin damage)    Wound - Properties Group Date first assessed: 06/28/20 [TF] Time first assessed: 2157 [TF] Side: Right [TF] Location: other (see notes) [TF], buttock  Primary Wound Type: MASD [TF] Recorded by:  [TF] Nehal Naranjo RN 06/28/20 2159    Row Name 07/02/20 1306             Plan of Care Review    Plan of Care Reviewed With  patient  -KP      Progress  improving  -KP      Outcome Summary  pt completed bed mobility w. Mod A sup to sit . stood w. min A to min x2. walked into BR w. min A and walker w. VCs. Tsf to toilet min x2 w. grab bar. Completed toileting skills dep. Grooming  skills SBA. Pt walked to chair then completed B UE Ex AROM/AAROM. pt cont to benefit from OT to incr ADLs strength, balnace, and tsf. pt plan is home w. family and HH. pt has shower seated, WC, walker at home.  -KP      Recorded by [KP] Lashaun Elliott, OTR 07/02/20 1310      Row Name 07/02/20 1306             Outcome Summary/Treatment Plan (OT)    Daily Summary of Progress (OT)  progress toward functional goals is good  -KP      Anticipated Discharge Disposition (OT)  home with assist;home with home health  -KP      Recorded by [KP] Lashaun Elliott, OTR 07/02/20 1310        User Key  (r) = Recorded By, (t) = Taken By, (c) = Cosigned By    Initials Name Effective Dates Discipline    KP Lexi Lashaun Salinas, OTR 06/08/18 -  OT    TF Nehal Naranjo RN 06/16/16 -  Nurse        Wound 06/28/20 0252 Right other (see notes) MASD (Moisture associated skin damage) (Active)   [place]iwound dry 7/1/2020  9:15 PM     Rehab Goal Summary     Row Name 07/02/20 1301             Bed Mobility Goal 1 (PT)    Activity/Assistive Device (Bed Mobility Goal 1, PT)  bed mobility activities, all  -MS      [place] Level/Cues Needed (Bed Mobility Goal 1, PT)  contact guard assist  -MS      Time Frame (Bed Mobility Goal 1, PT)  long term goal (LTG);1 week  -MS         Transfer Goal 1 (PT)    Activity/Assistive Device (Transfer Goal 1, PT)  transfers, all;walker, rolling  -MS      [place] Level/Cues Needed (Transfer Goal 1, PT)  contact guard assist  -MS      Time Frame (Transfer Goal 1, PT)  long term goal (LTG);1 week  -MS         Gait Training Goal 1 (PT)    Activity/Assistive Device (Gait Training Goal 1, PT)  gait (walking locomotion);walker, rolling  -MS      [place] Level (Gait Training Goal 1, PT)  contact guard assist  -MS      Distance (Gait Goal 1, PT)  40 feet  -MS      Time Frame (Gait Training Goal 1, PT)  long term goal (LTG);1 week  -MS        User Key  (r) = Recorded By, (t) = Taken By, (c) =  Cosigned By    Initials Name Provider Type Discipline    Jaamal Sapp L, PT Physical Therapist PT        Occupational Therapy Education                 Title: PT OT SLP Therapies (In Progress)     Topic: Occupational Therapy (Done)     Point: ADL training (Done)     Description:   Instruct learner(s) on proper safety adaptation and remediation techniques during self care or transfers.   Instruct in proper use of assistive devices.              Learning Progress Summary           Patient Acceptance, E,TB, VU,RT by MS at 6/17/2020 1723    Acceptance, E, DU by  at 6/17/2020 1517    Comment:  Pt educated in POC and explaination of purpose of therapy/ interventions.    Acceptance, E,TB, VU by MS at 6/16/2020 1834    Eager, E, NR by  at 6/15/2020 1626    Comment:  OT discussed POC and goals   Family Acceptance, E,TB, VU,RT by MS at 6/17/2020 1723                   Point: Precautions (Done)     Description:   Instruct learner(s) on prescribed precautions during self-care and functional transfers.              Learning Progress Summary           Patient Acceptance, E,TB, VU,RT by MS at 6/17/2020 1723    Acceptance, E, DU by  at 6/17/2020 1517    Comment:  Pt educated in POC and explaination of purpose of therapy/ interventions.    Acceptance, E,TB, VU by MS at 6/16/2020 1834    Eager, E, NR by  at 6/15/2020 1626    Comment:  OT discussed POC and goals   Family Acceptance, E,TB, VU,RT by MS at 6/17/2020 1723                               User Key     Initials Effective Dates Name Provider Type Discipline     03/02/20 -  Anne Marie Conteh OTR Occupational Therapist OT    MS 11/15/19 -  Broderick Dominguez, RN Registered Nurse Nurse     04/02/20 -  Janis Walters OT Occupational Therapist OT                OT Recommendation and Plan  Outcome Summary/Treatment Plan (OT)  Daily Summary of Progress (OT): progress toward functional goals is good  Anticipated Discharge Disposition (OT): home with assist, home with  home health  Daily Summary of Progress (OT): progress toward functional goals is good  Plan of Care Review  Plan of Care Reviewed With: patient  Plan of Care Reviewed With: patient  Outcome Summary: pt completed bed mobility w. Mod A sup to sit . stood w. min A to min x2. walked into BR w. min A and walker w. VCs. Tsf to toilet min x2 w. grab bar. Completed toileting skills dep. Grooming skills SBA. Pt walked to chair then completed B UE Ex AROM/AAROM. pt cont to benefit from OT to incr ADLs strength, balnace, and tsf. pt plan is home w. family and HH. pt has shower seated, WC, walker at home.  Outcome Measures     Row Name 07/02/20 1311 07/01/20 1315          How much help from another is currently needed...    Putting on and taking off regular lower body clothing?  1  -KP  1  -KP     Bathing (including washing, rinsing, and drying)  2  -KP  2  -KP     Toileting (which includes using toilet bed pan or urinal)  1  -KP  1  -KP     Putting on and taking off regular upper body clothing  3  -KP  2  -KP     Taking care of personal grooming (such as brushing teeth)  3  -KP  3  -KP     Eating meals  3  -KP  2  -KP     AM-PAC 6 Clicks Score (OT)  13  -KP  11  -KP        Functional Assessment    Outcome Measure Options  AM-PAC 6 Clicks Daily Activity (OT)  -KP  AM-PAC 6 Clicks Daily Activity (OT)  -       User Key  (r) = Recorded By, (t) = Taken By, (c) = Cosigned By    Initials Name Provider Type    Lashaun Franklin OTR Occupational Therapist           Time Calculation:   Time Calculation- OT     Row Name 07/02/20 1311             Time Calculation- OT    OT Start Time  0940  -      OT Stop Time  1013  -      OT Time Calculation (min)  33 min  -      Total Timed Code Minutes- OT  33 minute(s)  -      OT Received On  07/02/20  -      OT - Next Appointment  07/03/20  -        User Key  (r) = Recorded By, (t) = Taken By, (c) = Cosigned By    Initials Name Provider Type    Lashaun Franklin OTR  Occupational Therapist        Therapy Charges for Today     Code Description Service Date Service Provider Modifiers Qty    97750914019 HC OT THER PROC EA 15 MIN 7/1/2020 Lashaun Elliott, OTR GO 1    77455587944 HC OT SELF CARE/MGMT/TRAIN EA 15 MIN 7/2/2020 Lashaun Elliott, OTR GO 1    21595486542 HC OT THER PROC EA 15 MIN 7/2/2020 Lashaun Elliott, OTR GO 1               MALINDA Mart  7/2/2020

## 2020-07-02 NOTE — PROGRESS NOTES
Continued Stay Note  [facility]     Patient Name: Erika Irvin  MRN: [id number]  Today's Date: 7/2/2020    Admit Date: 6/12/2020    Discharge Plan     Row Name 07/02/20 1408       Plan    Plan  Home with daughter, Jessica at home, with DME from [facility]    Patient/Family in Agreement with Plan  yes    Plan Comments  Hosptial bed being arranged by [facility]. Order and note obtained. [facility] aware. Left VM with Brenna to determine of Jessica will be providing tube feeds at [place]. Attempted to meet wiht daughter at the bedside to update her on the hospital bed but she was not at the bedside. CCP to follow. Carlie Grace RN        Discharge Codes    No documentation.             Carlie Grace RN

## 2020-07-03 VITALS
TEMPERATURE: 98.7 F | OXYGEN SATURATION: 100 % | SYSTOLIC BLOOD PRESSURE: 116 MMHG | WEIGHT: 251.77 LBS | DIASTOLIC BLOOD PRESSURE: 77 MMHG | HEIGHT: 60 IN | HEART RATE: 93 BPM | BODY MASS INDEX: 49.43 KG/M2 | RESPIRATION RATE: 18 BRPM

## 2020-07-03 LAB
ANION GAP SERPL CALCULATED.3IONS-SCNC: 5.3 MMOL/L (ref 5–15)
BASOPHILS # BLD AUTO: 0.02 10*3/MM3 (ref 0–0.2)
BASOPHILS NFR BLD AUTO: 0.2 % (ref 0–1.5)
BUN SERPL-MCNC: 29 MG/DL (ref 8–23)
BUN/CREAT SERPL: 27.4 (ref 7–25)
CALCIUM SPEC-SCNC: 9.2 MG/DL (ref 8.6–10.5)
CHLORIDE SERPL-SCNC: 102 MMOL/L (ref 98–107)
CO2 SERPL-SCNC: 33.7 MMOL/L (ref 22–29)
CREAT SERPL-MCNC: 1.06 MG/DL (ref 0.57–1)
DEPRECATED RDW RBC AUTO: 49.3 FL (ref 37–54)
EOSINOPHIL # BLD AUTO: 0 10*3/MM3 (ref 0–0.4)
EOSINOPHIL NFR BLD AUTO: 0 % (ref 0.3–6.2)
ERYTHROCYTE [DISTWIDTH] IN BLOOD BY AUTOMATED COUNT: 15.2 % (ref 12.3–15.4)
GFR SERPL CREATININE-BSD FRML MDRD: 60 ML/MIN/1.73
GLUCOSE BLDC GLUCOMTR-MCNC: 133 MG/DL (ref 70–130)
GLUCOSE BLDC GLUCOMTR-MCNC: 228 MG/DL (ref 70–130)
GLUCOSE BLDC GLUCOMTR-MCNC: 244 MG/DL (ref 70–130)
GLUCOSE SERPL-MCNC: 138 MG/DL (ref 65–99)
HCT VFR BLD AUTO: 31.8 % (ref 34–46.6)
HGB BLD-MCNC: 9.6 G/DL (ref 12–15.9)
IMM GRANULOCYTES # BLD AUTO: 0.05 10*3/MM3 (ref 0–0.05)
IMM GRANULOCYTES NFR BLD AUTO: 0.6 % (ref 0–0.5)
LYMPHOCYTES # BLD AUTO: 1.15 10*3/MM3 (ref 0.7–3.1)
LYMPHOCYTES NFR BLD AUTO: 14.1 % (ref 19.6–45.3)
MCH RBC QN AUTO: 26.5 PG (ref 26.6–33)
MCHC RBC AUTO-ENTMCNC: 30.2 G/DL (ref 31.5–35.7)
MCV RBC AUTO: 87.8 FL (ref 79–97)
MONOCYTES # BLD AUTO: 0.82 10*3/MM3 (ref 0.1–0.9)
MONOCYTES NFR BLD AUTO: 10 % (ref 5–12)
NEUTROPHILS NFR BLD AUTO: 6.13 10*3/MM3 (ref 1.7–7)
NEUTROPHILS NFR BLD AUTO: 75.1 % (ref 42.7–76)
NRBC BLD AUTO-RTO: 0 /100 WBC (ref 0–0.2)
PLATELET # BLD AUTO: 211 10*3/MM3 (ref 140–450)
PMV BLD AUTO: 10.8 FL (ref 6–12)
POTASSIUM SERPL-SCNC: 4.3 MMOL/L (ref 3.5–5.2)
RBC # BLD AUTO: 3.62 10*6/MM3 (ref 3.77–5.28)
SODIUM SERPL-SCNC: 141 MMOL/L (ref 136–145)
WBC # BLD AUTO: 8.17 10*3/MM3 (ref 3.4–10.8)

## 2020-07-03 PROCEDURE — 94799 UNLISTED PULMONARY SVC/PX: CPT

## 2020-07-03 PROCEDURE — 85025 COMPLETE CBC W/AUTO DIFF WBC: CPT | Performed by: SURGERY

## 2020-07-03 PROCEDURE — 82962 GLUCOSE BLOOD TEST: CPT

## 2020-07-03 PROCEDURE — 94760 N-INVAS EAR/PLS OXIMETRY 1: CPT

## 2020-07-03 PROCEDURE — 63710000001 INSULIN REGULAR HUMAN PER 5 UNITS: Performed by: NURSE PRACTITIONER

## 2020-07-03 PROCEDURE — 63710000001 INSULIN GLARGINE PER 5 UNITS: Performed by: SURGERY

## 2020-07-03 PROCEDURE — 80048 BASIC METABOLIC PNL TOTAL CA: CPT | Performed by: NURSE PRACTITIONER

## 2020-07-03 RX ORDER — METOPROLOL TARTRATE 50 MG/1
50 TABLET, FILM COATED ORAL 2 TIMES DAILY
Qty: 60 TABLET | Refills: 0 | Status: SHIPPED | OUTPATIENT
Start: 2020-07-03

## 2020-07-03 RX ORDER — METOCLOPRAMIDE 10 MG/1
10 TABLET ORAL 2 TIMES DAILY
Start: 2020-07-03

## 2020-07-03 RX ORDER — LEVETIRACETAM 100 MG/ML
750 SOLUTION ORAL 2 TIMES DAILY
Qty: 450 ML | Refills: 0 | Status: SHIPPED | OUTPATIENT
Start: 2020-07-03 | End: 2020-07-31 | Stop reason: HOSPADM

## 2020-07-03 RX ORDER — PRAVASTATIN SODIUM 40 MG
40 TABLET ORAL NIGHTLY
Start: 2020-07-03

## 2020-07-03 RX ORDER — DEXLANSOPRAZOLE 30 MG/1
30 CAPSULE, DELAYED RELEASE ORAL DAILY
Start: 2020-07-03

## 2020-07-03 RX ORDER — BUDESONIDE AND FORMOTEROL FUMARATE DIHYDRATE 80; 4.5 UG/1; UG/1
2 AEROSOL RESPIRATORY (INHALATION)
Qty: 10.2 G | Refills: 0 | Status: SHIPPED | OUTPATIENT
Start: 2020-07-03

## 2020-07-03 RX ADMIN — ASPIRIN 81 MG: 81 TABLET, COATED ORAL at 10:16

## 2020-07-03 RX ADMIN — METOPROLOL TARTRATE 50 MG: 50 TABLET, FILM COATED ORAL at 10:17

## 2020-07-03 RX ADMIN — BUDESONIDE 0.5 MG: 0.5 INHALANT RESPIRATORY (INHALATION) at 07:49

## 2020-07-03 RX ADMIN — MULTIPLE VITAMINS W/ MINERALS TAB 1 TABLET: TAB at 10:17

## 2020-07-03 RX ADMIN — INSULIN HUMAN 3 UNITS: 100 INJECTION, SOLUTION PARENTERAL at 00:42

## 2020-07-03 RX ADMIN — LEVETIRACETAM 750 MG: 100 SOLUTION ORAL at 10:16

## 2020-07-03 RX ADMIN — BUSPIRONE HYDROCHLORIDE 15 MG: 15 TABLET ORAL at 06:35

## 2020-07-03 RX ADMIN — IPRATROPIUM BROMIDE AND ALBUTEROL SULFATE 3 ML: 2.5; .5 SOLUTION RESPIRATORY (INHALATION) at 11:29

## 2020-07-03 RX ADMIN — SODIUM CHLORIDE, PRESERVATIVE FREE 10 ML: 5 INJECTION INTRAVENOUS at 10:18

## 2020-07-03 RX ADMIN — FUROSEMIDE 40 MG: 40 TABLET ORAL at 10:17

## 2020-07-03 RX ADMIN — DICLOFENAC SODIUM 2 G: 10 GEL TOPICAL at 10:17

## 2020-07-03 RX ADMIN — INSULIN GLARGINE 22 UNITS: 100 INJECTION, SOLUTION SUBCUTANEOUS at 06:43

## 2020-07-03 RX ADMIN — APIXABAN 2.5 MG: 2.5 TABLET, FILM COATED ORAL at 10:17

## 2020-07-03 RX ADMIN — LANSOPRAZOLE 30 MG: KIT at 06:35

## 2020-07-03 RX ADMIN — IPRATROPIUM BROMIDE AND ALBUTEROL SULFATE 3 ML: 2.5; .5 SOLUTION RESPIRATORY (INHALATION) at 07:50

## 2020-07-03 RX ADMIN — INSULIN HUMAN 3 UNITS: 100 INJECTION, SOLUTION PARENTERAL at 12:16

## 2020-07-03 NOTE — SIGNIFICANT NOTE
07/03/20 1119   Rehab Time/Intention   Evaluation Not Performed other (see comments)  (Pt scheduled for d/c today home with daughter.  Pt with PEG placed 6/30.  REC home health for dysphagia tx to progress toward po.)   Rehab Treatment   Discipline speech language pathologist

## 2020-07-03 NOTE — PROGRESS NOTES
Case Management Discharge Note      Final Note: Home with [facility] and [facility] for tube feedings.    Provided Post Acute Provider List?: Yes  Post Acute Provider List: Home Health, Nursing Home  Provided Post Acute Provider Quality & Resource List?: Yes  Post Acute Provider Quality and Resource List: Home Health  Delivered To: Support Person  Support Person: wendie Mcknight [phone]   Method of Delivery: In person    Destination      No service has been selected for the patient.      Durable Medical Equipment      No service has been selected for the patient.      Dialysis/Infusion      No service has been selected for the patient.      Home Medical Care      No service has been selected for the patient.      Therapy      No service has been selected for the patient.      Community Resources      No service has been selected for the patient.             Final Discharge Disposition Code: 06 - home with home health care

## 2020-07-03 NOTE — PLAN OF CARE
Placed call to dgt, Dorcas Mcknight, and asked if she could come to the hospital to visit earlier today so that she could see how patient moves with therapies, how to work patient's trilogy that [facility] will be delivering, and to participate with some care due to daughter taking patient home to her house at d/c. Ms. Mcknight present for therapy and teaching about trilogy. Educated at bedside about bolus feeding and taught Ms. Mcknight how to correctly administer a bolus with hands on education under direct nursing supervision. Patient up to chair and back to bed, climbing out of bed x1 when needed to use bedpan. Continent of urine during the day and uses purewick for night time.

## 2020-07-03 NOTE — NURSING NOTE
Asked to re-eval for possible inpatient acute rehab. Patient remains low level and slow to progress. Discussed with Dr. Martinez, continue to recommend subacute level rehab.    Madison Espino RN  Rehab Admissions Nurse  [phone]

## 2020-07-03 NOTE — PLAN OF CARE
Problem: Patient Care Overview  Goal: Plan of Care Review  Outcome: Ongoing (interventions implemented as appropriate)  Flowsheets (Taken 7/3/2020 4230)  Progress: improving  Plan of Care Reviewed With: patient

## 2020-07-03 NOTE — PROGRESS NOTES
Continued Stay Note  [facility]     Patient Name: Erika Irvin  MRN: [id number]  Today's Date: 7/3/2020    Admit Date: 6/12/2020    Discharge Plan     Row Name 07/03/20 1150       Plan    Plan Comments  Spoke with [facility] and tube feedings cost per month is $260.  Called dgt Dorcas to inform and she agrees to this cost.  Informed her that [facility] has a payment plan also.    Row Name 07/03/20 1104       Plan    Final Discharge Disposition Code  06 - home with home health care    Final Note  Home with [facility] and [facility] for tube feedings.    Row Name 07/03/20 1057       Plan    Plan  Home with dgt and [facility]    Plan Comments  Spoke with dgt Dorcas Mcknight.  Plan is for patient to discharge home with dgt today.  nicolas was here yesterday for teaching with nursing concerning tube feedings and Trilogy and general care needs.  [facility] home.  Paiges plans to visit on Monday to check on Tri.ogy.  t states she knows how to mange the Trilogy for patient.  Patient will d/c home on [facility].  Called [facility] and first month is free and the cost after that is$8.67 per month.  Informed dgt and she agrees to this amount.  Spoke with Lidia ROMERO with [facility] and faxed d/c summary to admissions at [phone].  Called [facility] and informed them of discharge.  They will have their nurse meet with the dgt prior to discharge.  Nursing plans to sent extra tube feeding home with dgt.  Patient will go home by car with assist of dgt and son in law at requset of the dgt.  Discussed [facility] transport and cost by declined.            Discharge Codes    No documentation.       Expected Discharge Date and Time     Expected Discharge Date Expected Discharge Time    Jul 3, 2020             Marycarmen Parish, RN

## 2020-07-03 NOTE — PROGRESS NOTES
Continued Stay Note  [facility]     Patient Name: Erika Irvin  MRN: [id number]  Today's Date: 7/3/2020    Admit Date: 6/12/2020    Discharge Plan     Row Name 07/03/20 1251       Plan    Plan  Home with [facility]    Plan Comments  Spoke with Lidia ROMERO with [facility] and due to a staffing problem they may not be able to see patient until Sunday.  Lidia stated if patient needs to be seen sooner to refer to another  agency.  Spoke with shorty and patient will need another referral and is agreeabl to [facility].  Spoke with Flavia Peralta with [facility] and they have accepted and spoken with dgt.      Final Discharge Disposition Code  06 - home with home health care    Final Note  Home with [facility]    Row Name 07/03/20 1150       Plan    Plan Comments  Spoke with [facility] and tube feedings cost per month is $260.  Called shorty Toscano to inform and she agrees to this cost.  Informed her that [facility] has a payment plan also.    Row Name 07/03/20 1104       Plan    Final Discharge Disposition Code  06 - home with home health care    Final Note  Home with [facility] and [facility] for tube feedings.    Row Name 07/03/20 1057       Plan    Plan  Home with dgt and [facility]    Plan Comments  Spoke with shorty Mcknight.  Plan is for patient to discharge home with t today.  nicolas was here yesterday for teaching with nursing concerning tube feedings and Trilogy and general care needs.  [facility] has delivered hospital bed to [facility]'s home.  [facility] plans to visit on Monday to check on Tri.ogy.  [facility] states she knows how to mange the Trilogy for patient.  Patient will d/c home on Eliquis.  Called [facility] and first month is free and the cost after that is$8.67 per month.  Informed randt and she agrees to this amount.  Spoke with Lidia ROMERO with [facility] and faxed d/c summary to admissions at [phone].  Called [facility] and informed them of discharge.  They will have their nurse meet with the dgt prior to discharge.   Nursing plans to sent extra tube feeding home with dgt.  Patient will go home by car with assist of dgt and son in law at requset of the dgt.  Discussed [facility] transport and cost by declined.            Discharge Codes    No documentation.       Expected Discharge Date and Time     Expected Discharge Date Expected Discharge Time    Jul 3, 2020             Marycarmen Parish RN

## 2020-07-03 NOTE — PROGRESS NOTES
Continued Stay Note  [facility]     Patient Name: Erika Irvin  MRN: [id number]  Today's Date: 7/3/2020    Admit Date: 6/12/2020    Discharge Plan     Row Name 07/03/20 1057       Plan    Plan  Home with dgt and [facility]    Plan Comments  Spoke with t Dorcas Mcknight.  Plan is for patient to discharge home with dgt today.  Dgt was here yesterday for teaching with nursing concerning tube feedings and Trilogy and general care needs.  [facility] has [facility].  [facility] plans to visit on Monday to check on Trilogy.  Dgt states she knows how to manage the Trilogy for patient.  Patient will d/c home on [employer].  Called [facility] and first month is free and the cost after that is $8.67 per month.  Informed dgt and she agrees to this amount.  Spoke with Lidia ROMERO with [facility] and faxed d/c summary to admissions at [phone].  Called [facility] and spoke with Colleen and informed them of discharge.  They will have their nurse meet with the dgt prior to discharge.  Nursing plans to send extra tube feeding home with dgt.  Patient will go home by car with assist of dgt and son in law at request of the dgt.  Discussed [facility] transport and cost but dgt declined.            Discharge Codes    No documentation.       Expected Discharge Date and Time     Expected Discharge Date Expected Discharge Time    Jul 3, 2020             Marycarmen Parish RN

## 2020-07-03 NOTE — DISCHARGE SUMMARY
Date of Admission: 6/12/2020  Date of Discharge:  7/3/2020  Primary Care Physician: Bruce Maldonado MD     Discharge Diagnosis:  Active Hospital Problems    Diagnosis  POA   • **New onset atrial fibrillation (CMS/HCC) [I48.91]  Yes   • Dysphagia [R13.10]  Yes   • Chronic deep vein thrombosis (DVT) of left upper extremity (CMS/HCC) [I82.722]  Clinically Undetermined   • Pain of right middle finger [M79.644]  Unknown   • Expressive aphasia [R47.01]  Yes   • Hypokalemia [E87.6]  Unknown   • Metabolic encephalopathy [G93.41]  No   • Acute respiratory failure with hypercapnia (CMS/HCC) [J96.02]  Yes   • Acute kidney injury (CMS/HCC) [N17.9]  Yes   • Elevated troponin [R79.89]  Yes   • Elevated LFTs [R79.89]  Yes   • CKD (chronic kidney disease) stage 3, GFR 30-59 ml/min (CMS/HCC) [N18.3]  Yes   • Morbid obesity with BMI of 40.0-44.9, adult (CMS/[facility]) [E66.01, Z68.41]  Not Applicable   • Type 2 diabetes mellitus with stage 3 chronic kidney disease, with long-term current use of insulin (CMS/[facility]) [E11.22, N18.3, Z79.4]  Not Applicable   • Hypertension [I10]  Yes   • Hyperlipidemia [E78.5]  Yes   • COPD (chronic obstructive pulmonary disease) (CMS/HCC) [J44.9]  Yes   • Oropharyngeal dysphagia [R13.12]  Unknown   • Malignant neoplasm of upper-inner quadrant of right female breast (CMS/HCC) [C50.211]  Yes   • Anemia of chronic renal failure, stage 3 (moderate) (CMS/HCC) [N18.3, D63.1]  Yes      Resolved Hospital Problems   No resolved problems to display.       Presenting Problem/History of Present Illness:  Shortness of breath [R06.02]  New onset atrial fibrillation (CMS/HCC) [I48.91]  Respiratory failure with hypercapnia, unspecified chronicity (CMS/HCC) [J96.92]     Ms. Irvin is a 84 y.o. female with a history of anemia, breast cancer, CKD3, COPD, DM2, HLD, HTN, YARY, CVA that presents to [facility] for reported tachycardia. Patient was in ACU receiving a blood transfusion when nurses noticed her  tachycardia. She was brought to ED where they found her to be in new onset a fib. Patient also reports shortness of breath, chronic but worse than usual, and reports that she thinks she has gained weight. Patient denies fever, chest pain, abdominal pain, changes in bowel or bladder habits, edema. She does report that she feels like her abdomen is more distended than normal. Labs done tonight show elevated troponin and BNP, CXR showed mild vascular congestion and possible tiny bilateral pleural effusions. Patient had previously been given lasix in between units of blood while in ACU and given addition IV lasix and metoprolol while in ED with reported improvement in her shortness of breath and heart rate.     Hospital Course:  The patient is a 84 y.o. female who presented with acute on chronic mixed respiratory failure in the setting of acute on chronic diastolic heart failure and COPD and pulmonary hypertension.  She was admitted, had a prolonged and complicated hospital course with multiple consulting services.  In brief she had pulmonology and cardiology evaluations.  She was diuresed and underwent thoracentesis.  She did have HAYDEE on CKD 3 due to decreased cardiac output and also rapid atrial fibrillation.  Her medications were adjusted and rate was eventually controlled.  She did require BiPAP for her respiratory failure and home trilogy machine is being arranged.  She had worsening mental status and neurology was consulted.  There is possibility that some of this was from metabolic encephalopathy however she also has expressive a aphasia.  MRI was obtained and did not show any acute stroke.  Neurology feels that she has an occult MRI negative stroke however repeat MRI is not going to be obtained because she would require sedation to get the MRI and this would likely cause a worsening of her already tenuous respiratory status.  She had reevaluation by speech therapy and could not swallow.  Surgery was consulted  and PEG placed.  She is tolerating tube feeds and has been cleared for discharge.  Family did not want SNF and she did undergo evaluation by acute rehab but did not qualify for that.  They have elected to take her home with home health rather than pursue SNF placement.    She has been started on Eliquis dose adjusted for atrial fibrillation to decrease risk for future stroke.  She also is on rate control medication now.  Her Keppra dose was increased by neurology due to concern for possible seizure activity as well.      Exam Today:  Constitutional: She is oriented to person, place, and time. She appears well-developed. No distress.   Chronically ill appearing, but no distress. Pleasant. Alert and talkative.   HENT:   Head: Normocephalic and atraumatic.   Nose: Nose normal.   Mouth/Throat: Oropharynx is clear and moist.   Eyes: Conjunctivae are normal. Right eye exhibits no discharge. Left eye exhibits no discharge.   Neck: Normal range of motion. Neck supple.   Cardiovascular: Normal rate, normal heart sounds and intact distal pulses. An irregularly irregular rhythm present.   Pulmonary/Chest: Effort normal and breath sounds normal. No respiratory distress.   Diminished, but clear   Abdominal: Soft. Bowel sounds are normal. She exhibits no distension. There is no tenderness.   PEG tube in place.   Musculoskeletal: Normal range of motion. She exhibits no edema or tenderness.   Neurological: She is alert. She exhibits normal muscle tone.   Expressive aphasia present.   Skin: Skin is warm and dry. She is not diaphoretic. No erythema.   Psychiatric: She has a normal mood and affect. Her behavior is normal.     Results:  Final Diagnosis   1. Right Pleural Fluid, Thin Prep (1) and cell block (1):               A. Negative for malignant cells with mesothelial cells, viable and degenerating histiocytes and lymphocytes                   noted.                 B. Cell block contains benign and reactive mesothelial cells,  histiocytes, inflammatory cells and blood.        EEG  Grossly abnormal study being moderately diffusely slow consistent with a moderate diffuse encephalopathy.  Clear-cut more focal findings are not appreciated on this suboptimal study due to poor patient compliance    CXR  Orogastric tube the course in the stomach and the tip is collimated out  of the field-of-view.     The lungs are hypoinflated. There is pulmonary vascular congestion with  superimposed interstitial thickening and pulmonary opacification, most  notably within the bilateral mid and lower lung fields, likely  representing pulmonary edema, as before.. No pneumothorax is seen.  Previously seen right pleural effusion is not definitely visualized.  Please note a lateral radiograph would provide a more accurate  assessment of pleural effusions. The heart is enlarged and grossly  unchanged in size since 06/13/2020.    CT Head  The CT scan was performed as an emergency procedure through the brain  without contrast there is moderate diffuse atrophy and chronic small  vessel ischemic change similar to previous studies. Again noted is a  left frontal craniotomy with some post surgical encephalomalacia high in  the left frontal lobe as also noted on yesterday's MRI scan and  yesterday's CT scan. There is no evidence of acute intracranial  hemorrhage or mass effect and the visualized sinuses are clear.    MRI Brain  1. No areas of restricted diffusion seen to suggest acute infarct.    2. Chronic area of encephalomalacia within the left frontal lobe    TTE  · Left ventricular wall thickness is consistent with mild-to-moderate concentric hypertrophy.  · Estimated EF = 50%.  · Left ventricular systolic function is low normal.  · Left ventricular diastolic dysfunction (grade II) consistent with pseudonormalization.  · Right ventricular cavity is moderate-to-severely dilated.  · Mildly reduced right ventricular systolic function noted.  · Left atrial cavity size is  mild-to-moderately dilated.  · Mild mitral valve regurgitation is present  · Moderate tricuspid valve regurgitation is present.     LV size normal  LV systolic function mildly decreased globally, EF 50% in the setting of A. fib RVR  RV sizes moderate to severely increased with mildly reduced to moderate reduced RV function  Severe pulmonary hypertension by instantaneous gradient assessment noninvasively, RVSP estimated 55-60 with right atrial pressure of 15 with evidence of RV pressure volume overload  Septal motion consistent with RV pressure volume overload  Mild to moderately increased left atrial size, moderately increased right atrial size  Moderate TR  Mild MR  No valvular stenoses seen  At least grade 2 diastolic dysfunction by criteria  No masses or effusion seen    Carotid Duplex  · Proximal right internal carotid artery mild stenosis.  · Proximal left internal carotid artery plaque without significant stenosis.      Procedures Performed:  Procedure(s):  PERCUTANEOUS ENDOSCOPIC GASTROSTOMY TUBE INSERTION       Consults:   Consults     Date and Time Order Name Status Description    6/30/2020 1845 Inpatient Cardiology Consult      6/30/2020 0030 Inpatient Neurology Consult Stroke Completed     6/29/2020 1332 Inpatient General Surgery Consult      6/14/2020 1220 Inpatient Pulmonology Consult Completed     6/13/2020 1205 Inpatient Neurology Consult Stroke Completed     6/13/2020 1153 Inpatient Nephrology Consult Completed     6/12/2020 1848 Hematology & Oncology Inpatient Consult Completed     6/12/2020 1810 Inpatient Cardiology Consult Completed     6/12/2020 1742 LCG (on-call MD unless specified) Completed     6/12/2020 1742 LHA (on-call MD unless specified) Details Completed            Discharge Disposition:  Home-Health Care [facility]    Discharge Medications:     Discharge Medications      New Medications      Instructions Start Date   apixaban 2.5 MG tablet tablet  Commonly known as:  ELIQUIS   2.5 mg, Per G  Tube, Every 12 Hours Scheduled      budesonide-formoterol 80-4.5 MCG/ACT inhaler  Commonly known as:  Symbicort   2 puffs, Inhalation, 2 Times Daily - RT      levETIRAcetam 100 MG/ML solution  Commonly known as:  KEPPRA  Replaces:  levETIRAcetam 500 MG tablet   750 mg, Per G Tube, 2 Times Daily      metoprolol tartrate 50 MG tablet  Commonly known as:  LOPRESSOR   50 mg, Per G Tube, 2 Times Daily         Changes to Medications      Instructions Start Date   dexlansoprazole 30 MG capsule  Commonly known as:  Dexilant  What changed:  how to take this   30 mg, Per G Tube, Daily      metoclopramide 10 MG tablet  Commonly known as:  REGLAN  What changed:  how to take this   10 mg, Per G Tube, 2 times daily, Before a meal      pravastatin 40 MG tablet  Commonly known as:  PRAVACHOL  What changed:  how to take this   40 mg, Per G Tube, Nightly         Continue These Medications      Instructions Start Date   aspirin 81 MG tablet   81 mg, Oral, Daily      busPIRone 15 MG tablet  Commonly known as:  BUSPAR   30 mg, Oral, Every Evening      busPIRone 15 MG tablet  Commonly known as:  BUSPAR   1 tablet, Oral, Every Morning      cyanocobalamin 1000 MCG tablet  Commonly known as:  VITAMIN B-12   1,000 mcg, Oral, Daily      diclofenac 1 % gel gel  Commonly known as:  VOLTAREN   2 g, Topical, 3 Times Daily      doxepin 50 MG capsule  Commonly known as:  SINEquan   100 mg, Oral, Every Night at Bedtime      DuoNeb 0.5-2.5 mg/3 ml nebulizer  Generic drug:  ipratropium-albuterol   3 mL, Inhalation, As Needed      epoetin joe 74494 UNIT/ML injection  Commonly known as:  EPOGEN,PROCRIT   5,000 Units, Subcutaneous, See Admin Instructions, Patient's daughter is unsure how often she was receiving.      furosemide 40 MG tablet  Commonly known as:  LASIX   1 tablet, Oral, Daily      HYDROcodone-acetaminophen 5-325 MG per tablet  Commonly known as:  NORCO   1 tablet, Oral, Every 8 Hours PRN, For pain      Lantus 100 UNIT/ML  injection  Generic drug:  insulin glargine   22 Units, Subcutaneous, Daily      MULTIVITAL PO   1 tablet, Oral, Daily      temazepam 15 MG capsule  Commonly known as:  RESTORIL   15 mg, Oral, Nightly PRN, At bedtime         Stop These Medications    insulin aspart 100 UNIT/ML injection  Commonly known as:  novoLOG     levETIRAcetam 500 MG tablet  Commonly known as:  KEPPRA  Replaced by:  levETIRAcetam 100 MG/ML solution     MEDROL (ALISSA) PO            Discharge Diet:   Diet Instructions     Diet: Tube Feeding; Bolus; Glucerna 1.2, 250ml 5x daily. 150ml water flush with each bolus      Discharge Diet:  Tube Feeding    Feeding Type:  Bolus    Formula, Amount & Frequency:  Glucerna 1.2, 250ml 5x daily. 150ml water flush with each bolus          Activity at Discharge:   Activity Instructions     Activity as Tolerated            Follow-up Appointments:  Additional Instructions for the Follow-ups that You Need to Schedule     Referral to Home Health   As directed      Face to Face Visit Date:  7/3/2020    Follow-up provider for Plan of Care?:  I treated the patient in an acute care facility and will not continue treatment after discharge.    Follow-up provider:  JESSICA GIBBONS [[id number]]    Reason/Clinical Findings:  occult stroke, afib, PEG    Describe mobility limitations that make leaving home difficult:  see above    Nursing/Therapeutic Services Requested:  Skilled Nursing Physical Therapy Occupational Therapy Speech Therapy    Skilled nursing orders:  Neurovascular assessments    PT orders:  Therapeutic exercise Strengthening Home safety assessment    Occupational orders:  Activities of daily living Strengthening Cognition Home safety assessment    SLP orders:  Dysphagia therapy Articulation Language Cognition Voice    Frequency:  1 Week 1           Follow-up Information     Jamaal Greer MD Follow up in 4 week(s).    Specialties:  Cardiology, Hospitalist  Why:  Follow-up on 7/14 at 10:15 am.  Contact  information:  [address]  [facility] [address]  [place] [facility] [address]  [phone]             Bruce Maldonado MD Follow up.    Specialty:  Internal Medicine  Contact information:  [address]  [facility] [id number] [phone]             Rhina Gillis MD Follow up.    Specialties:  Neurology, Sleep Medicine  Contact information:  [address]  [place] [id number] [phone]             [facility] REFERRAL [place] AND [place] .    Specialty:  Home Health Services  Contact information:  [address]  [place] [address]                 Test Results Pending at Discharge:   Order Current Status    AFB Culture - Body Fluid, Pleural Cavity Preliminary result           Gilles Lindsey MD  07/03/20  09:40    Time Spent on Discharge Activities: >30 minutes    Dictated portions using Dragon dictation software.

## 2020-07-04 ENCOUNTER — READMISSION MANAGEMENT (OUTPATIENT)
Dept: CALL CENTER | Facility: HOSPITAL | Age: 85
End: 2020-07-04

## 2020-07-04 NOTE — OUTREACH NOTE
Prep Survey      Responses   [facility] facility patient discharged from?  [place]   Is LACE score < 7 ?  No   Eligibility  Readm Mgmt   Discharge diagnosis  New onset atrial fibrillation DysphagiaChronic deep vein thrombosis    COVID-19 Test Status  Negative   Does the patient have one of the following disease processes/diagnoses(primary or secondary)?  Other   Does the patient have Home health ordered?  Yes   What is the Home health agency?   [facility]   Is there a DME ordered?  No   Prep survey completed?  Yes          Marina Jose RN

## 2020-07-06 ENCOUNTER — READMISSION MANAGEMENT (OUTPATIENT)
Dept: CALL CENTER | Facility: HOSPITAL | Age: 85
End: 2020-07-06

## 2020-07-06 NOTE — OUTREACH NOTE
Medical Week 1 Survey      Responses   [facility]   COVID-19 Test Status  Negative   Does the patient have one of the following disease processes/diagnoses(primary or secondary)?  Other   Is there a successful TCM telephone encounter documented?  No   Week 1 attempt successful?  Yes   Call start time  1658   Call end time  1704   Discharge diagnosis  New onset atrial fibrillation DysphagiaChronic deep vein thrombosis    Person spoke with today (if not patient) and relationship  Daughter   Meds reviewed with patient/caregiver?  Yes   Is the patient having any side effects they believe may be caused by any medication additions or changes?  No   Does the patient have all medications ordered at discharge?  Yes   Is the patient taking all medications as directed (includes completed medication regime)?  Yes   Does the patient have a primary care provider?   Yes   Does the patient have an appointment with their PCP within 7 days of discharge?  Yes   Comments regarding PCP  Televisit with provider tomorrow r/t sugars being high.     Has the patient kept scheduled appointments due by today?  Yes   Comments  Daughter called provider last night and was instructed to increase Lantus   What is the Home health agency?   [facility] visited today.  Speech will be arriving tomorrow.  PT visited yesterday.   Has home health visited the patient within 72 hours of discharge?  Yes   Pulse Ox monitoring  None   Psychosocial issues?  No   Did the patient receive a copy of their discharge instructions?  Yes   Nursing interventions  Reviewed instructions with patient   What is the patient's perception of their health status since discharge?  Improving   Is the patient/caregiver able to teach back signs and symptoms related to disease process for when to call PCP?  Yes   Is the patient/caregiver able to teach back signs and symptoms related to disease process for when to call 911?  Yes   Is the  patient/caregiver able to teach back the hierarchy of who to call/visit for symptoms/problems? PCP, Specialist, Home health nurse, Urgent Care, ED, 911  Yes   Additional teach back comments  Daughter states patient was coughing a lot yesterday.  VS WNL.  Cough improved this morning.     Week 1 call completed?  Yes   Wrap up additional comments  Daughter states patient is stable.  She has an appointment with provider via televisit tomorrow and will discuss medications and sugars at that time.           Jacquie Lazaro RN

## 2020-07-14 ENCOUNTER — READMISSION MANAGEMENT (OUTPATIENT)
Dept: CALL CENTER | Facility: HOSPITAL | Age: 85
End: 2020-07-14

## 2020-07-14 ENCOUNTER — OFFICE VISIT (OUTPATIENT)
Dept: CARDIOLOGY | Facility: CLINIC | Age: 85
End: 2020-07-14

## 2020-07-14 VITALS
HEART RATE: 85 BPM | RESPIRATION RATE: 18 BRPM | SYSTOLIC BLOOD PRESSURE: 108 MMHG | WEIGHT: 188 LBS | OXYGEN SATURATION: 99 % | DIASTOLIC BLOOD PRESSURE: 60 MMHG | HEIGHT: 60 IN | BODY MASS INDEX: 36.91 KG/M2

## 2020-07-14 DIAGNOSIS — I82.722 CHRONIC DEEP VEIN THROMBOSIS (DVT) OF LEFT UPPER EXTREMITY, UNSPECIFIED VEIN (HCC): ICD-10-CM

## 2020-07-14 DIAGNOSIS — E78.1 PURE HYPERTRIGLYCERIDEMIA: ICD-10-CM

## 2020-07-14 DIAGNOSIS — I50.33 ACUTE ON CHRONIC DIASTOLIC CHF (CONGESTIVE HEART FAILURE) (HCC): ICD-10-CM

## 2020-07-14 DIAGNOSIS — I48.91 NEW ONSET ATRIAL FIBRILLATION (HCC): ICD-10-CM

## 2020-07-14 DIAGNOSIS — I10 ESSENTIAL HYPERTENSION: Primary | ICD-10-CM

## 2020-07-14 DIAGNOSIS — E66.01 MORBID OBESITY WITH BMI OF 40.0-44.9, ADULT (HCC): ICD-10-CM

## 2020-07-14 PROCEDURE — 99214 OFFICE O/P EST MOD 30 MIN: CPT | Performed by: INTERNAL MEDICINE

## 2020-07-14 RX ORDER — FUROSEMIDE 40 MG/1
40 TABLET ORAL 2 TIMES DAILY
Qty: 60 TABLET | Refills: 5 | Status: SHIPPED | OUTPATIENT
Start: 2020-07-14

## 2020-07-14 NOTE — PROGRESS NOTES
Cardiology clinic note  Jamaal Greer MD, PhD  Subjective:     Encounter Date:07/14/2020      Patient ID: Erika Irvin is a 84 y.o. female.    Chief Complaint:  Chief Complaint   Patient presents with   • Follow-up       HPI:  History of Present Illness  I had the pleasure of seeing this very pleasant 84-year-old female today who is well-known to me from prior hospitalization with significant past cardiac history of diastolic congestive heart failure, new onset atrial fibrillation with recent hospitalization, diabetes, hypertension hyperlipidemia morbid obesity.  She was recently hospitalized with respiratory failure as well as new onset atrial fibrillation and acute on chronic decompensated diastolic congestive heart failure.  She underwent diuresis and rate control as well as anticoagulation initiation for stroke risk reduction with new onset A. fib and she remains rate controlled presently.  She had a high likelihood of recurrence of atrial fibrillation and therefore rate control strategy was utilized with preserved LV systolic function.  She presents back to clinic today with mild volume overload with JVD as well as HJR as well as 1-2+ peripheral edema.  I discussed her care with her daughter as well as her granddaughter and perhaps the patient is more functional with physical therapy but they can really not say whether she is more or less volume overloaded with respect to her discharge.  On my clinical assessment today she says her shortness of breath is stable NYHA class III however her edema is significant as well as her JVD and she likely needs more loop diuretics.  She agreed to follow-up with me in 2 weeks to try to keep her out of the hospital and we will increase her Lasix to 40 twice daily today with BMP in 5 to 7 days.  She has no other complaints of anginal chest pain syncope palpitations or PND presently.    Review of systems a 14 point review of systems is negative except as mentioned  above    Historical data copied for brief encounters and EMR is unchanged    Atrial fibrillation rate controlled today rate 80s to 90s    The following portions of the patient's history were reviewed and updated as appropriate: allergies, current medications, past family history, past medical history, past social history, past surgical history and problem list.    Problem List:  Patient Active Problem List   Diagnosis   • Anemia of chronic renal failure, stage 3 (moderate) (CMS/[facility])   • Malignant neoplasm of upper-inner quadrant of right female breast (CMS/HCC)   • Ductal carcinoma in situ (DCIS) of left breast   • Iron deficiency anemia   • At risk for bone density loss   • Cerebral meningioma (CMS/HCC)   • Aromatase inhibitor use   • New onset atrial fibrillation (CMS/[facility])   • Elevated troponin   • Elevated LFTs   • CKD (chronic kidney disease) stage 3, GFR 30-59 ml/min (CMS/HCC)   • Morbid obesity with BMI of 40.0-44.9, adult (CMS/[facility])   • Type 2 diabetes mellitus with stage 3 chronic kidney disease, with long-term current use of insulin (CMS/[facility])   • Hypertension   • Hyperlipidemia   • COPD (chronic obstructive pulmonary disease) (CMS/[facility])   • Acute kidney injury (CMS/[facility])   • Metabolic encephalopathy   • Acute respiratory failure with hypercapnia (CMS/[facility])   • Hypokalemia   • Expressive aphasia   • Pain of right middle finger   • Chronic deep vein thrombosis (DVT) of left upper extremity (CMS/HCC)   • Dysphagia   • Oropharyngeal dysphagia       Past Medical History:  Past Medical History:   Diagnosis Date   • Anemia     Iron deficiency anemia    • Anxiety    • Arthritis    • Breast cancer (CMS/[facility]) 08/2014    right    • Breast cancer (CMS/[facility]) 12/2006    Left   • Chronic kidney disease     Anemia of chronic renal insufficency, stage 3   • COPD (chronic obstructive pulmonary disease) (CMS/[facility])    • Depression    • Diabetes mellitus (CMS/[facility])    • Hyperlipidemia    • Hypertension    • Irregular heartbeat      "noted by RN on 6/12/2020   • YARY (obstructive sleep apnea)    • Poor circulation    • Stroke (CMS/HCC)     CVA in 1990.       Past Surgical History:  Past Surgical History:   Procedure Laterality Date   • BREAST LUMPECTOMY  08/2014    Right-sided invasive ductal carcinoma,papillary type   • BREAST LUMPECTOMY Left 2006   • BREAST SURGERY Right 09/2014    enlargement of lumpectomy site   • CHOLECYSTECTOMY     • CRANIOTOMY Left 08/2013    with removal meningioma   • HYSTERECTOMY     • PEG TUBE INSERTION N/A 6/30/2020    Procedure: PERCUTANEOUS ENDOSCOPIC GASTROSTOMY TUBE INSERTION;  Surgeon: Jared Ibarra MD;  Location: [facility] ENDOSCOPY;  Service: General;  Laterality: N/A;  dysphagia   • TUBAL ABDOMINAL LIGATION         Social History:  Social History     Socioeconomic History   • Marital status:      Spouse name: Not on file   • Number of children: Not on file   • Years of education: High School   • Highest education level: Not on file   Occupational History   • Occupation: Nurse aid     Employer: RETIRED   Tobacco Use   • Smoking status: Never Smoker   • Smokeless tobacco: Never Used   Substance and Sexual Activity   • Alcohol use: No   • Drug use: No   • Sexual activity: Defer       Allergies:  Allergies   Allergen Reactions   • Sulfa Antibiotics Unknown - Low Severity     GI upset   • Penicillins Itching and Rash       Immunizations:    There is no immunization history on file for this patient.    ROS:  ROS       Objective:         /60 (BP Location: Left arm, Patient Position: Sitting)   Pulse 85   Resp 18   Ht 152.4 cm (60\")   Wt 85.3 kg (188 lb)   SpO2 99% Comment: 2L  BMI 36.72 kg/m²     Physical Exam  No acute distress alert and oriented x3 afebrile vital signs stable  Irregularly irregular, no rubs gallops 1 out of 6 stock ejection murmur lower sternal border  Diminished breath sounds bases with mild crackles and likely atelectasis clear apically however  Obese soft nontender " nondistended bowel sounds are positive  Positive HJR  No clubbing or cyanosis but she has 2+ pitting edema to the shins with legs in dependent position  Normocephalic atraumatic pupils equal round extraocular movement intact bilaterally  Trachea midline neck supple no carotid bruits  No bony abnormalities grossly  Skin is warm and dry normal cap refill  Normal mood and affect today    In-Office Procedure(s):  Procedures    ASCVD RIsk Score::  The ASCVD Risk score (Jennifer KWOK Jr., et al., 2013) failed to calculate for the following reasons:    The 2013 ASCVD risk score is only valid for ages 40 to 79    Recent Radiology:  Imaging Results (Most Recent)     None          Lab Review:   No results displayed because visit has over 200 results.      Hospital Outpatient Visit on 06/12/2020   Component Date Value   • Product Code 06/13/2020 [id number]    • Unit Number 06/13/2020 [id number]    • UNIT  ABO 06/13/2020 O    • UNIT  RH 06/13/2020 POS    • Crossmatch Interpretation 06/13/2020 Compatible    • Dispense Status 06/13/2020 PT    • Blood Expiration Date 06/13/2020 202007162359    • Blood Type Barcode 06/13/2020 5100    • Product Code 06/13/2020 [id number]    • Unit Number 06/13/2020 [id number]    • UNIT  ABO 06/13/2020 O    • UNIT  RH 06/13/2020 POS    • Crossmatch Interpretation 06/13/2020 Compatible    • Dispense Status 06/13/2020 PT    • Blood Expiration Date 06/13/2020 202007172359    • Blood Type Barcode 06/13/2020 5100    • ABO Type 06/12/2020 O    • RH type 06/12/2020 Positive    • Antibody Screen 06/12/2020 Negative    • T&S Expiration Date 06/12/2020 6/15/2020 11:59:59 PM    Lab on 05/27/2020   Component Date Value   • Ferritin 05/27/2020 467.00*   • Iron 05/27/2020 39    • Iron Saturation 05/27/2020 15    • Transferrin 05/27/2020 192*   • TIBC 05/27/2020 269    • WBC 05/27/2020 8.07    • RBC 05/27/2020 3.48*   • Hemoglobin 05/27/2020 9.2*   • Hematocrit 05/27/2020 31.6*   • MCV 05/27/2020 90.8    • MCH  05/27/2020 26.4*   • MCHC 05/27/2020 29.1*   • RDW 05/27/2020 13.2    • RDW-SD 05/27/2020 43.0    • MPV 05/27/2020 9.9    • Platelets 05/27/2020 258    • Neutrophil % 05/27/2020 63.3    • Lymphocyte % 05/27/2020 27.5    • Monocyte % 05/27/2020 8.6    • Eosinophil % 05/27/2020 0.0*   • Basophil % 05/27/2020 0.4    • Immature Grans % 05/27/2020 0.2    • Neutrophils, Absolute 05/27/2020 5.11    • Lymphocytes, Absolute 05/27/2020 2.22    • Monocytes, Absolute 05/27/2020 0.69    • Eosinophils, Absolute 05/27/2020 0.00    • Basophils, Absolute 05/27/2020 0.03    • Immature Grans, Absolute 05/27/2020 0.02    • nRBC 05/27/2020 0.0    Lab on 04/29/2020   Component Date Value   • Ferritin 04/29/2020 488.70*   • Iron 04/29/2020 41    • Iron Saturation 04/29/2020 16    • Transferrin 04/29/2020 183*   • TIBC 04/29/2020 256    • WBC 04/29/2020 6.85    • RBC 04/29/2020 3.38*   • Hemoglobin 04/29/2020 9.2*   • Hematocrit 04/29/2020 30.5*   • MCV 04/29/2020 90.2    • MCH 04/29/2020 27.2    • MCHC 04/29/2020 30.2*   • RDW 04/29/2020 12.7    • RDW-SD 04/29/2020 41.3    • MPV 04/29/2020 8.8    • Platelets 04/29/2020 220    • Neutrophil % 04/29/2020 62.0    • Lymphocyte % 04/29/2020 26.7    • Monocyte % 04/29/2020 10.4    • Eosinophil % 04/29/2020 0.0*   • Basophil % 04/29/2020 0.3    • Immature Grans % 04/29/2020 0.6*   • Neutrophils, Absolute 04/29/2020 4.25    • Lymphocytes, Absolute 04/29/2020 1.83    • Monocytes, Absolute 04/29/2020 0.71    • Eosinophils, Absolute 04/29/2020 0.00    • Basophils, Absolute 04/29/2020 0.02    • Immature Grans, Absolute 04/29/2020 0.04    • nRBC 04/29/2020 0.0    Lab on 04/01/2020   Component Date Value   • WBC 04/01/2020 6.59    • RBC 04/01/2020 3.63*   • Hemoglobin 04/01/2020 10.0*   • Hematocrit 04/01/2020 31.9*   • MCV 04/01/2020 87.9    • MCH 04/01/2020 27.5    • MCHC 04/01/2020 31.3*   • RDW 04/01/2020 12.2*   • RDW-SD 04/01/2020 39.5    • MPV 04/01/2020 10.2    • Platelets 04/01/2020 230    •  Neutrophil % 04/01/2020 66.2    • Lymphocyte % 04/01/2020 22.8    • Monocyte % 04/01/2020 9.4    • Eosinophil % 04/01/2020 0.2*   • Basophil % 04/01/2020 0.3    • Immature Grans % 04/01/2020 1.1*   • Neutrophils, Absolute 04/01/2020 4.37    • Lymphocytes, Absolute 04/01/2020 1.50    • Monocytes, Absolute 04/01/2020 0.62    • Eosinophils, Absolute 04/01/2020 0.01    • Basophils, Absolute 04/01/2020 0.02    • Immature Grans, Absolute 04/01/2020 0.07*   • nRBC 04/01/2020 0.0    Lab on 03/04/2020   Component Date Value   • WBC 03/04/2020 6.18    • RBC 03/04/2020 3.34*   • Hemoglobin 03/04/2020 9.3*   • Hematocrit 03/04/2020 30.4*   • MCV 03/04/2020 91.0    • MCH 03/04/2020 27.8    • MCHC 03/04/2020 30.6*   • RDW 03/04/2020 12.4    • RDW-SD 03/04/2020 41.5    • MPV 03/04/2020 10.4    • Platelets 03/04/2020 226    • Neutrophil % 03/04/2020 68.4    • Lymphocyte % 03/04/2020 21.4    • Monocyte % 03/04/2020 9.1    • Eosinophil % 03/04/2020 0.0*   • Basophil % 03/04/2020 0.3    • Immature Grans % 03/04/2020 0.8*   • Neutrophils, Absolute 03/04/2020 4.23    • Lymphocytes, Absolute 03/04/2020 1.32    • Monocytes, Absolute 03/04/2020 0.56    • Eosinophils, Absolute 03/04/2020 0.00    • Basophils, Absolute 03/04/2020 0.02    • Immature Grans, Absolute 03/04/2020 0.05    • nRBC 03/04/2020 0.0    Lab on 02/05/2020   Component Date Value   • Ferritin 02/05/2020 595.80*   • Iron 02/05/2020 73    • Iron Saturation 02/05/2020 29    • Transferrin 02/05/2020 182*   • TIBC 02/05/2020 255    • WBC 02/05/2020 4.63    • RBC 02/05/2020 3.09*   • Hemoglobin 02/05/2020 8.5*   • Hematocrit 02/05/2020 28.9*   • MCV 02/05/2020 93.5    • MCH 02/05/2020 27.5    • MCHC 02/05/2020 29.4*   • RDW 02/05/2020 12.2*   • RDW-SD 02/05/2020 41.1    • MPV 02/05/2020 9.5    • Platelets 02/05/2020 210    • Neutrophil % 02/05/2020 65.7    • Lymphocyte % 02/05/2020 23.3    • Monocyte % 02/05/2020 10.4    • Eosinophil % 02/05/2020 0.0*   • Basophil % 02/05/2020 0.2     • Immature Grans % 02/05/2020 0.4    • Neutrophils, Absolute 02/05/2020 3.04    • Lymphocytes, Absolute 02/05/2020 1.08    • Monocytes, Absolute 02/05/2020 0.48    • Eosinophils, Absolute 02/05/2020 0.00    • Basophils, Absolute 02/05/2020 0.01    • Immature Grans, Absolute 02/05/2020 0.02    • nRBC 02/05/2020 0.0                 Assessment and plan:       New onset atrial fibrillation (CMS/[facility])    Anemia of chronic renal failure, stage 3 (moderate) (CMS/[facility])    Malignant neoplasm of upper-inner quadrant of right female breast (CMS/[facility])    Elevated troponin    Elevated LFTs    CKD (chronic kidney disease) stage 3, GFR 30-59 ml/min (CMS/[facility])    Morbid obesity with BMI of 40.0-44.9, adult (CMS/[facility])    Type 2 diabetes mellitus with stage 3 chronic kidney disease, with long-term current use of insulin (CMS/[facility])    Hypertension    Hyperlipidemia    COPD (chronic obstructive pulmonary disease) (CMS/[facility])    Acute kidney injury (CMS/[facility])    Metabolic encephalopathy    Acute respiratory failure with hypercapnia (CMS/[facility])    Hypokalemia    Expressive aphasia    Pain of right middle finger    Chronic deep vein thrombosis (DVT) of left upper extremity (CMS/[facility])    Dysphagia    Oropharyngeal dysphagia  Acute on chronic diastolic congestive heart failure     1. New onset A. fib RVR  - rate controlled on BB per tube, anticoagulation continues with apixaban twice daily     2. Acute on chronic diastolic congestive heart failure  - EF 50%, grade 2 diastolic dysfunction  Worsening volume overload increase Lasix to 40 twice daily, BMP in 5 to 7 days, return to clinic in 2 weeks     3. Elevated troponin  Likely secondary to high filling pressures, no chest pain, preserved LV systolic function, medical management presently  Angina free       #4 obstructive sleep apnea CPAP as tolerated     5. Essential hypertension      #6. Hyperlipidemia     #7. Underlying dementia     #8 CKD       #9 anemia of chronic disease        #10, type II/III  pulmonary HTN, afterload reduction volume reduction         >  25-minute spent face-to-face with the patient today with greater 50% time spent discussing acute on chronic diastolic congestive heart failure, volume reduction, ambulation, elevating legs, obstructive sleep apnea with need for CPAP usage, paroxysmal atrial fibrillation with stroke risk reduction as well as all questions answered as proposed by patient and granddaughter         Jamaal Greer MD  07/14/20  .

## 2020-07-14 NOTE — OUTREACH NOTE
Medical Week 2 Survey      Responses   [facility] patient discharged from?  [place]   COVID-19 Test Status  Negative   Does the patient have one of the following disease processes/diagnoses(primary or secondary)?  Other   Week 2 attempt successful?  Yes   Call start time  0729   Discharge diagnosis  New onset atrial fibrillation DysphagiaChronic deep vein thrombosis    Call end time  0741   Person spoke with today (if not patient) and relationship  DaughterDorcas reviewed with patient/caregiver?  Yes   Is the patient taking all medications as directed (includes completed medication regime)?  Yes   Medication comments  Insulin adjusted per PCP   Does the patient have a primary care provider?   Yes   Has the patient kept scheduled appointments due by today?  Yes   Comments  Cardiology today 07/14/2020     PCP Televisit   What is the Home health agency?   [facility]   Home health comments  PT 3xweek ST 3xweek   What DME was ordered?  Ambulates with a cane.   Has all DME been delivered?  Yes   DME comments  O2 @ 2l/m   Oral nebs ususally 2x a day   Pulse Ox monitoring  Intermittent   Pulse Ox device source  Patient   O2 Sat comments  O2 sat 97%  HR 81   Psychosocial issues?  No   Comments  Tolerating feedings. Daughter cleans around GT daily, applies fresh gauze.   Nursing interventions  Reviewed instructions with patient   What is the patient's perception of their health status since discharge?  Improving   Week 2 Call Completed?  Yes          Jannette Quiroz RN

## 2020-07-21 ENCOUNTER — READMISSION MANAGEMENT (OUTPATIENT)
Dept: CALL CENTER | Facility: HOSPITAL | Age: 85
End: 2020-07-21

## 2020-07-21 NOTE — OUTREACH NOTE
Medical Week 3 Survey      Responses   [facility] patient discharged from?  [place]   COVID-19 Test Status  Negative   Does the patient have one of the following disease processes/diagnoses(primary or secondary)?  Other   Week 3 attempt successful?  Yes   Call start time  1422   Call end time  1427   Discharge diagnosis  New onset atrial fibrillation DysphagiaChronic deep vein thrombosis    Is patient permission given to speak with other caregiver?  Yes   Person spoke with today (if not patient) and relationship  Daughter, Dorcas Sánchez reviewed with patient/caregiver?  Yes   Is the patient taking all medications as directed (includes completed medication regime)?  Yes   Has the patient kept scheduled appointments due by today?  Yes   What is the Home health agency?   [facility]   Has home health visited the patient within 72 hours of discharge?  Yes   Home health comments  PT 3xweek ST 3xweek   What DME was ordered?  Ambulates with a cane.   Pulse Ox monitoring  Intermittent   What is the patient's perception of their health status since discharge?  Improving   Week 3 Call Completed?  Yes   Wrap up additional comments  O2 stays up to 98% even with exercise with PT.  HR is 80's. Therapist wants her to have swallowing study to see if she can start on solids.   Continues tube feedings.           Kia Dennis RN

## 2020-07-22 ENCOUNTER — INFUSION (OUTPATIENT)
Dept: ONCOLOGY | Facility: HOSPITAL | Age: 85
End: 2020-07-22

## 2020-07-22 ENCOUNTER — LAB (OUTPATIENT)
Dept: LAB | Facility: HOSPITAL | Age: 85
End: 2020-07-22

## 2020-07-22 DIAGNOSIS — N18.30 ANEMIA OF CHRONIC RENAL FAILURE, STAGE 3 (MODERATE) (HCC): Primary | ICD-10-CM

## 2020-07-22 DIAGNOSIS — N18.30 ANEMIA OF CHRONIC RENAL FAILURE, STAGE 3 (MODERATE) (HCC): ICD-10-CM

## 2020-07-22 DIAGNOSIS — D63.1 ANEMIA OF CHRONIC RENAL FAILURE, STAGE 3 (MODERATE) (HCC): Primary | ICD-10-CM

## 2020-07-22 DIAGNOSIS — D63.1 ANEMIA OF CHRONIC RENAL FAILURE, STAGE 3 (MODERATE) (HCC): ICD-10-CM

## 2020-07-22 DIAGNOSIS — Z17.0 MALIGNANT NEOPLASM OF UPPER-INNER QUADRANT OF RIGHT BREAST IN FEMALE, ESTROGEN RECEPTOR POSITIVE (HCC): ICD-10-CM

## 2020-07-22 DIAGNOSIS — C50.211 MALIGNANT NEOPLASM OF UPPER-INNER QUADRANT OF RIGHT BREAST IN FEMALE, ESTROGEN RECEPTOR POSITIVE (HCC): ICD-10-CM

## 2020-07-22 LAB
BASOPHILS # BLD AUTO: 0.01 10*3/MM3 (ref 0–0.2)
BASOPHILS NFR BLD AUTO: 0.1 % (ref 0–1.5)
DEPRECATED RDW RBC AUTO: 62.6 FL (ref 37–54)
EOSINOPHIL # BLD AUTO: 0 10*3/MM3 (ref 0–0.4)
EOSINOPHIL NFR BLD AUTO: 0 % (ref 0.3–6.2)
ERYTHROCYTE [DISTWIDTH] IN BLOOD BY AUTOMATED COUNT: 18.3 % (ref 12.3–15.4)
FERRITIN SERPL-MCNC: 471.9 NG/ML (ref 13–150)
HCT VFR BLD AUTO: 30.6 % (ref 34–46.6)
HGB BLD-MCNC: 8.8 G/DL (ref 12–15.9)
IMM GRANULOCYTES # BLD AUTO: 0.04 10*3/MM3 (ref 0–0.05)
IMM GRANULOCYTES NFR BLD AUTO: 0.4 % (ref 0–0.5)
IRON 24H UR-MRATE: 52 MCG/DL (ref 37–145)
IRON SATN MFR SERPL: 17 % (ref 14–48)
LYMPHOCYTES # BLD AUTO: 1.25 10*3/MM3 (ref 0.7–3.1)
LYMPHOCYTES NFR BLD AUTO: 13.4 % (ref 19.6–45.3)
MCH RBC QN AUTO: 27.2 PG (ref 26.6–33)
MCHC RBC AUTO-ENTMCNC: 28.8 G/DL (ref 31.5–35.7)
MCV RBC AUTO: 94.4 FL (ref 79–97)
MONOCYTES # BLD AUTO: 0.86 10*3/MM3 (ref 0.1–0.9)
MONOCYTES NFR BLD AUTO: 9.2 % (ref 5–12)
NEUTROPHILS NFR BLD AUTO: 7.17 10*3/MM3 (ref 1.7–7)
NEUTROPHILS NFR BLD AUTO: 76.9 % (ref 42.7–76)
NRBC BLD AUTO-RTO: 0.2 /100 WBC (ref 0–0.2)
PLATELET # BLD AUTO: 272 10*3/MM3 (ref 140–450)
PMV BLD AUTO: 9.8 FL (ref 6–12)
RBC # BLD AUTO: 3.24 10*6/MM3 (ref 3.77–5.28)
TIBC SERPL-MCNC: 301 MCG/DL (ref 249–505)
TRANSFERRIN SERPL-MCNC: 215 MG/DL (ref 200–360)
WBC # BLD AUTO: 9.33 10*3/MM3 (ref 3.4–10.8)

## 2020-07-22 PROCEDURE — 25010000002 EPOETIN ALFA-EPBX 10000 UNIT/ML SOLUTION: Performed by: INTERNAL MEDICINE

## 2020-07-22 PROCEDURE — 36415 COLL VENOUS BLD VENIPUNCTURE: CPT

## 2020-07-22 PROCEDURE — 85025 COMPLETE CBC W/AUTO DIFF WBC: CPT

## 2020-07-22 PROCEDURE — 83540 ASSAY OF IRON: CPT

## 2020-07-22 PROCEDURE — 84466 ASSAY OF TRANSFERRIN: CPT

## 2020-07-22 PROCEDURE — 82728 ASSAY OF FERRITIN: CPT

## 2020-07-22 PROCEDURE — 96372 THER/PROPH/DIAG INJ SC/IM: CPT

## 2020-07-22 RX ADMIN — EPOETIN ALFA-EPBX 10000 UNITS: 10000 INJECTION, SOLUTION INTRAVENOUS; SUBCUTANEOUS at 10:31

## 2020-07-23 ENCOUNTER — HOSPITAL ENCOUNTER (INPATIENT)
Facility: HOSPITAL | Age: 85
LOS: 7 days | Discharge: HOME HEALTH | End: 2020-07-31
Attending: EMERGENCY MEDICINE | Admitting: INTERNAL MEDICINE

## 2020-07-23 DIAGNOSIS — R56.9 SEIZURE-LIKE ACTIVITY (HCC): ICD-10-CM

## 2020-07-23 DIAGNOSIS — J96.02 ACUTE RESPIRATORY FAILURE WITH HYPERCAPNIA (HCC): Primary | ICD-10-CM

## 2020-07-23 DIAGNOSIS — J44.9 CHRONIC OBSTRUCTIVE PULMONARY DISEASE, UNSPECIFIED COPD TYPE (HCC): ICD-10-CM

## 2020-07-23 PROCEDURE — 99285 EMERGENCY DEPT VISIT HI MDM: CPT

## 2020-07-24 ENCOUNTER — READMISSION MANAGEMENT (OUTPATIENT)
Dept: CALL CENTER | Facility: HOSPITAL | Age: 85
End: 2020-07-24

## 2020-07-24 ENCOUNTER — APPOINTMENT (OUTPATIENT)
Dept: GENERAL RADIOLOGY | Facility: HOSPITAL | Age: 85
End: 2020-07-24

## 2020-07-24 ENCOUNTER — APPOINTMENT (OUTPATIENT)
Dept: CT IMAGING | Facility: HOSPITAL | Age: 85
End: 2020-07-24

## 2020-07-24 LAB
ALBUMIN SERPL-MCNC: 3.5 G/DL (ref 3.5–5.2)
ALBUMIN/GLOB SERPL: 1 G/DL
ALP SERPL-CCNC: 156 U/L (ref 39–117)
ALT SERPL W P-5'-P-CCNC: 55 U/L (ref 1–33)
AMPHET+METHAMPHET UR QL: NEGATIVE
ANION GAP SERPL CALCULATED.3IONS-SCNC: 3.6 MMOL/L (ref 5–15)
ANION GAP SERPL CALCULATED.3IONS-SCNC: 3.8 MMOL/L (ref 5–15)
ARTERIAL PATENCY WRIST A: POSITIVE
AST SERPL-CCNC: 68 U/L (ref 1–32)
ATMOSPHERIC PRESS: 751.8 MMHG
ATMOSPHERIC PRESS: 753.9 MMHG
ATMOSPHERIC PRESS: 754 MMHG
B PARAPERT DNA SPEC QL NAA+PROBE: NOT DETECTED
B PERT DNA SPEC QL NAA+PROBE: NOT DETECTED
BARBITURATES UR QL SCN: NEGATIVE
BASE EXCESS BLDA CALC-SCNC: 13.6 MMOL/L (ref 0–2)
BASE EXCESS BLDA CALC-SCNC: 17.5 MMOL/L (ref 0–2)
BASE EXCESS BLDA CALC-SCNC: 18.5 MMOL/L (ref 0–2)
BASOPHILS # BLD AUTO: 0.02 10*3/MM3 (ref 0–0.2)
BASOPHILS NFR BLD AUTO: 0.2 % (ref 0–1.5)
BDY SITE: ABNORMAL
BENZODIAZ UR QL SCN: NEGATIVE
BILIRUB SERPL-MCNC: 0.5 MG/DL (ref 0–1.2)
BUN SERPL-MCNC: 48 MG/DL (ref 8–23)
BUN SERPL-MCNC: 48 MG/DL (ref 8–23)
BUN/CREAT SERPL: 37.2 (ref 7–25)
BUN/CREAT SERPL: 38.1 (ref 7–25)
C PNEUM DNA NPH QL NAA+NON-PROBE: NOT DETECTED
CALCIUM SPEC-SCNC: 9.4 MG/DL (ref 8.6–10.5)
CALCIUM SPEC-SCNC: 9.6 MG/DL (ref 8.6–10.5)
CANNABINOIDS SERPL QL: NEGATIVE
CHLORIDE SERPL-SCNC: 91 MMOL/L (ref 98–107)
CHLORIDE SERPL-SCNC: 91 MMOL/L (ref 98–107)
CO2 SERPL-SCNC: 43.2 MMOL/L (ref 22–29)
CO2 SERPL-SCNC: 45.4 MMOL/L (ref 22–29)
COCAINE UR QL: NEGATIVE
CREAT SERPL-MCNC: 1.26 MG/DL (ref 0.57–1)
CREAT SERPL-MCNC: 1.29 MG/DL (ref 0.57–1)
DEPRECATED RDW RBC AUTO: 54.4 FL (ref 37–54)
DEPRECATED RDW RBC AUTO: 58.3 FL (ref 37–54)
EOSINOPHIL # BLD AUTO: 0 10*3/MM3 (ref 0–0.4)
EOSINOPHIL NFR BLD AUTO: 0 % (ref 0.3–6.2)
ERYTHROCYTE [DISTWIDTH] IN BLOOD BY AUTOMATED COUNT: 16.6 % (ref 12.3–15.4)
ERYTHROCYTE [DISTWIDTH] IN BLOOD BY AUTOMATED COUNT: 16.8 % (ref 12.3–15.4)
ETHANOL BLD-MCNC: <10 MG/DL (ref 0–10)
ETHANOL UR QL: <0.01 %
FLUAV H1 2009 PAND RNA NPH QL NAA+PROBE: NOT DETECTED
FLUAV H1 HA GENE NPH QL NAA+PROBE: NOT DETECTED
FLUAV H3 RNA NPH QL NAA+PROBE: NOT DETECTED
FLUAV SUBTYP SPEC NAA+PROBE: NOT DETECTED
FLUBV RNA ISLT QL NAA+PROBE: NOT DETECTED
GAS FLOW AIRWAY: 2 LPM
GFR SERPL CREATININE-BSD FRML MDRD: 48 ML/MIN/1.73
GFR SERPL CREATININE-BSD FRML MDRD: 49 ML/MIN/1.73
GLOBULIN UR ELPH-MCNC: 3.4 GM/DL
GLUCOSE BLDC GLUCOMTR-MCNC: 169 MG/DL (ref 70–130)
GLUCOSE BLDC GLUCOMTR-MCNC: 50 MG/DL (ref 70–130)
GLUCOSE BLDC GLUCOMTR-MCNC: 62 MG/DL (ref 70–130)
GLUCOSE BLDC GLUCOMTR-MCNC: 66 MG/DL (ref 70–130)
GLUCOSE BLDC GLUCOMTR-MCNC: 79 MG/DL (ref 70–130)
GLUCOSE BLDC GLUCOMTR-MCNC: 85 MG/DL (ref 70–130)
GLUCOSE BLDC GLUCOMTR-MCNC: 87 MG/DL (ref 70–130)
GLUCOSE SERPL-MCNC: 162 MG/DL (ref 65–99)
GLUCOSE SERPL-MCNC: 64 MG/DL (ref 65–99)
HADV DNA SPEC NAA+PROBE: NOT DETECTED
HCO3 BLDA-SCNC: 41.8 MMOL/L (ref 22–28)
HCO3 BLDA-SCNC: 47 MMOL/L (ref 22–28)
HCO3 BLDA-SCNC: 47.7 MMOL/L (ref 22–28)
HCOV 229E RNA SPEC QL NAA+PROBE: NOT DETECTED
HCOV HKU1 RNA SPEC QL NAA+PROBE: NOT DETECTED
HCOV NL63 RNA SPEC QL NAA+PROBE: NOT DETECTED
HCOV OC43 RNA SPEC QL NAA+PROBE: NOT DETECTED
HCT VFR BLD AUTO: 28.8 % (ref 34–46.6)
HCT VFR BLD AUTO: 28.8 % (ref 34–46.6)
HGB BLD-MCNC: 8.3 G/DL (ref 12–15.9)
HGB BLD-MCNC: 8.8 G/DL (ref 12–15.9)
HMPV RNA NPH QL NAA+NON-PROBE: NOT DETECTED
HOLD SPECIMEN: NORMAL
HOLD SPECIMEN: NORMAL
HPIV1 RNA SPEC QL NAA+PROBE: NOT DETECTED
HPIV2 RNA SPEC QL NAA+PROBE: NOT DETECTED
HPIV3 RNA NPH QL NAA+PROBE: NOT DETECTED
HPIV4 P GENE NPH QL NAA+PROBE: NOT DETECTED
IMM GRANULOCYTES # BLD AUTO: 0.04 10*3/MM3 (ref 0–0.05)
IMM GRANULOCYTES NFR BLD AUTO: 0.4 % (ref 0–0.5)
INHALED O2 CONCENTRATION: 25 %
INHALED O2 CONCENTRATION: 30 %
LYMPHOCYTES # BLD AUTO: 0.52 10*3/MM3 (ref 0.7–3.1)
LYMPHOCYTES NFR BLD AUTO: 5.7 % (ref 19.6–45.3)
M PNEUMO IGG SER IA-ACNC: NOT DETECTED
MAGNESIUM SERPL-MCNC: 2.5 MG/DL (ref 1.6–2.4)
MCH RBC QN AUTO: 27.3 PG (ref 26.6–33)
MCH RBC QN AUTO: 27.6 PG (ref 26.6–33)
MCHC RBC AUTO-ENTMCNC: 28.8 G/DL (ref 31.5–35.7)
MCHC RBC AUTO-ENTMCNC: 30.6 G/DL (ref 31.5–35.7)
MCV RBC AUTO: 90.3 FL (ref 79–97)
MCV RBC AUTO: 94.7 FL (ref 79–97)
METHADONE UR QL SCN: NEGATIVE
MODALITY: ABNORMAL
MONOCYTES # BLD AUTO: 0.77 10*3/MM3 (ref 0.1–0.9)
MONOCYTES NFR BLD AUTO: 8.5 % (ref 5–12)
NEUTROPHILS NFR BLD AUTO: 7.74 10*3/MM3 (ref 1.7–7)
NEUTROPHILS NFR BLD AUTO: 85.2 % (ref 42.7–76)
NRBC BLD AUTO-RTO: 0.2 /100 WBC (ref 0–0.2)
NT-PROBNP SERPL-MCNC: 5733 PG/ML (ref 0–1800)
O2 A-A PPRESDIFF RESPIRATORY: 0.6 MMHG
O2 A-A PPRESDIFF RESPIRATORY: 0.7 MMHG
OPIATES UR QL: NEGATIVE
OXYCODONE UR QL SCN: NEGATIVE
PCO2 BLDA: 101.3 MM HG (ref 35–45)
PCO2 BLDA: 75.9 MM HG (ref 35–45)
PCO2 BLDA: 81.6 MM HG (ref 35–45)
PH BLDA: 7.28 PH UNITS (ref 7.35–7.45)
PH BLDA: 7.35 PH UNITS (ref 7.35–7.45)
PH BLDA: 7.37 PH UNITS (ref 7.35–7.45)
PLATELET # BLD AUTO: 230 10*3/MM3 (ref 140–450)
PLATELET # BLD AUTO: 230 10*3/MM3 (ref 140–450)
PMV BLD AUTO: 10 FL (ref 6–12)
PMV BLD AUTO: 10 FL (ref 6–12)
PO2 BLDA: 173.7 MM HG (ref 80–100)
PO2 BLDA: 55.3 MM HG (ref 80–100)
PO2 BLDA: 81.3 MM HG (ref 80–100)
POTASSIUM SERPL-SCNC: 4.2 MMOL/L (ref 3.5–5.2)
POTASSIUM SERPL-SCNC: 4.3 MMOL/L (ref 3.5–5.2)
PROT SERPL-MCNC: 6.9 G/DL (ref 6–8.5)
RBC # BLD AUTO: 3.04 10*6/MM3 (ref 3.77–5.28)
RBC # BLD AUTO: 3.19 10*6/MM3 (ref 3.77–5.28)
RHINOVIRUS RNA SPEC NAA+PROBE: NOT DETECTED
RSV RNA NPH QL NAA+NON-PROBE: NOT DETECTED
SAO2 % BLDCOA: 84.8 % (ref 92–99)
SAO2 % BLDCOA: 94.6 % (ref 92–99)
SAO2 % BLDCOA: 99.2 % (ref 92–99)
SARS-COV-2 RNA NPH QL NAA+NON-PROBE: NOT DETECTED
SET MECH RESP RATE: 20
SET MECH RESP RATE: 20
SODIUM SERPL-SCNC: 138 MMOL/L (ref 136–145)
SODIUM SERPL-SCNC: 140 MMOL/L (ref 136–145)
TOTAL RATE: 18 BREATHS/MINUTE
TOTAL RATE: 20 BREATHS/MINUTE
TOTAL RATE: 21 BREATHS/MINUTE
TROPONIN T SERPL-MCNC: 0.03 NG/ML (ref 0–0.03)
VENTILATOR MODE: ABNORMAL
VT ON VENT VENT: 301 ML
VT ON VENT VENT: 362 ML
WBC # BLD AUTO: 8.36 10*3/MM3 (ref 3.4–10.8)
WBC # BLD AUTO: 9.09 10*3/MM3 (ref 3.4–10.8)
WHOLE BLOOD HOLD SPECIMEN: NORMAL
WHOLE BLOOD HOLD SPECIMEN: NORMAL

## 2020-07-24 PROCEDURE — 94799 UNLISTED PULMONARY SVC/PX: CPT

## 2020-07-24 PROCEDURE — 80307 DRUG TEST PRSMV CHEM ANLYZR: CPT | Performed by: PHYSICIAN ASSISTANT

## 2020-07-24 PROCEDURE — 83735 ASSAY OF MAGNESIUM: CPT | Performed by: PHYSICIAN ASSISTANT

## 2020-07-24 PROCEDURE — 25010000002 LEVETIRACETAM IN NACL 0.82% 500 MG/100ML SOLUTION: Performed by: INTERNAL MEDICINE

## 2020-07-24 PROCEDURE — 82962 GLUCOSE BLOOD TEST: CPT

## 2020-07-24 PROCEDURE — 93010 ELECTROCARDIOGRAM REPORT: CPT | Performed by: INTERNAL MEDICINE

## 2020-07-24 PROCEDURE — 70450 CT HEAD/BRAIN W/O DYE: CPT

## 2020-07-24 PROCEDURE — 94640 AIRWAY INHALATION TREATMENT: CPT

## 2020-07-24 PROCEDURE — 71045 X-RAY EXAM CHEST 1 VIEW: CPT

## 2020-07-24 PROCEDURE — 82803 BLOOD GASES ANY COMBINATION: CPT

## 2020-07-24 PROCEDURE — 25010000002 FUROSEMIDE PER 20 MG: Performed by: INTERNAL MEDICINE

## 2020-07-24 PROCEDURE — 36600 WITHDRAWAL OF ARTERIAL BLOOD: CPT

## 2020-07-24 PROCEDURE — 85025 COMPLETE CBC W/AUTO DIFF WBC: CPT | Performed by: PHYSICIAN ASSISTANT

## 2020-07-24 PROCEDURE — 25010000002 LEVETIRACETAM IN NACL 0.82% 500 MG/100ML SOLUTION: Performed by: PHYSICIAN ASSISTANT

## 2020-07-24 PROCEDURE — 83880 ASSAY OF NATRIURETIC PEPTIDE: CPT | Performed by: PHYSICIAN ASSISTANT

## 2020-07-24 PROCEDURE — 85027 COMPLETE CBC AUTOMATED: CPT | Performed by: INTERNAL MEDICINE

## 2020-07-24 PROCEDURE — 84484 ASSAY OF TROPONIN QUANT: CPT | Performed by: PHYSICIAN ASSISTANT

## 2020-07-24 PROCEDURE — 93005 ELECTROCARDIOGRAM TRACING: CPT | Performed by: PHYSICIAN ASSISTANT

## 2020-07-24 PROCEDURE — 94660 CPAP INITIATION&MGMT: CPT

## 2020-07-24 PROCEDURE — 0202U NFCT DS 22 TRGT SARS-COV-2: CPT | Performed by: PHYSICIAN ASSISTANT

## 2020-07-24 PROCEDURE — 80053 COMPREHEN METABOLIC PANEL: CPT | Performed by: PHYSICIAN ASSISTANT

## 2020-07-24 RX ORDER — LEVETIRACETAM 5 MG/ML
500 INJECTION INTRAVASCULAR EVERY 12 HOURS SCHEDULED
Status: DISCONTINUED | OUTPATIENT
Start: 2020-07-24 | End: 2020-07-24 | Stop reason: DRUGHIGH

## 2020-07-24 RX ORDER — LANSOPRAZOLE
30 KIT EVERY MORNING
Status: DISCONTINUED | OUTPATIENT
Start: 2020-07-24 | End: 2020-07-31 | Stop reason: HOSPADM

## 2020-07-24 RX ORDER — SODIUM CHLORIDE 0.9 % (FLUSH) 0.9 %
10 SYRINGE (ML) INJECTION AS NEEDED
Status: DISCONTINUED | OUTPATIENT
Start: 2020-07-24 | End: 2020-07-31 | Stop reason: HOSPADM

## 2020-07-24 RX ORDER — LEVETIRACETAM 100 MG/ML
250 SOLUTION ORAL ONCE
Status: COMPLETED | OUTPATIENT
Start: 2020-07-24 | End: 2020-07-24

## 2020-07-24 RX ORDER — FUROSEMIDE 10 MG/ML
40 INJECTION INTRAMUSCULAR; INTRAVENOUS EVERY 12 HOURS
Status: DISCONTINUED | OUTPATIENT
Start: 2020-07-24 | End: 2020-07-27

## 2020-07-24 RX ORDER — BUDESONIDE AND FORMOTEROL FUMARATE DIHYDRATE 80; 4.5 UG/1; UG/1
2 AEROSOL RESPIRATORY (INHALATION)
Status: DISCONTINUED | OUTPATIENT
Start: 2020-07-24 | End: 2020-07-31 | Stop reason: HOSPADM

## 2020-07-24 RX ORDER — SODIUM CHLORIDE 0.9 % (FLUSH) 0.9 %
10 SYRINGE (ML) INJECTION EVERY 12 HOURS SCHEDULED
Status: DISCONTINUED | OUTPATIENT
Start: 2020-07-24 | End: 2020-07-31 | Stop reason: HOSPADM

## 2020-07-24 RX ORDER — DEXTROSE MONOHYDRATE 25 G/50ML
50 INJECTION, SOLUTION INTRAVENOUS
Status: DISCONTINUED | OUTPATIENT
Start: 2020-07-24 | End: 2020-07-31 | Stop reason: HOSPADM

## 2020-07-24 RX ORDER — DEXTROSE, SODIUM CHLORIDE, AND POTASSIUM CHLORIDE 5; .45; .15 G/100ML; G/100ML; G/100ML
50 INJECTION INTRAVENOUS CONTINUOUS
Status: DISCONTINUED | OUTPATIENT
Start: 2020-07-24 | End: 2020-07-25

## 2020-07-24 RX ORDER — DEXTROSE MONOHYDRATE 25 G/50ML
INJECTION, SOLUTION INTRAVENOUS
Status: COMPLETED
Start: 2020-07-24 | End: 2020-07-24

## 2020-07-24 RX ORDER — LEVETIRACETAM 5 MG/ML
500 INJECTION INTRAVASCULAR ONCE
Status: COMPLETED | OUTPATIENT
Start: 2020-07-24 | End: 2020-07-24

## 2020-07-24 RX ORDER — SODIUM CHLORIDE 0.9 % (FLUSH) 0.9 %
10 SYRINGE (ML) INJECTION AS NEEDED
Status: DISCONTINUED | OUTPATIENT
Start: 2020-07-24 | End: 2020-07-25

## 2020-07-24 RX ORDER — LEVETIRACETAM 100 MG/ML
750 SOLUTION ORAL EVERY 12 HOURS SCHEDULED
Status: DISCONTINUED | OUTPATIENT
Start: 2020-07-24 | End: 2020-07-26

## 2020-07-24 RX ADMIN — LANSOPRAZOLE 30 MG: KIT at 13:44

## 2020-07-24 RX ADMIN — APIXABAN 2.5 MG: 2.5 TABLET, FILM COATED ORAL at 08:07

## 2020-07-24 RX ADMIN — FUROSEMIDE 40 MG: 10 INJECTION, SOLUTION INTRAMUSCULAR; INTRAVENOUS at 16:31

## 2020-07-24 RX ADMIN — LEVETIRACETAM 500 MG: 5 INJECTION INTRAVENOUS at 08:07

## 2020-07-24 RX ADMIN — APIXABAN 2.5 MG: 2.5 TABLET, FILM COATED ORAL at 20:41

## 2020-07-24 RX ADMIN — LEVETIRACETAM 250 MG: 100 SOLUTION ORAL at 13:45

## 2020-07-24 RX ADMIN — SODIUM CHLORIDE, PRESERVATIVE FREE 10 ML: 5 INJECTION INTRAVENOUS at 20:00

## 2020-07-24 RX ADMIN — FUROSEMIDE 40 MG: 10 INJECTION, SOLUTION INTRAMUSCULAR; INTRAVENOUS at 06:08

## 2020-07-24 RX ADMIN — LEVETIRACETAM 750 MG: 100 SOLUTION ORAL at 20:41

## 2020-07-24 RX ADMIN — LEVETIRACETAM 500 MG: 5 INJECTION INTRAVENOUS at 01:33

## 2020-07-24 RX ADMIN — BUDESONIDE AND FORMOTEROL FUMARATE DIHYDRATE 2 PUFF: 80; 4.5 AEROSOL RESPIRATORY (INHALATION) at 20:32

## 2020-07-24 RX ADMIN — POTASSIUM CHLORIDE, DEXTROSE MONOHYDRATE AND SODIUM CHLORIDE 50 ML/HR: 150; 5; 450 INJECTION, SOLUTION INTRAVENOUS at 21:43

## 2020-07-24 RX ADMIN — DEXTROSE MONOHYDRATE 50 ML: 25 INJECTION, SOLUTION INTRAVENOUS at 15:12

## 2020-07-24 RX ADMIN — DEXTROSE MONOHYDRATE 50 ML: 25 INJECTION, SOLUTION INTRAVENOUS at 08:08

## 2020-07-24 NOTE — OUTREACH NOTE
Medical Week 4 Survey      Responses   [facility] patient discharged from?  [place]   COVID-19 Test Status  Negative   Does the patient have one of the following disease processes/diagnoses(primary or secondary)?  Other   Week 4 attempt successful?  No   Revoke  Readmitted          Kiarra Henriquez RN

## 2020-07-25 LAB
GLUCOSE BLDC GLUCOMTR-MCNC: 108 MG/DL (ref 70–130)
GLUCOSE BLDC GLUCOMTR-MCNC: 118 MG/DL (ref 70–130)
GLUCOSE BLDC GLUCOMTR-MCNC: 147 MG/DL (ref 70–130)
GLUCOSE BLDC GLUCOMTR-MCNC: 73 MG/DL (ref 70–130)
GLUCOSE BLDC GLUCOMTR-MCNC: 74 MG/DL (ref 70–130)
GLUCOSE BLDC GLUCOMTR-MCNC: 77 MG/DL (ref 70–130)

## 2020-07-25 PROCEDURE — 94799 UNLISTED PULMONARY SVC/PX: CPT

## 2020-07-25 PROCEDURE — 82962 GLUCOSE BLOOD TEST: CPT

## 2020-07-25 PROCEDURE — 93010 ELECTROCARDIOGRAM REPORT: CPT | Performed by: INTERNAL MEDICINE

## 2020-07-25 PROCEDURE — 94660 CPAP INITIATION&MGMT: CPT

## 2020-07-25 PROCEDURE — 93005 ELECTROCARDIOGRAM TRACING: CPT | Performed by: INTERNAL MEDICINE

## 2020-07-25 PROCEDURE — 25010000002 FUROSEMIDE PER 20 MG: Performed by: INTERNAL MEDICINE

## 2020-07-25 PROCEDURE — 25010000002 ONDANSETRON PER 1 MG: Performed by: INTERNAL MEDICINE

## 2020-07-25 PROCEDURE — 99222 1ST HOSP IP/OBS MODERATE 55: CPT | Performed by: PSYCHIATRY & NEUROLOGY

## 2020-07-25 PROCEDURE — 94002 VENT MGMT INPAT INIT DAY: CPT

## 2020-07-25 RX ORDER — ONDANSETRON 2 MG/ML
4 INJECTION INTRAMUSCULAR; INTRAVENOUS EVERY 6 HOURS PRN
Status: DISCONTINUED | OUTPATIENT
Start: 2020-07-25 | End: 2020-07-31 | Stop reason: HOSPADM

## 2020-07-25 RX ORDER — METOPROLOL TARTRATE 50 MG/1
50 TABLET, FILM COATED ORAL 2 TIMES DAILY
Status: DISCONTINUED | OUTPATIENT
Start: 2020-07-25 | End: 2020-07-28

## 2020-07-25 RX ADMIN — METOPROLOL TARTRATE 50 MG: 50 TABLET, FILM COATED ORAL at 20:18

## 2020-07-25 RX ADMIN — LEVETIRACETAM 750 MG: 100 SOLUTION ORAL at 20:18

## 2020-07-25 RX ADMIN — APIXABAN 2.5 MG: 2.5 TABLET, FILM COATED ORAL at 20:18

## 2020-07-25 RX ADMIN — FUROSEMIDE 40 MG: 10 INJECTION, SOLUTION INTRAMUSCULAR; INTRAVENOUS at 06:19

## 2020-07-25 RX ADMIN — SODIUM CHLORIDE, PRESERVATIVE FREE 10 ML: 5 INJECTION INTRAVENOUS at 20:18

## 2020-07-25 RX ADMIN — LEVETIRACETAM 750 MG: 100 SOLUTION ORAL at 08:07

## 2020-07-25 RX ADMIN — ONDANSETRON 4 MG: 2 INJECTION INTRAMUSCULAR; INTRAVENOUS at 11:35

## 2020-07-25 RX ADMIN — BUDESONIDE AND FORMOTEROL FUMARATE DIHYDRATE 2 PUFF: 80; 4.5 AEROSOL RESPIRATORY (INHALATION) at 19:55

## 2020-07-25 RX ADMIN — FUROSEMIDE 40 MG: 10 INJECTION, SOLUTION INTRAMUSCULAR; INTRAVENOUS at 16:54

## 2020-07-25 RX ADMIN — LANSOPRAZOLE 30 MG: KIT at 06:19

## 2020-07-25 RX ADMIN — APIXABAN 2.5 MG: 2.5 TABLET, FILM COATED ORAL at 08:07

## 2020-07-25 RX ADMIN — BUDESONIDE AND FORMOTEROL FUMARATE DIHYDRATE 2 PUFF: 80; 4.5 AEROSOL RESPIRATORY (INHALATION) at 07:56

## 2020-07-26 ENCOUNTER — APPOINTMENT (OUTPATIENT)
Dept: CT IMAGING | Facility: HOSPITAL | Age: 85
End: 2020-07-26

## 2020-07-26 LAB
GLUCOSE BLDC GLUCOMTR-MCNC: 219 MG/DL (ref 70–130)
GLUCOSE BLDC GLUCOMTR-MCNC: 222 MG/DL (ref 70–130)
GLUCOSE BLDC GLUCOMTR-MCNC: 224 MG/DL (ref 70–130)
GLUCOSE BLDC GLUCOMTR-MCNC: 290 MG/DL (ref 70–130)
GLUCOSE BLDC GLUCOMTR-MCNC: 309 MG/DL (ref 70–130)

## 2020-07-26 PROCEDURE — 70450 CT HEAD/BRAIN W/O DYE: CPT

## 2020-07-26 PROCEDURE — 63710000001 INSULIN GLARGINE PER 5 UNITS: Performed by: INTERNAL MEDICINE

## 2020-07-26 PROCEDURE — 63710000001 INSULIN REGULAR HUMAN PER 5 UNITS: Performed by: INTERNAL MEDICINE

## 2020-07-26 PROCEDURE — 94799 UNLISTED PULMONARY SVC/PX: CPT

## 2020-07-26 PROCEDURE — 82962 GLUCOSE BLOOD TEST: CPT

## 2020-07-26 PROCEDURE — 25010000002 FUROSEMIDE PER 20 MG: Performed by: INTERNAL MEDICINE

## 2020-07-26 RX ORDER — DOCUSATE SODIUM 50 MG/5 ML
50 LIQUID (ML) ORAL 2 TIMES DAILY
Status: DISCONTINUED | OUTPATIENT
Start: 2020-07-26 | End: 2020-07-31 | Stop reason: HOSPADM

## 2020-07-26 RX ORDER — INSULIN GLARGINE 100 [IU]/ML
10 INJECTION, SOLUTION SUBCUTANEOUS EVERY 12 HOURS SCHEDULED
Status: DISCONTINUED | OUTPATIENT
Start: 2020-07-26 | End: 2020-07-27

## 2020-07-26 RX ORDER — DEXTROSE MONOHYDRATE 25 G/50ML
25 INJECTION, SOLUTION INTRAVENOUS
Status: DISCONTINUED | OUTPATIENT
Start: 2020-07-26 | End: 2020-07-31 | Stop reason: HOSPADM

## 2020-07-26 RX ORDER — NICOTINE POLACRILEX 4 MG
15 LOZENGE BUCCAL
Status: DISCONTINUED | OUTPATIENT
Start: 2020-07-26 | End: 2020-07-31 | Stop reason: HOSPADM

## 2020-07-26 RX ORDER — LEVETIRACETAM 100 MG/ML
1000 SOLUTION ORAL EVERY 12 HOURS SCHEDULED
Status: DISCONTINUED | OUTPATIENT
Start: 2020-07-26 | End: 2020-07-31 | Stop reason: HOSPADM

## 2020-07-26 RX ADMIN — METOPROLOL TARTRATE 50 MG: 50 TABLET, FILM COATED ORAL at 08:01

## 2020-07-26 RX ADMIN — LEVETIRACETAM 750 MG: 100 SOLUTION ORAL at 08:01

## 2020-07-26 RX ADMIN — SODIUM CHLORIDE, PRESERVATIVE FREE 10 ML: 5 INJECTION INTRAVENOUS at 20:00

## 2020-07-26 RX ADMIN — INSULIN HUMAN 3 UNITS: 100 INJECTION, SOLUTION PARENTERAL at 06:44

## 2020-07-26 RX ADMIN — METOPROLOL TARTRATE 50 MG: 50 TABLET, FILM COATED ORAL at 21:19

## 2020-07-26 RX ADMIN — INSULIN HUMAN 10 UNITS: 100 INJECTION, SOLUTION PARENTERAL at 12:05

## 2020-07-26 RX ADMIN — LANSOPRAZOLE 30 MG: KIT at 06:31

## 2020-07-26 RX ADMIN — INSULIN GLARGINE 10 UNITS: 100 INJECTION, SOLUTION SUBCUTANEOUS at 14:33

## 2020-07-26 RX ADMIN — DOCUSATE SODIUM 50 MG: 50 LIQUID ORAL at 21:21

## 2020-07-26 RX ADMIN — INSULIN HUMAN 3 UNITS: 100 INJECTION, SOLUTION PARENTERAL at 00:30

## 2020-07-26 RX ADMIN — LEVETIRACETAM 1000 MG: 100 SOLUTION ORAL at 21:21

## 2020-07-26 RX ADMIN — APIXABAN 2.5 MG: 2.5 TABLET, FILM COATED ORAL at 08:01

## 2020-07-26 RX ADMIN — INSULIN HUMAN 10 UNITS: 100 INJECTION, SOLUTION PARENTERAL at 17:02

## 2020-07-26 RX ADMIN — BUDESONIDE AND FORMOTEROL FUMARATE DIHYDRATE 2 PUFF: 80; 4.5 AEROSOL RESPIRATORY (INHALATION) at 07:56

## 2020-07-26 RX ADMIN — APIXABAN 2.5 MG: 2.5 TABLET, FILM COATED ORAL at 21:21

## 2020-07-26 RX ADMIN — FUROSEMIDE 40 MG: 10 INJECTION, SOLUTION INTRAMUSCULAR; INTRAVENOUS at 06:31

## 2020-07-26 RX ADMIN — FUROSEMIDE 40 MG: 10 INJECTION, SOLUTION INTRAMUSCULAR; INTRAVENOUS at 17:02

## 2020-07-26 RX ADMIN — DOCUSATE SODIUM 50 MG: 50 LIQUID ORAL at 15:05

## 2020-07-27 ENCOUNTER — APPOINTMENT (OUTPATIENT)
Dept: MRI IMAGING | Facility: HOSPITAL | Age: 85
End: 2020-07-27

## 2020-07-27 LAB
ANION GAP SERPL CALCULATED.3IONS-SCNC: 7.4 MMOL/L (ref 5–15)
BASOPHILS # BLD AUTO: 0.01 10*3/MM3 (ref 0–0.2)
BASOPHILS NFR BLD AUTO: 0.1 % (ref 0–1.5)
BUN SERPL-MCNC: 28 MG/DL (ref 8–23)
BUN/CREAT SERPL: 29.5 (ref 7–25)
CALCIUM SPEC-SCNC: 9.7 MG/DL (ref 8.6–10.5)
CHLORIDE SERPL-SCNC: 95 MMOL/L (ref 98–107)
CO2 SERPL-SCNC: 45.6 MMOL/L (ref 22–29)
CREAT SERPL-MCNC: 0.95 MG/DL (ref 0.57–1)
DEPRECATED RDW RBC AUTO: 56.6 FL (ref 37–54)
EOSINOPHIL # BLD AUTO: 0 10*3/MM3 (ref 0–0.4)
EOSINOPHIL NFR BLD AUTO: 0 % (ref 0.3–6.2)
ERYTHROCYTE [DISTWIDTH] IN BLOOD BY AUTOMATED COUNT: 16.5 % (ref 12.3–15.4)
GFR SERPL CREATININE-BSD FRML MDRD: 68 ML/MIN/1.73
GLUCOSE BLDC GLUCOMTR-MCNC: 123 MG/DL (ref 70–130)
GLUCOSE BLDC GLUCOMTR-MCNC: 204 MG/DL (ref 70–130)
GLUCOSE BLDC GLUCOMTR-MCNC: 221 MG/DL (ref 70–130)
GLUCOSE BLDC GLUCOMTR-MCNC: 226 MG/DL (ref 70–130)
GLUCOSE BLDC GLUCOMTR-MCNC: 62 MG/DL (ref 70–130)
GLUCOSE BLDC GLUCOMTR-MCNC: 81 MG/DL (ref 70–130)
GLUCOSE SERPL-MCNC: 87 MG/DL (ref 65–99)
HCT VFR BLD AUTO: 29.4 % (ref 34–46.6)
HGB BLD-MCNC: 8.7 G/DL (ref 12–15.9)
IMM GRANULOCYTES # BLD AUTO: 0.03 10*3/MM3 (ref 0–0.05)
IMM GRANULOCYTES NFR BLD AUTO: 0.4 % (ref 0–0.5)
LYMPHOCYTES # BLD AUTO: 1.05 10*3/MM3 (ref 0.7–3.1)
LYMPHOCYTES NFR BLD AUTO: 14 % (ref 19.6–45.3)
MCH RBC QN AUTO: 28 PG (ref 26.6–33)
MCHC RBC AUTO-ENTMCNC: 29.6 G/DL (ref 31.5–35.7)
MCV RBC AUTO: 94.5 FL (ref 79–97)
MONOCYTES # BLD AUTO: 0.93 10*3/MM3 (ref 0.1–0.9)
MONOCYTES NFR BLD AUTO: 12.4 % (ref 5–12)
NEUTROPHILS NFR BLD AUTO: 5.46 10*3/MM3 (ref 1.7–7)
NEUTROPHILS NFR BLD AUTO: 73.1 % (ref 42.7–76)
NRBC BLD AUTO-RTO: 0.1 /100 WBC (ref 0–0.2)
PLATELET # BLD AUTO: 218 10*3/MM3 (ref 140–450)
PMV BLD AUTO: 10.4 FL (ref 6–12)
POTASSIUM SERPL-SCNC: 3.6 MMOL/L (ref 3.5–5.2)
RBC # BLD AUTO: 3.11 10*6/MM3 (ref 3.77–5.28)
SODIUM SERPL-SCNC: 148 MMOL/L (ref 136–145)
WBC # BLD AUTO: 7.48 10*3/MM3 (ref 3.4–10.8)

## 2020-07-27 PROCEDURE — 85025 COMPLETE CBC W/AUTO DIFF WBC: CPT | Performed by: INTERNAL MEDICINE

## 2020-07-27 PROCEDURE — 25010000002 FUROSEMIDE PER 20 MG: Performed by: INTERNAL MEDICINE

## 2020-07-27 PROCEDURE — 63710000001 INSULIN REGULAR HUMAN PER 5 UNITS: Performed by: INTERNAL MEDICINE

## 2020-07-27 PROCEDURE — 94799 UNLISTED PULMONARY SVC/PX: CPT

## 2020-07-27 PROCEDURE — 94660 CPAP INITIATION&MGMT: CPT

## 2020-07-27 PROCEDURE — 82962 GLUCOSE BLOOD TEST: CPT

## 2020-07-27 PROCEDURE — 63710000001 INSULIN GLARGINE PER 5 UNITS: Performed by: INTERNAL MEDICINE

## 2020-07-27 PROCEDURE — 80048 BASIC METABOLIC PNL TOTAL CA: CPT | Performed by: INTERNAL MEDICINE

## 2020-07-27 RX ORDER — INSULIN GLARGINE 100 [IU]/ML
10 INJECTION, SOLUTION SUBCUTANEOUS EVERY MORNING
Status: DISCONTINUED | OUTPATIENT
Start: 2020-07-28 | End: 2020-07-28

## 2020-07-27 RX ADMIN — METOPROLOL TARTRATE 50 MG: 50 TABLET, FILM COATED ORAL at 08:42

## 2020-07-27 RX ADMIN — APIXABAN 2.5 MG: 2.5 TABLET, FILM COATED ORAL at 08:43

## 2020-07-27 RX ADMIN — DOCUSATE SODIUM 50 MG: 50 LIQUID ORAL at 08:42

## 2020-07-27 RX ADMIN — LEVETIRACETAM 1000 MG: 100 SOLUTION ORAL at 20:56

## 2020-07-27 RX ADMIN — INSULIN GLARGINE 10 UNITS: 100 INJECTION, SOLUTION SUBCUTANEOUS at 08:43

## 2020-07-27 RX ADMIN — SODIUM CHLORIDE, PRESERVATIVE FREE 10 ML: 5 INJECTION INTRAVENOUS at 20:56

## 2020-07-27 RX ADMIN — BUDESONIDE AND FORMOTEROL FUMARATE DIHYDRATE 2 PUFF: 80; 4.5 AEROSOL RESPIRATORY (INHALATION) at 21:07

## 2020-07-27 RX ADMIN — LANSOPRAZOLE 30 MG: KIT at 06:11

## 2020-07-27 RX ADMIN — APIXABAN 2.5 MG: 2.5 TABLET, FILM COATED ORAL at 20:56

## 2020-07-27 RX ADMIN — INSULIN HUMAN 8 UNITS: 100 INJECTION, SOLUTION PARENTERAL at 17:56

## 2020-07-27 RX ADMIN — FUROSEMIDE 40 MG: 10 INJECTION, SOLUTION INTRAMUSCULAR; INTRAVENOUS at 06:11

## 2020-07-27 RX ADMIN — METOPROLOL TARTRATE 50 MG: 50 TABLET, FILM COATED ORAL at 20:56

## 2020-07-27 RX ADMIN — SODIUM CHLORIDE, PRESERVATIVE FREE 10 ML: 5 INJECTION INTRAVENOUS at 08:55

## 2020-07-27 RX ADMIN — LEVETIRACETAM 1000 MG: 100 SOLUTION ORAL at 08:42

## 2020-07-27 RX ADMIN — INSULIN HUMAN 5 UNITS: 100 INJECTION, SOLUTION PARENTERAL at 11:46

## 2020-07-28 LAB
ANION GAP SERPL CALCULATED.3IONS-SCNC: 9.2 MMOL/L (ref 5–15)
BUN SERPL-MCNC: 31 MG/DL (ref 8–23)
BUN/CREAT SERPL: 33 (ref 7–25)
CALCIUM SPEC-SCNC: 10.1 MG/DL (ref 8.6–10.5)
CHLORIDE SERPL-SCNC: 93 MMOL/L (ref 98–107)
CO2 SERPL-SCNC: 45.8 MMOL/L (ref 22–29)
CREAT SERPL-MCNC: 0.94 MG/DL (ref 0.57–1)
GFR SERPL CREATININE-BSD FRML MDRD: 69 ML/MIN/1.73
GLUCOSE BLDC GLUCOMTR-MCNC: 157 MG/DL (ref 70–130)
GLUCOSE BLDC GLUCOMTR-MCNC: 187 MG/DL (ref 70–130)
GLUCOSE BLDC GLUCOMTR-MCNC: 195 MG/DL (ref 70–130)
GLUCOSE BLDC GLUCOMTR-MCNC: 226 MG/DL (ref 70–130)
GLUCOSE BLDC GLUCOMTR-MCNC: 267 MG/DL (ref 70–130)
GLUCOSE SERPL-MCNC: 197 MG/DL (ref 65–99)
POTASSIUM SERPL-SCNC: 3.4 MMOL/L (ref 3.5–5.2)
SODIUM SERPL-SCNC: 148 MMOL/L (ref 136–145)

## 2020-07-28 PROCEDURE — 94799 UNLISTED PULMONARY SVC/PX: CPT

## 2020-07-28 PROCEDURE — 97530 THERAPEUTIC ACTIVITIES: CPT

## 2020-07-28 PROCEDURE — 97110 THERAPEUTIC EXERCISES: CPT

## 2020-07-28 PROCEDURE — 80048 BASIC METABOLIC PNL TOTAL CA: CPT | Performed by: INTERNAL MEDICINE

## 2020-07-28 PROCEDURE — 82962 GLUCOSE BLOOD TEST: CPT

## 2020-07-28 PROCEDURE — 97162 PT EVAL MOD COMPLEX 30 MIN: CPT

## 2020-07-28 PROCEDURE — 63710000001 INSULIN GLARGINE PER 5 UNITS: Performed by: INTERNAL MEDICINE

## 2020-07-28 PROCEDURE — 63710000001 INSULIN REGULAR HUMAN PER 5 UNITS: Performed by: INTERNAL MEDICINE

## 2020-07-28 RX ORDER — POTASSIUM CHLORIDE 750 MG/1
40 CAPSULE, EXTENDED RELEASE ORAL AS NEEDED
Status: DISCONTINUED | OUTPATIENT
Start: 2020-07-28 | End: 2020-07-31 | Stop reason: HOSPADM

## 2020-07-28 RX ORDER — INSULIN GLARGINE 100 [IU]/ML
10 INJECTION, SOLUTION SUBCUTANEOUS EVERY 12 HOURS SCHEDULED
Status: DISCONTINUED | OUTPATIENT
Start: 2020-07-28 | End: 2020-07-29

## 2020-07-28 RX ORDER — POTASSIUM CHLORIDE 1.5 G/1.77G
40 POWDER, FOR SOLUTION ORAL AS NEEDED
Status: DISCONTINUED | OUTPATIENT
Start: 2020-07-28 | End: 2020-07-31 | Stop reason: HOSPADM

## 2020-07-28 RX ORDER — METOPROLOL TARTRATE 50 MG/1
100 TABLET, FILM COATED ORAL 2 TIMES DAILY
Status: DISCONTINUED | OUTPATIENT
Start: 2020-07-28 | End: 2020-07-31 | Stop reason: HOSPADM

## 2020-07-28 RX ADMIN — LEVETIRACETAM 1000 MG: 100 SOLUTION ORAL at 08:43

## 2020-07-28 RX ADMIN — APIXABAN 2.5 MG: 2.5 TABLET, FILM COATED ORAL at 08:42

## 2020-07-28 RX ADMIN — LEVETIRACETAM 1000 MG: 100 SOLUTION ORAL at 21:32

## 2020-07-28 RX ADMIN — METOPROLOL TARTRATE 50 MG: 50 TABLET, FILM COATED ORAL at 08:42

## 2020-07-28 RX ADMIN — SODIUM CHLORIDE, PRESERVATIVE FREE 10 ML: 5 INJECTION INTRAVENOUS at 08:43

## 2020-07-28 RX ADMIN — POTASSIUM CHLORIDE 40 MEQ: 1.5 POWDER, FOR SOLUTION ORAL at 21:31

## 2020-07-28 RX ADMIN — DOCUSATE SODIUM 50 MG: 50 LIQUID ORAL at 00:37

## 2020-07-28 RX ADMIN — APIXABAN 2.5 MG: 2.5 TABLET, FILM COATED ORAL at 21:32

## 2020-07-28 RX ADMIN — BUDESONIDE AND FORMOTEROL FUMARATE DIHYDRATE 2 PUFF: 80; 4.5 AEROSOL RESPIRATORY (INHALATION) at 07:48

## 2020-07-28 RX ADMIN — INSULIN GLARGINE 10 UNITS: 100 INJECTION, SOLUTION SUBCUTANEOUS at 21:33

## 2020-07-28 RX ADMIN — INSULIN HUMAN 4 UNITS: 100 INJECTION, SOLUTION PARENTERAL at 06:40

## 2020-07-28 RX ADMIN — METOPROLOL TARTRATE 100 MG: 50 TABLET, FILM COATED ORAL at 21:32

## 2020-07-28 RX ADMIN — BUDESONIDE AND FORMOTEROL FUMARATE DIHYDRATE 2 PUFF: 80; 4.5 AEROSOL RESPIRATORY (INHALATION) at 20:00

## 2020-07-28 RX ADMIN — SODIUM CHLORIDE, PRESERVATIVE FREE 10 ML: 5 INJECTION INTRAVENOUS at 21:42

## 2020-07-28 RX ADMIN — INSULIN HUMAN 8 UNITS: 100 INJECTION, SOLUTION PARENTERAL at 18:07

## 2020-07-28 RX ADMIN — DOCUSATE SODIUM 50 MG: 50 LIQUID ORAL at 08:43

## 2020-07-28 RX ADMIN — INSULIN GLARGINE 10 UNITS: 100 INJECTION, SOLUTION SUBCUTANEOUS at 06:40

## 2020-07-28 RX ADMIN — LANSOPRAZOLE 30 MG: KIT at 06:42

## 2020-07-28 RX ADMIN — INSULIN HUMAN 12 UNITS: 100 INJECTION, SOLUTION PARENTERAL at 12:04

## 2020-07-28 RX ADMIN — DOCUSATE SODIUM 50 MG: 50 LIQUID ORAL at 21:32

## 2020-07-28 RX ADMIN — INSULIN HUMAN 4 UNITS: 100 INJECTION, SOLUTION PARENTERAL at 00:37

## 2020-07-29 ENCOUNTER — APPOINTMENT (OUTPATIENT)
Dept: GENERAL RADIOLOGY | Facility: HOSPITAL | Age: 85
End: 2020-07-29

## 2020-07-29 LAB
ANION GAP SERPL CALCULATED.3IONS-SCNC: 9.8 MMOL/L (ref 5–15)
BUN SERPL-MCNC: 34 MG/DL (ref 8–23)
BUN/CREAT SERPL: 32.4 (ref 7–25)
CALCIUM SPEC-SCNC: 10.2 MG/DL (ref 8.6–10.5)
CHLORIDE SERPL-SCNC: 94 MMOL/L (ref 98–107)
CO2 SERPL-SCNC: 42.2 MMOL/L (ref 22–29)
CREAT SERPL-MCNC: 1.05 MG/DL (ref 0.57–1)
GFR SERPL CREATININE-BSD FRML MDRD: 61 ML/MIN/1.73
GLUCOSE BLDC GLUCOMTR-MCNC: 195 MG/DL (ref 70–130)
GLUCOSE BLDC GLUCOMTR-MCNC: 203 MG/DL (ref 70–130)
GLUCOSE BLDC GLUCOMTR-MCNC: 329 MG/DL (ref 70–130)
GLUCOSE BLDC GLUCOMTR-MCNC: 364 MG/DL (ref 70–130)
GLUCOSE SERPL-MCNC: 212 MG/DL (ref 65–99)
POTASSIUM SERPL-SCNC: 3.6 MMOL/L (ref 3.5–5.2)
SODIUM SERPL-SCNC: 146 MMOL/L (ref 136–145)

## 2020-07-29 PROCEDURE — 97110 THERAPEUTIC EXERCISES: CPT

## 2020-07-29 PROCEDURE — 74230 X-RAY XM SWLNG FUNCJ C+: CPT

## 2020-07-29 PROCEDURE — 82962 GLUCOSE BLOOD TEST: CPT

## 2020-07-29 PROCEDURE — 25010000002 FUROSEMIDE PER 20 MG: Performed by: INTERNAL MEDICINE

## 2020-07-29 PROCEDURE — 94799 UNLISTED PULMONARY SVC/PX: CPT

## 2020-07-29 PROCEDURE — 63710000001 INSULIN GLARGINE PER 5 UNITS: Performed by: INTERNAL MEDICINE

## 2020-07-29 PROCEDURE — 80048 BASIC METABOLIC PNL TOTAL CA: CPT | Performed by: INTERNAL MEDICINE

## 2020-07-29 PROCEDURE — 92611 MOTION FLUOROSCOPY/SWALLOW: CPT

## 2020-07-29 PROCEDURE — 71045 X-RAY EXAM CHEST 1 VIEW: CPT

## 2020-07-29 PROCEDURE — 63710000001 INSULIN REGULAR HUMAN PER 5 UNITS: Performed by: INTERNAL MEDICINE

## 2020-07-29 RX ORDER — METOLAZONE 5 MG/1
10 TABLET ORAL DAILY
Status: DISCONTINUED | OUTPATIENT
Start: 2020-07-29 | End: 2020-07-31 | Stop reason: HOSPADM

## 2020-07-29 RX ORDER — INSULIN GLARGINE 100 [IU]/ML
15 INJECTION, SOLUTION SUBCUTANEOUS EVERY 12 HOURS SCHEDULED
Status: DISCONTINUED | OUTPATIENT
Start: 2020-07-29 | End: 2020-07-31 | Stop reason: HOSPADM

## 2020-07-29 RX ORDER — FUROSEMIDE 10 MG/ML
40 INJECTION INTRAMUSCULAR; INTRAVENOUS EVERY 12 HOURS
Status: DISCONTINUED | OUTPATIENT
Start: 2020-07-29 | End: 2020-07-31 | Stop reason: HOSPADM

## 2020-07-29 RX ADMIN — BARIUM SULFATE 50 ML: 400 SUSPENSION ORAL at 10:22

## 2020-07-29 RX ADMIN — POTASSIUM CHLORIDE 40 MEQ: 1.5 POWDER, FOR SOLUTION ORAL at 15:53

## 2020-07-29 RX ADMIN — BUDESONIDE AND FORMOTEROL FUMARATE DIHYDRATE 2 PUFF: 80; 4.5 AEROSOL RESPIRATORY (INHALATION) at 07:32

## 2020-07-29 RX ADMIN — METOPROLOL TARTRATE 100 MG: 50 TABLET, FILM COATED ORAL at 09:07

## 2020-07-29 RX ADMIN — POTASSIUM CHLORIDE 40 MEQ: 1.5 POWDER, FOR SOLUTION ORAL at 00:12

## 2020-07-29 RX ADMIN — METOLAZONE 10 MG: 5 TABLET ORAL at 15:53

## 2020-07-29 RX ADMIN — FUROSEMIDE 40 MG: 10 INJECTION, SOLUTION INTRAMUSCULAR; INTRAVENOUS at 17:35

## 2020-07-29 RX ADMIN — SODIUM CHLORIDE, PRESERVATIVE FREE 10 ML: 5 INJECTION INTRAVENOUS at 09:07

## 2020-07-29 RX ADMIN — BARIUM SULFATE 50 ML: 400 SUSPENSION ORAL at 10:21

## 2020-07-29 RX ADMIN — BARIUM SULFATE 4 ML: 980 POWDER, FOR SUSPENSION ORAL at 10:21

## 2020-07-29 RX ADMIN — METOPROLOL TARTRATE 100 MG: 50 TABLET, FILM COATED ORAL at 20:21

## 2020-07-29 RX ADMIN — INSULIN GLARGINE 10 UNITS: 100 INJECTION, SOLUTION SUBCUTANEOUS at 09:08

## 2020-07-29 RX ADMIN — BARIUM SULFATE 55 ML: 0.81 POWDER, FOR SUSPENSION ORAL at 10:21

## 2020-07-29 RX ADMIN — LANSOPRAZOLE 30 MG: KIT at 06:01

## 2020-07-29 RX ADMIN — SODIUM CHLORIDE, PRESERVATIVE FREE 10 ML: 5 INJECTION INTRAVENOUS at 20:21

## 2020-07-29 RX ADMIN — INSULIN HUMAN 16 UNITS: 100 INJECTION, SOLUTION PARENTERAL at 17:35

## 2020-07-29 RX ADMIN — APIXABAN 2.5 MG: 2.5 TABLET, FILM COATED ORAL at 09:07

## 2020-07-29 RX ADMIN — POTASSIUM CHLORIDE 40 MEQ: 1.5 POWDER, FOR SOLUTION ORAL at 11:48

## 2020-07-29 RX ADMIN — APIXABAN 2.5 MG: 2.5 TABLET, FILM COATED ORAL at 20:20

## 2020-07-29 RX ADMIN — INSULIN HUMAN 20 UNITS: 100 INJECTION, SOLUTION PARENTERAL at 11:48

## 2020-07-29 RX ADMIN — INSULIN GLARGINE 15 UNITS: 100 INJECTION, SOLUTION SUBCUTANEOUS at 20:21

## 2020-07-29 RX ADMIN — DOCUSATE SODIUM 50 MG: 50 LIQUID ORAL at 09:07

## 2020-07-29 RX ADMIN — INSULIN HUMAN 4 UNITS: 100 INJECTION, SOLUTION PARENTERAL at 06:01

## 2020-07-29 RX ADMIN — LEVETIRACETAM 1000 MG: 100 SOLUTION ORAL at 09:07

## 2020-07-29 RX ADMIN — DOCUSATE SODIUM 50 MG: 50 LIQUID ORAL at 20:21

## 2020-07-29 RX ADMIN — LEVETIRACETAM 1000 MG: 100 SOLUTION ORAL at 20:21

## 2020-07-30 LAB
ANION GAP SERPL CALCULATED.3IONS-SCNC: 5.1 MMOL/L (ref 5–15)
BUN SERPL-MCNC: 47 MG/DL (ref 8–23)
BUN/CREAT SERPL: 46.5 (ref 7–25)
CALCIUM SPEC-SCNC: 10.6 MG/DL (ref 8.6–10.5)
CHLORIDE SERPL-SCNC: 89 MMOL/L (ref 98–107)
CO2 SERPL-SCNC: 48.9 MMOL/L (ref 22–29)
CREAT SERPL-MCNC: 1.01 MG/DL (ref 0.57–1)
GFR SERPL CREATININE-BSD FRML MDRD: 63 ML/MIN/1.73
GLUCOSE BLDC GLUCOMTR-MCNC: 106 MG/DL (ref 70–130)
GLUCOSE BLDC GLUCOMTR-MCNC: 159 MG/DL (ref 70–130)
GLUCOSE BLDC GLUCOMTR-MCNC: 84 MG/DL (ref 70–130)
GLUCOSE BLDC GLUCOMTR-MCNC: 98 MG/DL (ref 70–130)
GLUCOSE SERPL-MCNC: 108 MG/DL (ref 65–99)
POTASSIUM SERPL-SCNC: 3.7 MMOL/L (ref 3.5–5.2)
SODIUM SERPL-SCNC: 143 MMOL/L (ref 136–145)

## 2020-07-30 PROCEDURE — 63710000001 INSULIN REGULAR HUMAN PER 5 UNITS: Performed by: INTERNAL MEDICINE

## 2020-07-30 PROCEDURE — 63710000001 INSULIN GLARGINE PER 5 UNITS: Performed by: INTERNAL MEDICINE

## 2020-07-30 PROCEDURE — 82962 GLUCOSE BLOOD TEST: CPT

## 2020-07-30 PROCEDURE — 25010000002 FUROSEMIDE PER 20 MG: Performed by: INTERNAL MEDICINE

## 2020-07-30 PROCEDURE — 94660 CPAP INITIATION&MGMT: CPT

## 2020-07-30 PROCEDURE — 94799 UNLISTED PULMONARY SVC/PX: CPT

## 2020-07-30 PROCEDURE — 97110 THERAPEUTIC EXERCISES: CPT

## 2020-07-30 PROCEDURE — 80048 BASIC METABOLIC PNL TOTAL CA: CPT | Performed by: INTERNAL MEDICINE

## 2020-07-30 RX ADMIN — FUROSEMIDE 40 MG: 10 INJECTION, SOLUTION INTRAMUSCULAR; INTRAVENOUS at 06:37

## 2020-07-30 RX ADMIN — INSULIN GLARGINE 15 UNITS: 100 INJECTION, SOLUTION SUBCUTANEOUS at 22:58

## 2020-07-30 RX ADMIN — DOCUSATE SODIUM 50 MG: 50 LIQUID ORAL at 08:24

## 2020-07-30 RX ADMIN — LEVETIRACETAM 1000 MG: 100 SOLUTION ORAL at 08:24

## 2020-07-30 RX ADMIN — SODIUM CHLORIDE, PRESERVATIVE FREE 10 ML: 5 INJECTION INTRAVENOUS at 22:49

## 2020-07-30 RX ADMIN — METOLAZONE 10 MG: 5 TABLET ORAL at 08:24

## 2020-07-30 RX ADMIN — INSULIN HUMAN 4 UNITS: 100 INJECTION, SOLUTION PARENTERAL at 12:40

## 2020-07-30 RX ADMIN — LEVETIRACETAM 1000 MG: 100 SOLUTION ORAL at 22:49

## 2020-07-30 RX ADMIN — INSULIN GLARGINE 15 UNITS: 100 INJECTION, SOLUTION SUBCUTANEOUS at 08:24

## 2020-07-30 RX ADMIN — LANSOPRAZOLE 30 MG: KIT at 06:37

## 2020-07-30 RX ADMIN — METOPROLOL TARTRATE 100 MG: 50 TABLET, FILM COATED ORAL at 22:56

## 2020-07-30 RX ADMIN — APIXABAN 2.5 MG: 2.5 TABLET, FILM COATED ORAL at 08:24

## 2020-07-30 RX ADMIN — SODIUM CHLORIDE, PRESERVATIVE FREE 10 ML: 5 INJECTION INTRAVENOUS at 09:00

## 2020-07-30 RX ADMIN — FUROSEMIDE 40 MG: 10 INJECTION, SOLUTION INTRAMUSCULAR; INTRAVENOUS at 17:49

## 2020-07-30 RX ADMIN — APIXABAN 2.5 MG: 2.5 TABLET, FILM COATED ORAL at 22:49

## 2020-07-30 RX ADMIN — BUDESONIDE AND FORMOTEROL FUMARATE DIHYDRATE 2 PUFF: 80; 4.5 AEROSOL RESPIRATORY (INHALATION) at 12:06

## 2020-07-30 RX ADMIN — BUDESONIDE AND FORMOTEROL FUMARATE DIHYDRATE 2 PUFF: 80; 4.5 AEROSOL RESPIRATORY (INHALATION) at 19:50

## 2020-07-30 RX ADMIN — METOPROLOL TARTRATE 100 MG: 50 TABLET, FILM COATED ORAL at 08:24

## 2020-07-31 VITALS
DIASTOLIC BLOOD PRESSURE: 78 MMHG | HEIGHT: 58 IN | WEIGHT: 187.61 LBS | HEART RATE: 79 BPM | RESPIRATION RATE: 18 BRPM | BODY MASS INDEX: 39.38 KG/M2 | OXYGEN SATURATION: 98 % | SYSTOLIC BLOOD PRESSURE: 128 MMHG | TEMPERATURE: 98.4 F

## 2020-07-31 LAB
ANION GAP SERPL CALCULATED.3IONS-SCNC: 12.5 MMOL/L (ref 5–15)
BUN SERPL-MCNC: 50 MG/DL (ref 8–23)
BUN/CREAT SERPL: 45 (ref 7–25)
CALCIUM SPEC-SCNC: 10.1 MG/DL (ref 8.6–10.5)
CHLORIDE SERPL-SCNC: 89 MMOL/L (ref 98–107)
CO2 SERPL-SCNC: 44.5 MMOL/L (ref 22–29)
CREAT SERPL-MCNC: 1.11 MG/DL (ref 0.57–1)
GFR SERPL CREATININE-BSD FRML MDRD: 57 ML/MIN/1.73
GLUCOSE BLDC GLUCOMTR-MCNC: 193 MG/DL (ref 70–130)
GLUCOSE BLDC GLUCOMTR-MCNC: 195 MG/DL (ref 70–130)
GLUCOSE BLDC GLUCOMTR-MCNC: 196 MG/DL (ref 70–130)
GLUCOSE BLDC GLUCOMTR-MCNC: 223 MG/DL (ref 70–130)
GLUCOSE SERPL-MCNC: 191 MG/DL (ref 65–99)
POTASSIUM SERPL-SCNC: 4.2 MMOL/L (ref 3.5–5.2)
SODIUM SERPL-SCNC: 146 MMOL/L (ref 136–145)

## 2020-07-31 PROCEDURE — 63710000001 INSULIN GLARGINE PER 5 UNITS: Performed by: INTERNAL MEDICINE

## 2020-07-31 PROCEDURE — 63710000001 INSULIN REGULAR HUMAN PER 5 UNITS: Performed by: INTERNAL MEDICINE

## 2020-07-31 PROCEDURE — 97530 THERAPEUTIC ACTIVITIES: CPT

## 2020-07-31 PROCEDURE — 80048 BASIC METABOLIC PNL TOTAL CA: CPT | Performed by: INTERNAL MEDICINE

## 2020-07-31 PROCEDURE — 94799 UNLISTED PULMONARY SVC/PX: CPT

## 2020-07-31 PROCEDURE — 97116 GAIT TRAINING THERAPY: CPT

## 2020-07-31 PROCEDURE — 25010000002 FUROSEMIDE PER 20 MG: Performed by: INTERNAL MEDICINE

## 2020-07-31 PROCEDURE — 92526 ORAL FUNCTION THERAPY: CPT

## 2020-07-31 PROCEDURE — 82962 GLUCOSE BLOOD TEST: CPT

## 2020-07-31 RX ORDER — LEVETIRACETAM 100 MG/ML
1000 SOLUTION ORAL EVERY 12 HOURS SCHEDULED
Qty: 600 ML | Refills: 0 | Status: SHIPPED | OUTPATIENT
Start: 2020-07-31

## 2020-07-31 RX ADMIN — FUROSEMIDE 40 MG: 10 INJECTION, SOLUTION INTRAMUSCULAR; INTRAVENOUS at 18:33

## 2020-07-31 RX ADMIN — METOPROLOL TARTRATE 100 MG: 50 TABLET, FILM COATED ORAL at 11:35

## 2020-07-31 RX ADMIN — INSULIN HUMAN 4 UNITS: 100 INJECTION, SOLUTION PARENTERAL at 11:34

## 2020-07-31 RX ADMIN — FUROSEMIDE 40 MG: 10 INJECTION, SOLUTION INTRAMUSCULAR; INTRAVENOUS at 06:31

## 2020-07-31 RX ADMIN — DOCUSATE SODIUM 50 MG: 50 LIQUID ORAL at 11:35

## 2020-07-31 RX ADMIN — METOLAZONE 10 MG: 5 TABLET ORAL at 11:47

## 2020-07-31 RX ADMIN — INSULIN HUMAN 4 UNITS: 100 INJECTION, SOLUTION PARENTERAL at 01:24

## 2020-07-31 RX ADMIN — INSULIN GLARGINE 15 UNITS: 100 INJECTION, SOLUTION SUBCUTANEOUS at 11:34

## 2020-07-31 RX ADMIN — DOCUSATE SODIUM 50 MG: 50 LIQUID ORAL at 01:25

## 2020-07-31 RX ADMIN — APIXABAN 2.5 MG: 2.5 TABLET, FILM COATED ORAL at 11:35

## 2020-07-31 RX ADMIN — INSULIN HUMAN 8 UNITS: 100 INJECTION, SOLUTION PARENTERAL at 06:31

## 2020-07-31 RX ADMIN — SODIUM CHLORIDE, PRESERVATIVE FREE 10 ML: 5 INJECTION INTRAVENOUS at 11:43

## 2020-07-31 RX ADMIN — LEVETIRACETAM 1000 MG: 100 SOLUTION ORAL at 11:35

## 2020-07-31 RX ADMIN — LANSOPRAZOLE 30 MG: KIT at 06:31

## 2020-07-31 RX ADMIN — BUDESONIDE AND FORMOTEROL FUMARATE DIHYDRATE 2 PUFF: 80; 4.5 AEROSOL RESPIRATORY (INHALATION) at 09:13

## 2020-08-01 ENCOUNTER — READMISSION MANAGEMENT (OUTPATIENT)
Dept: CALL CENTER | Facility: HOSPITAL | Age: 85
End: 2020-08-01

## 2020-08-01 NOTE — OUTREACH NOTE
Prep Survey      Responses   [facility] facility patient discharged from?  [place]   Is LACE score < 7 ?  No   Eligibility  Readm Mgmt   Discharge diagnosis  A/C hypoxemic and hypercapnic resp. failure on trilogy, bilateral pleural effusions, A/C diastolic CHF, James on CKD III, pulmonary HTN, encephalopathy, COPD, Morbid obesity   COVID-19 Test Status  Negative   Does the patient have one of the following disease processes/diagnoses(primary or secondary)?  CHF   Does the patient have Home health ordered?  Yes   What is the Home health agency?   [facility]   Is there a DME ordered?  No   Comments regarding appointments  Pt to schedule F/U appointment   Medication alerts for this patient  see AVS   Prep survey completed?  Yes          Ayla Spivey RN

## 2020-08-03 ENCOUNTER — READMISSION MANAGEMENT (OUTPATIENT)
Dept: CALL CENTER | Facility: HOSPITAL | Age: 85
End: 2020-08-03

## 2020-08-03 NOTE — OUTREACH NOTE
CHF Week 1 Survey      Responses   [facility] patient discharged from?  [place]   Does the patient have one of the following disease processes/diagnoses(primary or secondary)?  CHF   CHF Week 1 attempt successful?  Yes   Call start time  1307   Call end time  1314   Discharge diagnosis  A/C hypoxemic and hypercapnic resp. failure on trilogy, bilateral pleural effusions, A/C diastolic CHF, James on CKD III, pulmonary HTN, encephalopathy, COPD, Morbid obesity   Meds reviewed with patient/caregiver?  Yes   Is the patient having any side effects they believe may be caused by any medication additions or changes?  No   Does the patient have all medications ordered at discharge?  Yes   Is the patient taking all medications as directed (includes completed medication regime)?  Yes   Does the patient have a primary care provider?   Yes   Does the patient have an appointment with their PCP within 7 days of discharge?  Yes   Has the patient kept scheduled appointments due by today?  Yes   What is the Home health agency?   [facility]   Has home health visited the patient within 72 hours of discharge?  Yes   Has all DME been delivered?  Yes   Pulse Ox monitoring  None   Psychosocial issues?  No   Did the patient receive a copy of their discharge instructions?  Yes   Nursing interventions  Reviewed instructions with patient   What is the patient's perception of their health status since discharge?  Improving   Nursing interventions  Nurse provided patient education   Is the patient weighing daily?  Yes   Does the patient have scales?  Yes   Is the patient able to teach back Heart Failure diet management?  Yes   Is the patient able to teach back Heart Failure Zones?  Yes   Is the patient able to teach back signs and symptoms of worsening condition? (i.e. weight gain, shortness of air, etc.)  Yes   Is the patient/caregiver able to teach back the hierarchy of who to call/visit for symptoms/problems? PCP, Specialist, Home health  nurse, Urgent Care, ED, 911  Yes    CHF Week 1 call completed?  Yes          Nathaniel Blackwood, RN

## 2020-08-03 NOTE — PROGRESS NOTES
Case Management Discharge Note      Final Note: Pt was dc'd home with [facility]         Destination      No service has been selected for the patient.      Durable Medical Equipment      No service has been selected for the patient.      Dialysis/Infusion      No service has been selected for the patient.      Home Medical Care      Service Provider Request Status Selected Services Address Phone Number Fax Number    [facility] Selected Home Health Services [address] [address] [phone]      Therapy      No service has been selected for the patient.      Community Resources      No service has been selected for the patient.        Transportation Services  Private: Car    Final Discharge Disposition Code: 06 - home with home health care

## 2020-08-04 LAB
MYCOBACTERIUM SPEC CULT: NORMAL
NIGHT BLUE STAIN TISS: NORMAL

## 2020-08-05 ENCOUNTER — APPOINTMENT (OUTPATIENT)
Dept: MRI IMAGING | Facility: HOSPITAL | Age: 85
End: 2020-08-05

## 2020-08-12 ENCOUNTER — READMISSION MANAGEMENT (OUTPATIENT)
Dept: CALL CENTER | Facility: HOSPITAL | Age: 85
End: 2020-08-12

## 2020-08-12 NOTE — OUTREACH NOTE
CHF Week 2 Survey      Responses   [facility] patient discharged from?  [place]   Does the patient have one of the following disease processes/diagnoses(primary or secondary)?  CHF   Week 2 attempt successful?  Yes   Call start time  0845   Call end time  0848   Discharge diagnosis  A/C hypoxemic and hypercapnic resp. failure on trilogy, bilateral pleural effusions, A/C diastolic CHF, James on CKD III, pulmonary HTN, encephalopathy, COPD, Morbid obesity   Is patient permission given to speak with other caregiver?  Yes   List who call center can speak with  daughter, Dorcas   Person spoke with today (if not patient) and relationship  Daughter, Dorcas   Meds reviewed with patient/caregiver?  Yes   Is the patient taking all medications as directed (includes completed medication regime)?  Yes   Medication comments  Daughter reports patient saw PCP yesterday, no adjustments in medications.    Does the patient have a primary care provider?   Yes   Does the patient have an appointment with their PCP within 7 days of discharge?  Greater than 7 days   Comments regarding PCP  PCP Bruce Maldonado. Patient had appt yesterday 8/11   Nursing Interventions  Verified appointment date/time/provider   Has the patient kept scheduled appointments due by today?  Yes   What is the Home health agency?   [facility]   Has home health visited the patient within 72 hours of discharge?  Yes   Pulse Ox monitoring  Intermittent   Pulse Ox device source  Other [Active HH. ]   Psychosocial issues?  No   Did the patient receive a copy of their discharge instructions?  Yes   Nursing interventions  Reviewed instructions with patient [daughter]   What is the patient's perception of their health status since discharge?  Improving   Nursing interventions  Nurse provided patient education   Is the patient weighing daily?  No   Does the patient have scales?  Yes   Daily weight interventions  Education provided on importance of daily weight   Is the patient  able to teach back signs and symptoms of worsening condition? (i.e. weight gain, shortness of air, etc.)  Yes   Is the patient/caregiver able to teach back the hierarchy of who to call/visit for symptoms/problems? PCP, Specialist, Home health nurse, Urgent Care, ED, 911  Yes   Additional teach back comments  Reinforced need for daily weight monitoring and to notify dr if patient gains >2# overnight.    CHF Week 2 call completed?  Yes   Wrap up additional comments  Daughter asleep when called this morning. Brief call.           Ayla Valencia RN

## 2020-08-13 ENCOUNTER — OFFICE VISIT (OUTPATIENT)
Dept: CARDIOLOGY | Facility: CLINIC | Age: 85
End: 2020-08-13

## 2020-08-13 VITALS
RESPIRATION RATE: 18 BRPM | WEIGHT: 179 LBS | HEIGHT: 58 IN | SYSTOLIC BLOOD PRESSURE: 100 MMHG | BODY MASS INDEX: 37.57 KG/M2 | HEART RATE: 98 BPM | DIASTOLIC BLOOD PRESSURE: 78 MMHG

## 2020-08-13 DIAGNOSIS — I10 ESSENTIAL HYPERTENSION: ICD-10-CM

## 2020-08-13 DIAGNOSIS — I48.91 NEW ONSET ATRIAL FIBRILLATION (HCC): Primary | ICD-10-CM

## 2020-08-13 DIAGNOSIS — I50.33 ACUTE ON CHRONIC DIASTOLIC CHF (CONGESTIVE HEART FAILURE) (HCC): ICD-10-CM

## 2020-08-13 DIAGNOSIS — E78.1 PURE HYPERTRIGLYCERIDEMIA: ICD-10-CM

## 2020-08-13 PROCEDURE — 99214 OFFICE O/P EST MOD 30 MIN: CPT | Performed by: INTERNAL MEDICINE

## 2020-08-13 NOTE — PROGRESS NOTES
Cardiology clinic note  Jamaal Greer MD, PhD  Subjective:     Encounter Date:08/13/2020      Patient ID: Erika Irvin is a 84 y.o. female.    Chief Complaint:  Chief Complaint   Patient presents with   • Follow-up       HPI:  History of Present Illness  I had the pleasure of seeing this very pleasant 84-year-old female today who is well-known to me from prior hospitalization with significant past cardiac history of diastolic congestive heart failure, new onset atrial fibrillation with recent hospitalization, diabetes, hypertension hyperlipidemia morbid obesity.  She was recently hospitalized with respiratory failure as well as new onset atrial fibrillation and acute on chronic decompensated diastolic congestive heart failure.  She underwent diuresis and rate control as well as anticoagulation initiation for stroke risk reduction with new onset A. fib and she remains rate controlled presently.  She had a high likelihood of recurrence of atrial fibrillation and therefore rate control strategy was utilized with preserved LV systolic function.  She presents back to clinic today with mild volume overload with JVD as well as HJR as well as 1-2+ peripheral edema.  I discussed her care with her daughter as well as her granddaughter and perhaps the patient is more functional with physical therapy but they can really not say whether she is more or less volume overloaded with respect to her discharge.  On my clinical assessment today she says her shortness of breath is stable NYHA class III however her edema is significant as well as her JVD and she likely needs more loop diuretics.  She agreed to follow-up with me in 2 weeks to try to keep her out of the hospital and we will increase her Lasix to 40 twice daily today with BMP in 5 to 7 days.  She has no other complaints of anginal chest pain syncope palpitations or PND on prior encounter    Previously Lasix was increased to 40 twice daily.  She has had 10 pounds weight  loss of fluid and labs demonstrate stable renal function and electrolytes.  She is follow-up with primary care who says she is doing well, lab all looked good, she is better with respect exertional capacity and baseline shortness of air which and right class III status which is stable given that she is largely sedentary.  She says she feels overall good with no chest pain no baseline dyspnea.  Otherwise she is euvolemic today with controlled atrial fibrillation 80s to 90s and chest pain-free hemodynamically electrically stable.  Her blood pressure is 100/78 with heart rate in the 90s.  She has no other acute concerns and she appears well compensated today compared to recent encounters and encounter during hospitalization.     Review of systems a 14 point review of systems is negative except as mentioned above     Historical data copied for brief encounters and EMR is unchanged     Atrial fibrillation rate controlled today rate 80s to 90s     The following portions of the patient's history were reviewed and updated as appropriate: allergies, current medications, past family history, past medical history, past social history, past surgical history and problem list.    Problem List:  Patient Active Problem List   Diagnosis   • Anemia of chronic renal failure, stage 3 (moderate) (CMS/HCC)   • Malignant neoplasm of upper-inner quadrant of right female breast (CMS/HCC)   • Ductal carcinoma in situ (DCIS) of left breast   • Iron deficiency anemia   • At risk for bone density loss   • Cerebral meningioma (CMS/HCC)   • Aromatase inhibitor use   • New onset atrial fibrillation (CMS/HCC)   • Elevated troponin   • Elevated LFTs   • CKD (chronic kidney disease) stage 3, GFR 30-59 ml/min (CMS/HCC)   • Morbid obesity with BMI of 40.0-44.9, adult (CMS/HCC)   • Type 2 diabetes mellitus with stage 3 chronic kidney disease, with long-term current use of insulin (CMS/HCC)   • Hypertension   • Hyperlipidemia   • COPD (chronic  obstructive pulmonary disease) (CMS/HCC)   • Acute kidney injury (CMS/HCC)   • Metabolic encephalopathy   • Acute respiratory failure with hypercapnia (CMS/HCC)   • Hypokalemia   • Expressive aphasia   • Pain of right middle finger   • Chronic deep vein thrombosis (DVT) of left upper extremity (CMS/HCC)   • Dysphagia   • Oropharyngeal dysphagia   • Acute on chronic diastolic CHF (congestive heart failure) (CMS/HCC)       Past Medical History:  Past Medical History:   Diagnosis Date   • Anemia     Iron deficiency anemia    • Anxiety    • Arthritis    • Breast cancer (CMS/HCC) 08/2014    right    • Breast cancer (CMS/HCC) 12/2006    Left   • Chronic kidney disease     Anemia of chronic renal insufficency, stage 3   • COPD (chronic obstructive pulmonary disease) (CMS/HCC)    • Depression    • Diabetes mellitus (CMS/HCC)    • Hyperlipidemia    • Hypertension    • Irregular heartbeat     noted by RN on 6/12/2020   • YARY (obstructive sleep apnea)    • Poor circulation    • Stroke (CMS/[facility])     CVA in 1990.       Past Surgical History:  Past Surgical History:   Procedure Laterality Date   • BREAST LUMPECTOMY  08/2014    Right-sided invasive ductal carcinoma,papillary type   • BREAST LUMPECTOMY Left 2006   • BREAST SURGERY Right 09/2014    enlargement of lumpectomy site   • CHOLECYSTECTOMY     • CRANIOTOMY Left 08/2013    with removal meningioma   • HYSTERECTOMY     • PEG TUBE INSERTION N/A 6/30/2020    Procedure: PERCUTANEOUS ENDOSCOPIC GASTROSTOMY TUBE INSERTION;  Surgeon: Jared Ibarra MD;  Location: [facility];  Service: General;  Laterality: N/A;  dysphagia   • TUBAL ABDOMINAL LIGATION         Social History:  Social History     Socioeconomic History   • Marital status:      Spouse name: Not on file   • Number of children: Not on file   • Years of education: High School   • Highest education level: Not on file   Occupational History   • Occupation: Nurse aid     Employer: RETIRED   Tobacco Use   •  "Smoking status: Never Smoker   • Smokeless tobacco: Never Used   Substance and Sexual Activity   • Alcohol use: No   • Drug use: No   • Sexual activity: Defer       Allergies:  Allergies   Allergen Reactions   • Sulfa Antibiotics Unknown - Low Severity     GI upset   • Penicillins Itching and Rash       Immunizations:    There is no immunization history on file for this patient.    ROS:  ROS       Objective:         /78 (BP Location: Left arm, Patient Position: Sitting)   Pulse 98   Resp 18   Ht 147.3 cm (58\")   Wt 81.2 kg (179 lb)   BMI 37.41 kg/m²     Physical Exam  No acute distress alert and oriented x3 afebrile vital signs stable  Irregularly irregular, no rubs gallops 1 out of 6 stock ejection murmur lower sternal border  Diminished breath sounds bases with mild crackles and likely atelectasis clear apically however  Obese soft nontender nondistended bowel sounds are positive  Positive HJR  No clubbing or cyanosis but she has 2+ pitting edema to the shins with legs in dependent position  Normocephalic atraumatic pupils equal round extraocular movement intact bilaterally  Trachea midline neck supple no carotid bruits  No bony abnormalities grossly  Skin is warm and dry normal cap refill  Normal mood and affect today  In-Office Procedure(s):  Procedures    ASCVD RIsk Score::  The ASCVD Risk score ([place] RISSA Jr., et al., 2013) failed to calculate for the following reasons:    The 2013 ASCVD risk score is only valid for ages 40 to 79    Recent Radiology:  Imaging Results (Most Recent)     None          Lab Review:   No results displayed because visit has over 200 results.      Lab on 07/22/2020   Component Date Value   • Ferritin 07/22/2020 471.90*   • Iron 07/22/2020 52    • Iron Saturation 07/22/2020 17    • Transferrin 07/22/2020 215    • TIBC 07/22/2020 301    • WBC 07/22/2020 9.33    • RBC 07/22/2020 3.24*   • Hemoglobin 07/22/2020 8.8*   • Hematocrit 07/22/2020 30.6*   • MCV 07/22/2020 94.4    • MCH " 07/22/2020 27.2    • MCHC 07/22/2020 28.8*   • RDW 07/22/2020 18.3*   • RDW-SD 07/22/2020 62.6*   • MPV 07/22/2020 9.8    • Platelets 07/22/2020 272    • Neutrophil % 07/22/2020 76.9*   • Lymphocyte % 07/22/2020 13.4*   • Monocyte % 07/22/2020 9.2    • Eosinophil % 07/22/2020 0.0*   • Basophil % 07/22/2020 0.1    • Immature Grans % 07/22/2020 0.4    • Neutrophils, Absolute 07/22/2020 7.17*   • Lymphocytes, Absolute 07/22/2020 1.25    • Monocytes, Absolute 07/22/2020 0.86    • Eosinophils, Absolute 07/22/2020 0.00    • Basophils, Absolute 07/22/2020 0.01    • Immature Grans, Absolute 07/22/2020 0.04    • nRBC 07/22/2020 0.2    No results displayed because visit has over 200 results.      Hospital Outpatient Visit on 06/12/2020   Component Date Value   • Product Code 06/13/2020 N8735G92    • Unit Number 06/13/2020 [id number]    • UNIT  ABO 06/13/2020 O    • UNIT  RH 06/13/2020 POS    • Crossmatch Interpretation 06/13/2020 Compatible    • Dispense Status 06/13/2020 PT    • Blood Expiration Date 06/13/2020 202007162359    • Blood Type Barcode 06/13/2020 5100    • Product Code 06/13/2020 A0611A52    • Unit Number 06/13/2020 [id number]    • UNIT  ABO 06/13/2020 O    • UNIT  RH 06/13/2020 POS    • Crossmatch Interpretation 06/13/2020 Compatible    • Dispense Status 06/13/2020 PT    • Blood Expiration Date 06/13/2020 202007172359    • Blood Type Barcode 06/13/2020 5100    • ABO Type 06/12/2020 O    • RH type 06/12/2020 Positive    • Antibody Screen 06/12/2020 Negative    • T&S Expiration Date 06/12/2020 6/15/2020 11:59:59 PM    Lab on 05/27/2020   Component Date Value   • Ferritin 05/27/2020 467.00*   • Iron 05/27/2020 39    • Iron Saturation 05/27/2020 15    • Transferrin 05/27/2020 192*   • TIBC 05/27/2020 269    • WBC 05/27/2020 8.07    • RBC 05/27/2020 3.48*   • Hemoglobin 05/27/2020 9.2*   • Hematocrit 05/27/2020 31.6*   • MCV 05/27/2020 90.8    • MCH 05/27/2020 26.4*   • MCHC 05/27/2020 29.1*   • RDW  05/27/2020 13.2    • RDW-SD 05/27/2020 43.0    • MPV 05/27/2020 9.9    • Platelets 05/27/2020 258    • Neutrophil % 05/27/2020 63.3    • Lymphocyte % 05/27/2020 27.5    • Monocyte % 05/27/2020 8.6    • Eosinophil % 05/27/2020 0.0*   • Basophil % 05/27/2020 0.4    • Immature Grans % 05/27/2020 0.2    • Neutrophils, Absolute 05/27/2020 5.11    • Lymphocytes, Absolute 05/27/2020 2.22    • Monocytes, Absolute 05/27/2020 0.69    • Eosinophils, Absolute 05/27/2020 0.00    • Basophils, Absolute 05/27/2020 0.03    • Immature Grans, Absolute 05/27/2020 0.02    • nRBC 05/27/2020 0.0    Lab on 04/29/2020   Component Date Value   • Ferritin 04/29/2020 488.70*   • Iron 04/29/2020 41    • Iron Saturation 04/29/2020 16    • Transferrin 04/29/2020 183*   • TIBC 04/29/2020 256    • WBC 04/29/2020 6.85    • RBC 04/29/2020 3.38*   • Hemoglobin 04/29/2020 9.2*   • Hematocrit 04/29/2020 30.5*   • MCV 04/29/2020 90.2    • MCH 04/29/2020 27.2    • MCHC 04/29/2020 30.2*   • RDW 04/29/2020 12.7    • RDW-SD 04/29/2020 41.3    • MPV 04/29/2020 8.8    • Platelets 04/29/2020 220    • Neutrophil % 04/29/2020 62.0    • Lymphocyte % 04/29/2020 26.7    • Monocyte % 04/29/2020 10.4    • Eosinophil % 04/29/2020 0.0*   • Basophil % 04/29/2020 0.3    • Immature Grans % 04/29/2020 0.6*   • Neutrophils, Absolute 04/29/2020 4.25    • Lymphocytes, Absolute 04/29/2020 1.83    • Monocytes, Absolute 04/29/2020 0.71    • Eosinophils, Absolute 04/29/2020 0.00    • Basophils, Absolute 04/29/2020 0.02    • Immature Grans, Absolute 04/29/2020 0.04    • nRBC 04/29/2020 0.0    Lab on 04/01/2020   Component Date Value   • WBC 04/01/2020 6.59    • RBC 04/01/2020 3.63*   • Hemoglobin 04/01/2020 10.0*   • Hematocrit 04/01/2020 31.9*   • MCV 04/01/2020 87.9    • MCH 04/01/2020 27.5    • MCHC 04/01/2020 31.3*   • RDW 04/01/2020 12.2*   • RDW-SD 04/01/2020 39.5    • MPV 04/01/2020 10.2    • Platelets 04/01/2020 230    • Neutrophil % 04/01/2020 66.2    • Lymphocyte %  04/01/2020 22.8    • Monocyte % 04/01/2020 9.4    • Eosinophil % 04/01/2020 0.2*   • Basophil % 04/01/2020 0.3    • Immature Grans % 04/01/2020 1.1*   • Neutrophils, Absolute 04/01/2020 4.37    • Lymphocytes, Absolute 04/01/2020 1.50    • Monocytes, Absolute 04/01/2020 0.62    • Eosinophils, Absolute 04/01/2020 0.01    • Basophils, Absolute 04/01/2020 0.02    • Immature Grans, Absolute 04/01/2020 0.07*   • nRBC 04/01/2020 0.0    Lab on 03/04/2020   Component Date Value   • WBC 03/04/2020 6.18    • RBC 03/04/2020 3.34*   • Hemoglobin 03/04/2020 9.3*   • Hematocrit 03/04/2020 30.4*   • MCV 03/04/2020 91.0    • MCH 03/04/2020 27.8    • MCHC 03/04/2020 30.6*   • RDW 03/04/2020 12.4    • RDW-SD 03/04/2020 41.5    • MPV 03/04/2020 10.4    • Platelets 03/04/2020 226    • Neutrophil % 03/04/2020 68.4    • Lymphocyte % 03/04/2020 21.4    • Monocyte % 03/04/2020 9.1    • Eosinophil % 03/04/2020 0.0*   • Basophil % 03/04/2020 0.3    • Immature Grans % 03/04/2020 0.8*   • Neutrophils, Absolute 03/04/2020 4.23    • Lymphocytes, Absolute 03/04/2020 1.32    • Monocytes, Absolute 03/04/2020 0.56    • Eosinophils, Absolute 03/04/2020 0.00    • Basophils, Absolute 03/04/2020 0.02    • Immature Grans, Absolute 03/04/2020 0.05    • nRBC 03/04/2020 0.0                 Assessment:           Level of Care:    New onset atrial fibrillation (CMS/HCC)    Anemia of chronic renal failure, stage 3 (moderate) (CMS/HCC)    Malignant neoplasm of upper-inner quadrant of right female breast (CMS/HCC)    Elevated troponin    Elevated LFTs    CKD (chronic kidney disease) stage 3, GFR 30-59 ml/min (CMS/HCC)    Morbid obesity with BMI of 40.0-44.9, adult (CMS/[facility])    Type 2 diabetes mellitus with stage 3 chronic kidney disease, with long-term current use of insulin (CMS/[facility])    Hypertension    Hyperlipidemia    COPD (chronic obstructive pulmonary disease) (CMS/[facility])    Acute kidney injury (CMS/[facility])    Metabolic encephalopathy    Acute respiratory failure  with hypercapnia (CMS/HCC)    Hypokalemia    Expressive aphasia    Pain of right middle finger    Chronic deep vein thrombosis (DVT) of left upper extremity (CMS/HCC)    Dysphagia    Oropharyngeal dysphagia  Acute on chronic diastolic congestive heart failure     1. New onset A. fib RVR  - rate controlled on BB per tube, anticoagulation continues with apixaban twice daily, very well controlled presently continue current therapy     2. Acute on chronic diastolic congestive heart failure  - EF 50%, grade 2 diastolic dysfunction  Much improved volume status, continue Lasix to 40 twice daily, BMP stable renal function electrolytes days, return to clinic in 3 months, primary care in the interim     3. Elevated troponin  Likely secondary to high filling pressures, no chest pain, preserved LV systolic function, medical management presently  Angina free  No acute events        #4 obstructive sleep apnea CPAP as tolerated, pulmonary sleep follow-up     5. Essential hypertension, controlled   #6. Hyperlipidemia, on statin     #7. Underlying dementia stable     #8 CKD follow-up with nephrology        #9 anemia of chronic disease per nephrology        #10, type II/III pulmonary HTN, afterload reduction volume reduction           >  25-minute spent face-to-face with the patient today with greater 50% time spent discussing acute on chronic diastolic congestive heart failure, volume reduction, ambulation, elevating legs, obstructive sleep apnea with need for CPAP usage, paroxysmal atrial fibrillation with stroke risk reduction as well as all questions answered as proposed by patient and granddaughter                Jamaal Greer MD  08/13/20  .

## 2020-08-17 ENCOUNTER — APPOINTMENT (OUTPATIENT)
Dept: MRI IMAGING | Facility: HOSPITAL | Age: 85
End: 2020-08-17

## 2020-08-19 ENCOUNTER — READMISSION MANAGEMENT (OUTPATIENT)
Dept: CALL CENTER | Facility: HOSPITAL | Age: 85
End: 2020-08-19

## 2020-08-19 NOTE — OUTREACH NOTE
CHF Week 3 Survey      Responses   [facility] patient discharged from?  [place]   Does the patient have one of the following disease processes/diagnoses(primary or secondary)?  CHF   Week 3 attempt successful?  Yes   Call start time  1338   Call end time  1348   Person spoke with today (if not patient) and relationship  Daughter, Dorcas Sánchez reviewed with patient/caregiver?  Yes   Is the patient taking all medications as directed (includes completed medication regime)?  Yes   Has the patient kept scheduled appointments due by today?  Yes   DME comments  O2 @ 2l/m   Oral nebs ususally 2x a day   Pulse Ox monitoring  Intermittent   Psychosocial issues?  Yes   Psychosocial comments  having confusion, they are testing for dementia.    Comments  Dtr reports her SOA is better as well as her confusion. PCP is testing her for dementia r/t her confusion. She is walking to bathroom on her own with cane.    What is the patient's perception of their health status since discharge?  Improving   Is the patient weighing daily?  Yes   CHF Week 3 call completed?  Yes          Sadia Doyle RN

## 2020-08-24 ENCOUNTER — OFFICE VISIT (OUTPATIENT)
Dept: ONCOLOGY | Facility: CLINIC | Age: 85
End: 2020-08-24

## 2020-08-24 ENCOUNTER — LAB (OUTPATIENT)
Dept: LAB | Facility: HOSPITAL | Age: 85
End: 2020-08-24

## 2020-08-24 ENCOUNTER — TELEPHONE (OUTPATIENT)
Dept: ONCOLOGY | Facility: HOSPITAL | Age: 85
End: 2020-08-24

## 2020-08-24 ENCOUNTER — APPOINTMENT (OUTPATIENT)
Dept: ONCOLOGY | Facility: HOSPITAL | Age: 85
End: 2020-08-24

## 2020-08-24 VITALS
SYSTOLIC BLOOD PRESSURE: 106 MMHG | DIASTOLIC BLOOD PRESSURE: 63 MMHG | HEART RATE: 92 BPM | OXYGEN SATURATION: 92 % | BODY MASS INDEX: 37.42 KG/M2 | RESPIRATION RATE: 16 BRPM | HEIGHT: 58 IN | TEMPERATURE: 97.8 F

## 2020-08-24 DIAGNOSIS — C50.211 MALIGNANT NEOPLASM OF UPPER-INNER QUADRANT OF RIGHT BREAST IN FEMALE, ESTROGEN RECEPTOR POSITIVE (HCC): Primary | ICD-10-CM

## 2020-08-24 DIAGNOSIS — Z17.0 MALIGNANT NEOPLASM OF UPPER-INNER QUADRANT OF RIGHT BREAST IN FEMALE, ESTROGEN RECEPTOR POSITIVE (HCC): Primary | ICD-10-CM

## 2020-08-24 DIAGNOSIS — Z17.0 MALIGNANT NEOPLASM OF UPPER-INNER QUADRANT OF RIGHT BREAST IN FEMALE, ESTROGEN RECEPTOR POSITIVE (HCC): ICD-10-CM

## 2020-08-24 DIAGNOSIS — N18.30 ANEMIA OF CHRONIC RENAL FAILURE, STAGE 3 (MODERATE) (HCC): ICD-10-CM

## 2020-08-24 DIAGNOSIS — D63.1 ANEMIA OF CHRONIC RENAL FAILURE, STAGE 3 (MODERATE) (HCC): ICD-10-CM

## 2020-08-24 DIAGNOSIS — C50.211 MALIGNANT NEOPLASM OF UPPER-INNER QUADRANT OF RIGHT BREAST IN FEMALE, ESTROGEN RECEPTOR POSITIVE (HCC): ICD-10-CM

## 2020-08-24 LAB
BASOPHILS # BLD AUTO: 0.03 10*3/MM3 (ref 0–0.2)
BASOPHILS NFR BLD AUTO: 0.4 % (ref 0–1.5)
DEPRECATED RDW RBC AUTO: 52 FL (ref 37–54)
EOSINOPHIL # BLD AUTO: 0 10*3/MM3 (ref 0–0.4)
EOSINOPHIL NFR BLD AUTO: 0 % (ref 0.3–6.2)
ERYTHROCYTE [DISTWIDTH] IN BLOOD BY AUTOMATED COUNT: 15.6 % (ref 12.3–15.4)
HCT VFR BLD AUTO: 32 % (ref 34–46.6)
HGB BLD-MCNC: 9.7 G/DL (ref 12–15.9)
IMM GRANULOCYTES # BLD AUTO: 0.01 10*3/MM3 (ref 0–0.05)
IMM GRANULOCYTES NFR BLD AUTO: 0.1 % (ref 0–0.5)
LYMPHOCYTES # BLD AUTO: 1.78 10*3/MM3 (ref 0.7–3.1)
LYMPHOCYTES NFR BLD AUTO: 23.2 % (ref 19.6–45.3)
MCH RBC QN AUTO: 27.6 PG (ref 26.6–33)
MCHC RBC AUTO-ENTMCNC: 30.3 G/DL (ref 31.5–35.7)
MCV RBC AUTO: 90.9 FL (ref 79–97)
MONOCYTES # BLD AUTO: 0.72 10*3/MM3 (ref 0.1–0.9)
MONOCYTES NFR BLD AUTO: 9.4 % (ref 5–12)
NEUTROPHILS NFR BLD AUTO: 5.14 10*3/MM3 (ref 1.7–7)
NEUTROPHILS NFR BLD AUTO: 66.9 % (ref 42.7–76)
NRBC BLD AUTO-RTO: 0 /100 WBC (ref 0–0.2)
PLATELET # BLD AUTO: 233 10*3/MM3 (ref 140–450)
PMV BLD AUTO: 10.6 FL (ref 6–12)
RBC # BLD AUTO: 3.52 10*6/MM3 (ref 3.77–5.28)
WBC # BLD AUTO: 7.68 10*3/MM3 (ref 3.4–10.8)

## 2020-08-24 PROCEDURE — 36415 COLL VENOUS BLD VENIPUNCTURE: CPT

## 2020-08-24 PROCEDURE — 85025 COMPLETE CBC W/AUTO DIFF WBC: CPT

## 2020-08-24 PROCEDURE — 99214 OFFICE O/P EST MOD 30 MIN: CPT | Performed by: INTERNAL MEDICINE

## 2020-08-24 RX ORDER — TEMAZEPAM 7.5 MG/1
CAPSULE ORAL
COMMUNITY
Start: 2020-07-10

## 2020-08-24 NOTE — TELEPHONE ENCOUNTER
Call to Ms. Irvin to inquired if she has had a mammogram since the one done in March 2019.  Patient's daughter, Dorcas, stated that her mother was in the hospital at the time her mammogram had been scheduled, and she had missed it, but they would call to reschedule mammogram. CHETAN

## 2020-08-24 NOTE — PROGRESS NOTES
Subjective .     REASONS FOR FOLLOWUP:  Breast cancer, anemia    HISTORY OF PRESENT ILLNESS:  The patient is a 84 y.o. year old female  who is here for follow-up with the above-mentioned history.    Since last visit with me, she has been admitted to the hospital twice.  She is been admitted with shortness of breath issues.  Currently seems to be doing okay.  No worsening of baseline shortness of breath.  No nausea.  No bleeding.    Difficulty sleeping.  Daughter reports she yells out at night.    Past Medical History:   Diagnosis Date   • Anemia     Iron deficiency anemia    • Anxiety    • Arthritis    • Breast cancer (CMS/HCC) 08/2014    right    • Breast cancer (CMS/HCC) 12/2006    Left   • Chronic kidney disease     Anemia of chronic renal insufficency, stage 3   • COPD (chronic obstructive pulmonary disease) (CMS/[facility])    • Depression    • Diabetes mellitus (CMS/[facility])    • Hyperlipidemia    • Hypertension    • Irregular heartbeat     noted by RN on 6/12/2020   • YARY (obstructive sleep apnea)    • Poor circulation    • Stroke (CMS/[facility])     CVA in 1990.       HEMATOLOGIC/ONCOLOGIC HISTORY:  (History from previous dates can be found in the separate document.)    MEDICATIONS    Current Outpatient Medications:   •  apixaban (ELIQUIS) 2.5 MG tablet tablet, Take 1 tablet by Per G Tube route Every 12 (Twelve) Hours. Indications: Atrial Fibrillation, Disp: 60 tablet, Rfl: 0  •  aspirin 81 MG tablet, Take 81 mg by mouth Daily., Disp: , Rfl:   •  budesonide-formoterol (Symbicort) 80-4.5 MCG/ACT inhaler, Inhale 2 puffs 2 (Two) Times a Day., Disp: 10.2 g, Rfl: 0  •  busPIRone (BUSPAR) 15 MG tablet, Take 1 tablet by mouth Every Morning., Disp: , Rfl:   •  cyanocobalamin (VITAMIN B-12) 1000 MCG tablet, Take 1,000 mcg by mouth Daily., Disp: , Rfl:   •  dexlansoprazole (Dexilant) 30 MG capsule, 1 capsule by Per G Tube route Daily., Disp: , Rfl:   •  diclofenac (VOLTAREN) 1 % gel gel, Apply 2 g topically to the appropriate area  as directed 3 (Three) Times a Day., Disp: , Rfl:   •  epoetin joe (EPOGEN,PROCRIT) 58700 UNIT/ML injection, Inject 5,000 Units under the skin into the appropriate area as directed See Admin Instructions. Patient's daughter is unsure how often she was receiving., Disp: , Rfl:   •  furosemide (LASIX) 40 MG tablet, Take 1 tablet by mouth 2 (Two) Times a Day., Disp: 60 tablet, Rfl: 5  •  glucose blood (Accu-Chek Arleen Plus) test strip, 1 strip by Other route., Disp: , Rfl:   •  HYDROcodone-acetaminophen (NORCO) 5-325 MG per tablet, Take 1 tablet by mouth Every 8 (Eight) Hours As Needed. For pain, Disp: , Rfl:   •  ipratropium-albuterol (DUONEB) 0.5-2.5 mg/mL nebulizer, Inhale 3 mL As Needed., Disp: , Rfl:   •  LANTUS 100 UNIT/ML injection, Inject 22 Units under the skin into the appropriate area as directed Daily., Disp: , Rfl: 0  •  levETIRAcetam (KEPPRA) 100 MG/ML solution, Take 10 mL by Per G Tube route Every 12 (Twelve) Hours., Disp: 600 mL, Rfl: 0  •  metoclopramide (REGLAN) 10 MG tablet, 1 tablet by Per G Tube route 2 (two) times a day. Before a meal, Disp: , Rfl:   •  metoprolol tartrate (LOPRESSOR) 50 MG tablet, Take 1 tablet by Per G Tube route 2 (Two) Times a Day., Disp: 60 tablet, Rfl: 0  •  Multiple Vitamins-Minerals (MULTIVITAL PO), Take 1 tablet by mouth daily., Disp: , Rfl:   •  pravastatin (PRAVACHOL) 40 MG tablet, 1 tablet by Per G Tube route Every Night., Disp: , Rfl:   •  temazepam (RESTORIL) 7.5 MG capsule, TK 1 C PO HS, Disp: , Rfl:   •  busPIRone (BUSPAR) 15 MG tablet, Take 30 mg by mouth Every Evening., Disp: , Rfl:   •  doxepin (SINEquan) 50 MG capsule, Take 100 mg by mouth every night at bedtime., Disp: , Rfl:     ALLERGIES:     Allergies   Allergen Reactions   • Sulfa Antibiotics Unknown - Low Severity     GI upset   • Penicillins Itching and Rash       SOCIAL HISTORY:       Social History     Socioeconomic History   • Marital status:      Spouse name: Not on file   • Number of  "children: Not on file   • Years of education: High School   • Highest education level: Not on file   Occupational History   • Occupation: Nurse aid     Employer: RETIRED   Tobacco Use   • Smoking status: Never Smoker   • Smokeless tobacco: Never Used   Substance and Sexual Activity   • Alcohol use: No   • Drug use: No   • Sexual activity: Defer         FAMILY HISTORY:  Family History   Problem Relation Age of Onset   • Cancer Father    • Cancer Brother    • Diabetes Brother    • Heart disease Brother    • Cancer Other        REVIEW OF SYSTEMS:  Review of Systems   Constitutional: Negative for activity change.   HENT: Negative for nosebleeds and trouble swallowing.    Respiratory: Negative for shortness of breath and wheezing.    Cardiovascular: Negative for chest pain and palpitations.   Gastrointestinal: Negative for constipation, diarrhea and nausea.   Genitourinary: Negative for dysuria and hematuria.   Musculoskeletal: Negative for arthralgias and myalgias.   Skin: Negative for rash and wound.   Neurological: Negative for seizures and syncope.   Hematological: Negative for adenopathy. Does not bruise/bleed easily.   Psychiatric/Behavioral: Negative for confusion.        Objective    Vitals:    08/24/20 1032   BP: 106/63   Pulse: 92   Resp: 16   Temp: 97.8 °F (36.6 °C)   TempSrc: Skin   SpO2: 92%   Weight: Comment: unable   Height: 147.3 cm (57.99\")   PainSc: 0-No pain     Current Status 8/24/2020   ECOG score 0      PHYSICAL EXAM:        CONSTITUTIONAL:  Vital signs reviewed.  No distress, looks comfortable, but chronically ill.  On a wheelchair with nasal cannula oxygen.  EYES:  Conjunctiva and lids unremarkable.  PERRLA  EARS,NOSE,MOUTH,THROAT:  Ears and nose appear unremarkable.  Lips, teeth, gums appear unremarkable.  RESPIRATORY:  Normal respiratory effort.  Lungs clear to auscultation bilaterally.  CARDIOVASCULAR:  Normal S1, S2.  No murmurs rubs or gallops.  No significant lower extremity " edema.  GASTROINTESTINAL: Abdomen appears unremarkable.  Nontender.  No hepatomegaly.  No splenomegaly.  LYMPHATIC:  No cervical, supraclavicular, axillary lymphadenopathy.  SKIN:  Warm.  No rashes.  PSYCHIATRIC:  Normal judgment and insight.  Normal mood and affect.         RECENT LABS:        WBC   Date/Time Value Ref Range Status   08/24/2020 10:20 AM 7.68 3.40 - 10.80 10*3/mm3 Final   08/11/2020 12:31 PM 7.58 4.5 - 11.0 10*3/uL Final     Hemoglobin   Date/Time Value Ref Range Status   08/24/2020 10:20 AM 9.7 (L) 12.0 - 15.9 g/dL Final   08/11/2020 12:31 PM 9.8 (L) 12.0 - 16.0 g/dL Final     Platelets   Date/Time Value Ref Range Status   08/24/2020 10:20  140 - 450 10*3/mm3 Final   08/11/2020 12:31  140 - 440 10*3/uL Final       Assessment/Plan   ASSESSMENT:  There are no diagnoses linked to this encounter.    *Breast cancer.  Right-sided invasive ductal carcinoma, papillary type, removed with lumpectomy on 08/26/2014 by Dr. Sung Griggs. Only 1.7 mm of remaining invasive carcinoma. Node negative. Completed radiation.   Arimidex 10/17/2014-10/17/2019.    No evidence of recurrence.  Remains in remission    *Anemia of chronic renal insufficiency, stage 3.   Procrit p.r.n. for hemoglobin less than 10.   On the late March 2016 visit, changed from every 5 week CBC to every 2 week CBC due to a fall in hemoglobin.  She has not needed much Procrit on the every 3 week interval she is currently on.    · On the 1/8/2020 (today) office visit, last Procrit was 5000 units on 11/18/2019.  Therefore, change Procrit threshold to Hb <9 (since she has been receiving such a low dose of Procrit, Procrit might not be needed)  Hb 9.7    *Iron deficiency anemia. History of Venofer and Feraheme use. Does not respond well to p.o. iron but is taking ferrous sulfate 1 tablet 3 times per day without any problems (she will continue this).   In May 2015, she received one dose of Feraheme due to an iron percent saturation of around  10% and MCV of 82.6 despite an elevated ferritin. I think her ferritin is chronically high because of inflammation).   Last iron labs from 7/22/2020 not low    *Elevated ferritin  · On the 1/8/2020 office visit, daughter reports patient has only received 2 transfusions.  Therefore, suspect this is inflammatory in nature.  Doubt it is due to her oral iron.    · On 1/8/2020, stopped oral iron 1 tablet 3 times per day.  Ferritin is better, 472 on 7/22/2020, from 596 on 2/5/2020 from 1034 on 11/18/2019    *Elevated transaminases 1/4/18.  This prompted CT abdomen 1/11/18 (without contrast due to chronic renal insufficiency):  No evidence of malignancy.  Transaminases are much better today, but still significantly elevated.  She was advised to follow up with her PCP.  ALT and AST elevated March 2019.  Patient's daughter reports her PCP repeated these and repeat labs were fine.    *Hypercalcemia.  Calcium was stopped 2/28/2019.  Remain off calcium  Calcium on 8/11/2020, 9.8    *Atrial fibrillation, new onset per cardiology note 8/13/2020.  Eliquis twice per day through cardiology.    PLAN:  · CBC every month  · Procrit if Hb <9.    · Iron labs 1 month and 3 months  · (Defer CMP lab monitoring to PCP)  · M.D. CBC Procrit 4 months.   · She states Dr. Bruce Maldonado (PCP) does her breast exams and orders her mammograms and she is up-to-date.  ·   last mammogram I have on file was BI-RADS 2 (3/27/2019, [facility]).  I asked our nurses to check into this and if she has not had a mammogram since then to make sure she has this done.      Daughter assisted with history.  For this visit I reviewed and summarized hospital records and cardiology outpatient office note as well.    Send copy this to PCP, Dr. Bruce Maldonado

## 2020-08-26 ENCOUNTER — READMISSION MANAGEMENT (OUTPATIENT)
Dept: CALL CENTER | Facility: HOSPITAL | Age: 85
End: 2020-08-26

## 2020-08-26 NOTE — OUTREACH NOTE
CHF Week 4 Survey      Responses   [facility] patient discharged from?  [place]   Does the patient have one of the following disease processes/diagnoses(primary or secondary)?  CHF   Week 4 attempt successful?  No          Sadia Doyle RN

## 2020-08-28 ENCOUNTER — TELEPHONE (OUTPATIENT)
Dept: NEUROSURGERY | Facility: CLINIC | Age: 85
End: 2020-08-28

## 2020-08-28 DIAGNOSIS — D32.0 CEREBRAL MENINGIOMA (HCC): Primary | ICD-10-CM

## 2020-08-28 RX ORDER — DIAZEPAM 10 MG/1
TABLET ORAL
Qty: 1 TABLET | Refills: 0 | Status: SHIPPED | OUTPATIENT
Start: 2020-08-28

## 2020-08-28 NOTE — TELEPHONE ENCOUNTER
----- Message from Nena Aponte sent at 8/27/2020  2:24 PM EDT -----  Hannah,    I am not sure if this goes to you or not.  The patient is going to need something for her mri on 9-23-20 @ [facility].    Nena

## 2020-08-28 NOTE — TELEPHONE ENCOUNTER
I would advise that she call and speak to whoever manages her COPD or the PCP and ask if it is okay for her to take a half of Valium for MRI.  additionally, since the patient has had multiple medical issues and is now on a feeding tube, does a family want to proceed with MRI imaging at this time.  Would they even want to consider surgical management if something had changed?

## 2020-08-28 NOTE — TELEPHONE ENCOUNTER
Daughter would like to know if it is safe for her to take the 1/2 diazepam because of her COPD.  She said she has had multiple new medical diagnosis since she was last seen by us. She is also NPO and has a feeding tube.     [phone]    You seen her last in 2017 for meningioma. MRI is for a 3 year follow up new MRI.

## 2020-08-31 ENCOUNTER — TRANSCRIBE ORDERS (OUTPATIENT)
Dept: ADMINISTRATIVE | Facility: HOSPITAL | Age: 85
End: 2020-08-31

## 2020-08-31 DIAGNOSIS — R13.10 DYSPHAGIA, UNSPECIFIED TYPE: Primary | ICD-10-CM

## 2020-08-31 NOTE — TELEPHONE ENCOUNTER
PT'S DAUGHTER CALLED AND HAD QUESTIONS I CANNOT ANSWER. TRIED TO TRANSFER CALL AND NO ONE ANSWERED. PRIYANKA  [phone]

## 2020-08-31 NOTE — TELEPHONE ENCOUNTER
Pt daughter Dorcas will call PCP or COPD doctor to see if it is ok for her to take the diazepam because of her overall medical history.     She said if surgery is indicated then they would move forward with the surgery.     She is scheduled to see  9/28/20 for the results.

## 2020-09-01 ENCOUNTER — TRANSCRIBE ORDERS (OUTPATIENT)
Dept: SLEEP MEDICINE | Facility: HOSPITAL | Age: 85
End: 2020-09-01

## 2020-09-01 DIAGNOSIS — Z01.818 OTHER SPECIFIED PRE-OPERATIVE EXAMINATION: Primary | ICD-10-CM

## 2020-09-02 ENCOUNTER — LAB (OUTPATIENT)
Dept: LAB | Facility: HOSPITAL | Age: 85
End: 2020-09-02

## 2020-09-02 DIAGNOSIS — Z01.818 OTHER SPECIFIED PRE-OPERATIVE EXAMINATION: ICD-10-CM

## 2020-09-02 PROCEDURE — U0004 COV-19 TEST NON-CDC HGH THRU: HCPCS

## 2020-09-02 PROCEDURE — C9803 HOPD COVID-19 SPEC COLLECT: HCPCS

## 2020-09-03 LAB — SARS-COV-2 RNA RESP QL NAA+PROBE: NOT DETECTED

## 2020-09-04 ENCOUNTER — HOSPITAL ENCOUNTER (OUTPATIENT)
Dept: GENERAL RADIOLOGY | Facility: HOSPITAL | Age: 85
Discharge: HOME | End: 2020-09-04
Admitting: INTERNAL MEDICINE

## 2020-09-04 DIAGNOSIS — R13.10 DYSPHAGIA, UNSPECIFIED TYPE: ICD-10-CM

## 2020-09-04 PROCEDURE — A9270 NON-COVERED ITEM OR SERVICE: HCPCS | Performed by: INTERNAL MEDICINE

## 2020-09-04 PROCEDURE — 63710000001 BARIUM SULFATE 40 % SUSPENSION: Performed by: INTERNAL MEDICINE

## 2020-09-04 PROCEDURE — 92611 MOTION FLUOROSCOPY/SWALLOW: CPT

## 2020-09-04 PROCEDURE — 63710000001 BARIUM SULFATE 98 % RECONSTITUTED SUSPENSION: Performed by: INTERNAL MEDICINE

## 2020-09-04 PROCEDURE — 74230 X-RAY XM SWLNG FUNCJ C+: CPT

## 2020-09-04 RX ADMIN — BARIUM SULFATE 4 ML: 980 POWDER, FOR SUSPENSION ORAL at 10:03

## 2020-09-04 RX ADMIN — BARIUM SULFATE 50 ML: 400 SUSPENSION ORAL at 10:04

## 2020-09-04 NOTE — MBS/VFSS/FEES
Outpatient Speech Language Pathology   Adult Swallow Initial Evaluation  [facility]     Patient Name: Erika Irvin  : 1935  MRN: [id number]  Today's Date: 2020         Visit Date: 2020   Patient Active Problem List   Diagnosis   • Anemia of chronic renal failure, stage 3 (moderate) (CMS/HCC)   • Malignant neoplasm of upper-inner quadrant of right female breast (CMS/HCC)   • Ductal carcinoma in situ (DCIS) of left breast   • Iron deficiency anemia   • At risk for bone density loss   • Cerebral meningioma (CMS/HCC)   • Aromatase inhibitor use   • New onset atrial fibrillation (CMS/HCC)   • Elevated troponin   • Elevated LFTs   • CKD (chronic kidney disease) stage 3, GFR 30-59 ml/min (CMS/HCC)   • Morbid obesity with BMI of 40.0-44.9, adult (CMS/HCC)   • Type 2 diabetes mellitus with stage 3 chronic kidney disease, with long-term current use of insulin (CMS/HCC)   • Hypertension   • Hyperlipidemia   • COPD (chronic obstructive pulmonary disease) (CMS/HCC)   • Acute kidney injury (CMS/HCC)   • Metabolic encephalopathy   • Acute respiratory failure with hypercapnia (CMS/HCC)   • Hypokalemia   • Expressive aphasia   • Pain of right middle finger   • Chronic deep vein thrombosis (DVT) of left upper extremity (CMS/HCC)   • Dysphagia   • Oropharyngeal dysphagia   • Acute on chronic diastolic CHF (congestive heart failure) (CMS/HCC)        Past Medical History:   Diagnosis Date   • Anemia     Iron deficiency anemia    • Anxiety    • Arthritis    • Breast cancer (CMS/HCC) 2014    right    • Breast cancer (CMS/HCC) 2006    Left   • Chronic kidney disease     Anemia of chronic renal insufficency, stage 3   • COPD (chronic obstructive pulmonary disease) (CMS/HCC)    • Depression    • Diabetes mellitus (CMS/HCC)    • Hyperlipidemia    • Hypertension    • Irregular heartbeat     noted by RN on 2020   • YARY (obstructive sleep apnea)    • Poor circulation    • Stroke (CMS/HCC)     CVA in .         Past Surgical History:   Procedure Laterality Date   • BREAST LUMPECTOMY  08/2014    Right-sided invasive ductal carcinoma,papillary type   • BREAST LUMPECTOMY Left 2006   • BREAST SURGERY Right 09/2014    enlargement of lumpectomy site   • CHOLECYSTECTOMY     • CRANIOTOMY Left 08/2013    with removal meningioma   • HYSTERECTOMY     • PEG TUBE INSERTION N/A 6/30/2020    Procedure: PERCUTANEOUS ENDOSCOPIC GASTROSTOMY TUBE INSERTION;  Surgeon: Jared Ibarra MD;  Location: [facility] ENDOSCOPY;  Service: General;  Laterality: N/A;  dysphagia   • TUBAL ABDOMINAL LIGATION           Visit Dx:     ICD-10-CM ICD-9-CM   1. Dysphagia, unspecified type R13.10 787.20           SLP Adult Swallow Evaluation     Row Name 09/04/20 1000       Rehab Evaluation    Document Type  evaluation  -AW    Subjective Information  no complaints  -AW    Patient Observations  alert;cooperative;agree to therapy  -AW    Patient/Family Observations  Pt's breathing significantly better than last VFSS. Daughter present for education.  -AW    Patient Effort  good  -AW    Symptoms Noted During/After Treatment  none  -AW       General Information    Patient Profile Reviewed  yes  -AW    Pertinent History Of Current Problem  Pt referred for VFSS by [facility] to determine if pt is safe for a pureed diet with honey thick liquids. Pt has a h/o respiratory failure, COPD, dysphagia with PEG placed 6/30/20.  -AW    Current Method of Nutrition  NPO  -AW    Precautions/Limitations, Vision  WFL;for purposes of eval  -AW    Precautions/Limitations, Hearing  WFL;for purposes of eval  -AW    Prior Level of Function-Communication  WFL  -AW    Prior Level of Function-Swallowing  no diet consistency restrictions  -AW    Plans/Goals Discussed with  patient;family;agreed upon  -AW    Barriers to Rehab  previous functional deficit  -AW    Patient's Goals for Discharge  return to PO diet  -AW    Family Goals for Discharge  patient able to return to PO diet  -AW        Pain Assessment    Additional Documentation  Pain Scale: Numbers Pre/Post-Treatment (Group)  -AW       Pain Scale: Numbers Pre/Post-Treatment    Pain Scale: Numbers, Pretreatment  0/10 - no pain  -AW    Pain Scale: Numbers, Post-Treatment  0/10 - no pain  -AW       Oral Motor and Function    Dentition Assessment  natural, present and adequate  -AW    Secretion Management  WNL/WFL  -AW    Mucosal Quality  moist, healthy  -AW       Oral Musculature and Cranial Nerve Assessment    Oral Motor General Assessment  generalized oral motor weakness  -AW    Lingual Impairment, Detail. Cranial Nerves IX, XII (Glossopharyngeal and Hypoglossal)  reduced lingual ROM  -AW       General Eating/Swallowing Observations    Eating/Swallowing Skills  fed by SLP  -AW    Positioning During Eating  upright in chair  -AW       MBS/VFSS    Utensils Used  spoon;cup  -AW    Consistencies Trialed  pureed;nectar/syrup-thick liquids;honey-thick liquids  -AW       MBS/VFSS Interpretation    Oral Prep Phase  WFL  -AW    Oral Transit Phase  impaired  -AW    Oral Residue  WFL  -AW    VFSS Summary  Pt presented with moderate oropharyngeal dysphagia characterized by mistiming/delayed swallow and decreased hyolaryngeal excursion. Pt had premature spillage to the valleculae with pureed and honey via teaspoon; and to the pyriforms with honey via cup, nectar via teaspoon, and occasionally honey via teaspoon. Swallow was delayed 1-2 seconds. No penetration or aspiration was seen, however, risk is high. Mild base of tongue and vallecular residuals were cleared with a spontaneous swallow. An esophageal scan showed esophageal retention and an irregular barium column. Feel pt is safe to begin a pureed diet and honey thick liquids via teaspoon. Discussed with pt and daughter starting with small amounts and increasing if tolerating and supplementing with tube feedings. Pt's daughter felt comfortable with preparing pureed and honey thick liquids and has  thickener at home.   -AW       Initiation of Pharyngeal Swallow    Pharyngeal Phase  impaired pharyngeal phase of swallowing  -AW       Esophageal Phase    Esophageal Phase  esophageal retention;irregular barium column  -AW       SLP Communication to Radiology    Summary Statement  Pt presented with moderate oropharyngeal dysphagia characterized by mistiming/delayed swallow and decreased hyolaryngeal excursion. Pt had premature spillage to the valleculae with pureed and honey via teaspoon; and to the pyriforms with honey via cup, nectar via teaspoon, and occasionally honey via teaspoon. Swallow was delayed 1-2 seconds. No penetration or aspiration was seen, however, risk is high. Mild base of tongue and vallecular residuals were cleared with a spontaneous swallow. An esophageal scan showed esophageal retention and an irregular barium column.   -AW       Clinical Impression    SLP Swallowing Diagnosis  moderate;oral dysfunction;pharyngeal dysfunction  -AW    Functional Impact  risk of aspiration/pneumonia  -AW    Swallow Criteria for Skilled Therapeutic Interventions Met  no problems identified which require skilled intervention;other (see comments) pt recently d/c from home health  -AW       Recommendations    SLP Diet Recommendation  puree;honey thick liquids  -AW    Recommended Precautions and Strategies  upright posture during/after eating;small bites of food and sips of liquid;multiple swallows per bite of food;multiple swallows per sip of liquid;liquid via spoon only;volitional throat clear  -AW    SLP Rec. for Method of Medication Administration  meds via alternate route;meds crushed;with pudding or applesauce;as tolerated  -AW    Monitor for Signs of Aspiration  yes;other (see comments) daughter aware of s/s   -AW    Demonstrates Need for Referral to Another Service  dedicated esophageal assessment  -AW      User Key  (r) = Recorded By, (t) = Taken By, (c) = Cosigned By    Initials Name Provider Type    AW  Kristi Bolivar MS CCC-SLP Speech and Language Pathologist                        OP SLP Education     Row Name 09/04/20 1038       Education    Barriers to Learning  No barriers identified  -AW    Education Provided  Described results of evaluation;Family/caregivers expressed understanding of evaluation;Family/caregivers participated in establishing goals and treatment plan;Family/caregivers demonstrated recommended strategies Pt's daughter participated in education and demonstrated understanding of all recommendations. She lives with pt and prepares her food and liquids.  -AW    Assessed  Learning needs;Learning motivation;Learning preferences;Learning readiness  -AW    Learning Motivation  Strong  -AW    Learning Method  Explanation;Demonstration;Teach back;Written materials  -AW    Teaching Response  Verbalized understanding;Demonstrated understanding  -AW      User Key  (r) = Recorded By, (t) = Taken By, (c) = Cosigned By    Initials Name Effective Dates    DELONTE Sanderseulalia Kristi MS CCC-SLP 06/08/18 -         Patient was not wearing a face mask during this therapy encounter. Therapist used appropriate personal protective equipment including mask, eye protection and gloves.  Mask used was standard procedure mask. Appropriate PPE was worn during the entire therapy session. Hand hygiene was completed before and after therapy session. Patient is not in enhanced droplet precautions.                   SLP Outcome Measures (last 72 hours)      SLP Outcome Measures     Row Name 09/04/20 1000             SLP Outcome Measures    Outcome Measure Used?  Adult NOMS  -AW         Adult FCM Scores    FCM Chosen  Swallowing  -AW      Swallowing FCM Score  3  -AW        User Key  (r) = Recorded By, (t) = Taken By, (c) = Cosigned By    Initials Name Effective Dates    DELONTE Sanderseulalia Kristi MS CCC-SLP 06/08/18 -                Time Calculation:   SLP Start Time: 0930  SLP Stop Time: 1045  SLP Time Calculation (min): 75 min    Therapy Charges for  Today     Code Description Service Date Service Provider Modifiers Qty    [id number] HC ST MOTION FLUORO EVAL SWALLOW 5 9/4/2020 Kristi Bolivar, MS CCC-SLP GN 1                   Kristi Bolivar MS CCC-SLP  9/4/2020

## 2020-09-21 ENCOUNTER — APPOINTMENT (OUTPATIENT)
Dept: LAB | Facility: HOSPITAL | Age: 85
End: 2020-09-21

## 2020-09-21 ENCOUNTER — APPOINTMENT (OUTPATIENT)
Dept: ONCOLOGY | Facility: HOSPITAL | Age: 85
End: 2020-09-21

## 2020-10-14 ENCOUNTER — HOSPITAL ENCOUNTER (OUTPATIENT)
Dept: MRI IMAGING | Facility: HOSPITAL | Age: 85
Discharge: HOME | End: 2020-10-14
Admitting: NURSE PRACTITIONER

## 2020-10-14 DIAGNOSIS — D32.0 CEREBRAL MENINGIOMA (HCC): ICD-10-CM

## 2020-10-14 PROCEDURE — 0 GADOBENATE DIMEGLUMINE 529 MG/ML SOLUTION: Performed by: NURSE PRACTITIONER

## 2020-10-14 PROCEDURE — 70553 MRI BRAIN STEM W/O & W/DYE: CPT

## 2020-10-14 PROCEDURE — A9577 INJ MULTIHANCE: HCPCS | Performed by: NURSE PRACTITIONER

## 2020-10-14 PROCEDURE — 82565 ASSAY OF CREATININE: CPT

## 2020-10-14 RX ADMIN — GADOBENATE DIMEGLUMINE 17 ML: 529 INJECTION, SOLUTION INTRAVENOUS at 18:33

## 2020-10-15 LAB — CREAT BLDA-MCNC: 1.3 MG/DL (ref 0.6–1.3)

## 2020-10-16 ENCOUNTER — TELEPHONE (OUTPATIENT)
Dept: ONCOLOGY | Facility: CLINIC | Age: 85
End: 2020-10-16

## 2020-10-16 NOTE — TELEPHONE ENCOUNTER
Caller: PRIYANKA LOCO    Relationship to patient: DAUGHTER    Best call back number: [phone]    PT WILL NOT BE ABLE TO MAKE IT TO HER APPT ON 10/19 AND NEEDS TO RESCHEDULE. PT'S DAUGHTER NORMALLY BRINGS HER TO HER APPT'S, BUT IS CURRENTLY IN THE HOSPITAL FOR COVID.

## 2020-10-19 ENCOUNTER — APPOINTMENT (OUTPATIENT)
Dept: ONCOLOGY | Facility: HOSPITAL | Age: 85
End: 2020-10-19

## 2020-10-19 ENCOUNTER — APPOINTMENT (OUTPATIENT)
Dept: LAB | Facility: HOSPITAL | Age: 85
End: 2020-10-19

## 2020-10-19 ENCOUNTER — DOCUMENTATION (OUTPATIENT)
Dept: ONCOLOGY | Facility: CLINIC | Age: 85
End: 2020-10-19

## 2020-11-16 ENCOUNTER — APPOINTMENT (OUTPATIENT)
Dept: LAB | Facility: HOSPITAL | Age: 85
End: 2020-11-16

## 2020-11-16 ENCOUNTER — APPOINTMENT (OUTPATIENT)
Dept: ONCOLOGY | Facility: HOSPITAL | Age: 85
End: 2020-11-16

## 2020-12-03 ENCOUNTER — TELEPHONE (OUTPATIENT)
Dept: NEUROSURGERY | Facility: CLINIC | Age: 85
End: 2020-12-03

## 2020-12-03 NOTE — TELEPHONE ENCOUNTER
THE PATIENT'S DAUGHTER PRIYANKA CALLED ABOUT RESHEDULING THE PATIENT'S APPOINTMENT WITH DR. ROSARIO. SHE WAS SCHEDULED TO SEE HIM ON 11/11 BUT THE APPOINTMENT WAS CANCELLED BECAUSE SHE WAS IN THE HOSPITAL.    PRIYANKA CAN BE CALLED [phone]    THANK YOU!

## 2020-12-15 ENCOUNTER — TELEPHONE (OUTPATIENT)
Dept: NEUROSURGERY | Facility: CLINIC | Age: 85
End: 2020-12-15

## 2020-12-15 NOTE — TELEPHONE ENCOUNTER
Dorcas (patient's daughter) called and cancelled her appointment due to patient is now on hospice.

## 2022-07-08 NOTE — PROGRESS NOTES
Subjective .     REASONS FOR FOLLOWUP:  Breast cancer, anemia    HISTORY OF PRESENT ILLNESS:  The patient is a 81 y.o. year old female  who is here for follow-up with the above-mentioned history.  Denies neurological symptoms.  Denies nausea   Denies weight loss.  Denies pain.  No significant problems with hot flashes.      Past Medical History:   Diagnosis Date   • Anemia     Iron deficiency anemia    • Anxiety    • Arthritis    • Breast cancer    • Chronic kidney disease     Anemia of chronic renal insufficency, stage 3   • Depression    • Diabetes mellitus    • Hypertension    • YARY (obstructive sleep apnea)    • Poor circulation    • Stroke        HEMATOLOGIC/ONCOLOGIC HISTORY:  (History from previous dates can be found in the separate document.)    MEDICATIONS    Current Outpatient Prescriptions:   •  amLODIPine (NORVASC) 10 MG tablet, Take 1 tablet by mouth daily., Disp: , Rfl:   •  anastrozole (ARIMIDEX) 1 MG tablet, take 1 tablet by mouth once daily, Disp: 30 tablet, Rfl: 5  •  aspirin 81 MG tablet, Take 81 mg by mouth., Disp: , Rfl:   •  busPIRone (BUSPAR) 15 MG tablet, Take 1 tablet by mouth every morning. And 2 tablets in the evening, Disp: , Rfl:   •  calcium carbonate-vitamin d (CALCIUM 600+D) 600-400 MG-UNIT per tablet, Take 1 tablet by mouth., Disp: , Rfl:   •  clopidogrel (PLAVIX) 75 MG tablet, take 1 tablet by mouth once daily, Disp: , Rfl:   •  cyanocobalamin (VITAMIN B-12) 1000 MCG tablet, Take 100 mcg by mouth., Disp: , Rfl:   •  doxepin (SINEquan) 50 MG capsule, take 1 tablet by mouth three times a day at bedtime, Disp: , Rfl: 0  •  epoetin joe (EPOGEN,PROCRIT) 66191 UNIT/ML injection, Epogen SOLN; Patient Sig: Epogen SOLN 5,000u if hgb is below 12   subq injectable; 0; 22-Jul-2015; Active, Disp: , Rfl:   •  FERROUS SULFATE PO, Take  by mouth., Disp: , Rfl:   •  furosemide (LASIX) 40 MG tablet, Take 1 tablet by mouth daily., Disp: , Rfl:   •  glucose blood test strip, TRUEtest Test In Vitro  I spoke with Beau and his Wife YING Plummer D.O.  [facility] Neurology     Strip; Patient Sig: TRUEtest Test In Vitro Strip ; 200; 0; 27-May-2014; Active, Disp: , Rfl:   •  HYDROcodone-acetaminophen (NORCO) 5-325 MG per tablet, Take 1 tablet by mouth every 6 (six) hours as needed. For pain, Disp: , Rfl:   •  insulin aspart (NovoLOG) 100 UNIT/ML injection, Inject 8 Units under the skin Medrol Dose Pack scheduling ONLY., Disp: , Rfl:   •  Insulin Glargine 100 UNIT/ML solution pen-injector, Inject 20 Units under the skin daily., Disp: , Rfl:   •  ipratropium-albuterol (DUONEB) 0.5-2.5 mg/mL nebulizer, Inhale 3 mL 3 (Three) Times a Day., Disp: , Rfl:   •  lansoprazole (PREVACID) 30 MG capsule, Take 1 capsule by mouth daily., Disp: , Rfl:   •  LANTUS 100 UNIT/ML injection, , Disp: , Rfl: 0  •  levETIRAcetam (KEPPRA) 500 MG tablet, Take 1 tablet by mouth 2 (two) times a day., Disp: , Rfl:   •  losartan (COZAAR) 100 MG tablet, Take 1 tablet by mouth daily., Disp: , Rfl:   •  meclizine (ANTIVERT) 25 MG tablet, take 1 tablet by mouth three times a day if needed for dizziness, Disp: , Rfl: 0  •  metoclopramide (REGLAN) 10 MG tablet, Take 1 tablet by mouth daily. Before a meal, Disp: , Rfl:   •  Multiple Vitamins-Minerals (MULTIVITAL PO), Take 1 tablet by mouth daily., Disp: , Rfl:   •  pravastatin (PRAVACHOL) 40 MG tablet, Take 1 tablet by mouth nightly., Disp: , Rfl:   •  temazepam (RESTORIL) 30 MG capsule, Take 1 capsule by mouth nightly as needed. At bedtime, Disp: , Rfl:   No current facility-administered medications for this visit.     ALLERGIES:     Allergies   Allergen Reactions   • Sulfa Antibiotics Other (See Comments)     GI upset   • Penicillins Itching and Rash       SOCIAL HISTORY:       Social History     Social History   • Marital status:      Spouse name: N/A   • Number of children: N/A   • Years of education: High School     Occupational History   • Nurse aid Retired     Social History Main Topics   • Smoking status: Never Smoker   • Smokeless tobacco: Never Used   • Alcohol  "use No   • Drug use: No   • Sexual activity: Not on file     Other Topics Concern   • Not on file     Social History Narrative         FAMILY HISTORY:  Family History   Problem Relation Age of Onset   • Cancer Father    • Cancer Brother    • Diabetes Brother    • Heart disease Brother    • Cancer Other        REVIEW OF SYSTEMS:  GENERAL: No change in appetite or weight;   No fevers, chills, sweats.    SKIN: No nonhealing lesions.   No rashes.  HEME/LYMPH: No easy bruising, bleeding.   No swollen nodes.   EYES: No vision changes or diplopia.   ENT: No tinnitus, hearing loss, gum bleeding, epistaxis, hoarseness or dysphagia.   RESPIRATORY: No cough, shortness of breath, hemoptysis or wheezing.   CVS: No chest pain, palpitations, orthopnea, dyspnea on exertion or PND.   GI: No melena or hematochezia.   No abdominal pain.  No nausea, vomiting, constipation, diarrhea  : No lower tract obstructive symptoms, dysuria or hematuria.   MUSCULOSKELETAL: No bone pain.  No joint stiffness.   NEUROLOGICAL: No global weakness, loss of consciousness or seizures.   PSYCHIATRIC: No increased nervousness, mood changes or depression.          Objective    Vitals:    07/06/17 1544   BP: 136/64   Pulse: 86   Resp: 14   Temp: 97.9 °F (36.6 °C)   TempSrc: Oral   SpO2: 94%   Weight: 194 lb 3.2 oz (88.1 kg)   Height: 60.23\" (153 cm)   PainSc: 5  Comment: lower back pain   PainLoc: Back     Current Status 7/6/2017   ECOG score 1      PHYSICAL EXAM:    GENERAL:  Well-developed, well-nourished in no acute distress.   SKIN:  Warm, dry without rashes, purpura or petechiae.  HEAD:  Normocephalic.  EYES:  Pupils equal, round and reactive to light.  EOMs intact.  Conjunctivae normal.  EARS:  Hearing intact.  NOSE:  Septum midline.  No excoriations or nasal discharge.  MOUTH:  Tongue is well-papillated; no stomatitis or ulcers.  Lips normal.  THROAT:  Oropharynx without lesions or exudates.  NECK:  Supple with good range of motion; no thyromegaly or " masses, no JVD.  LYMPHATICS:  No cervical, supraclavicular, axillary or inguinal adenopathy.  CHEST:  Lungs clear to percussion and auscultation. Good airflow.  CARDIAC:  Regular rate and rhythm without murmurs, rubs or gallops. Normal S1,S2.  ABDOMEN:  Soft, nontender with no organomegaly or masses.  EXTREMITIES:  No clubbing, cyanosis or edema.  NEUROLOGICAL:  Cranial Nerves II-XII grossly intact.  No focal neurological deficits.  PSYCHIATRIC:  Normal affect and mood.      RECENT LABS:        WBC   Date/Time Value Ref Range Status   07/06/2017 03:31 PM 6.83 4.00 - 10.00 10*3/mm3 Final     Hemoglobin   Date/Time Value Ref Range Status   07/06/2017 03:31 PM 9.9 (L) 11.5 - 14.9 g/dL Final     Platelets   Date/Time Value Ref Range Status   07/06/2017 03:31  150 - 375 10*3/mm3 Final       Assessment/Plan   ASSESSMENT:  Malignant neoplasm of upper-inner quadrant of right female breast  - CBC & Differential  - CBC & Differential  - CBC & Differential  - CBC & Differential  - Ferritin  - Iron Profile    Anemia of chronic renal failure, stage 3 (moderate)  - CBC & Differential  - CBC & Differential  - CBC & Differential  - CBC & Differential  - Ferritin  - Iron Profile    Iron deficiency anemia, unspecified iron deficiency anemia type  - CBC & Differential  - CBC & Differential  - CBC & Differential  - CBC & Differential  - Ferritin  - Iron Profile    1.  Breast cancer.  Right-sided invasive ductal carcinoma, papillary type, removed with lumpectomy on 08/26/2014 by Dr. Sung Griggs. Only 1.7 mm of remaining invasive carcinoma. Node negative. Completed radiation.   Arimidex 10/17/2014 until planned 10/17/2019. Tolerating Arimidex well.     2.  Bone health. Normal bone density on DEXA scan 9/23/16. Calcium carbonate 600 mg with vitamin D once daily, changed to calcium citrate 1000 mg with vitamin D when Arimidex was started (she is also on lansoprazole).     3.  Anemia of chronic renal insufficiency, stage 3.    Procrit p.r.n. for hemoglobin less than 10.   On the late March 2016 visit, changed from every 5 week CBC to every 2 week CBC due to a fall in hemoglobin.  She has not needed much Procrit on the every 3 week interval she is currently on.  However, family prefers to maintain 3 week interval checks.    4.  Iron deficiency anemia. History of Venofer and Feraheme use. Does not respond well to p.o. iron but is taking ferrous sulfate 1 tablet 3 times per day without any problems (she will continue this).   In May 2015, she received one dose of Feraheme due to an iron percent saturation of around 10% and MCV of 82.6 despite an elevated ferritin. I think her ferritin is chronically high because of inflammation).   Recent iron labs: Unremarkable     PLAN:  · CBC every 3 week.    · Procrit if hemoglobin less than 10.    · M.D. visit in 12 weeks with iron studies 3 weeks prior  · Next DEXA scan due sometime after 9/23/18  · She states Dr. Plascencia does her breast exams and orders her mammograms and she is up-to-date.    · Continue Arimidex calcium and vitamin D.

## (undated) DEVICE — CANN O2 ETCO2 FITS ALL CONN CO2 SMPL A/ 7IN DISP LF

## (undated) DEVICE — BNDR ABD 4PANEL 12IN MED/LG

## (undated) DEVICE — KT ORCA ORCAPOD DISP STRL

## (undated) DEVICE — BITEBLOCK OMNI BLOC

## (undated) DEVICE — ADAPT CLN BIOGUARD AIR/H2O DISP

## (undated) DEVICE — TUBING, SUCTION, 1/4" X 10', STRAIGHT: Brand: MEDLINE

## (undated) DEVICE — MSK PROC CURAPLEX O2 2/ADAPT 7FT

## (undated) DEVICE — SENSR O2 OXIMAX FNGR A/ 18IN NONSTR